# Patient Record
Sex: FEMALE | Race: BLACK OR AFRICAN AMERICAN | NOT HISPANIC OR LATINO | URBAN - METROPOLITAN AREA
[De-identification: names, ages, dates, MRNs, and addresses within clinical notes are randomized per-mention and may not be internally consistent; named-entity substitution may affect disease eponyms.]

---

## 2018-05-03 ENCOUNTER — INPATIENT (INPATIENT)
Facility: HOSPITAL | Age: 53
LOS: 16 days | Discharge: HOME HEALTH | End: 2018-05-20
Attending: INTERNAL MEDICINE | Admitting: INTERNAL MEDICINE
Payer: MEDICARE

## 2018-05-03 VITALS
SYSTOLIC BLOOD PRESSURE: 139 MMHG | HEART RATE: 116 BPM | TEMPERATURE: 98 F | DIASTOLIC BLOOD PRESSURE: 93 MMHG | OXYGEN SATURATION: 95 % | RESPIRATION RATE: 18 BRPM

## 2018-05-03 DIAGNOSIS — M21.959 UNSPECIFIED ACQUIRED DEFORMITY OF UNSPECIFIED THIGH: Chronic | ICD-10-CM

## 2018-05-03 DIAGNOSIS — I50.9 HEART FAILURE, UNSPECIFIED: ICD-10-CM

## 2018-05-03 DIAGNOSIS — E78.5 HYPERLIPIDEMIA, UNSPECIFIED: ICD-10-CM

## 2018-05-03 DIAGNOSIS — I10 ESSENTIAL (PRIMARY) HYPERTENSION: ICD-10-CM

## 2018-05-03 DIAGNOSIS — I82.90 ACUTE EMBOLISM AND THROMBOSIS OF UNSPECIFIED VEIN: ICD-10-CM

## 2018-05-03 DIAGNOSIS — S82.899A OTHER FRACTURE OF UNSPECIFIED LOWER LEG, INITIAL ENCOUNTER FOR CLOSED FRACTURE: Chronic | ICD-10-CM

## 2018-05-03 DIAGNOSIS — E11.9 TYPE 2 DIABETES MELLITUS WITHOUT COMPLICATIONS: ICD-10-CM

## 2018-05-03 LAB
ALBUMIN SERPL ELPH-MCNC: 2.6 G/DL — LOW (ref 3.3–5)
ALP SERPL-CCNC: 171 U/L — HIGH (ref 40–120)
ALT FLD-CCNC: 9 U/L — SIGNIFICANT CHANGE UP (ref 4–33)
ANISOCYTOSIS BLD QL: SLIGHT — SIGNIFICANT CHANGE UP
APTT BLD: 29.5 SEC — SIGNIFICANT CHANGE UP (ref 27.5–37.4)
AST SERPL-CCNC: 14 U/L — SIGNIFICANT CHANGE UP (ref 4–32)
BASE EXCESS BLDV CALC-SCNC: 2.8 MMOL/L — SIGNIFICANT CHANGE UP
BASOPHILS # BLD AUTO: 0.02 K/UL — SIGNIFICANT CHANGE UP (ref 0–0.2)
BASOPHILS NFR BLD AUTO: 0.4 % — SIGNIFICANT CHANGE UP (ref 0–2)
BASOPHILS NFR SPEC: 0 % — SIGNIFICANT CHANGE UP (ref 0–2)
BILIRUB SERPL-MCNC: 0.7 MG/DL — SIGNIFICANT CHANGE UP (ref 0.2–1.2)
BLOOD GAS VENOUS - CREATININE: 1.06 MG/DL — SIGNIFICANT CHANGE UP (ref 0.5–1.3)
BUN SERPL-MCNC: 24 MG/DL — HIGH (ref 7–23)
CALCIUM SERPL-MCNC: 8.8 MG/DL — SIGNIFICANT CHANGE UP (ref 8.4–10.5)
CHLORIDE BLDV-SCNC: 105 MMOL/L — SIGNIFICANT CHANGE UP (ref 96–108)
CHLORIDE SERPL-SCNC: 97 MMOL/L — LOW (ref 98–107)
CK MB BLD-MCNC: 2.05 NG/ML — SIGNIFICANT CHANGE UP (ref 1–4.7)
CK MB BLD-MCNC: SIGNIFICANT CHANGE UP (ref 0–2.5)
CK SERPL-CCNC: 85 U/L — SIGNIFICANT CHANGE UP (ref 25–170)
CO2 SERPL-SCNC: 28 MMOL/L — SIGNIFICANT CHANGE UP (ref 22–31)
CREAT SERPL-MCNC: 1.12 MG/DL — SIGNIFICANT CHANGE UP (ref 0.5–1.3)
EOSINOPHIL # BLD AUTO: 0.04 K/UL — SIGNIFICANT CHANGE UP (ref 0–0.5)
EOSINOPHIL NFR BLD AUTO: 0.8 % — SIGNIFICANT CHANGE UP (ref 0–6)
EOSINOPHIL NFR FLD: 0 % — SIGNIFICANT CHANGE UP (ref 0–6)
GAS PNL BLDV: 129 MMOL/L — LOW (ref 136–146)
GIANT PLATELETS BLD QL SMEAR: PRESENT — SIGNIFICANT CHANGE UP
GLUCOSE BLDC GLUCOMTR-MCNC: 160 MG/DL — HIGH (ref 70–99)
GLUCOSE BLDC GLUCOMTR-MCNC: 237 MG/DL — HIGH (ref 70–99)
GLUCOSE BLDV-MCNC: 307 — HIGH (ref 70–99)
GLUCOSE SERPL-MCNC: 331 MG/DL — HIGH (ref 70–99)
HBA1C BLD-MCNC: 14.3 % — HIGH (ref 4–5.6)
HCO3 BLDV-SCNC: 25 MMOL/L — SIGNIFICANT CHANGE UP (ref 20–27)
HCT VFR BLD CALC: 42.1 % — SIGNIFICANT CHANGE UP (ref 34.5–45)
HCT VFR BLDV CALC: 39.3 % — SIGNIFICANT CHANGE UP (ref 34.5–45)
HGB BLD-MCNC: 12.2 G/DL — SIGNIFICANT CHANGE UP (ref 11.5–15.5)
HGB BLDV-MCNC: 12.8 G/DL — SIGNIFICANT CHANGE UP (ref 11.5–15.5)
HYPOCHROMIA BLD QL: SLIGHT — SIGNIFICANT CHANGE UP
IMM GRANULOCYTES # BLD AUTO: 0.04 # — SIGNIFICANT CHANGE UP
IMM GRANULOCYTES NFR BLD AUTO: 0.8 % — SIGNIFICANT CHANGE UP (ref 0–1.5)
INR BLD: 1.12 — SIGNIFICANT CHANGE UP (ref 0.88–1.17)
LACTATE BLDV-MCNC: 1.2 MMOL/L — SIGNIFICANT CHANGE UP (ref 0.5–2)
LYMPHOCYTES # BLD AUTO: 0.74 K/UL — LOW (ref 1–3.3)
LYMPHOCYTES # BLD AUTO: 15.7 % — SIGNIFICANT CHANGE UP (ref 13–44)
LYMPHOCYTES NFR SPEC AUTO: 12.2 % — LOW (ref 13–44)
MAGNESIUM SERPL-MCNC: 1.6 MG/DL — SIGNIFICANT CHANGE UP (ref 1.6–2.6)
MCHC RBC-ENTMCNC: 20.8 PG — LOW (ref 27–34)
MCHC RBC-ENTMCNC: 29 % — LOW (ref 32–36)
MCV RBC AUTO: 71.8 FL — LOW (ref 80–100)
METAMYELOCYTES # FLD: 0.9 % — SIGNIFICANT CHANGE UP (ref 0–1)
MICROCYTES BLD QL: SLIGHT — SIGNIFICANT CHANGE UP
MONOCYTES # BLD AUTO: 0.49 K/UL — SIGNIFICANT CHANGE UP (ref 0–0.9)
MONOCYTES NFR BLD AUTO: 10.4 % — SIGNIFICANT CHANGE UP (ref 2–14)
MONOCYTES NFR BLD: 10.4 % — HIGH (ref 2–9)
NEUTROPHIL AB SER-ACNC: 67.8 % — SIGNIFICANT CHANGE UP (ref 43–77)
NEUTROPHILS # BLD AUTO: 3.39 K/UL — SIGNIFICANT CHANGE UP (ref 1.8–7.4)
NEUTROPHILS NFR BLD AUTO: 71.9 % — SIGNIFICANT CHANGE UP (ref 43–77)
NEUTS BAND # BLD: 2.6 % — SIGNIFICANT CHANGE UP (ref 0–6)
NRBC # FLD: 0 — SIGNIFICANT CHANGE UP
NT-PROBNP SERPL-SCNC: SIGNIFICANT CHANGE UP PG/ML
OVALOCYTES BLD QL SMEAR: SIGNIFICANT CHANGE UP
PCO2 BLDV: 50 MMHG — SIGNIFICANT CHANGE UP (ref 41–51)
PH BLDV: 7.36 PH — SIGNIFICANT CHANGE UP (ref 7.32–7.43)
PLATELET # BLD AUTO: 328 K/UL — SIGNIFICANT CHANGE UP (ref 150–400)
PLATELET COUNT - ESTIMATE: NORMAL — SIGNIFICANT CHANGE UP
PMV BLD: 9.4 FL — SIGNIFICANT CHANGE UP (ref 7–13)
PO2 BLDV: < 24 MMHG — LOW (ref 35–40)
POIKILOCYTOSIS BLD QL AUTO: SLIGHT — SIGNIFICANT CHANGE UP
POTASSIUM BLDV-SCNC: 4.6 MMOL/L — HIGH (ref 3.4–4.5)
POTASSIUM SERPL-MCNC: 4.8 MMOL/L — SIGNIFICANT CHANGE UP (ref 3.5–5.3)
POTASSIUM SERPL-SCNC: 4.8 MMOL/L — SIGNIFICANT CHANGE UP (ref 3.5–5.3)
PROT SERPL-MCNC: 6.6 G/DL — SIGNIFICANT CHANGE UP (ref 6–8.3)
PROTHROM AB SERPL-ACNC: 12.5 SEC — SIGNIFICANT CHANGE UP (ref 9.8–13.1)
RBC # BLD: 5.86 M/UL — HIGH (ref 3.8–5.2)
RBC # FLD: 15.9 % — HIGH (ref 10.3–14.5)
REVIEW TO FOLLOW: YES — SIGNIFICANT CHANGE UP
SAO2 % BLDV: 25.6 % — LOW (ref 60–85)
SODIUM SERPL-SCNC: 136 MMOL/L — SIGNIFICANT CHANGE UP (ref 135–145)
TROPONIN T SERPL-MCNC: < 0.06 NG/ML — SIGNIFICANT CHANGE UP (ref 0–0.06)
TROPONIN T SERPL-MCNC: < 0.06 NG/ML — SIGNIFICANT CHANGE UP (ref 0–0.06)
TSH SERPL-MCNC: 6.4 UIU/ML — HIGH (ref 0.27–4.2)
VARIANT LYMPHS # BLD: 6.1 % — SIGNIFICANT CHANGE UP
WBC # BLD: 4.72 K/UL — SIGNIFICANT CHANGE UP (ref 3.8–10.5)
WBC # FLD AUTO: 4.72 K/UL — SIGNIFICANT CHANGE UP (ref 3.8–10.5)

## 2018-05-03 PROCEDURE — 93970 EXTREMITY STUDY: CPT | Mod: 26

## 2018-05-03 PROCEDURE — 71045 X-RAY EXAM CHEST 1 VIEW: CPT | Mod: 26

## 2018-05-03 RX ORDER — FUROSEMIDE 40 MG
40 TABLET ORAL ONCE
Qty: 0 | Refills: 0 | Status: COMPLETED | OUTPATIENT
Start: 2018-05-03 | End: 2018-05-03

## 2018-05-03 RX ORDER — RIVAROXABAN 15 MG-20MG
20 KIT ORAL EVERY 24 HOURS
Qty: 0 | Refills: 0 | Status: DISCONTINUED | OUTPATIENT
Start: 2018-05-03 | End: 2018-05-05

## 2018-05-03 RX ORDER — FERROUS SULFATE 325(65) MG
325 TABLET ORAL
Qty: 0 | Refills: 0 | Status: DISCONTINUED | OUTPATIENT
Start: 2018-05-03 | End: 2018-05-20

## 2018-05-03 RX ORDER — ACETAMINOPHEN 500 MG
650 TABLET ORAL EVERY 6 HOURS
Qty: 0 | Refills: 0 | Status: DISCONTINUED | OUTPATIENT
Start: 2018-05-03 | End: 2018-05-20

## 2018-05-03 RX ORDER — INSULIN LISPRO 100/ML
VIAL (ML) SUBCUTANEOUS AT BEDTIME
Qty: 0 | Refills: 0 | Status: DISCONTINUED | OUTPATIENT
Start: 2018-05-03 | End: 2018-05-04

## 2018-05-03 RX ORDER — DEXTROSE 50 % IN WATER 50 %
25 SYRINGE (ML) INTRAVENOUS ONCE
Qty: 0 | Refills: 0 | Status: DISCONTINUED | OUTPATIENT
Start: 2018-05-03 | End: 2018-05-04

## 2018-05-03 RX ORDER — INSULIN LISPRO 100/ML
6 VIAL (ML) SUBCUTANEOUS ONCE
Qty: 0 | Refills: 0 | Status: COMPLETED | OUTPATIENT
Start: 2018-05-03 | End: 2018-05-03

## 2018-05-03 RX ORDER — METOPROLOL TARTRATE 50 MG
25 TABLET ORAL DAILY
Qty: 0 | Refills: 0 | Status: DISCONTINUED | OUTPATIENT
Start: 2018-05-03 | End: 2018-05-05

## 2018-05-03 RX ORDER — ATORVASTATIN CALCIUM 80 MG/1
40 TABLET, FILM COATED ORAL AT BEDTIME
Qty: 0 | Refills: 0 | Status: DISCONTINUED | OUTPATIENT
Start: 2018-05-03 | End: 2018-05-20

## 2018-05-03 RX ORDER — GLUCAGON INJECTION, SOLUTION 0.5 MG/.1ML
1 INJECTION, SOLUTION SUBCUTANEOUS ONCE
Qty: 0 | Refills: 0 | Status: DISCONTINUED | OUTPATIENT
Start: 2018-05-03 | End: 2018-05-04

## 2018-05-03 RX ORDER — SODIUM CHLORIDE 9 MG/ML
1000 INJECTION, SOLUTION INTRAVENOUS
Qty: 0 | Refills: 0 | Status: DISCONTINUED | OUTPATIENT
Start: 2018-05-03 | End: 2018-05-04

## 2018-05-03 RX ORDER — DEXTROSE 50 % IN WATER 50 %
1 SYRINGE (ML) INTRAVENOUS ONCE
Qty: 0 | Refills: 0 | Status: DISCONTINUED | OUTPATIENT
Start: 2018-05-03 | End: 2018-05-04

## 2018-05-03 RX ORDER — LOSARTAN POTASSIUM 100 MG/1
25 TABLET, FILM COATED ORAL DAILY
Qty: 0 | Refills: 0 | Status: DISCONTINUED | OUTPATIENT
Start: 2018-05-03 | End: 2018-05-05

## 2018-05-03 RX ORDER — FUROSEMIDE 40 MG
40 TABLET ORAL
Qty: 0 | Refills: 0 | Status: DISCONTINUED | OUTPATIENT
Start: 2018-05-03 | End: 2018-05-05

## 2018-05-03 RX ORDER — ASPIRIN/CALCIUM CARB/MAGNESIUM 324 MG
81 TABLET ORAL DAILY
Qty: 0 | Refills: 0 | Status: DISCONTINUED | OUTPATIENT
Start: 2018-05-03 | End: 2018-05-20

## 2018-05-03 RX ORDER — INSULIN LISPRO 100/ML
VIAL (ML) SUBCUTANEOUS
Qty: 0 | Refills: 0 | Status: DISCONTINUED | OUTPATIENT
Start: 2018-05-03 | End: 2018-05-04

## 2018-05-03 RX ORDER — FLUTICASONE PROPIONATE 220 MCG
1 AEROSOL WITH ADAPTER (GRAM) INHALATION
Qty: 0 | Refills: 0 | Status: DISCONTINUED | OUTPATIENT
Start: 2018-05-03 | End: 2018-05-20

## 2018-05-03 RX ORDER — DEXTROSE 50 % IN WATER 50 %
12.5 SYRINGE (ML) INTRAVENOUS ONCE
Qty: 0 | Refills: 0 | Status: DISCONTINUED | OUTPATIENT
Start: 2018-05-03 | End: 2018-05-04

## 2018-05-03 RX ADMIN — ATORVASTATIN CALCIUM 40 MILLIGRAM(S): 80 TABLET, FILM COATED ORAL at 22:56

## 2018-05-03 RX ADMIN — Medication 20 MILLIGRAM(S): at 18:18

## 2018-05-03 RX ADMIN — Medication 1: at 18:24

## 2018-05-03 RX ADMIN — Medication 40 MILLIGRAM(S): at 22:56

## 2018-05-03 RX ADMIN — Medication 6 UNIT(S): at 14:00

## 2018-05-03 RX ADMIN — Medication 1 PUFF(S): at 22:46

## 2018-05-03 RX ADMIN — Medication 40 MILLIGRAM(S): at 10:56

## 2018-05-03 RX ADMIN — RIVAROXABAN 20 MILLIGRAM(S): KIT at 18:18

## 2018-05-03 RX ADMIN — Medication 325 MILLIGRAM(S): at 18:18

## 2018-05-03 NOTE — H&P ADULT - HISTORY OF PRESENT ILLNESS
53 y/o female, with a PmHx of CHF stage III, on home O2, Sarcoidosis, ? blood clot in heart on Xarelto, DM, presented with sob and LE edema/pain. Pt states for the past 3 weeks she has been having LE edema/pain and sob but in the last 24 hours was started to get worse.  Pt states she was recently admitted to [facility] in February for a similar complaint and was eating much so she states her normal weight used to be 165lb but she has lost a lot of weight and now weights about 110. She states she has been having a  lot pain to both her legs so she decided to come to [facility] ED today for an evaluation. She denies any fever, chills, chest pain, HA, dizziness, blurred vision, n/v, abd pain, recent travel. The only complaints she has been having are nasal congestion, cold intolerance, diaphoresis, bilateral leg pain and sob (more than normal because she is on o2 via nasal cannula at home. She appears comfortable at this time. She is being admitted to telemetry for acute on chronic diastolic heart failure and chest pain r/o acs.

## 2018-05-03 NOTE — H&P ADULT - RS GEN PE MLT RESP DETAILS PC
respirations non-labored/no chest wall tenderness/airway patent/good air movement/clear to auscultation bilaterally/breath sounds equal

## 2018-05-03 NOTE — H&P ADULT - ATTENDING COMMENTS
Pt seen and examined at bedside Agree with assessment and plan as above   Admitted with acute on chronic systolic heart failure   IV lasix   TTE   For now continue with all home meds   Obtain outside cardiac records  Pulm consult

## 2018-05-03 NOTE — H&P ADULT - PROBLEM SELECTOR PLAN 1
ekg/telemetry, f/u ce x 2, mg, tsh, echo, lasix 40mg iv bid, strict I & O's, daily wts, fluid restriction, probnp, pt c/s ekg/telemetry, f/u ce x 2, mg, tsh, echo, lasix 40mg iv bid, strict I & O's, daily wts, fluid restriction, probnp,  Check TTE

## 2018-05-03 NOTE — H&P ADULT - FAMILY HISTORY
Mother  Still living? No  Family history of colon cancer in mother, Age at diagnosis: Age Unknown  Family history of breast cancer in mother, Age at diagnosis: Age Unknown     Father  Still living? Unknown  Family history of diabetes mellitus, Age at diagnosis: Age Unknown     Sibling  Still living? Yes, Estimated age: Age Unknown  Family history of diabetes mellitus, Age at diagnosis: Age Unknown  Family history of hypertension, Age at diagnosis: Age Unknown

## 2018-05-03 NOTE — H&P ADULT - NEGATIVE OPHTHALMOLOGIC SYMPTOMS
no diplopia/no blurred vision L/no loss of vision L/no loss of vision R/no photophobia/no blurred vision R

## 2018-05-03 NOTE — H&P ADULT - NSHPPHYSICALEXAM_GEN_ALL_CORE
Vital Signs Last 24 Hrs  T(C): 37 (03 May 2018 16:39), Max: 37.1 (03 May 2018 15:01)  T(F): 98.6 (03 May 2018 16:39), Max: 98.7 (03 May 2018 15:01)  HR: 98 (03 May 2018 16:39) (98 - 116)  BP: 112/81 (03 May 2018 16:39) (98/66 - 139/93)  BP(mean): --  RR: 16 (03 May 2018 16:39) (16 - 18)  SpO2: 99% (03 May 2018 16:39) (95% - 99%)    EKG: Sinus tach @ 115

## 2018-05-03 NOTE — H&P ADULT - ASSESSMENT
51 y/o female, with a PmHx of CHF stage III, on home O2, Sarcoidosis, ? blood clot in heart on Xarelto, DM, presented with sob and LE edema/pain. She is being admitted to telemetry for acute on chronic systolic heart failure.

## 2018-05-03 NOTE — H&P ADULT - PMH
Anemia    CHF (congestive heart failure)  Stage III, on home O2 @ 2lpm  Diabetes    Hyperlipidemia    Hypertension    Sarcoidosis    Thrombus  ? Thrombus in heart - on Xarelto

## 2018-05-03 NOTE — ED PROVIDER NOTE - ATTENDING CONTRIBUTION TO CARE
53 yo F with history of CHF likely stage III given history of possible defib placement on xarelto, sarcoidosis, dm, presenting with shortness of breath (on home O2 at baseline) and worsening lower extremity edema despite 80 lasix PO daily. Pos pack no chest pain  pt does not know reason for xarelto.  pe ncat  lung rales  le with pitting edema  imp decompensated chf failing outpt therapy.  Will admit, consult cards, optimization, and evaluation for ppm placement

## 2018-05-03 NOTE — ED PROVIDER NOTE - MEDICAL DECISION MAKING DETAILS
51 yo F with likely decompensated heart failure - labs, lasix, ekg, cxr and BLE doppler for swelling and likely admission

## 2018-05-03 NOTE — ED PROVIDER NOTE - OBJECTIVE STATEMENT
53 yo F with history of CHF likely stage III given history of possible defib placement on xarelto, sarcoidosis, dm, presenting with shortness of breath (on home O2 at baseline) and worsening lower extremity edema despite 80 lasix PO daily. Cardiologist is Dr. Sanford. Denies fevers, chills, cough, rhinorrhea, otorrhea, otalgia, nausea, vomiting, constipation, diarrhea, chest pain, or changes in urinary habits.

## 2018-05-03 NOTE — H&P ADULT - NSHPLABSRESULTS_GEN_ALL_CORE
LABS:    CARDIAC MARKERS:  CARDIAC MARKERS ( 03 May 2018 16:59 )  x     / < 0.06 ng/mL / 85 u/L / 2.05 ng/mL / x      CARDIAC MARKERS ( 03 May 2018 10:22 )  x     / < 0.06 ng/mL / x     / x     / x                                    11.8   6.14  )-----------( 298      ( 04 May 2018 06:45 )             40.7     05-04    130<L>  |  93<L>  |  34<H>  ----------------------------<  678<HH>  5.1   |  24  |  1.17    Ca    8.3<L>      04 May 2018 06:45  Mg     1.6     05-03    TPro  6.6  /  Alb  2.6<L>  /  TBili  0.7  /  DBili  x   /  AST  14  /  ALT  9   /  AlkPhos  171<H>  05-03    proBNP: Serum Pro-Brain Natriuretic Peptide: 7826 pg/mL (05-04 @ 06:45)    Lipid Profile:   HgA1c: Hemoglobin A1C, Whole Blood: 14.3 % (05-04 @ 06:45)  Hemoglobin A1C, Whole Blood: 14.3 % (05-03 @ 16:59)    TSH: Thyroid Stimulating Hormone, Serum: 6.40 uIU/mL (05-03 @ 16:59)      3-5/LPF

## 2018-05-03 NOTE — H&P ADULT - PSH
Ankle fracture  Left Ankle - with plate and screws placed  Hip deformity  left hip had a pin placed to hold bone in place after a fall

## 2018-05-03 NOTE — ED ADULT TRIAGE NOTE - CHIEF COMPLAINT QUOTE
Pt with bi-lateral leg edema and pain to knees having difficulty walking x several weeks worse now. Pt also reports edema to vaginal area.   Pt with increased sob with activity. Pt is on lasix.

## 2018-05-03 NOTE — H&P ADULT - NSHPREVIEWOFSYSTEMS_GEN_ALL_CORE
REVIEW OF SYSTEMS:    CONSTITUTIONAL: + weakness, fevers or chills  EYES/ENT: No visual changes;  No vertigo or throat pain   NECK: No pain or stiffness  RESPIRATORY: + cough, wheezing, hemoptysis; + shortness of breath  CARDIOVASCULAR: No chest pain or palpitations  GASTROINTESTINAL: No abdominal or epigastric pain. No nausea, vomiting, or hematemesis; No diarrhea or constipation. No melena or hematochezia.  GENITOURINARY: No dysuria, frequency or hematuria  NEUROLOGICAL: No numbness or weakness  SKIN: No itching, burning, rashes, or lesions   All other review of systems is negative unless indicated above.

## 2018-05-03 NOTE — ED ADULT NURSE NOTE - OBJECTIVE STATEMENT
pt received to room 13 via wheelchair. patient alert and oriented x4. afebrile. lungs has b.l lower lobe crackles. pt c.o of having sob with excretion and increase in swelling of b.l LE for the past 3 weeks. pt as b.l +3 pitting edema on the b.l LE, the right leg more swollen than left. pt c.o of having pain in the right leg. positive pedal pulses. limited LE ROM. Pt placed on continuous tele monitoring.pt is afib on cm,pt on Xarelto.   pt has no complaints of CP or SOB at the moment. md at bedside for eval. will continue to monitor

## 2018-05-03 NOTE — ED ADULT NURSE REASSESSMENT NOTE - NS ED NURSE REASSESS COMMENT FT1
Report received from LAUREN Paul. No acute distress at present. Respirations are even and unlabored. Pt. received with 20 gauge IV in right ac. Pt. is admitted to Tele, awaiting bed assignment. Report given to [facility] LAUREN Vazquez. Pt. will be transported to [facility].

## 2018-05-04 DIAGNOSIS — E78.5 HYPERLIPIDEMIA, UNSPECIFIED: ICD-10-CM

## 2018-05-04 DIAGNOSIS — R94.6 ABNORMAL RESULTS OF THYROID FUNCTION STUDIES: ICD-10-CM

## 2018-05-04 DIAGNOSIS — E11.65 TYPE 2 DIABETES MELLITUS WITH HYPERGLYCEMIA: ICD-10-CM

## 2018-05-04 DIAGNOSIS — R73.9 HYPERGLYCEMIA, UNSPECIFIED: ICD-10-CM

## 2018-05-04 LAB
APPEARANCE UR: CLEAR — SIGNIFICANT CHANGE UP
B-OH-BUTYR SERPL-SCNC: 0.7 MMOL/L — HIGH (ref 0–0.4)
BILIRUB UR-MCNC: NEGATIVE — SIGNIFICANT CHANGE UP
BLOOD UR QL VISUAL: HIGH
BUN SERPL-MCNC: 34 MG/DL — HIGH (ref 7–23)
CALCIUM SERPL-MCNC: 8.3 MG/DL — LOW (ref 8.4–10.5)
CHLORIDE SERPL-SCNC: 93 MMOL/L — LOW (ref 98–107)
CHOLEST SERPL-MCNC: 157 MG/DL — SIGNIFICANT CHANGE UP (ref 120–199)
CO2 SERPL-SCNC: 24 MMOL/L — SIGNIFICANT CHANGE UP (ref 22–31)
COLOR SPEC: YELLOW — SIGNIFICANT CHANGE UP
CREAT SERPL-MCNC: 1.17 MG/DL — SIGNIFICANT CHANGE UP (ref 0.5–1.3)
GLUCOSE BLDC GLUCOMTR-MCNC: 339 MG/DL — HIGH (ref 70–99)
GLUCOSE BLDC GLUCOMTR-MCNC: 360 MG/DL — HIGH (ref 70–99)
GLUCOSE BLDC GLUCOMTR-MCNC: 396 MG/DL — HIGH (ref 70–99)
GLUCOSE BLDC GLUCOMTR-MCNC: 504 MG/DL — CRITICAL HIGH (ref 70–99)
GLUCOSE BLDC GLUCOMTR-MCNC: >600 MG/DL — CRITICAL HIGH (ref 70–99)
GLUCOSE SERPL-MCNC: 678 MG/DL — CRITICAL HIGH (ref 70–99)
GLUCOSE UR-MCNC: 50 — SIGNIFICANT CHANGE UP
HBA1C BLD-MCNC: 14.3 % — HIGH (ref 4–5.6)
HCG UR-SCNC: NEGATIVE — SIGNIFICANT CHANGE UP
HCT VFR BLD CALC: 40.7 % — SIGNIFICANT CHANGE UP (ref 34.5–45)
HDLC SERPL-MCNC: 53 MG/DL — SIGNIFICANT CHANGE UP (ref 45–65)
HGB BLD-MCNC: 11.8 G/DL — SIGNIFICANT CHANGE UP (ref 11.5–15.5)
HYALINE CASTS # UR AUTO: HIGH (ref 0–?)
KETONES UR-MCNC: NEGATIVE — SIGNIFICANT CHANGE UP
LEUKOCYTE ESTERASE UR-ACNC: NEGATIVE — SIGNIFICANT CHANGE UP
LIPID PNL WITH DIRECT LDL SERPL: 95 MG/DL — SIGNIFICANT CHANGE UP
MCHC RBC-ENTMCNC: 20.8 PG — LOW (ref 27–34)
MCHC RBC-ENTMCNC: 29 % — LOW (ref 32–36)
MCV RBC AUTO: 71.7 FL — LOW (ref 80–100)
MUCOUS THREADS # UR AUTO: SIGNIFICANT CHANGE UP
NITRITE UR-MCNC: NEGATIVE — SIGNIFICANT CHANGE UP
NRBC # FLD: 0 — SIGNIFICANT CHANGE UP
NT-PROBNP SERPL-SCNC: 7826 PG/ML — SIGNIFICANT CHANGE UP
PH UR: 6.5 — SIGNIFICANT CHANGE UP (ref 4.6–8)
PLATELET # BLD AUTO: 298 K/UL — SIGNIFICANT CHANGE UP (ref 150–400)
PMV BLD: 10.3 FL — SIGNIFICANT CHANGE UP (ref 7–13)
POTASSIUM SERPL-MCNC: 5.1 MMOL/L — SIGNIFICANT CHANGE UP (ref 3.5–5.3)
POTASSIUM SERPL-SCNC: 5.1 MMOL/L — SIGNIFICANT CHANGE UP (ref 3.5–5.3)
PROT UR-MCNC: >600 MG/DL — SIGNIFICANT CHANGE UP
RBC # BLD: 5.68 M/UL — HIGH (ref 3.8–5.2)
RBC # FLD: 15.9 % — HIGH (ref 10.3–14.5)
RBC CASTS # UR COMP ASSIST: SIGNIFICANT CHANGE UP (ref 0–?)
SODIUM SERPL-SCNC: 130 MMOL/L — LOW (ref 135–145)
SP GR SPEC: 1.02 — SIGNIFICANT CHANGE UP (ref 1–1.04)
SP GR UR: 1.02 — SIGNIFICANT CHANGE UP (ref 1–1.03)
SPECIMEN SOURCE: SIGNIFICANT CHANGE UP
SQUAMOUS # UR AUTO: SIGNIFICANT CHANGE UP
TRIGL SERPL-MCNC: 111 MG/DL — SIGNIFICANT CHANGE UP (ref 10–149)
UROBILINOGEN FLD QL: NORMAL MG/DL — SIGNIFICANT CHANGE UP
WBC # BLD: 6.14 K/UL — SIGNIFICANT CHANGE UP (ref 3.8–10.5)
WBC # FLD AUTO: 6.14 K/UL — SIGNIFICANT CHANGE UP (ref 3.8–10.5)
WBC CLUMPS #/AREA URNS HPF: PRESENT — HIGH (ref 0–?)
WBC UR QL: SIGNIFICANT CHANGE UP (ref 0–?)

## 2018-05-04 PROCEDURE — 99223 1ST HOSP IP/OBS HIGH 75: CPT | Mod: GC

## 2018-05-04 PROCEDURE — 71045 X-RAY EXAM CHEST 1 VIEW: CPT | Mod: 26

## 2018-05-04 RX ORDER — SODIUM CHLORIDE 9 MG/ML
1000 INJECTION, SOLUTION INTRAVENOUS
Qty: 0 | Refills: 0 | Status: DISCONTINUED | OUTPATIENT
Start: 2018-05-04 | End: 2018-05-20

## 2018-05-04 RX ORDER — DEXTROSE 50 % IN WATER 50 %
25 SYRINGE (ML) INTRAVENOUS ONCE
Qty: 0 | Refills: 0 | Status: DISCONTINUED | OUTPATIENT
Start: 2018-05-04 | End: 2018-05-20

## 2018-05-04 RX ORDER — INSULIN LISPRO 100/ML
10 VIAL (ML) SUBCUTANEOUS
Qty: 0 | Refills: 0 | Status: DISCONTINUED | OUTPATIENT
Start: 2018-05-04 | End: 2018-05-05

## 2018-05-04 RX ORDER — INSULIN LISPRO 100/ML
VIAL (ML) SUBCUTANEOUS AT BEDTIME
Qty: 0 | Refills: 0 | Status: DISCONTINUED | OUTPATIENT
Start: 2018-05-04 | End: 2018-05-18

## 2018-05-04 RX ORDER — INSULIN GLARGINE 100 [IU]/ML
15 INJECTION, SOLUTION SUBCUTANEOUS AT BEDTIME
Qty: 0 | Refills: 0 | Status: DISCONTINUED | OUTPATIENT
Start: 2018-05-04 | End: 2018-05-05

## 2018-05-04 RX ORDER — DEXTROSE 50 % IN WATER 50 %
1 SYRINGE (ML) INTRAVENOUS ONCE
Qty: 0 | Refills: 0 | Status: DISCONTINUED | OUTPATIENT
Start: 2018-05-04 | End: 2018-05-20

## 2018-05-04 RX ORDER — INSULIN LISPRO 100/ML
10 VIAL (ML) SUBCUTANEOUS ONCE
Qty: 0 | Refills: 0 | Status: COMPLETED | OUTPATIENT
Start: 2018-05-04 | End: 2018-05-04

## 2018-05-04 RX ORDER — INSULIN LISPRO 100/ML
VIAL (ML) SUBCUTANEOUS
Qty: 0 | Refills: 0 | Status: DISCONTINUED | OUTPATIENT
Start: 2018-05-04 | End: 2018-05-18

## 2018-05-04 RX ORDER — DEXTROSE 50 % IN WATER 50 %
12.5 SYRINGE (ML) INTRAVENOUS ONCE
Qty: 0 | Refills: 0 | Status: DISCONTINUED | OUTPATIENT
Start: 2018-05-04 | End: 2018-05-20

## 2018-05-04 RX ORDER — ALBUTEROL 90 UG/1
2 AEROSOL, METERED ORAL EVERY 6 HOURS
Qty: 0 | Refills: 0 | Status: DISCONTINUED | OUTPATIENT
Start: 2018-05-04 | End: 2018-05-10

## 2018-05-04 RX ORDER — GLUCAGON INJECTION, SOLUTION 0.5 MG/.1ML
1 INJECTION, SOLUTION SUBCUTANEOUS ONCE
Qty: 0 | Refills: 0 | Status: DISCONTINUED | OUTPATIENT
Start: 2018-05-04 | End: 2018-05-20

## 2018-05-04 RX ADMIN — Medication 10 UNIT(S): at 08:24

## 2018-05-04 RX ADMIN — Medication 20 MILLIGRAM(S): at 05:37

## 2018-05-04 RX ADMIN — RIVAROXABAN 20 MILLIGRAM(S): KIT at 17:11

## 2018-05-04 RX ADMIN — Medication 40 MILLIGRAM(S): at 05:36

## 2018-05-04 RX ADMIN — Medication 40 MILLIGRAM(S): at 17:11

## 2018-05-04 RX ADMIN — Medication 10 UNIT(S): at 17:56

## 2018-05-04 RX ADMIN — ATORVASTATIN CALCIUM 40 MILLIGRAM(S): 80 TABLET, FILM COATED ORAL at 21:07

## 2018-05-04 RX ADMIN — Medication 10 UNIT(S): at 09:45

## 2018-05-04 RX ADMIN — Medication 25 MILLIGRAM(S): at 05:37

## 2018-05-04 RX ADMIN — Medication 325 MILLIGRAM(S): at 17:11

## 2018-05-04 RX ADMIN — Medication 4: at 22:37

## 2018-05-04 RX ADMIN — Medication 1 PUFF(S): at 09:46

## 2018-05-04 RX ADMIN — INSULIN GLARGINE 15 UNIT(S): 100 INJECTION, SOLUTION SUBCUTANEOUS at 22:37

## 2018-05-04 RX ADMIN — Medication 325 MILLIGRAM(S): at 05:36

## 2018-05-04 RX ADMIN — Medication 10: at 17:55

## 2018-05-04 RX ADMIN — LOSARTAN POTASSIUM 25 MILLIGRAM(S): 100 TABLET, FILM COATED ORAL at 05:37

## 2018-05-04 RX ADMIN — Medication 1 PUFF(S): at 21:07

## 2018-05-04 RX ADMIN — Medication 5: at 13:38

## 2018-05-04 RX ADMIN — Medication 20 MILLIGRAM(S): at 17:11

## 2018-05-04 RX ADMIN — Medication 81 MILLIGRAM(S): at 13:38

## 2018-05-04 NOTE — CONSULT NOTE ADULT - ASSESSMENT
53 y/o female, with a PMH uncontrolled DM2 c/b steroid induced hyperglycemia, HLD, CHF on home oxygen, hx of sarcoidosis on chronic prednisone, here with acute decompensated heart failure, also with severe steroid induced hyperglycemia in setting of uncontrolled DM2

## 2018-05-04 NOTE — CONSULT NOTE ADULT - ATTENDING COMMENTS
52F uncontrolled DM2. Will need basal bolus insulin inpatient and upon dc. Patient has beliefs about alternative medicine treatments and will need to reinforce importance of insulin at dc. Stop oral agents at dc. 52F uncontrolled DM2 exacerbated by chronic prednsione for sarcoid. Will need basal bolus insulin inpatient and upon dc. Patient has beliefs about alternative medicine treatments and will need to reinforce importance of insulin at dc. Stop oral agents at dc.

## 2018-05-04 NOTE — PHYSICAL THERAPY INITIAL EVALUATION ADULT - GENERAL OBSERVATIONS, REHAB EVAL
Consult received, chart reviewed. Patient received seated in chair, NAD, +cardiac monitor. Patient agreed to Evaluation from Physical Therapist.

## 2018-05-04 NOTE — CONSULT NOTE ADULT - PROBLEM SELECTOR RECOMMENDATION 9
- patient with severe steroid induced hyperglycemia in setting of uncontrolled DM2  - start basal bolus insulin at 0.6 units/kg as patient is on steroids - start Humalog 10 units qac and Lantus 15 units qhs, with moderate correction scale  - consistent carb diet  - order nutrition consult  - will follow  - plan to discharge on basal bolus insulin only, doses to be determined  - she can follow up in office as outpatient - [phone]

## 2018-05-04 NOTE — CONSULT NOTE ADULT - PROBLEM SELECTOR RECOMMENDATION 2
- recommend start basal bolus insulin at 0.6 units/kr - start Humalog 10 units qac and Lantus 15 units qhs, with moderate correction scale  - consistent carb diet  - will follow

## 2018-05-04 NOTE — PHYSICAL THERAPY INITIAL EVALUATION ADULT - PERTINENT HX OF CURRENT PROBLEM, REHAB EVAL
Pt. admitted for LE edema/pain and SOB. Acute on chronic CHF. Per radiology report, bilateral LE venous dopplers negative for DVT. PMH of CHF stage III, on home O2, Sarcoidosis, ? blood clot in heart on Xarelto, DM.

## 2018-05-04 NOTE — CONSULT NOTE ADULT - SUBJECTIVE AND OBJECTIVE BOX
HPI:  53 y/o female, with a PMH uncontrolled DM2 c/b steroid induced hyperglycemia, HLD, CHF on home oxygen, hx of sarcoidosis on chronic prednisone presents with sob and LE edema/pain, now admitted for acute decompensated heart failure.    Endocrine history:  Patient states that she was diagnosed with DM2 years ago. She is a somewhat poor historian. States that she takes Metformin 500 mg BID. Was previously on Glipizide, but was then switched to another sulfonylurea which she is not taking. Does not check FS because she has not had a glucometer for a year. Saw optho two years ago, no known retinopathy. She does not have symptoms of neuropathy. Has CKD stage 2. Due to lower extremity edema, she is increasingly bedbound. States that she cannot leave her home to buy her food and therefore only eats about one meal a day - she waits until her son comes home and then orders out (Chinese food, etc). Will sometimes have eggs and toast in the morning. States that she knows how to regulate her blood glucose levels by using turmeric (?). Does not have blurry vision, polyuria, polydipsia. Of note, she also has a history of sarcoidosis and has been taking prednisone daily for a year. Currently takes prednisone 20 mg daily. Does not have an endocrinologist, only a PMD.         PAST MEDICAL & SURGICAL HISTORY:  Thrombus: ? Thrombus in heart - on Xarelto  Hyperlipidemia  Anemia  Hypertension  Sarcoidosis  CHF (congestive heart failure): Stage III, on home O2 @ 2lpm  Diabetes  Ankle fracture: Left Ankle - with plate and screws placed  Hip deformity: left hip had a pin placed to hold bone in place after a fall      FAMILY HISTORY:  Family history of hypertension (Sibling): Siblings  Family history of diabetes mellitus (Father, Sibling): Father, Siblings  Family history of breast cancer in mother (Mother): Mother  Family history of colon cancer in mother (Mother): Mother      Social History:  No cigarette use  Social alcohol use  Lives with son  Single    Outpatient Medications:  · 	Metoprolol Succinate ER 25 mg oral tablet, extended release: 1 tab(s) orally once a day, Last Dose Taken:    · 	Xarelto 20 mg oral tablet: 1 tab(s) orally once a day (in the evening), Last Dose Taken:    · 	Flovent 220 mcg/inh inhalation aerosol with adapter: 1 puff(s) inhaled 2 times a day, Last Dose Taken:    · 	Aspirin Enteric Coated 81 mg oral delayed release tablet: 1 tab(s) orally once a day, Last Dose Taken:    · 	rosuvastatin 10 mg oral tablet: 1 tab(s) orally once a day (at bedtime), Last Dose Taken:    · 	predniSONE 20 mg oral tablet: 1 tab(s) orally 2 times a day, Last Dose Taken:    · 	azelastine 137 mcg/inh (0.1%) nasal spray: 2 spray(s) nasal 2 times a day, Last Dose Taken:    · 	furosemide 80 mg oral tablet: 1 tab(s) orally once a day, Last Dose Taken:    · 	sulfamethoxazole-trimethoprim DS: 1 tab(s) orally Monday, Wednesday, and Friday  · 	metFORMIN 500 mg oral tablet: 1 tab(s) orally 3 times a day, Last Dose Taken:    · 	losartan 25 mg oral tablet: 1 tab(s) orally once a day, Last Dose Taken:    · 	ferrous sulfate 325 mg (65 mg elemental iron) oral tablet: 1 tab(s) orally 2 times a day, Last Dose Taken:      MEDICATIONS  (STANDING):  aspirin enteric coated 81 milliGRAM(s) Oral daily  atorvastatin 40 milliGRAM(s) Oral at bedtime  dextrose 5%. 1000 milliLiter(s) (50 mL/Hr) IV Continuous <Continuous>  dextrose 50% Injectable 12.5 Gram(s) IV Push once  dextrose 50% Injectable 25 Gram(s) IV Push once  dextrose 50% Injectable 25 Gram(s) IV Push once  ferrous    sulfate 325 milliGRAM(s) Oral two times a day  fluticasone propionate   220 MICROgram(s) HFA Inhaler 1 Puff(s) Inhalation two times a day  furosemide   Injectable 40 milliGRAM(s) IV Push two times a day  insulin lispro (HumaLOG) corrective regimen sliding scale   SubCutaneous three times a day before meals  insulin lispro (HumaLOG) corrective regimen sliding scale   SubCutaneous at bedtime  losartan 25 milliGRAM(s) Oral daily  metoprolol succinate ER 25 milliGRAM(s) Oral daily  predniSONE   Tablet 20 milliGRAM(s) Oral two times a day  rivaroxaban 20 milliGRAM(s) Oral every 24 hours  trimethoprim  160 mG/sulfamethoxazole 800 mG 1 Tablet(s) Oral <User Schedule>    MEDICATIONS  (PRN):  acetaminophen   Tablet. 650 milliGRAM(s) Oral every 6 hours PRN mild, moderate pain  acetaminophen   Tablet. 650 milliGRAM(s) Oral every 6 hours PRN temp> 101  dextrose Gel 1 Dose(s) Oral once PRN Blood Glucose LESS THAN 70 milliGRAM(s)/deciliter  glucagon  Injectable 1 milliGRAM(s) IntraMuscular once PRN Glucose LESS THAN 70 milligrams/deciliter      Allergies  No Known Allergies    Review of Systems:  Constitutional: No fever  Eyes: No blurry vision  Neuro: No tremors  HEENT: No pain  Cardiovascular: No chest pain, palpitations; + lower extremity edema  Respiratory: + SOB, + cough  GI: No nausea, vomiting, abdominal pain  : No dysuria  Skin: no rash  Endocrine: no polyuria, polydipsia    ALL OTHER SYSTEMS REVIEWED AND NEGATIVE      PHYSICAL EXAM:  VITALS: T(C): 36.5 (05-04-18 @ 10:32)  T(F): 97.7 (05-04-18 @ 10:32), Max: 100.3 (05-03-18 @ 20:25)  HR: 83 (05-04-18 @ 10:32) (83 - 117)  BP: 106/77 (05-04-18 @ 10:32) (98/66 - 115/80)  RR:  (16 - 19)  SpO2:  (97% - 100%)  Wt(kg): --  GENERAL: NAD, well-developed  EYES: No proptosis, anicteric  HEENT:  Atraumatic, Normocephalic  THYROID: Normal size, no palpable nodules  RESPIRATORY: Clear to auscultation bilaterally; No rales, rhonchi, wheezing  CARDIOVASCULAR: Regular rate and rhythm; No murmurs; 3+ pitting edema bilaterally  GI: Soft, nontender, non distended, normal bowel sounds  SKIN: Dry, intact, No rashes   PSYCH: Alert and oriented x 3, reactive affect      POCT Blood Glucose.: 360 mg/dL (05-04-18 @ 12:43) H 5  POCT Blood Glucose.: 504 mg/dL (05-04-18 @ 11:01)  POCT Blood Glucose.: >600 mg/dL (05-04-18 @ 09:35) H 10  POCT Blood Glucose.: >600 mg/dL (05-04-18 @ 09:31)  POCT Blood Glucose.: >600 mg/dL (05-04-18 @ 08:53) H 10  POCT Blood Glucose.: >600 mg/dL (05-04-18 @ 07:57)  POCT Blood Glucose.: >600 mg/dL (05-04-18 @ 07:55)  POCT Blood Glucose.: 237 mg/dL (05-03-18 @ 21:45)  POCT Blood Glucose.: 160 mg/dL (05-03-18 @ 18:15)  POCT Blood Glucose.: 400 mg/dL (05-03-18 @ 13:44) H 6  POCT Blood Glucose.: 341 mg/dL (05-03-18 @ 09:54)                          11.8   6.14  )-----------( 298      ( 04 May 2018 06:45 )             40.7       05-04    130<L>  |  93<L>  |  34<H>  ----------------------------<  678<HH>  5.1   |  24  |  1.17    EGFR if : 62  EGFR if non : 54    Ca    8.3<L>      05-04  Mg     1.6     05-03    TPro  6.6  /  Alb  2.6<L>  /  TBili  0.7  /  DBili  x   /  AST  14  /  ALT  9   /  AlkPhos  171<H>  05-03      Thyroid Function Tests:  05-03 @ 16:59 TSH 6.40 FreeT4 -- T3 -- Anti TPO -- Anti Thyroglobulin Ab -- TSI --      Hemoglobin A1C, Whole Blood: 14.3 % <H> [4.0 - 5.6] (05-04-18 @ 06:45)  Hemoglobin A1C, Whole Blood: 14.3 % <H> [4.0 - 5.6] (05-03-18 @ 16:59)      05-04 Chol 157 LDL 95 HDL 53 Trig 111

## 2018-05-04 NOTE — PHYSICAL THERAPY INITIAL EVALUATION ADULT - PATIENT PROFILE REVIEW, REHAB EVAL
Noted elevated blood glucose. Spoke with RN Lennie Perales, reported pt. received coverage and ok to be seen for PT Evaluation. Pt. asymptomatic./yes

## 2018-05-04 NOTE — PROGRESS NOTE ADULT - ASSESSMENT
53 y/o female, with a PmHx of CHF stage III, on home O2, Sarcoidosis, ? blood clot in heart on Xarelto, DM, presented with sob and LE edema/pain. She is being admitted to telemetry for acute on chronic systolic heart failure.

## 2018-05-04 NOTE — PROGRESS NOTE ADULT - SUBJECTIVE AND OBJECTIVE BOX
Subjective: Patient seen and examined. No new events except as noted.   +swelling in legs   REVIEW OF SYSTEMS:    CONSTITUTIONAL: = weakness, fevers or chills  EYES/ENT: No visual changes;  No vertigo or throat pain   NECK: No pain or stiffness  RESPIRATORY: No cough, wheezing, hemoptysis; + shortness of breath  CARDIOVASCULAR: No chest pain or palpitations  GASTROINTESTINAL: No abdominal or epigastric pain. No nausea, vomiting, or hematemesis; No diarrhea or constipation. No melena or hematochezia.  GENITOURINARY: No dysuria, frequency or hematuria  NEUROLOGICAL: No numbness or weakness  SKIN: No itching, burning, rashes, or lesions   All other review of systems is negative unless indicated above.    MEDICATIONS:  MEDICATIONS  (STANDING):  aspirin enteric coated 81 milliGRAM(s) Oral daily  atorvastatin 40 milliGRAM(s) Oral at bedtime  dextrose 5%. 1000 milliLiter(s) (50 mL/Hr) IV Continuous <Continuous>  dextrose 50% Injectable 12.5 Gram(s) IV Push once  dextrose 50% Injectable 25 Gram(s) IV Push once  dextrose 50% Injectable 25 Gram(s) IV Push once  ferrous    sulfate 325 milliGRAM(s) Oral two times a day  fluticasone propionate   220 MICROgram(s) HFA Inhaler 1 Puff(s) Inhalation two times a day  furosemide   Injectable 40 milliGRAM(s) IV Push two times a day  insulin lispro (HumaLOG) corrective regimen sliding scale   SubCutaneous three times a day before meals  insulin lispro (HumaLOG) corrective regimen sliding scale   SubCutaneous at bedtime  losartan 25 milliGRAM(s) Oral daily  metoprolol succinate ER 25 milliGRAM(s) Oral daily  predniSONE   Tablet 20 milliGRAM(s) Oral two times a day  rivaroxaban 20 milliGRAM(s) Oral every 24 hours  trimethoprim  160 mG/sulfamethoxazole 800 mG 1 Tablet(s) Oral <User Schedule>      PHYSICAL EXAM:  T(C): 36.5 (05-04-18 @ 10:32), Max: 37.9 (05-03-18 @ 20:25)  HR: 83 (05-04-18 @ 10:32) (83 - 117)  BP: 106/77 (05-04-18 @ 10:32) (98/66 - 115/80)  RR: 19 (05-04-18 @ 10:32) (16 - 19)  SpO2: 97% (05-04-18 @ 10:32) (97% - 100%)  Wt(kg): --  I&O's Summary    Height (cm): 160.02 (05-03 @ 16:55)  Weight (kg): 74.8 (05-03 @ 16:55)  BMI (kg/m2): 29.2 (05-03 @ 16:55)  BSA (m2): 1.78 (05-03 @ 16:55)    Appearance: Normal	  HEENT:   Normal oral mucosa, PERRL, EOMI	  Lymphatic: No lymphadenopathy , no edema  Cardiovascular: Normal S1 S2, No JVD, No murmurs , Peripheral pulses palpable 2+ bilaterally  Respiratory: decreased bs  Gastrointestinal:  Soft, Non-tender, + BS	  Skin: No rashes, No ecchymoses, No cyanosis, warm to touch  Musculoskeletal: Normal range of motion, normal strength  Psychiatry:  Mood & affect appropriate  Ext: 3+ edema      LABS:    CARDIAC MARKERS:  CARDIAC MARKERS ( 03 May 2018 16:59 )  x     / < 0.06 ng/mL / 85 u/L / 2.05 ng/mL / x      CARDIAC MARKERS ( 03 May 2018 10:22 )  x     / < 0.06 ng/mL / x     / x     / x                                    11.8   6.14  )-----------( 298      ( 04 May 2018 06:45 )             40.7     05-04    130<L>  |  93<L>  |  34<H>  ----------------------------<  678<HH>  5.1   |  24  |  1.17    Ca    8.3<L>      04 May 2018 06:45  Mg     1.6     05-03    TPro  6.6  /  Alb  2.6<L>  /  TBili  0.7  /  DBili  x   /  AST  14  /  ALT  9   /  AlkPhos  171<H>  05-03    proBNP: Serum Pro-Brain Natriuretic Peptide: 7826 pg/mL (05-04 @ 06:45)    Lipid Profile:   HgA1c: Hemoglobin A1C, Whole Blood: 14.3 % (05-04 @ 06:45)  Hemoglobin A1C, Whole Blood: 14.3 % (05-03 @ 16:59)    TSH: Thyroid Stimulating Hormone, Serum: 6.40 uIU/mL (05-03 @ 16:59)      3-5/LPF          TELEMETRY: 	SR    ECG:  	  RADIOLOGY:   DIAGNOSTIC TESTING:  [ ] Echocardiogram:  [ ]  Catheterization:  [ ] Stress Test:    OTHER:

## 2018-05-05 DIAGNOSIS — I31.3 PERICARDIAL EFFUSION (NONINFLAMMATORY): ICD-10-CM

## 2018-05-05 LAB
BACTERIA UR CULT: SIGNIFICANT CHANGE UP
BASOPHILS # BLD AUTO: 0.01 K/UL — SIGNIFICANT CHANGE UP (ref 0–0.2)
BASOPHILS NFR BLD AUTO: 0.1 % — SIGNIFICANT CHANGE UP (ref 0–2)
EOSINOPHIL # BLD AUTO: 0 K/UL — SIGNIFICANT CHANGE UP (ref 0–0.5)
EOSINOPHIL NFR BLD AUTO: 0 % — SIGNIFICANT CHANGE UP (ref 0–6)
GLUCOSE BLDC GLUCOMTR-MCNC: 117 MG/DL — HIGH (ref 70–99)
GLUCOSE BLDC GLUCOMTR-MCNC: 138 MG/DL — HIGH (ref 70–99)
GLUCOSE BLDC GLUCOMTR-MCNC: 348 MG/DL — HIGH (ref 70–99)
GLUCOSE BLDC GLUCOMTR-MCNC: 404 MG/DL — HIGH (ref 70–99)
HCT VFR BLD CALC: 41 % — SIGNIFICANT CHANGE UP (ref 34.5–45)
HGB BLD-MCNC: 12.2 G/DL — SIGNIFICANT CHANGE UP (ref 11.5–15.5)
IMM GRANULOCYTES # BLD AUTO: 0.05 # — SIGNIFICANT CHANGE UP
IMM GRANULOCYTES NFR BLD AUTO: 0.6 % — SIGNIFICANT CHANGE UP (ref 0–1.5)
LYMPHOCYTES # BLD AUTO: 0.46 K/UL — LOW (ref 1–3.3)
LYMPHOCYTES # BLD AUTO: 5.7 % — LOW (ref 13–44)
MAGNESIUM SERPL-MCNC: 1.6 MG/DL — SIGNIFICANT CHANGE UP (ref 1.6–2.6)
MANUAL SMEAR VERIFICATION: SIGNIFICANT CHANGE UP
MCHC RBC-ENTMCNC: 21.1 PG — LOW (ref 27–34)
MCHC RBC-ENTMCNC: 29.8 % — LOW (ref 32–36)
MCV RBC AUTO: 70.8 FL — LOW (ref 80–100)
MONOCYTES # BLD AUTO: 0.28 K/UL — SIGNIFICANT CHANGE UP (ref 0–0.9)
MONOCYTES NFR BLD AUTO: 3.4 % — SIGNIFICANT CHANGE UP (ref 2–14)
NEUTROPHILS # BLD AUTO: 7.33 K/UL — SIGNIFICANT CHANGE UP (ref 1.8–7.4)
NEUTROPHILS NFR BLD AUTO: 90.2 % — HIGH (ref 43–77)
NRBC # FLD: 0 — SIGNIFICANT CHANGE UP
PLATELET # BLD AUTO: 345 K/UL — SIGNIFICANT CHANGE UP (ref 150–400)
PMV BLD: 9.5 FL — SIGNIFICANT CHANGE UP (ref 7–13)
RBC # BLD: 5.79 M/UL — HIGH (ref 3.8–5.2)
RBC # FLD: 16 % — HIGH (ref 10.3–14.5)
SPECIMEN SOURCE: SIGNIFICANT CHANGE UP
WBC # BLD: 8.13 K/UL — SIGNIFICANT CHANGE UP (ref 3.8–10.5)
WBC # FLD AUTO: 8.13 K/UL — SIGNIFICANT CHANGE UP (ref 3.8–10.5)

## 2018-05-05 PROCEDURE — 99223 1ST HOSP IP/OBS HIGH 75: CPT

## 2018-05-05 PROCEDURE — 99232 SBSQ HOSP IP/OBS MODERATE 35: CPT

## 2018-05-05 PROCEDURE — 93306 TTE W/DOPPLER COMPLETE: CPT | Mod: 26

## 2018-05-05 RX ORDER — INSULIN LISPRO 100/ML
15 VIAL (ML) SUBCUTANEOUS
Qty: 0 | Refills: 0 | Status: DISCONTINUED | OUTPATIENT
Start: 2018-05-05 | End: 2018-05-11

## 2018-05-05 RX ORDER — HUMAN INSULIN 100 [IU]/ML
5 INJECTION, SUSPENSION SUBCUTANEOUS ONCE
Qty: 0 | Refills: 0 | Status: COMPLETED | OUTPATIENT
Start: 2018-05-05 | End: 2018-05-05

## 2018-05-05 RX ORDER — INSULIN GLARGINE 100 [IU]/ML
20 INJECTION, SOLUTION SUBCUTANEOUS AT BEDTIME
Qty: 0 | Refills: 0 | Status: DISCONTINUED | OUTPATIENT
Start: 2018-05-05 | End: 2018-05-07

## 2018-05-05 RX ADMIN — HUMAN INSULIN 5 UNIT(S): 100 INJECTION, SUSPENSION SUBCUTANEOUS at 09:57

## 2018-05-05 RX ADMIN — Medication 1 PUFF(S): at 20:58

## 2018-05-05 RX ADMIN — ATORVASTATIN CALCIUM 40 MILLIGRAM(S): 80 TABLET, FILM COATED ORAL at 20:58

## 2018-05-05 RX ADMIN — Medication 15 UNIT(S): at 13:13

## 2018-05-05 RX ADMIN — LOSARTAN POTASSIUM 25 MILLIGRAM(S): 100 TABLET, FILM COATED ORAL at 05:42

## 2018-05-05 RX ADMIN — Medication 12: at 08:38

## 2018-05-05 RX ADMIN — Medication 8: at 13:13

## 2018-05-05 RX ADMIN — Medication 40 MILLIGRAM(S): at 05:42

## 2018-05-05 RX ADMIN — Medication 325 MILLIGRAM(S): at 05:42

## 2018-05-05 RX ADMIN — Medication 25 MILLIGRAM(S): at 05:42

## 2018-05-05 RX ADMIN — Medication 81 MILLIGRAM(S): at 08:38

## 2018-05-05 RX ADMIN — Medication 20 MILLIGRAM(S): at 17:07

## 2018-05-05 RX ADMIN — Medication 15 UNIT(S): at 18:12

## 2018-05-05 RX ADMIN — Medication 325 MILLIGRAM(S): at 17:07

## 2018-05-05 RX ADMIN — Medication 1 PUFF(S): at 08:37

## 2018-05-05 RX ADMIN — Medication 20 MILLIGRAM(S): at 05:42

## 2018-05-05 RX ADMIN — Medication 10 UNIT(S): at 08:38

## 2018-05-05 NOTE — CONSULT NOTE ADULT - PROBLEM SELECTOR RECOMMENDATION 9
Above d/w Dr. Franklin   Pt is very high risk for any operative procedure and general anesthesia given her co-morbidities. She also is refusing anesthesia at this time  No clinical signs of tamponade at all  Currently still on Xarelto.   Recommend IR consultation for Pericardial Drainage.   Will continue to follow along  Above d/w pt. and primary team.

## 2018-05-05 NOTE — CHART NOTE - NSCHARTNOTEFT_GEN_A_CORE
Called by nurse for hypotension    Pt is asymptomatic    Vital Signs Last 24 Hrs  T(C): 36.3 (05 May 2018 20:55), Max: 36.4 (05 May 2018 05:40)  T(F): 97.3 (05 May 2018 20:55), Max: 97.5 (05 May 2018 05:40)  HR: 97 (05 May 2018 20:55) (84 - 97)  BP: 92/61 (05 May 2018 20:55) (92/61 - 126/87)  RR: 16 (05 May 2018 20:55) (16 - 18)  SpO2: 99% (05 May 2018 20:55) (98% - 100%)    Case discussed with Dr. Sam, he recommends to discontinue Lasix.  Lasix Dce'd  Will continue to monitor.

## 2018-05-05 NOTE — DIETITIAN INITIAL EVALUATION ADULT. - PROBLEM SELECTOR PLAN 1
ekg/telemetry, f/u ce x 2, mg, tsh, echo, lasix 40mg iv bid, strict I & O's, daily wts, fluid restriction, probnp,  Check TTE

## 2018-05-05 NOTE — DIETITIAN INITIAL EVALUATION ADULT. - NS AS NUTRI DX KNOWLEDGE BELIEFS
Food - and nutrition - related knowledge deficit/Disordered eating pattern/Undesirable food choices/Self - monitoring deficit/Limited adherence to nutrition - related recommendations

## 2018-05-05 NOTE — CONSULT NOTE ADULT - ASSESSMENT
51 y/o female, with a PmHx of CHF stage III, on home O2, Sarcoidosis, ? blood clot in heart on Xarelto, Cardiac stents, DM, presented with sob and LE edema/pain. Pt states for the past 3 weeks she has been having LE edema/pain and sob but in the last 24 hours was started to get worse. Echo today showing large pericardial effusion.

## 2018-05-05 NOTE — CHART NOTE - NSCHARTNOTEFT_GEN_A_CORE
Called IR for pericardial drain for large pericardial effusion and tamponade physiology today, and said to call back on Monday since pt. is hemodynamically stable and the patient was on xarelto.  Xarelto was discontinued as there was no thrombus on todays Echo findings.  Will call IR to arrange on Monday.  D/W attending.

## 2018-05-05 NOTE — PROGRESS NOTE ADULT - PROBLEM SELECTOR PLAN 6
Thoracic and IR consulted. IR to perform pericardiocentesis on Monday   Xarelto on hold for now   Transfer to CCU for close monitoring

## 2018-05-05 NOTE — CHART NOTE - NSCHARTNOTEFT_GEN_A_CORE
Reason for CCU : pericardial effusion    HISTORY OF PRESENT ILLNESS:  Patient is a 52y old  Female  with a PmHx of CHF stage III, on home O2, Sarcoidosis, ? blood clot in heart on Xarelto, DM, HTN, HLD  presented with a chief complaint of  worsening of LE edema/pain x 3weeks and shortness of breath and recent weight gain who admitted to tele for acute on chronic HF found to have large pericardial effusion with no tamponade in echo. She denies any fever, CT surgery consulted for pericardial window  but  patient was  deemed not  a surgical candidate 2/2 high risk for any surgical procedure and general anesthesia given her co-morbidities.  Patient also  refusing anesthesia at this time. She  now transfer to CCU for borderline hypotension SBP 90 and  c/o dizziness.     Allergies: No Known Allergies    PAST MEDICAL & SURGICAL HISTORY:  Thrombus: ? Thrombus in heart - on Xarelto  Hyperlipidemia  Anemia  Hypertension  Sarcoidosis  CHF (congestive heart failure): Stage III, on home O2 @ 2lpm  Diabetes  Ankle fracture: Left Ankle - with plate and screws placed  Hip deformity: left hip had a pin placed to hold bone in place after a fall      MEDICATIONS  (STANDING):Home meds     Metoprolol Succinate ER 25 mg oral tablet, extended release: 1 tab(s) orally once a day, Last Dose Taken:    Xarelto 20 mg oral tablet: 1 tab(s) orally once a day (in the evening), Last Dose Taken:    Flovent 220 mcg/inh inhalation aerosol with adapter: 1 puff(s) inhaled 2 times a day, Last Dose Taken:    Aspirin Enteric Coated 81 mg oral delayed release tablet: 1 tab(s) orally once a day, Last Dose Taken:    rosuvastatin 10 mg oral tablet: 1 tab(s) orally once a day (at bedtime), Last Dose Taken:    predniSONE 20 mg oral tablet: 1 tab(s) orally 2 times a day, Last Dose Taken:    azelastine 137 mcg/inh (0.1%) nasal spray: 2 spray(s) nasal 2 times a day, Last Dose Taken:    furosemide 80 mg oral tablet: 1 tab(s) orally once a day, Last Dose Taken:    sulfamethoxazole-trimethoprim DS: 1 tab(s) orally Monday, Wednesday, and Friday  metFORMIN 500 mg oral tablet: 1 tab(s) orally 3 times a day, Last Dose Taken:    losartan 25 mg oral tablet: 1 tab(s) orally once a day, Last Dose Taken:    ferrous sulfate 325 mg (65 mg elemental iron) oral tablet: 1 tab(s) orally 2 times a day, Last Dose Taken:    current meds ;    aspirin enteric coated 81 milliGRAM(s) Oral daily  atorvastatin 40 milliGRAM(s) Oral at bedtime  ferrous    sulfate 325 milliGRAM(s) Oral two times a day  fluticasone propionate   220 MICROgram(s) HFA Inhaler 1 Puff(s) Inhalation two times a day  insulin glargine Injectable (LANTUS) 20 Unit(s) SubCutaneous at bedtime  insulin lispro (HumaLOG) corrective regimen sliding scale   SubCutaneous three times a day before meals  insulin lispro (HumaLOG) corrective regimen sliding scale   SubCutaneous at bedtime  insulin lispro Injectable (HumaLOG) 15 Unit(s) SubCutaneous three times a day before meals  losartan 25 milliGRAM(s) Oral daily  metoprolol succinate ER 25 milliGRAM(s) Oral daily  predniSONE   Tablet 20 milliGRAM(s) Oral two times a day  trimethoprim  160 mG/sulfamethoxazole 800 mG 1 Tablet(s) Oral <User Schedule>  acetaminophen   Tablet. 650 milliGRAM(s) Oral every 6 hours PRN temp> 101  acetaminophen   Tablet. 650 milliGRAM(s) Oral every 6 hours PRN mild, moderate pain  ALBUTerol    90 MICROgram(s) HFA Inhaler 2 Puff(s) Inhalation every 6 hours PRN Bronchospasm  dextrose Gel 1 Dose(s) Oral once PRN Blood Glucose LESS THAN 70 milliGRAM(s)/deciliter  glucagon  Injectable 1 milliGRAM(s) IntraMuscular once PRN Glucose LESS THAN 70 milligrams/deciliter      Drug Dosing Weight  Height (cm): 160.02 (04 May 2018 13:40)  Weight (kg): 74 (04 May 2018 13:40)  BMI (kg/m2): 28.9 (04 May 2018 13:40)  BSA (m2): 1.77 (04 May 2018 13:40)    FAMILY HISTORY:  Family history of hypertension (Sibling): Siblings  Family history of diabetes mellitus (Father, Sibling): Father, Siblings  Family history of breast cancer in mother (Mother): Mother  Family history of colon cancer in mother (Mother): Mother    SOCIAL HISTORY:  Single ,Assistant manger for a store   never smoked  social alcohol use  :    CONSTITUTIONAL: No fevers, No chills, No fatigue, No weight gain  EYES: No vision changes   ENT: No congestion, No ear pain, No sore throat.  NECK: No pain, No stiffness  RESPIRATORY: No shortness of breath, No cough, No wheezing, No hemoptysis  CARDIOVASCULAR: No chest pain. No palpitations, No SOTELO, No orthopnea, No paroxysmal nocturnal dyspnea, No pleuritic pain  GASTROINTESTINAL: No abdominal pain, No nausea, No vomiting, No hematemesis, No diarrhea No constipation. No melena  GENITOURINARY: No dysuria, No frequency, No incontinence, No hematuria  NEUROLOGICAL: No dizziness, No lightheadedness, No syncope, No LOC, No headache, No numbness or weakness  MUSCULOSKELETAL: No joint pain, No joint swelling.  PSYCHIATRIC: No anxiety, No depression  SKIN: No diaphoresis. No itching, No rashes, No pressure ulcers    All other review of systems is negative unless indicated above.    PHYSICAL EXAM:    Appearance: NAD, no distress  HEENT:   Normal oral mucosa, PERRL, EOMI  Cardiovascular: Regular rate and rhythm, Normal S1 S2, No JVD, No murmurs, No edema  Respiratory: Lungs clear to auscultation. No rales, No rhonchi, No wheezing. No tenderness to palpation  Gastrointestinal:  Soft, Non-tender, + BS  Neurologic: Non-focal, A&Ox3  Skin: Warm and dry, No rashes, No ecchymoses, No cyanosis  Extremities: No clubbing, cyanosis or edema  Vascular: Peripheral pulses palpable 2+ bilaterally  Psychiatry: Mood & affect appropriate      ICU Vital Signs Last 24 Hrs  T(C): 36.3 (05 May 2018 20:55), Max: 36.4 (05 May 2018 05:40)  T(F): 97.3 (05 May 2018 20:55), Max: 97.5 (05 May 2018 05:40)  HR: 97 (05 May 2018 20:55) (84 - 97)  BP: 92/61 (05 May 2018 20:55) (92/61 - 126/87)  RR: 16 (05 May 2018 20:55) (16 - 18)  SpO2: 99% (05 May 2018 20:55) (98% - 100%)          LABS:  CBC Full  -  ( 05 May 2018 07:05 )  WBC Count : 8.13 K/uL  Hemoglobin : 12.2 g/dL  Hematocrit : 41.0 %  Platelet Count - Automated : 345 K/uL  Mean Cell Volume : 70.8 fL  Mean Cell Hemoglobin : 21.1 pg  Mean Cell Hemoglobin Concentration : 29.8 %  Auto Neutrophil # : 7.33 K/uL  Auto Lymphocyte # : 0.46 K/uL  Auto Monocyte # : 0.28 K/uL  Auto Eosinophil # : 0.00 K/uL  Auto Basophil # : 0.01 K/uL  Auto Neutrophil % : 90.2 %  Auto Lymphocyte % : 5.7 %  Auto Monocyte % : 3.4 %  Auto Eosinophil % : 0.0 %  Auto Basophil % : 0.1 %        130<L>  |  93<L>  |  34<H>  ----------------------------<  678<HH>  5.1   |  24  |  1.17    Ca    8.3<L>      04 May 2018 06:45  Mg     1.6     05-          CAPILLARY BLOOD GLUCOSE      POCT Blood Glucose.: 117 mg/dL (05 May 2018 22:13)      Urinalysis Basic - ( 03 May 2018 23:20 )    Color: YELLOW / Appearance: CLEAR / S.018 / pH: 6.5  Gluc: 50 / Ketone: NEGATIVE  / Bili: NEGATIVE / Urobili: NORMAL mg/dL   Blood: SMALL / Protein: >600 mg/dL / Nitrite: NEGATIVE   Leuk Esterase: NEGATIVE / RBC: 2-5 / WBC 2-5   Sq Epi: OCC / Non Sq Epi: x / Bacteria: x  --  IN:    Oral Fluid: 560 mL  Total IN: 560 mL    OUT:    Voided: 600 mL  Total OUT: 600 mL    Total NET: -40 mL      05 May 2018 07:01  -  05 May 2018 22:38  --------------------------------------------------------  IN:    Oral Fluid: 480 mL  Total IN: 480 mL    OUT:  Total OUT: 0 mL    Total NET: 480 mL          EKG:    ECHO      RADIOLOGY STUDIES    CXR:     CT SCAN:     ULTRASOUND:     53 y/o female, with a PmHx of CHF stage III, on home O2, Sarcoidosis, ? blood clot in heart on Xarelto, DM, presented with sob and LE edema/pain. She is being admitted to telemetry for acute on chronic systolic heart failure.     Problem/Plan - 1:  ·  Problem: Acute on chronic heart failure, unspecified heart failure type.  Plan: ekg/telemetry, f/u ce x 2, mg, tsh, echo, lasix 40mg iv bid, strict I & O's, daily wts, fluid restriction, probnp,  Check TTE.    Problem/Plan - 2:  ·  Problem: Diabetes.  Plan: monitor fs, insulin ssc, hgba1-c, hold po meds.     Problem/Plan - 3:  ·  Problem: Thrombus.  Plan: ? thrombus in heart, echo, con't Xarelto.     Problem/Plan - 4:  ·  Problem: Hyperlipidemia.  Plan: fasting lipid profile, con't statin.     Problem/Plan - 5:  ·  Problem: Hypertension.  Plan: monitor bp, con't meds, adjust as needed.                   Case discussed with Cardiology fellow Reason for CCU : pericardial effusion    HISTORY OF PRESENT ILLNESS:  Patient is a 59 years old female with HTN,. DM, HLD, sarcoidosis on steroid, CHFrEF stage III, ? cardiac thrombus on xer alto ,on home O2  presented with a chief complaint of  worsening of LE edema/pain x 3weeks and shortness of breath and recent weight gain who admitted to tele for acute on chronic HF found to have large pericardial effusion with no tamponade in echo. She denies any fever, CT surgery consulted for pericardial window  but  patient was  deemed not  a surgical candidate 2/2 high risk for any surgical procedure and general anesthesia given her co-morbidities.  . Patient also  refusing anesthesia at this time. IR to perform pericardiocentesis  on Monday. She  now transfer to CCU for borderline hypotension SBP 90 and  c/o dizziness.     Allergies: No Known Allergies    PAST MEDICAL & SURGICAL HISTORY:  Thrombus: ? Thrombus in heart - on Xarelto  Hyperlipidemia  Anemia  Hypertension  Sarcoidosis  CHF (congestive heart failure): Stage III, on home O2 @ 2lpm  Diabetes  Ankle fracture: Left Ankle - with plate and screws placed  Hip deformity: left hip had a pin placed to hold bone in place after a fall      MEDICATIONS  (STANDING):Home meds     Metoprolol Succinate ER 25 mg oral tablet, extended release: 1 tab(s) orally once a day, Last Dose Taken:    Xarelto 20 mg oral tablet: 1 tab(s) orally once a day (in the evening), Last Dose Taken:    Flovent 220 mcg/inh inhalation aerosol with adapter: 1 puff(s) inhaled 2 times a day, Last Dose Taken:    Aspirin Enteric Coated 81 mg oral delayed release tablet: 1 tab(s) orally once a day, Last Dose Taken:    rosuvastatin 10 mg oral tablet: 1 tab(s) orally once a day (at bedtime), Last Dose Taken:    prednisone 20 mg oral tablet: 1 tab(s) orally 2 times a day, Last Dose Taken:    azelastine 137 mcg/inh (0.1%) nasal spray: 2 spray(s) nasal 2 times a day, Last Dose Taken:    furosemide 80 mg oral tablet: 1 tab(s) orally once a day, Last Dose Taken:    sulfamethoxazole-trimethoprim DS: 1 tab(s) orally Monday, Wednesday, and Friday  met FORMIN 500 mg oral tablet: 1 tab(s) orally 3 times a day, Last Dose Taken:    losartan 25 mg oral tablet: 1 tab(s) orally once a day, Last Dose Taken:    ferrous sulfate 325 mg (65 mg elemental iron) oral tablet: 1 tab(s) orally 2 times a day, Last Dose Taken:    current meds ;    aspirin enteric coated 81 milliGRAM(s) Oral daily  atorvastatin 40 milliGRAM(s) Oral at bedtime  ferrous    sulfate 325 milliGRAM(s) Oral two times a day  fluticasone propionate   220 MICROgram(s) HFA Inhaler 1 Puff(s) Inhalation two times a day  insulin glargine Injectable (LANTUS) 20 Unit(s) SubCutaneous at bedtime  insulin lispro (HumaLOG) corrective regimen sliding scale   SubCutaneous three times a day before meals  insulin lispro (HumaLOG) corrective regimen sliding scale   SubCutaneous at bedtime  insulin lispro Injectable (HumaLOG) 15 Unit(s) SubCutaneous three times a day before meals  losartan 25 milliGRAM(s) Oral daily  metoprolol succinate ER 25 milliGRAM(s) Oral daily  predniSONE   Tablet 20 milliGRAM(s) Oral two times a day  trimethoprim  160 mG/sulfamethoxazole 800 mG 1 Tablet(s) Oral <User Schedule>  acetaminophen   Tablet. 650 milliGRAM(s) Oral every 6 hours PRN temp> 101  acetaminophen   Tablet. 650 milliGRAM(s) Oral every 6 hours PRN mild, moderate pain  ALBUTerol    90 MICROgram(s) HFA Inhaler 2 Puff(s) Inhalation every 6 hours PRN Bronchospasm  dextrose Gel 1 Dose(s) Oral once PRN Blood Glucose LESS THAN 70 milliGRAM(s)/deciliter  glucagon  Injectable 1 milliGRAM(s) IntraMuscular once PRN Glucose LESS THAN 70 milligrams/deciliter      Drug Dosing Weight  Height (cm): 160.02 (04 May 2018 13:40)  Weight (kg): 74 (04 May 2018 13:40)  BMI (kg/m2): 28.9 (04 May 2018 13:40)  BSA (m2): 1.77 (04 May 2018 13:40)    FAMILY HISTORY:  Family history of hypertension (Sibling): Siblings  Family history of diabetes mellitus (Father, Sibling): Father, Siblings  Family history of breast cancer in mother (Mother): Mother  Family history of colon cancer in mother (Mother): Mother    SOCIAL HISTORY:  Single ,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  NECK: No pain, No stiffness  RESPIRATORY: No shortness of breath, No cough, No wheezing, No hemoptysis  CARDIOVASCULAR: No chest pain. No palpitations, No SOTELO, No orthopnea, No paroxysmal nocturnal dyspnea, No pleuritic pain  GASTROINTESTINAL: No abdominal pain, No nausea, No vomiting, No hematemesis, No diarrhea No constipation. No melena  GENITOURINARY: No dysuria, No frequency, No incontinence, No hematuria  NEUROLOGICAL: No dizziness, No lightheadedness, No syncope, No LOC, No headache, No numbness or weakness  MUSCULOSKELETAL: No joint pain, No joint swelling.  PSYCHIATRIC: No anxiety, No depression  SKIN: No diaphoresis. No itching, No rashes, No pressure ulcers    All other review of systems is negative unless indicated above.    PHYSICAL EXAM:    Appearance: NAD, no distress  HEENT:   Normal oral mucosa, PERRL, EOMI  Cardiovascular: Regular rate and rhythm, Normal S1 S2, No JVD, No murmurs, No edema  Respiratory: Lungs clear to auscultation. No rales, No rhonchi, No wheezing. No tenderness to palpation  Gastrointestinal:  Soft, Non-tender, + BS  Neurologic: Non-focal, A&Ox3  Skin: Warm and dry, No rashes, No ecchymoses, No cyanosis  Extremities: No clubbing, cyanosis or edema  Vascular: Peripheral pulses palpable 2+ bilaterally  Psychiatry: Mood & affect appropriate      ICU Vital Signs Last 24 Hrs  T(C): 36.3 (05 May 2018 20:55), Max: 36.4 (05 May 2018 05:40)  T(F): 97.3 (05 May 2018 20:55), Max: 97.5 (05 May 2018 05:40)  HR: 97 (05 May 2018 20:55) (84 - 97)  BP: 92/61 (05 May 2018 20:55) (92/61 - 126/87)  RR: 16 (05 May 2018 20:55) (16 - 18)  SpO2: 99% (05 May 2018 20:55) (98% - 100%)          LABS:  CBC Full  -  ( 05 May 2018 07:05 )  WBC Count : 8.13 K/uL  Hemoglobin : 12.2 g/dL  Hematocrit : 41.0 %  Platelet Count - Automated : 345 K/uL  Mean Cell Volume : 70.8 fL  Mean Cell Hemoglobin : 21.1 pg  Mean Cell Hemoglobin Concentration : 29.8 %  Auto Neutrophil # : 7.33 K/uL  Auto Lymphocyte # : 0.46 K/uL  Auto Monocyte # : 0.28 K/uL  Auto Eosinophil # : 0.00 K/uL  Auto Basophil # : 0.01 K/uL  Auto Neutrophil % : 90.2 %  Auto Lymphocyte % : 5.7 %  Auto Monocyte % : 3.4 %  Auto Eosinophil % : 0.0 %  Auto Basophil % : 0.1 %        130<L>  |  93<L>  |  34<H>  ----------------------------<  678<HH>  5.1   |  24  |  1.17    Ca    8.3<L>      04 May 2018 06:45g     1.6               CAPILLARY BLOOD GLUCOSE    POCT Blood Glucose.: 117 mg/dL (05 May 2018 22:13)      Urinalysis Basic - ( 03 May 2018 23:20 )    Color: YELLOW / Appearance: CLEAR / S.018 / pH: 6.5  Gluc: 50 / Ketone: NEGATIVE  / Bili: NEGATIVE / Urobili: NORMAL mg/dL   Blood: SMALL / Protein: >600 mg/dL / Nitrite: NEGATIVE   Leuk Esterase: NEGATIVE / RBC: 2-5 / WBC 2-5   Sq Epi: OCC / Non Sq Epi: x / Bacteria: x  --  IN:    Oral Fluid: 560 mL  Total IN: 560 mL    OUT:    Voided: 600 mL  Total OUT: 600 mL    Total NET: -40 mL      05 May 2018 07:01  -  05 May 2018 22:38  --------------------------------------------------------  IN:    Oral Fluid: 480 mL  Total IN: 480 mL    OUT:  Total OUT: 0 mL    Total NET: 480 mL          EKG:< from: 12 Lead ECG (18 @ 09:59) > sinus tachycardia left axis deviation Ventricular Rate 115 BPM  Atrial Rate 115 BPM,P-R Interval 186 ms,QRS Duration 78 ms,Q-T Interval 330 ms,QTC Calculation(Bezet) 456 ms    ECHO:< from: Transthoracic Echocardiogram (18 @ 11:47) >Ef 19%, Severe global left ventricular systolic dysfunction.  2. Large circumferential pericardial effusion. The effusion measures 3.2 cm lateral to the right atrium and 1.6 cm anterior to the right ventricle. The IVC measures about 2.0cm and does not appear to collapse, although there is minimal respiratory variability. Transmitral gradients were not performed. The RV and RA are not collapsed however they do not appear to fully expand during diastole.*** No previous Echo exam.    RADIOLOGY STUDIES    CXR: < from: Xray Chest 1 View- PORTABLE-Routine (18 @ 07:51) >Small bilateral pleural effusions with adjacent atelectasis with or   without pneumonia.    ULTRASOUND: < from: US Duplex Venous Lower Ext Complete, Bilateral (18 @ 13:08) >No evidence of bilateral lower extremity deep venous thrombosis.      Assessment :59 years old female with HTN, DM ,HLD, sarcoidosis on steroid, CHFrEF stage III, on home O2  presented acute on chronic HF found to have large  pericardial effusion     Problem/Plan-1:  Problem: large pericardial effusion  monitor in ccu -currently stable   holding xeralto for pericardiocentesis by IR on monday      Problem/Plan - 2:  ·  Problem: Acute on chronic heart failure, unspecified heart failure type.    Plan: ekg /telemetry, f/u ce x 2,    lasix on hold due to pericardial effusion and borderline hypotension     strict I & O's, daily wts, fluid restriction,      Problem/Plan - 3:  ·  Problem: Diabetes.    Plan:  hyperglycemia 2/2 long term steroid use  endocrinology consulted -started on Lantus 20units daily  Humalog 15units pre meal   monitor finger stick , insulin ssc,  hold po meds.     Problem/Plan - 4:  ·  Problem: Thrombus.    Plan: ? thrombus in heart,   recent echo showed no thrombus  holding  Xarelto for pericardiocentesis      Problem/Plan - 5:  ·  Problem: Hyperlipidemia.  Plan:    con't  Lipitor 40mg daily    Problem/Plan - 6:  ·  Problem: Hypertension.    Plan: monitor Blood Pressure  hold anti HTN meds for now in the setting of borderline low Blood Pressure and large pericardial effusion Reason for CCU : pericardial effusion    HISTORY OF PRESENT ILLNESS:  Patient is a 59 years old female with HTN,. DM, HLD, sarcoidosis on steroid, CHFrEF stage III, CAD s/p  mLAD  xience stent on 2017 ? cardiac thrombus on xer alto ,? asthma /COPD on home O2  presented with a chief complaint of  worsening of LE edema/pain x 3weeks and shortness of breath and recent weight gain who admitted to tele for acute on chronic HF found to have large pericardial effusion with no tamponade in echo. She denies any fever, CT surgery consulted for pericardial window  but  patient was  deemed not  a surgical candidate 2/2 high risk for any surgical procedure and general anesthesia given her co-morbidities.  . Patient also  refusing anesthesia at this time. IR to perform pericardiocentesis  on Monday. She  now transfer to CCU for borderline hypotension SBP 90 and  c/o dizziness.   . She reported that she was send by her PMD for cardiac workup and had one cardiac  stent in [facility] in 2017 and was told that other area of heart muscle is dead. she was recently diagnosed with sarcoidosis ( 1 month ago).she reports not taking her medication regularly because its not helping her anymore.    Allergies: No Known Allergies    PAST MEDICAL & SURGICAL HISTORY:  Thrombus: ? Thrombus in heart - on Xarelto  Hyperlipidemia  Anemia  Hypertension  Sarcoidosis  CHF (congestive heart failure): Stage III, on home O2 @ 2lpm  Diabetes  astma/COPD  CAd s/p stent  Ankle fracture: Left Ankle - with plate and screws placed  Hip deformity: left hip had a pin placed to hold bone in place after a fall      MEDICATIONS  (STANDING):Home meds     Metoprolol Succinate ER 25 mg oral tablet, extended release: 1 tab(s) orally once a day, Last Dose Taken:    Xarelto 20 mg oral tablet: 1 tab(s) orally once a day (in the evening), Last Dose Taken:    Flovent 220 mcg/inh inhalation aerosol with adapter: 1 puff(s) inhaled 2 times a day, Last Dose Taken:    Aspirin Enteric Coated 81 mg oral delayed release tablet: 1 tab(s) orally once a day, Last Dose Taken:    rosuvastatin 10 mg oral tablet: 1 tab(s) orally once a day (at bedtime), Last Dose Taken:    prednisone 20 mg oral tablet: 1 tab(s) orally 2 times a day, Last Dose Taken:    azelastine 137 mcg/inh (0.1%) nasal spray: 2 spray(s) nasal 2 times a day, Last Dose Taken:    furosemide 80 mg oral tablet: 1 tab(s) orally once a day, Last Dose Taken:    sulfamethoxazole-trimethoprim DS: 1 tab(s) orally Monday, Wednesday, and Friday  met FORMIN 500 mg oral tablet: 1 tab(s) orally 3 times a day, Last Dose Taken:    losartan 25 mg oral tablet: 1 tab(s) orally once a day, Last Dose Taken:    ferrous sulfate 325 mg (65 mg elemental iron) oral tablet: 1 tab(s) orally 2 times a day, Last Dose Taken:  flovent HFA 220mcg inhaler 1 puff bid  ventolin HFA 2puffs 4x day prn    current meds ;    aspirin enteric coated 81 milliGRAM(s) Oral daily  atorvastatin 40 milliGRAM(s) Oral at bedtime  ferrous    sulfate 325 milliGRAM(s) Oral two times a day  fluticasone propionate   220 MICROgram(s) HFA Inhaler 1 Puff(s) Inhalation two times a day  insulin glargine Injectable (LANTUS) 20 Unit(s) SubCutaneous at bedtime  insulin lispro (HumaLOG) corrective regimen sliding scale   SubCutaneous three times a day before meals  insulin lispro (HumaLOG) corrective regimen sliding scale   SubCutaneous at bedtime  insulin lispro Injectable (HumaLOG) 15 Unit(s) SubCutaneous three times a day before meals  losartan 25 milliGRAM(s) Oral daily  metoprolol succinate ER 25 milliGRAM(s) Oral daily  predniSONE   Tablet 20 milliGRAM(s) Oral two times a day  trimethoprim  160 mG/sulfamethoxazole 800 mG 1 Tablet(s) Oral <User Schedule>  acetaminophen   Tablet. 650 milliGRAM(s) Oral every 6 hours PRN temp> 101  acetaminophen   Tablet. 650 milliGRAM(s) Oral every 6 hours PRN mild, moderate pain  ALBUTerol    90 MICROgram(s) HFA Inhaler 2 Puff(s) Inhalation every 6 hours PRN Bronchospasm  dextrose Gel 1 Dose(s) Oral once PRN Blood Glucose LESS THAN 70 milliGRAM(s)/deciliter  glucagon  Injectable 1 milliGRAM(s) IntraMuscular once PRN Glucose LESS THAN 70 milligrams/deciliter      Drug Dosing Weight  Height (cm): 160.02 (04 May 2018 13:40)  Weight (kg): 74 (04 May 2018 13:40)  BMI (kg/m2): 28.9 (04 May 2018 13:40)  BSA (m2): 1.77 (04 May 2018 13:40)    FAMILY HISTORY:  Family history of hypertension (Sibling): Siblings  Family history of diabetes mellitus (Father, Sibling): Father, Siblings  Family history of breast cancer in mother (Mother): Mother  Family history of colon cancer in mother (Mother): Mother    SOCIAL HISTORY:  Single ,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 No palpitations, + SOTELO, + orthopnea, No paroxysmal nocturnal dyspnea, No pleuritic pain  GASTROINTESTINAL: No abdominal pain, No nausea, No vomiting, No hematemesis, No diarrhea No constipation. No melena,+ abdomen bloating  GENITOURINARY: No dysuria, No frequency, No incontinence, No hematuria  NEUROLOGICAL: No dizziness, No lightheadedness, No syncope, No LOC, No headache, + weakness  MUSCULOSKELETAL: + leg ankle pain, No joint swelling,+ LE edema   PSYCHIATRIC: No anxiety, No depression  SKIN: No diaphoresis. No itching, No rashes, No pressure ulcers      PHYSICAL EXAM:    Appearance: NAD, no distress  HEENT:   Normal oral mucosa, PERRL, EOMI  Cardiovascular: Regular rate and rhythm, Normal S1 S2, + mild  JVD, No murmurs,   Respiratory: b/l Lungs crackles  to auscultation. , No rhonchi, No wheezing. No tenderness to palpation  Gastrointestinal:  Soft, Non-tender, + BS,distended  Neurologic: Non-focal, A&Ox3  Skin: Warm and dry, No rashes, No ecchymoses, No cyanosis  Extremities: No clubbing, cyanosis , b/l + LE edema up to the lower abdomen  Vascular: Peripheral pulses palpable 2+ bilaterally  Psychiatry: Mood & affect appropriate      ICU Vital Signs Last 24 Hrs  T(C): 36.3 (05 May 2018 20:55), Max: 36.4 (05 May 2018 05:40)  T(F): 97.3 (05 May 2018 20:55), Max: 97.5 (05 May 2018 05:40)  HR: 97 (05 May 2018 20:55) (84 - 97)  BP: 92/61 (05 May 2018 20:55) (92/61 - 126/87)  RR: 16 (05 May 2018 20:55) (16 - 18)  SpO2: 99% (05 May 2018 20:55) (98% - 100%)          LABS:  CBC Full  -  ( 05 May 2018 07:05 )  WBC Count : 8.13 K/uL  Hemoglobin : 12.2 g/dL  Hematocrit : 41.0 %  Platelet Count - Automated : 345 K/uL  Mean Cell Volume : 70.8 fL  Mean Cell Hemoglobin : 21.1 pg  Mean Cell Hemoglobin Concentration : 29.8 %  Auto Neutrophil # : 7.33 K/uL  Auto Lymphocyte # : 0.46 K/uL  Auto Monocyte # : 0.28 K/uL  Auto Eosinophil # : 0.00 K/uL  Auto Basophil # : 0.01 K/uL  Auto Neutrophil % : 90.2 %  Auto Lymphocyte % : 5.7 %  Auto Monocyte % : 3.4 %  Auto Eosinophil % : 0.0 %  Auto Basophil % : 0.1 %    04    130<L>  |  93<L>  |  34<H>  ----------------------------<  678<HH>  5.1   |  24  |  1.17    Ca    8.3<L>      04 May 2018 06:45g     1.6     05-05          CAPILLARY BLOOD GLUCOSE    POCT Blood Glucose.: 117 mg/dL (05 May 2018 22:13)      Urinalysis Basic - ( 03 May 2018 23:20 )    Color: YELLOW / Appearance: CLEAR / S.018 / pH: 6.5  Gluc: 50 / Ketone: NEGATIVE  / Bili: NEGATIVE / Urobili: NORMAL mg/dL   Blood: SMALL / Protein: >600 mg/dL / Nitrite: NEGATIVE   Leuk Esterase: NEGATIVE / RBC: 2-5 / WBC 2-5   Sq Epi: OCC / Non Sq Epi: x / Bacteria: x  --  IN:    Oral Fluid: 560 mL  Total IN: 560 mL    OUT:    Voided: 600 mL  Total OUT: 600 mL    Total NET: -40 mL      05 May 2018 07:01  -  05 May 2018 22:38  --------------------------------------------------------  IN:    Oral Fluid: 480 mL  Total IN: 480 mL    OUT:  Total OUT: 0 mL    Total NET: 480 mL          EKG:< from: 12 Lead ECG (18 @ 09:59) > sinus tachycardia left axis deviation Ventricular Rate 115 BPM  Atrial Rate 115 BPM,P-R Interval 186 ms,QRS Duration 78 ms,Q-T Interval 330 ms,QTC Calculation(Bezet) 456 ms    ECHO:< from: Transthoracic Echocardiogram (18 @ 11:47) >Ef 19%, Severe global left ventricular systolic dysfunction.  2. Large circumferential pericardial effusion. The effusion measures 3.2 cm lateral to the right atrium and 1.6 cm anterior to the right ventricle. The IVC measures about 2.0cm and does not appear to collapse, although there is minimal respiratory variability. Transmitral gradients were not performed. The RV and RA are not collapsed however they do not appear to fully expand during diastole.*** No previous Echo exam.    RADIOLOGY STUDIES    CXR: < from: Xray Chest 1 View- PORTABLE-Routine (18 @ 07:51) >Small bilateral pleural effusions with adjacent atelectasis with or   without pneumonia.    ULTRASOUND: < from: US Duplex Venous Lower Ext Complete, Bilateral (18 @ 13:08) >No evidence of bilateral lower extremity deep venous thrombosis.      Assessment :59 years old female with HTN, DM ,HLD, sarcoidosis on steroid, CHFrEF stage III, on home O2  presented acute on chronic HF found to have large  pericardial effusion     Problem/Plan-1:  Problem: large pericardial effusion  monitor in ccu -currently stable   holding xeralto for pericardiocentesis by IR on monday      Problem/Plan - 2:  ·  Problem: Acute on chronic heart failure, unspecified heart failure type.    Plan: ekg /telemetry, f/u ce x 2,    lasix on hold due to pericardial effusion and borderline hypotension     strict I & O's, daily wts, fluid restriction,      Problem/Plan - 3:  ·  Problem: Diabetes.    Plan:  hyperglycemia 2/2 long term steroid use  endocrinology consulted -started on Lantus 20units daily  Humalog 15units pre meal   monitor finger stick , insulin ssc,  hold po meds.     Problem/Plan - 4:  ·  Problem: Thrombus.    Plan: ? thrombus in heart,   recent echo showed no thrombus  holding  Xarelto for pericardiocentesis      Problem/Plan - 5:  ·  Problem: Hyperlipidemia.  Plan:    con't  Lipitor 40mg daily    Problem/Plan - 6:  ·  Problem: Hypertension.    Plan: monitor Blood Pressure  hold anti HTN meds for now in the setting of borderline low Blood Pressure and large pericardial effusion Reason for CCU : pericardial effusion    HISTORY OF PRESENT ILLNESS:  Patient is a 52 years old female with HTN,. DM, HLD, sarcoidosis on steroid, CHFrEF stage III, CAD s/p  mLAD  xience stent on 2017 ? cardiac thrombus on xer alto ,? asthma /COPD on home O2  presented with a chief complaint of  worsening of LE edema/pain x 3weeks and shortness of breath and recent weight gain who admitted to tele for acute on chronic HF found to have large pericardial effusion with no tamponade in echo. She denies any fever, CT surgery consulted for pericardial window  but  patient was  deemed not  a surgical candidate 2/2 high risk for any surgical procedure and general anesthesia given her co-morbidities.  . Patient also  refusing anesthesia at this time. IR to perform pericardiocentesis  on Monday. She  now transfer to CCU for borderline hypotension SBP 90 and  c/o dizziness.   . She reported that she was send by her PMD for cardiac workup and had one cardiac  stent in [facility] in 2017 and was told that other area of heart muscle is dead. she was recently diagnosed with sarcoidosis ( 1 month ago).she reports not taking her medication regularly because its not helping her anymore.    Allergies: No Known Allergies    PAST MEDICAL & SURGICAL HISTORY:  Thrombus: ? Thrombus in heart - on Xarelto  Hyperlipidemia  Anemia  Hypertension  Sarcoidosis  CHF (congestive heart failure): Stage III, on home O2 @ 2lpm  Diabetes  astma/COPD  CAd s/p stent  Ankle fracture: Left Ankle - with plate and screws placed  Hip deformity: left hip had a pin placed to hold bone in place after a fall      MEDICATIONS  (STANDING):Home meds     Metoprolol Succinate ER 25 mg oral tablet, extended release: 1 tab(s) orally once a day, Last Dose Taken:    Xarelto 20 mg oral tablet: 1 tab(s) orally once a day (in the evening), Last Dose Taken:    Flovent 220 mcg/inh inhalation aerosol with adapter: 1 puff(s) inhaled 2 times a day, Last Dose Taken:    Aspirin Enteric Coated 81 mg oral delayed release tablet: 1 tab(s) orally once a day, Last Dose Taken:    rosuvastatin 10 mg oral tablet: 1 tab(s) orally once a day (at bedtime), Last Dose Taken:    prednisone 20 mg oral tablet: 1 tab(s) orally 2 times a day, Last Dose Taken:    azelastine 137 mcg/inh (0.1%) nasal spray: 2 spray(s) nasal 2 times a day, Last Dose Taken:    furosemide 80 mg oral tablet: 1 tab(s) orally once a day, Last Dose Taken:    sulfamethoxazole-trimethoprim DS: 1 tab(s) orally Monday, Wednesday, and Friday  met FORMIN 500 mg oral tablet: 1 tab(s) orally 3 times a day, Last Dose Taken:    losartan 25 mg oral tablet: 1 tab(s) orally once a day, Last Dose Taken:    ferrous sulfate 325 mg (65 mg elemental iron) oral tablet: 1 tab(s) orally 2 times a day, Last Dose Taken:  flovent HFA 220mcg inhaler 1 puff bid  ventolin HFA 2puffs 4x day prn    current meds ;    aspirin enteric coated 81 milliGRAM(s) Oral daily  atorvastatin 40 milliGRAM(s) Oral at bedtime  ferrous    sulfate 325 milliGRAM(s) Oral two times a day  fluticasone propionate   220 MICROgram(s) HFA Inhaler 1 Puff(s) Inhalation two times a day  insulin glargine Injectable (LANTUS) 20 Unit(s) SubCutaneous at bedtime  insulin lispro (HumaLOG) corrective regimen sliding scale   SubCutaneous three times a day before meals  insulin lispro (HumaLOG) corrective regimen sliding scale   SubCutaneous at bedtime  insulin lispro Injectable (HumaLOG) 15 Unit(s) SubCutaneous three times a day before meals  losartan 25 milliGRAM(s) Oral daily  metoprolol succinate ER 25 milliGRAM(s) Oral daily  predniSONE   Tablet 20 milliGRAM(s) Oral two times a day  trimethoprim  160 mG/sulfamethoxazole 800 mG 1 Tablet(s) Oral <User Schedule>  acetaminophen   Tablet. 650 milliGRAM(s) Oral every 6 hours PRN temp> 101  acetaminophen   Tablet. 650 milliGRAM(s) Oral every 6 hours PRN mild, moderate pain  ALBUTerol    90 MICROgram(s) HFA Inhaler 2 Puff(s) Inhalation every 6 hours PRN Bronchospasm  dextrose Gel 1 Dose(s) Oral once PRN Blood Glucose LESS THAN 70 milliGRAM(s)/deciliter  glucagon  Injectable 1 milliGRAM(s) IntraMuscular once PRN Glucose LESS THAN 70 milligrams/deciliter      Drug Dosing Weight  Height (cm): 160.02 (04 May 2018 13:40)  Weight (kg): 74 (04 May 2018 13:40)  BMI (kg/m2): 28.9 (04 May 2018 13:40)  BSA (m2): 1.77 (04 May 2018 13:40)    FAMILY HISTORY:  Family history of hypertension (Sibling): Siblings  Family history of diabetes mellitus (Father, Sibling): Father, Siblings  Family history of breast cancer in mother (Mother): Mother  Family history of colon cancer in mother (Mother): Mother    SOCIAL HISTORY:  Single ,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 No palpitations, + SOTELO, + orthopnea, No paroxysmal nocturnal dyspnea, No pleuritic pain  GASTROINTESTINAL: No abdominal pain, No nausea, No vomiting, No hematemesis, No diarrhea No constipation. No melena,+ abdomen bloating  GENITOURINARY: No dysuria, No frequency, No incontinence, No hematuria  NEUROLOGICAL: No dizziness, No lightheadedness, No syncope, No LOC, No headache, + weakness  MUSCULOSKELETAL: + leg ankle pain, No joint swelling,+ LE edema   PSYCHIATRIC: No anxiety, No depression  SKIN: No diaphoresis. No itching, No rashes, No pressure ulcers      PHYSICAL EXAM:    Appearance: NAD, no distress  HEENT:   Normal oral mucosa, PERRL, EOMI  Cardiovascular: Regular rate and rhythm, Normal S1 S2, + mild  JVD, No murmurs,   Respiratory: b/l Lungs crackles  to auscultation. , No rhonchi, No wheezing. No tenderness to palpation  Gastrointestinal:  Soft, Non-tender, + BS,distended  Neurologic: Non-focal, A&Ox3  Skin: Warm and dry, No rashes, No ecchymoses, No cyanosis  Extremities: No clubbing, cyanosis , b/l + LE edema up to the lower abdomen  Vascular: Peripheral pulses palpable 2+ bilaterally  Psychiatry: Mood & affect appropriate      ICU Vital Signs Last 24 Hrs  T(C): 36.3 (05 May 2018 20:55), Max: 36.4 (05 May 2018 05:40)  T(F): 97.3 (05 May 2018 20:55), Max: 97.5 (05 May 2018 05:40)  HR: 97 (05 May 2018 20:55) (84 - 97)  BP: 92/61 (05 May 2018 20:55) (92/61 - 126/87)  RR: 16 (05 May 2018 20:55) (16 - 18)  SpO2: 99% (05 May 2018 20:55) (98% - 100%)          LABS:  CBC Full  -  ( 05 May 2018 07:05 )  WBC Count : 8.13 K/uL  Hemoglobin : 12.2 g/dL  Hematocrit : 41.0 %  Platelet Count - Automated : 345 K/uL  Mean Cell Volume : 70.8 fL  Mean Cell Hemoglobin : 21.1 pg  Mean Cell Hemoglobin Concentration : 29.8 %  Auto Neutrophil # : 7.33 K/uL  Auto Lymphocyte # : 0.46 K/uL  Auto Monocyte # : 0.28 K/uL  Auto Eosinophil # : 0.00 K/uL  Auto Basophil # : 0.01 K/uL  Auto Neutrophil % : 90.2 %  Auto Lymphocyte % : 5.7 %  Auto Monocyte % : 3.4 %  Auto Eosinophil % : 0.0 %  Auto Basophil % : 0.1 %    04    130<L>  |  93<L>  |  34<H>  ----------------------------<  678<HH>  5.1   |  24  |  1.17    Ca    8.3<L>      04 May 2018 06:45g     1.6     05-05          CAPILLARY BLOOD GLUCOSE    POCT Blood Glucose.: 117 mg/dL (05 May 2018 22:13)      Urinalysis Basic - ( 03 May 2018 23:20 )    Color: YELLOW / Appearance: CLEAR / S.018 / pH: 6.5  Gluc: 50 / Ketone: NEGATIVE  / Bili: NEGATIVE / Urobili: NORMAL mg/dL   Blood: SMALL / Protein: >600 mg/dL / Nitrite: NEGATIVE   Leuk Esterase: NEGATIVE / RBC: 2-5 / WBC 2-5   Sq Epi: OCC / Non Sq Epi: x / Bacteria: x  --  IN:    Oral Fluid: 560 mL  Total IN: 560 mL    OUT:    Voided: 600 mL  Total OUT: 600 mL    Total NET: -40 mL      05 May 2018 07:01  -  05 May 2018 22:38  --------------------------------------------------------  IN:    Oral Fluid: 480 mL  Total IN: 480 mL    OUT:  Total OUT: 0 mL    Total NET: 480 mL          EKG:< from: 12 Lead ECG (18 @ 09:59) > sinus tachycardia left axis deviation Ventricular Rate 115 BPM  Atrial Rate 115 BPM,P-R Interval 186 ms,QRS Duration 78 ms,Q-T Interval 330 ms,QTC Calculation(Bezet) 456 ms    ECHO:< from: Transthoracic Echocardiogram (18 @ 11:47) >Ef 19%, Severe global left ventricular systolic dysfunction.  2. Large circumferential pericardial effusion. The effusion measures 3.2 cm lateral to the right atrium and 1.6 cm anterior to the right ventricle. The IVC measures about 2.0cm and does not appear to collapse, although there is minimal respiratory variability. Transmitral gradients were not performed. The RV and RA are not collapsed however they do not appear to fully expand during diastole.*** No previous Echo exam.    RADIOLOGY STUDIES    CXR: < from: Xray Chest 1 View- PORTABLE-Routine (18 @ 07:51) >Small bilateral pleural effusions with adjacent atelectasis with or   without pneumonia.    ULTRASOUND: < from: US Duplex Venous Lower Ext Complete, Bilateral (18 @ 13:08) >No evidence of bilateral lower extremity deep venous thrombosis.      Assessment :52 years old female with HTN, DM ,HLD, sarcoidosis on steroid, CHFrEF stage III, on home O2  presented acute on chronic HF found to have large  pericardial effusion     Problem/Plan-1:  Problem: large pericardial effusion  monitor in ccu -currently stable   holding xeralto for pericardiocentesis by IR on monday      Problem/Plan - 2:  ·  Problem: Acute on chronic heart failure, unspecified heart failure type.    Plan: ekg /telemetry, f/u ce x 2,    lasix on hold due to pericardial effusion and borderline hypotension     strict I & O's, daily wts, fluid restriction,      Problem/Plan - 3:  ·  Problem: Diabetes.    Plan:  hyperglycemia 2/2 long term steroid use  endocrinology consulted -started on Lantus 20units daily  Humalog 15units pre meal   monitor finger stick , insulin ssc,  hold po meds.     Problem/Plan - 4:  ·  Problem: Thrombus.    Plan: ? thrombus in heart,   recent echo showed no thrombus  holding  Xarelto for pericardiocentesis      Problem/Plan - 5:  ·  Problem: Hyperlipidemia.  Plan:    con't  Lipitor 40mg daily    Problem/Plan - 6:  ·  Problem: Hypertension.    Plan: monitor Blood Pressure  hold anti HTN meds for now in the setting of borderline low Blood Pressure and large pericardial effusion Reason for CCU : pericardial effusion    HISTORY OF PRESENT ILLNESS:  Patient is a 52 years old female with HTN,. DM, HLD, sarcoidosis on steroid, CHFrEF stage III, CAD s/p  mLAD  xience stent on 2017 ? cardiac thrombus on xer alto ,? asthma /COPD on home O2  presented with a chief complaint of  worsening of LE edema/pain x 3weeks and shortness of breath and recent weight gain who admitted to tele for acute on chronic HF found to have large pericardial effusion with early  tamponade in echo. She denies any fever, CT surgery consulted for pericardial window  but  patient was  deemed not  a surgical candidate 2/2 high risk for any surgical procedure and general anesthesia given her co-morbidities.  . Patient also  refusing anesthesia at this time. IR to perform pericardiocentesis  on Monday. She  now transfer to CCU for borderline hypotension SBP 90 and  c/o dizziness.   . She reported that she was send by her PMD for cardiac workup and had one cardiac  stent in [facility] in 2017 and was told that other area of heart muscle is dead. she was recently diagnosed with sarcoidosis ( 1 month ago).she reports not taking her medication regularly because its not helping her anymore.    Allergies: No Known Allergies    PAST MEDICAL & SURGICAL HISTORY:  Thrombus: ? Thrombus in heart - on Xarelto  Hyperlipidemia  Anemia  Hypertension  Sarcoidosis  CHF (congestive heart failure): Stage III, on home O2 @ 2lpm  Diabetes  astma/COPD  CAd s/p stent  Ankle fracture: Left Ankle - with plate and screws placed  Hip deformity: left hip had a pin placed to hold bone in place after a fall      MEDICATIONS  (STANDING):Home meds     Metoprolol Succinate ER 25 mg oral tablet, extended release: 1 tab(s) orally once a day, Last Dose Taken:    Xarelto 20 mg oral tablet: 1 tab(s) orally once a day (in the evening), Last Dose Taken:    Flovent 220 mcg/inh inhalation aerosol with adapter: 1 puff(s) inhaled 2 times a day, Last Dose Taken:    Aspirin Enteric Coated 81 mg oral delayed release tablet: 1 tab(s) orally once a day, Last Dose Taken:    rosuvastatin 10 mg oral tablet: 1 tab(s) orally once a day (at bedtime), Last Dose Taken:    prednisone 20 mg oral tablet: 1 tab(s) orally 2 times a day, Last Dose Taken:    azelastine 137 mcg/inh (0.1%) nasal spray: 2 spray(s) nasal 2 times a day, Last Dose Taken:    furosemide 80 mg oral tablet: 1 tab(s) orally once a day, Last Dose Taken:    sulfamethoxazole-trimethoprim DS: 1 tab(s) orally Monday, Wednesday, and Friday  met FORMIN 500 mg oral tablet: 1 tab(s) orally 3 times a day, Last Dose Taken:    losartan 25 mg oral tablet: 1 tab(s) orally once a day, Last Dose Taken:    ferrous sulfate 325 mg (65 mg elemental iron) oral tablet: 1 tab(s) orally 2 times a day, Last Dose Taken:  flovent HFA 220mcg inhaler 1 puff bid  ventolin HFA 2puffs 4x day prn    current meds ;    aspirin enteric coated 81 milliGRAM(s) Oral daily  atorvastatin 40 milliGRAM(s) Oral at bedtime  ferrous    sulfate 325 milliGRAM(s) Oral two times a day  fluticasone propionate   220 MICROgram(s) HFA Inhaler 1 Puff(s) Inhalation two times a day  insulin glargine Injectable (LANTUS) 20 Unit(s) SubCutaneous at bedtime  insulin lispro (HumaLOG) corrective regimen sliding scale   SubCutaneous three times a day before meals  insulin lispro (HumaLOG) corrective regimen sliding scale   SubCutaneous at bedtime  insulin lispro Injectable (HumaLOG) 15 Unit(s) SubCutaneous three times a day before meals  losartan 25 milliGRAM(s) Oral daily  metoprolol succinate ER 25 milliGRAM(s) Oral daily  predniSONE   Tablet 20 milliGRAM(s) Oral two times a day  trimethoprim  160 mG/sulfamethoxazole 800 mG 1 Tablet(s) Oral <User Schedule>  acetaminophen   Tablet. 650 milliGRAM(s) Oral every 6 hours PRN temp> 101  acetaminophen   Tablet. 650 milliGRAM(s) Oral every 6 hours PRN mild, moderate pain  ALBUTerol    90 MICROgram(s) HFA Inhaler 2 Puff(s) Inhalation every 6 hours PRN Bronchospasm  dextrose Gel 1 Dose(s) Oral once PRN Blood Glucose LESS THAN 70 milliGRAM(s)/deciliter  glucagon  Injectable 1 milliGRAM(s) IntraMuscular once PRN Glucose LESS THAN 70 milligrams/deciliter      Drug Dosing Weight  Height (cm): 160.02 (04 May 2018 13:40)  Weight (kg): 74 (04 May 2018 13:40)  BMI (kg/m2): 28.9 (04 May 2018 13:40)  BSA (m2): 1.77 (04 May 2018 13:40)    FAMILY HISTORY:  Family history of hypertension (Sibling): Siblings  Family history of diabetes mellitus (Father, Sibling): Father, Siblings  Family history of breast cancer in mother (Mother): Mother  Family history of colon cancer in mother (Mother): Mother    SOCIAL HISTORY:  Single ,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 No palpitations, + SOTELO, + orthopnea, No paroxysmal nocturnal dyspnea, No pleuritic pain  GASTROINTESTINAL: No abdominal pain, No nausea, No vomiting, No hematemesis, No diarrhea No constipation. No melena,+ abdomen bloating  GENITOURINARY: No dysuria, No frequency, No incontinence, No hematuria  NEUROLOGICAL: No dizziness, No lightheadedness, No syncope, No LOC, No headache, + weakness  MUSCULOSKELETAL: + leg ankle pain, No joint swelling,+ LE edema   PSYCHIATRIC: No anxiety, No depression  SKIN: No diaphoresis. No itching, No rashes, No pressure ulcers      PHYSICAL EXAM:    Appearance: NAD, no distress  HEENT:   Normal oral mucosa, PERRL, EOMI  Cardiovascular: Regular rate and rhythm, Normal S1 S2, + mild  JVD, No murmurs,   Respiratory: b/l Lungs crackles  to auscultation. , No rhonchi, No wheezing. No tenderness to palpation  Gastrointestinal:  Soft, Non-tender, + BS,distended  Neurologic: Non-focal, A&Ox3  Skin: Warm and dry, No rashes, No ecchymoses, No cyanosis  Extremities: No clubbing, cyanosis , b/l + LE edema up to the lower abdomen  Vascular: Peripheral pulses palpable 2+ bilaterally  Psychiatry: Mood & affect appropriate      ICU Vital Signs Last 24 Hrs  T(C): 36.3 (05 May 2018 20:55), Max: 36.4 (05 May 2018 05:40)  T(F): 97.3 (05 May 2018 20:55), Max: 97.5 (05 May 2018 05:40)  HR: 97 (05 May 2018 20:55) (84 - 97)  BP: 92/61 (05 May 2018 20:55) (92/61 - 126/87)  RR: 16 (05 May 2018 20:55) (16 - 18)  SpO2: 99% (05 May 2018 20:55) (98% - 100%)          LABS:  CBC Full  -  ( 05 May 2018 07:05 )  WBC Count : 8.13 K/uL  Hemoglobin : 12.2 g/dL  Hematocrit : 41.0 %  Platelet Count - Automated : 345 K/uL  Mean Cell Volume : 70.8 fL  Mean Cell Hemoglobin : 21.1 pg  Mean Cell Hemoglobin Concentration : 29.8 %  Auto Neutrophil # : 7.33 K/uL  Auto Lymphocyte # : 0.46 K/uL  Auto Monocyte # : 0.28 K/uL  Auto Eosinophil # : 0.00 K/uL  Auto Basophil # : 0.01 K/uL  Auto Neutrophil % : 90.2 %  Auto Lymphocyte % : 5.7 %  Auto Monocyte % : 3.4 %  Auto Eosinophil % : 0.0 %  Auto Basophil % : 0.1 %    04    130<L>  |  93<L>  |  34<H>  ----------------------------<  678<HH>  5.1   |  24  |  1.17    Ca    8.3<L>      04 May 2018 06:45g     1.6     05-05          CAPILLARY BLOOD GLUCOSE    POCT Blood Glucose.: 117 mg/dL (05 May 2018 22:13)      Urinalysis Basic - ( 03 May 2018 23:20 )    Color: YELLOW / Appearance: CLEAR / S.018 / pH: 6.5  Gluc: 50 / Ketone: NEGATIVE  / Bili: NEGATIVE / Urobili: NORMAL mg/dL   Blood: SMALL / Protein: >600 mg/dL / Nitrite: NEGATIVE   Leuk Esterase: NEGATIVE / RBC: 2-5 / WBC 2-5   Sq Epi: OCC / Non Sq Epi: x / Bacteria: x  --  IN:    Oral Fluid: 560 mL  Total IN: 560 mL    OUT:    Voided: 600 mL  Total OUT: 600 mL    Total NET: -40 mL      05 May 2018 07:01  -  05 May 2018 22:38  --------------------------------------------------------  IN:    Oral Fluid: 480 mL  Total IN: 480 mL    OUT:  Total OUT: 0 mL    Total NET: 480 mL          EKG:< from: 12 Lead ECG (18 @ 09:59) > sinus tachycardia left axis deviation Ventricular Rate 115 BPM  Atrial Rate 115 BPM,P-R Interval 186 ms,QRS Duration 78 ms,Q-T Interval 330 ms,QTC Calculation(Bezet) 456 ms    ECHO:< from: Transthoracic Echocardiogram (18 @ 11:47) >Ef 19%, Severe global left ventricular systolic dysfunction.  2. Large circumferential pericardial effusion. The effusion measures 3.2 cm lateral to the right atrium and 1.6 cm anterior to the right ventricle. The IVC measures about 2.0cm and does not appear to collapse, although there is minimal respiratory variability. Transmitral gradients were not performed. The RV and RA are not collapsed however they do not appear to fully expand during diastole.*** No previous Echo exam.    RADIOLOGY STUDIES    CXR: < from: Xray Chest 1 View- PORTABLE-Routine (18 @ 07:51) >Small bilateral pleural effusions with adjacent atelectasis with or   without pneumonia.    ULTRASOUND: < from: US Duplex Venous Lower Ext Complete, Bilateral (18 @ 13:08) >No evidence of bilateral lower extremity deep venous thrombosis.      Assessment :52 years old female with HTN, DM ,HLD, sarcoidosis on steroid, CHFrEF stage III, on home O2  presented acute on chronic HF found to have large  pericardial effusion w/ early tamponade physiology     Problem/Plan-1:  Problem: large pericardial effusion  monitor in ccu -currently stable   holding xer alto for pericardiocentesis by IR on Monday      Problem/Plan - 2:  ·  Problem: Acute on chronic heart failure, unspecified heart failure type.    Plan: ekg /telemetry, f/u ce x 2,    lasix on hold due to pericardial effusion and borderline hypotension     strict I & O's, daily wts, fluid restriction,      Problem/Plan - 3:  ·  Problem: Diabetes.    Plan:  hyperglycemia 2/2 long term steroid use  endocrinology consulted -started on Lantus 20units daily  Humalog 15units pre meal   monitor finger stick , insulin ssc,  hold po meds.     Problem/Plan - 4:  ·  Problem: Thrombus.    Plan: ? thrombus in heart,   recent echo showed no thrombus  holding  Xarelto for pericardiocentesis      Problem/Plan - 5:  ·  Problem: Hyperlipidemia.  Plan:    con't  Lipitor 40mg daily    Problem/Plan - 6:  ·  Problem: Hypertension.    Plan: monitor Blood Pressure  hold anti HTN meds for now in the setting of borderline low Blood Pressure and large pericardial effusion

## 2018-05-05 NOTE — PROGRESS NOTE ADULT - SUBJECTIVE AND OBJECTIVE BOX
Subjective: Patient seen and examined. No new events except as noted.   No cp or sob   Dizzy at times     REVIEW OF SYSTEMS:    CONSTITUTIONAL: + weakness, fevers or chills  EYES/ENT: No visual changes;  No vertigo or throat pain   NECK: No pain or stiffness  RESPIRATORY: No cough, wheezing, hemoptysis; No shortness of breath  CARDIOVASCULAR: No chest pain or palpitations  GASTROINTESTINAL: No abdominal or epigastric pain. No nausea, vomiting, or hematemesis; No diarrhea or constipation. No melena or hematochezia.  GENITOURINARY: No dysuria, frequency or hematuria  NEUROLOGICAL: No numbness or weakness  SKIN: No itching, burning, rashes, or lesions   All other review of systems is negative unless indicated above.    MEDICATIONS:  MEDICATIONS  (STANDING):  aspirin enteric coated 81 milliGRAM(s) Oral daily  atorvastatin 40 milliGRAM(s) Oral at bedtime  dextrose 5%. 1000 milliLiter(s) (50 mL/Hr) IV Continuous <Continuous>  dextrose 50% Injectable 12.5 Gram(s) IV Push once  dextrose 50% Injectable 25 Gram(s) IV Push once  dextrose 50% Injectable 25 Gram(s) IV Push once  ferrous    sulfate 325 milliGRAM(s) Oral two times a day  fluticasone propionate   220 MICROgram(s) HFA Inhaler 1 Puff(s) Inhalation two times a day  insulin glargine Injectable (LANTUS) 20 Unit(s) SubCutaneous at bedtime  insulin lispro (HumaLOG) corrective regimen sliding scale   SubCutaneous three times a day before meals  insulin lispro (HumaLOG) corrective regimen sliding scale   SubCutaneous at bedtime  insulin lispro Injectable (HumaLOG) 15 Unit(s) SubCutaneous three times a day before meals  losartan 25 milliGRAM(s) Oral daily  metoprolol succinate ER 25 milliGRAM(s) Oral daily  predniSONE   Tablet 20 milliGRAM(s) Oral two times a day  trimethoprim  160 mG/sulfamethoxazole 800 mG 1 Tablet(s) Oral <User Schedule>      PHYSICAL EXAM:  T(C): 36.3 (05-05-18 @ 20:55), Max: 36.4 (05-05-18 @ 05:40)  HR: 97 (05-05-18 @ 20:55) (84 - 97)  BP: 92/61 (05-05-18 @ 20:55) (92/61 - 126/87)  RR: 16 (05-05-18 @ 20:55) (16 - 18)  SpO2: 99% (05-05-18 @ 20:55) (98% - 100%)  Wt(kg): --  I&O's Summary    04 May 2018 07:01  -  05 May 2018 07:00  --------------------------------------------------------  IN: 560 mL / OUT: 600 mL / NET: -40 mL    05 May 2018 07:01  -  05 May 2018 21:42  --------------------------------------------------------  IN: 480 mL / OUT: 0 mL / NET: 480 mL          Appearance: Normal	  HEENT:   Normal oral mucosa, PERRL, EOMI	  Lymphatic: No lymphadenopathy , no edema  Cardiovascular: Normal S1 S2, No JVD, No murmurs , Peripheral pulses palpable 2+ bilaterally  Respiratory: +crackles   Gastrointestinal:  Soft, Non-tender, + BS	  Skin: No rashes, No ecchymoses, No cyanosis, warm to touch  Musculoskeletal: Normal range of motion, normal strength  Psychiatry:  Mood & affect appropriate  Ext: + edema      LABS:    CARDIAC MARKERS:  CARDIAC MARKERS ( 03 May 2018 16:59 )  x     / < 0.06 ng/mL / 85 u/L / 2.05 ng/mL / x      CARDIAC MARKERS ( 03 May 2018 10:22 )  x     / < 0.06 ng/mL / x     / x     / x                                    12.2   8.13  )-----------( 345      ( 05 May 2018 07:05 )             41.0     05-04    130<L>  |  93<L>  |  34<H>  ----------------------------<  678<HH>  5.1   |  24  |  1.17    Ca    8.3<L>      04 May 2018 06:45  Mg     1.6     05-05      proBNP:   Lipid Profile:   HgA1c:   TSH:     3-5/LPF          TELEMETRY: 	    ECG:  	  RADIOLOGY:   DIAGNOSTIC TESTING:  [ ] Echocardiogram:  < from: Transthoracic Echocardiogram (05.05.18 @ 11:47) >    Patient name: PEYMAN WATTS  YOB: 1965   Age: 52 (F)   MR#: [id number]  Study Date: 5/5/2018  Location: [id number]: [facility]par[facility]ja  Study quality: Technically good  Referring Physician: David Sam MD  Blood Pressure: 122/67 mmHg  Height: 160 cm  Weight: 74 kg  BSA: 1.8 m2  ------------------------------------------------------------------------  PROCEDURE: Transthoracic echocardiogram with 2-D, M-Mode  and complete spectral and color flow Doppler.  INDICATION: Heart failure, unspecified (I50.9)  ------------------------------------------------------------------------  DIMENSIONS:  Dimensions:     Normal Values:  LA:     2.1 cm    2.0 - 4.0 cm  Ao:     3.0 cm    2.0 - 3.8 cm  SEPTUM: 0.6 cm    0.6 - 1.2 cm  PWT:    1.1cm    0.6 - 1.1 cm  LVIDd:  4.6 cm    3.0 - 5.6 cm  LVIDs:  4.2 cm    1.8 - 4.0 cm  Derived Variables:  LVMI: 72 g/m2  RWT: 0.47  Fractional short: 9 %  Ejection Fraction (Facundoicholtz): 19 %  ------------------------------------------------------------------------  OBSERVATIONS:  Mitral Valve: Normal mitral valve.  Aortic Root: Normal aortic root.  Aortic Valve: Normal trileaflet aortic valve.  Left Atrium: Normal left atrium.  Left Ventricle: Severe global left ventricular systolic  dysfunction. Normal left ventricular internal dimensions  and wall thicknesses. (DT:211 ms).  Right Heart: Normal right atrium. Normal right ventricular  size and function. Normal tricuspid valve. Normal pulmonic  valve.  Pericardium/PleuraLarge circumferential pericardial  effusion. The effusion measures 3.2 cm lateral to the right  atrium and 1.6 cm anterior to the right ventricle. The IVC  measures about 2.0 cm and does not appear to collapse,  although there is minimal respiratory variability.  Transmitral gradients were not performed. The RV and RA are  not collapsed however they do not appear to fully expand  during diastole.  ------------------------------------------------------------------------  CONCLUSIONS:  1. Severe global left ventricular systolic dysfunction.  2. Large circumferential pericardial effusion. The effusion  measures 3.2 cm lateral to the right atrium and 1.6 cm  anterior to the right ventricle. The IVC measures about 2.0  cm and does not appear to collapse, although there is  minimal respiratory variability. Transmitral gradients were  not performed. The RV and RA are not collapsed however they  do not appear to fully expand during diastole.  *** No previous Echo exam.  ------------------------------------------------------------------------  Confirmed on  5/5/2018 - 12:42:37 by Triny Mascorro MD  ------------------------------------------------------------------------    < end of copied text >    [ ]  Catheterization:  [ ] Stress Test:    OTHER:

## 2018-05-05 NOTE — PROGRESS NOTE ADULT - PROBLEM SELECTOR PLAN 1
See full consult for complete plan of care    DM 2 exacerbated by steroids:  Hyperglycemia persists  -Increased Humalog to 15 units qac and increased Lantus to 20 units qhs, with moderate correction scale  -Ordered one time STAT dose of NPH 5 units to bridge to next Lantus dose tonight  -C/W consistent carb diet  -F/U nutrition consult  -will follow  -plan to discharge on basal bolus insulin only, doses to be determined  - she can follow up in office as outpatient - [phone] but she states she would rather find a provider closer to her home in [place]. Encouraged her to call her insurance company to locate a provider close to her and she should also see her PCP (who she also reports is in [place]) immediately upon DC. PCP can possible also refer to an endocrine provider in closer proximity to her home if that is more convenient for her.     Please note: nursing staff report they attempt to teach her pens today but she declined. Basal/bolus insulin is indicated for DC given hyperglycemia and hbA1c. Please continue to have staff try to teach her both insulin administration and glucometer. Staff should document all education provided on DM CPG. See full consult for complete plan of care    DM 2 exacerbated by steroids:  Hyperglycemia persists  -Increased Humalog to 15 units qac and increased Lantus to 20 units qhs, with moderate correction scale  -Ordered one time STAT dose of NPH 5 units to bridge to next Lantus dose tonight  -Important to note that endocrine service should be altered to steroid changes by calling - once steroids are decreased, insulin doses will likely need to be changed to avoid hypoglycemia.   -C/W consistent carb diet  -F/U nutrition consult  -will follow  -plan to discharge on basal bolus insulin only, doses to be determined  - she can follow up in office as outpatient - [phone] but she states she would rather find a provider closer to her home in [place]. Encouraged her to call her insurance company to locate a provider close to her and she should also see her PCP (who she also reports is in [place]) immediately upon DC. PCP can possible also refer to an endocrine provider in closer proximity to her home if that is more convenient for her.     Please note: nursing staff report they attempt to teach her pens today but she declined. Basal/bolus insulin is indicated for DC given hyperglycemia and hbA1c. Please continue to have staff try to teach her both insulin administration and glucometer. Staff should document all education provided on DM CPG.

## 2018-05-05 NOTE — PROGRESS NOTE ADULT - SUBJECTIVE AND OBJECTIVE BOX
Chief Complaint: DM 2 exacerbated by steroids with severe hyperglycemia     History: Hyperglycemia noted. Patient continues on prednisone 20 mg BID, tolerating PO intake with good reported appetite. Says she is willing to learn insulin but does not think she really needs it - nursing staff reports attempting to review pens with her today but she declined.     MEDICATIONS  (STANDING):  aspirin enteric coated 81 milliGRAM(s) Oral daily  atorvastatin 40 milliGRAM(s) Oral at bedtime  dextrose 5%. 1000 milliLiter(s) (50 mL/Hr) IV Continuous <Continuous>  dextrose 50% Injectable 12.5 Gram(s) IV Push once  dextrose 50% Injectable 25 Gram(s) IV Push once  dextrose 50% Injectable 25 Gram(s) IV Push once  ferrous    sulfate 325 milliGRAM(s) Oral two times a day  fluticasone propionate   220 MICROgram(s) HFA Inhaler 1 Puff(s) Inhalation two times a day  furosemide   Injectable 40 milliGRAM(s) IV Push two times a day  insulin glargine Injectable (LANTUS) 20 Unit(s) SubCutaneous at bedtime  insulin lispro (HumaLOG) corrective regimen sliding scale   SubCutaneous three times a day before meals  insulin lispro (HumaLOG) corrective regimen sliding scale   SubCutaneous at bedtime  insulin lispro Injectable (HumaLOG) 15 Unit(s) SubCutaneous three times a day before meals  losartan 25 milliGRAM(s) Oral daily  metoprolol succinate ER 25 milliGRAM(s) Oral daily  predniSONE   Tablet 20 milliGRAM(s) Oral two times a day  rivaroxaban 20 milliGRAM(s) Oral every 24 hours  trimethoprim  160 mG/sulfamethoxazole 800 mG 1 Tablet(s) Oral <User Schedule>    MEDICATIONS  (PRN):  acetaminophen   Tablet. 650 milliGRAM(s) Oral every 6 hours PRN temp> 101  acetaminophen   Tablet. 650 milliGRAM(s) Oral every 6 hours PRN mild, moderate pain  ALBUTerol    90 MICROgram(s) HFA Inhaler 2 Puff(s) Inhalation every 6 hours PRN Bronchospasm  dextrose Gel 1 Dose(s) Oral once PRN Blood Glucose LESS THAN 70 milliGRAM(s)/deciliter  glucagon  Injectable 1 milliGRAM(s) IntraMuscular once PRN Glucose LESS THAN 70 milligrams/deciliter          No Known Allergies          Review of Systems:  Constitutional: No fever  Eyes: No blurry vision  Cardiovascular: No chest pain, palpitations  Respiratory: No SOB, no cough  GI: No nausea, vomiting, abdominal pain  Endocrine: no polyuria, polydipsia      PHYSICAL EXAM:  VITALS: T(C): 36.4 (05-05-18 @ 05:40)  T(F): 97.5 (05-05-18 @ 05:40), Max: 98 (05-04-18 @ 17:07)  HR: 84 (05-05-18 @ 05:40) (78 - 89)  BP: 126/87 (05-05-18 @ 05:40) (106/77 - 126/87)  RR:  (16 - 19)  SpO2:  (97% - 100%)  Wt(kg): --  GENERAL: NAD  RESPIRATORY: Non labored  CARDIOVASCULAR: B/L LE edema noted  GI: Soft, nontender, non distended  SKIN: Dry, warm  PSYCH: Alert and oriented x 3      POCT Blood Glucose.: 404 mg/dL (05-05-18 @ 08:29)  POCT Blood Glucose.: 339 mg/dL (05-04-18 @ 22:26)  POCT Blood Glucose.: 396 mg/dL (05-04-18 @ 17:20)  POCT Blood Glucose.: 360 mg/dL (05-04-18 @ 12:43)  POCT Blood Glucose.: 504 mg/dL (05-04-18 @ 11:01)  POCT Blood Glucose.: >600 mg/dL (05-04-18 @ 09:35)  POCT Blood Glucose.: >600 mg/dL (05-04-18 @ 09:31)  POCT Blood Glucose.: >600 mg/dL (05-04-18 @ 08:53)  POCT Blood Glucose.: >600 mg/dL (05-04-18 @ 07:57)  POCT Blood Glucose.: >600 mg/dL (05-04-18 @ 07:55)  POCT Blood Glucose.: 237 mg/dL (05-03-18 @ 21:45)  POCT Blood Glucose.: 160 mg/dL (05-03-18 @ 18:15)  POCT Blood Glucose.: 400 mg/dL (05-03-18 @ 13:44)  POCT Blood Glucose.: 341 mg/dL (05-03-18 @ 09:54)      05-04    130<L>  |  93<L>  |  34<H>  ----------------------------<  678<HH>  5.1   |  24  |  1.17    EGFR if : 62  EGFR if non : 54    Ca    8.3<L>      05-04  Mg     1.6     05-05    TPro  6.6  /  Alb  2.6<L>  /  TBili  0.7  /  DBili  x   /  AST  14  /  ALT  9   /  AlkPhos  171<H>  05-03          Thyroid Function Tests:  05-03 @ 16:59 TSH 6.40 FreeT4 -- T3 -- Anti TPO -- Anti Thyroglobulin Ab -- TSI --      Hemoglobin A1C, Whole Blood: 14.3 % <H> [4.0 - 5.6] (05-04-18 @ 06:45)  Hemoglobin A1C, Whole Blood: 14.3 % <H> [4.0 - 5.6] (05-03-18 @ 16:59)

## 2018-05-05 NOTE — CONSULT NOTE ADULT - SUBJECTIVE AND OBJECTIVE BOX
HPI:  53 y/o female, with a PmHx of CHF stage III, on home O2, Sarcoidosis, ? blood clot in heart on Xarelto, DM, presented with sob and LE edema/pain. Pt states for the past 3 weeks she has been having LE edema/pain and sob but in the last 24 hours was started to get worse.  Pt states she was recently admitted to [facility] back in February for a similar complaint and was eating much so she states her normal weight used to be 165lb but she has lost a lot of weight and now weights about 110. She states she has been having a  lot pain to both her legs so she decided to come to [facility] ED today for an evaluation. She denies any fever, chills, chest pain, HA, dizziness, blurred vision, n/v, abd pain, recent travel. The only complaints she has been having are nasal congestion, cold intolerance, diaphoresis, bilateral leg pain and sob (more than normal because she is on o2 via nasal cannula at home. She appears comfortable at this time. She is being admitted to telemetry for acute on chronic diastolic heart failure and chest pain r/o acs. (03 May 2018 16:55)  Upon further work up pt. had TTE today which showed large circumferential pericardial effusion, no RV or RA collapse. Called by primary team to evaluate for surgical intervention. Upon exam pt. in no distress, alert, awake. On Ra w O2 sats 100% B/p and HR stable. Pt. denies any dizziness, chest pain, sob, abd pain, n/v/d. Pt only complaint is worsening LE edema with pain in her legs and hips x mult weeks. She states that last yr in May at [facility] she received multiple cardiac stents and at that time her cardiologist told her to never go under general anesthesia because "her body and heart can't handle it" Pt. states because of that there's no way she will agree to go under general anesthesia. Pt. has not followed up w her Cards for over 6 months. Denies any knowledge of previous pericardial effusion. She has baseline mild SOB, wears O2 PRN at home. Has been on steriods at home, upon admission glucose > 600.       PAST MEDICAL & SURGICAL HISTORY:  Thrombus: ? Thrombus in heart - on Xarelto  Hyperlipidemia  Anemia  Hypertension  Sarcoidosis  CHF (congestive heart failure): Stage III, on home O2 @ 2lpm  Diabetes  Ankle fracture: Left Ankle - with plate and screws placed  Hip deformity: left hip had a pin placed to hold bone in place after a fall    REVIEW OF SYSTEMS      General: + fatigue    Skin/Breast: No Rashes/ Lesions/ Masses  	  Ophthalmologic: No Blurry vision/ Glaucoma/ Blindness  	  ENMT: No Hearing loss/ Drainage/ Lesions	    Respiratory and Thorax: No Cough/ Wheezing/ SOB/ Hemoptysis/ Sputum production  	  Cardiovascular: No Chest pain/ Palpitations/ Diaphoresis	    Gastrointestinal: No Nausea/ Vomiting/ Constipation/ Appetite Change	    Genitourinary: No Heamturia/ Dysuria/ Frequency change/ Impotence	    Musculoskeletal: + pain/weakness to bilat LE	    Neurological: No Seizures/ TIA/CVA/ Parastesias	    Psychiatric: No Dementia/ Depression/ SI/HI	    Hematology/Lymphatics: + worsening LE edema/anasarca    Endocrine:	+ Hyperglycemia/ Hypoglycemia    Allergic/Immunologic:	 No Anaphylaxis/ Intolerance/ Recent illnesses  	    MEDICATIONS  (STANDING):  aspirin enteric coated 81 milliGRAM(s) Oral daily  atorvastatin 40 milliGRAM(s) Oral at bedtime  dextrose 5%. 1000 milliLiter(s) (50 mL/Hr) IV Continuous <Continuous>  dextrose 50% Injectable 12.5 Gram(s) IV Push once  dextrose 50% Injectable 25 Gram(s) IV Push once  dextrose 50% Injectable 25 Gram(s) IV Push once  ferrous    sulfate 325 milliGRAM(s) Oral two times a day  fluticasone propionate   220 MICROgram(s) HFA Inhaler 1 Puff(s) Inhalation two times a day  furosemide   Injectable 40 milliGRAM(s) IV Push two times a day  insulin glargine Injectable (LANTUS) 20 Unit(s) SubCutaneous at bedtime  insulin lispro (HumaLOG) corrective regimen sliding scale   SubCutaneous three times a day before meals  insulin lispro (HumaLOG) corrective regimen sliding scale   SubCutaneous at bedtime  insulin lispro Injectable (HumaLOG) 15 Unit(s) SubCutaneous three times a day before meals  losartan 25 milliGRAM(s) Oral daily  metoprolol succinate ER 25 milliGRAM(s) Oral daily  predniSONE   Tablet 20 milliGRAM(s) Oral two times a day  trimethoprim  160 mG/sulfamethoxazole 800 mG 1 Tablet(s) Oral <User Schedule>    MEDICATIONS  (PRN):  acetaminophen   Tablet. 650 milliGRAM(s) Oral every 6 hours PRN temp> 101  acetaminophen   Tablet. 650 milliGRAM(s) Oral every 6 hours PRN mild, moderate pain  ALBUTerol    90 MICROgram(s) HFA Inhaler 2 Puff(s) Inhalation every 6 hours PRN Bronchospasm  dextrose Gel 1 Dose(s) Oral once PRN Blood Glucose LESS THAN 70 milliGRAM(s)/deciliter  glucagon  Injectable 1 milliGRAM(s) IntraMuscular once PRN Glucose LESS THAN 70 milligrams/deciliter      Allergies    No Known Allergies    Intolerances        SOCIAL HISTORY:non smoker, occasional ETOH.   On disability from her job    FAMILY HISTORY:  Family history of hypertension (Sibling): Siblings  Family history of diabetes mellitus (Father, Sibling): Father, Siblings  Family history of breast cancer in mother (Mother): Mother  Family history of colon cancer in mother (Mother): Mother      Vital Signs Last 24 Hrs  T(C): 36.3 (05 May 2018 12:42), Max: 36.7 (04 May 2018 17:07)  T(F): 97.3 (05 May 2018 12:42), Max: 98 (04 May 2018 17:07)  HR: 92 (05 May 2018 12:42) (78 - 92)  BP: 120/84 (05 May 2018 12:42) (120/84 - 126/87)  BP(mean): --  RR: 16 (05 May 2018 12:42) (16 - 19)  SpO2: 100% (05 May 2018 12:42) (98% - 100%)    General: WN/WD NAD  Neurology: A&Ox3, nonfocal, AJ x 4  Eyes: PERRLA/ EOMI, Gross vision intact  ENT/Neck: Neck supple, trachea midline, No JVD, Gross hearing intact  Respiratory: CTA B/L, decreased at bases No wheezing, rales, rhonchi  CV: RRR, S1S2, no murmurs, rubs or gallops  Abdominal: Soft, NT, ND +BS, + anasarca, swelling to genital area  Extremities: +3 bilat pitting edema, + peripheral pulses  Skin: No Rashes, Hematoma, Ecchymosis        LABS:                        12.2   8.13  )-----------( 345      ( 05 May 2018 07:05 )             41.0     05-04    130<L>  |  93<L>  |  34<H>  ----------------------------<  678<HH>  5.1   |  24  |  1.17    Ca    8.3<L>      04 May 2018 06:45  Mg     1.6     05-05        Urinalysis Basic - ( 03 May 2018 23:20 )    Color: YELLOW / Appearance: CLEAR / S.018 / pH: 6.5  Gluc: 50 / Ketone: NEGATIVE  / Bili: NEGATIVE / Urobili: NORMAL mg/dL   Blood: SMALL / Protein: >600 mg/dL / Nitrite: NEGATIVE   Leuk Esterase: NEGATIVE / RBC: 2-5 / WBC 2-5   Sq Epi: OCC / Non Sq Epi: x / Bacteria: x        RADIOLOGY & ADDITIONAL STUDIES:    Patient name: PEYMAN WATTS  YOB: 1965   Age: 52 (F)   MR#: [id number]  Study Date: 2018  Location: [id number]: Aparna Borja  Study quality: Technically good  Referring Physician: David Sam MD  Blood Pressure: 122/67 mmHg  Height: 160 cm  Weight: 74 kg  BSA: 1.8 m2  ------------------------------------------------------------------------  PROCEDURE: Transthoracic echocardiogram with 2-D, M-Mode  and complete spectral and color flow Doppler.  INDICATION: Heart failure, unspecified (I50.9)  ------------------------------------------------------------------------  DIMENSIONS:  Dimensions:     Normal Values:  LA:     2.1 cm    2.0 - 4.0 cm  Ao:     3.0 cm    2.0 - 3.8 cm  SEPTUM: 0.6 cm    0.6 - 1.2 cm  PWT:    1.1 cm    0.6 - 1.1 cm  LVIDd:  4.6 cm    3.0 - 5.6 cm  LVIDs:  4.2 cm    1.8 - 4.0 cm  Derived Variables:  LVMI: 72 g/m2  RWT: 0.47  Fractional short: 9 %  Ejection Fraction (Teicholtz): 19 %  ------------------------------------------------------------------------  OBSERVATIONS:  Mitral Valve: Normal mitral valve.  Aortic Root: Normal aortic root.  Aortic Valve: Normal trileaflet aortic valve.  Left Atrium: Normal left atrium.  Left Ventricle: Severe global left ventricular systolic  dysfunction. Normal left ventricular internal dimensions  and wall thicknesses. (DT:211 ms).  Right Heart: Normal right atrium. Normal right ventricular  size and function. Normal tricuspid valve. Normal pulmonic  valve.  Pericardium/PleuraLarge circumferential pericardial  effusion. The effusion measures 3.2 cm lateral to the right  atrium and 1.6 cm anterior to the right ventricle. The IVC  measures about 2.0 cm and does not appear to collapse,  although there is minimal respiratory variability.  Transmitral gradients were not performed. The RV and RA are  not collapsed however they do not appear to fully expand  during diastole.  ------------------------------------------------------------------------  CONCLUSIONS:  1. Severe global left ventricular systolic dysfunction.  2. Large circumferential pericardial effusion. The effusion  measures 3.2 cm lateral to the right atrium and 1.6 cm  anterior to the right ventricle. The IVC measures about 2.0  cm and does not appear to collapse, although there is  minimal respiratory variability. Transmitral gradients were  not performed. The RV and RA are not collapsed however they  do not appear to fully expand during diastole.  *** No previous Echo exam.  ------------------------------------------------------------------------  Confirmed on  2018 - 12:42:37 by Triny Mascorro MD

## 2018-05-05 NOTE — DIETITIAN INITIAL EVALUATION ADULT. - OTHER INFO
Nutrition consult ordered for RD assessment and AIC >7 %. 51 Y/O female admitted with the DX of heart failure, uncontrolled DM, reports good po and appetite with    no N/V/D  reported at this  time, Pt reports  her usual    weight was 165 # lost weight related with diabetes, current weight 163 #, however  pt with 4 + edema in bilateral feet and ankle. Labs reviewed, elevated A1C noted, Pt appears to be non complaint , snacking on candies and food from home as she gets very hungry at night. Nutrition education provide with  written material and food  options discussed, Recommend to add Glucerna 1 can po daily at night  and add CSCHO evening snacks to current diet.

## 2018-05-06 DIAGNOSIS — I51.3 INTRACARDIAC THROMBOSIS, NOT ELSEWHERE CLASSIFIED: ICD-10-CM

## 2018-05-06 DIAGNOSIS — I25.10 ATHEROSCLEROTIC HEART DISEASE OF NATIVE CORONARY ARTERY WITHOUT ANGINA PECTORIS: ICD-10-CM

## 2018-05-06 DIAGNOSIS — Z29.9 ENCOUNTER FOR PROPHYLACTIC MEASURES, UNSPECIFIED: ICD-10-CM

## 2018-05-06 DIAGNOSIS — D86.9 SARCOIDOSIS, UNSPECIFIED: ICD-10-CM

## 2018-05-06 LAB
BASOPHILS # BLD AUTO: 0.01 K/UL — SIGNIFICANT CHANGE UP (ref 0–0.2)
BASOPHILS NFR BLD AUTO: 0.1 % — SIGNIFICANT CHANGE UP (ref 0–2)
BUN SERPL-MCNC: 61 MG/DL — HIGH (ref 7–23)
CALCIUM SERPL-MCNC: 8.5 MG/DL — SIGNIFICANT CHANGE UP (ref 8.4–10.5)
CHLORIDE SERPL-SCNC: 99 MMOL/L — SIGNIFICANT CHANGE UP (ref 98–107)
CO2 SERPL-SCNC: 25 MMOL/L — SIGNIFICANT CHANGE UP (ref 22–31)
CREAT SERPL-MCNC: 1.48 MG/DL — HIGH (ref 0.5–1.3)
EOSINOPHIL # BLD AUTO: 0.03 K/UL — SIGNIFICANT CHANGE UP (ref 0–0.5)
EOSINOPHIL NFR BLD AUTO: 0.4 % — SIGNIFICANT CHANGE UP (ref 0–6)
GLUCOSE BLDC GLUCOMTR-MCNC: 111 MG/DL — HIGH (ref 70–99)
GLUCOSE BLDC GLUCOMTR-MCNC: 127 MG/DL — HIGH (ref 70–99)
GLUCOSE BLDC GLUCOMTR-MCNC: 168 MG/DL — HIGH (ref 70–99)
GLUCOSE BLDC GLUCOMTR-MCNC: 175 MG/DL — HIGH (ref 70–99)
GLUCOSE SERPL-MCNC: 210 MG/DL — HIGH (ref 70–99)
HCT VFR BLD CALC: 40.4 % — SIGNIFICANT CHANGE UP (ref 34.5–45)
HGB BLD-MCNC: 11.7 G/DL — SIGNIFICANT CHANGE UP (ref 11.5–15.5)
IMM GRANULOCYTES # BLD AUTO: 0.07 # — SIGNIFICANT CHANGE UP
IMM GRANULOCYTES NFR BLD AUTO: 0.8 % — SIGNIFICANT CHANGE UP (ref 0–1.5)
LYMPHOCYTES # BLD AUTO: 0.98 K/UL — LOW (ref 1–3.3)
LYMPHOCYTES # BLD AUTO: 11.9 % — LOW (ref 13–44)
MAGNESIUM SERPL-MCNC: 1.6 MG/DL — SIGNIFICANT CHANGE UP (ref 1.6–2.6)
MCHC RBC-ENTMCNC: 20.7 PG — LOW (ref 27–34)
MCHC RBC-ENTMCNC: 29 % — LOW (ref 32–36)
MCV RBC AUTO: 71.6 FL — LOW (ref 80–100)
MONOCYTES # BLD AUTO: 0.48 K/UL — SIGNIFICANT CHANGE UP (ref 0–0.9)
MONOCYTES NFR BLD AUTO: 5.8 % — SIGNIFICANT CHANGE UP (ref 2–14)
NEUTROPHILS # BLD AUTO: 6.69 K/UL — SIGNIFICANT CHANGE UP (ref 1.8–7.4)
NEUTROPHILS NFR BLD AUTO: 81 % — HIGH (ref 43–77)
NRBC # FLD: 0 — SIGNIFICANT CHANGE UP
PLATELET # BLD AUTO: 375 K/UL — SIGNIFICANT CHANGE UP (ref 150–400)
PMV BLD: 10 FL — SIGNIFICANT CHANGE UP (ref 7–13)
POTASSIUM SERPL-MCNC: 3.8 MMOL/L — SIGNIFICANT CHANGE UP (ref 3.5–5.3)
POTASSIUM SERPL-SCNC: 3.8 MMOL/L — SIGNIFICANT CHANGE UP (ref 3.5–5.3)
RBC # BLD: 5.64 M/UL — HIGH (ref 3.8–5.2)
RBC # FLD: 16 % — HIGH (ref 10.3–14.5)
SODIUM SERPL-SCNC: 136 MMOL/L — SIGNIFICANT CHANGE UP (ref 135–145)
WBC # BLD: 8.26 K/UL — SIGNIFICANT CHANGE UP (ref 3.8–10.5)
WBC # FLD AUTO: 8.26 K/UL — SIGNIFICANT CHANGE UP (ref 3.8–10.5)

## 2018-05-06 RX ORDER — HEPARIN SODIUM 5000 [USP'U]/ML
5000 INJECTION INTRAVENOUS; SUBCUTANEOUS EVERY 8 HOURS
Qty: 0 | Refills: 0 | Status: DISCONTINUED | OUTPATIENT
Start: 2018-05-06 | End: 2018-05-06

## 2018-05-06 RX ORDER — POTASSIUM CHLORIDE 20 MEQ
20 PACKET (EA) ORAL ONCE
Qty: 0 | Refills: 0 | Status: COMPLETED | OUTPATIENT
Start: 2018-05-06 | End: 2018-05-06

## 2018-05-06 RX ORDER — HEPARIN SODIUM 5000 [USP'U]/ML
5000 INJECTION INTRAVENOUS; SUBCUTANEOUS EVERY 8 HOURS
Qty: 0 | Refills: 0 | Status: COMPLETED | OUTPATIENT
Start: 2018-05-06 | End: 2018-05-07

## 2018-05-06 RX ORDER — MAGNESIUM SULFATE 500 MG/ML
1 VIAL (ML) INJECTION ONCE
Qty: 0 | Refills: 0 | Status: COMPLETED | OUTPATIENT
Start: 2018-05-06 | End: 2018-05-06

## 2018-05-06 RX ORDER — CHLORHEXIDINE GLUCONATE 213 G/1000ML
1 SOLUTION TOPICAL
Qty: 0 | Refills: 0 | Status: DISCONTINUED | OUTPATIENT
Start: 2018-05-06 | End: 2018-05-20

## 2018-05-06 RX ADMIN — Medication 81 MILLIGRAM(S): at 12:29

## 2018-05-06 RX ADMIN — Medication 20 MILLIGRAM(S): at 06:17

## 2018-05-06 RX ADMIN — Medication 20 MILLIGRAM(S): at 17:39

## 2018-05-06 RX ADMIN — Medication 15 UNIT(S): at 08:58

## 2018-05-06 RX ADMIN — Medication 100 GRAM(S): at 10:03

## 2018-05-06 RX ADMIN — Medication 1 PUFF(S): at 21:37

## 2018-05-06 RX ADMIN — INSULIN GLARGINE 20 UNIT(S): 100 INJECTION, SOLUTION SUBCUTANEOUS at 00:54

## 2018-05-06 RX ADMIN — Medication 20 MILLIEQUIVALENT(S): at 10:03

## 2018-05-06 RX ADMIN — Medication 2: at 08:57

## 2018-05-06 RX ADMIN — Medication 325 MILLIGRAM(S): at 06:17

## 2018-05-06 RX ADMIN — INSULIN GLARGINE 20 UNIT(S): 100 INJECTION, SOLUTION SUBCUTANEOUS at 22:49

## 2018-05-06 RX ADMIN — ATORVASTATIN CALCIUM 40 MILLIGRAM(S): 80 TABLET, FILM COATED ORAL at 22:49

## 2018-05-06 RX ADMIN — Medication 15 UNIT(S): at 17:39

## 2018-05-06 RX ADMIN — Medication 15 UNIT(S): at 12:53

## 2018-05-06 RX ADMIN — Medication 2: at 12:52

## 2018-05-06 RX ADMIN — Medication 325 MILLIGRAM(S): at 17:39

## 2018-05-06 RX ADMIN — Medication 1 PUFF(S): at 10:19

## 2018-05-06 RX ADMIN — HEPARIN SODIUM 5000 UNIT(S): 5000 INJECTION INTRAVENOUS; SUBCUTANEOUS at 12:54

## 2018-05-06 NOTE — PROGRESS NOTE ADULT - ASSESSMENT
pt w. sarcoidosis and cardiac stents now off xarelto for IR drainage of pericardial effusion tomorrow

## 2018-05-06 NOTE — PROGRESS NOTE ADULT - PROBLEM SELECTOR PLAN 6
Thoracic and IR consulted. IR to perform pericardiocentesis on Monday   Xarelto on hold for now   CCU for close monitoring

## 2018-05-06 NOTE — PROGRESS NOTE ADULT - SUBJECTIVE AND OBJECTIVE BOX
Subjective: Patient seen and examined. No new events except as noted.   transferred to CCU last night   BP improved   No cp or sob   Feels hungry     REVIEW OF SYSTEMS:    CONSTITUTIONAL: + weakness, fevers or chills  EYES/ENT: No visual changes;  No vertigo or throat pain   NECK: No pain or stiffness  RESPIRATORY: No cough, wheezing, hemoptysis; No shortness of breath  CARDIOVASCULAR: No chest pain or palpitations  GASTROINTESTINAL: No abdominal or epigastric pain. No nausea, vomiting, or hematemesis; No diarrhea or constipation. No melena or hematochezia.  GENITOURINARY: No dysuria, frequency or hematuria  NEUROLOGICAL: No numbness or weakness  SKIN: No itching, burning, rashes, or lesions   All other review of systems is negative unless indicated above.    MEDICATIONS:  MEDICATIONS  (STANDING):  aspirin enteric coated 81 milliGRAM(s) Oral daily  atorvastatin 40 milliGRAM(s) Oral at bedtime  chlorhexidine 4% Liquid 1 Application(s) Topical <User Schedule>  dextrose 5%. 1000 milliLiter(s) (50 mL/Hr) IV Continuous <Continuous>  dextrose 50% Injectable 12.5 Gram(s) IV Push once  dextrose 50% Injectable 25 Gram(s) IV Push once  dextrose 50% Injectable 25 Gram(s) IV Push once  ferrous    sulfate 325 milliGRAM(s) Oral two times a day  fluticasone propionate   220 MICROgram(s) HFA Inhaler 1 Puff(s) Inhalation two times a day  heparin  Injectable 5000 Unit(s) SubCutaneous every 8 hours  insulin glargine Injectable (LANTUS) 20 Unit(s) SubCutaneous at bedtime  insulin lispro (HumaLOG) corrective regimen sliding scale   SubCutaneous three times a day before meals  insulin lispro (HumaLOG) corrective regimen sliding scale   SubCutaneous at bedtime  insulin lispro Injectable (HumaLOG) 15 Unit(s) SubCutaneous three times a day before meals  predniSONE   Tablet 20 milliGRAM(s) Oral two times a day  trimethoprim  160 mG/sulfamethoxazole 800 mG 1 Tablet(s) Oral <User Schedule>      PHYSICAL EXAM:  T(C): 37 (05-06-18 @ 04:00), Max: 37 (05-06-18 @ 04:00)  HR: 90 (05-06-18 @ 10:20) (79 - 97)  BP: 126/111 (05-06-18 @ 10:00) (92/61 - 132/84)  RR: 25 (05-06-18 @ 10:00) (14 - 25)  SpO2: 99% (05-06-18 @ 10:20) (94% - 100%)  Wt(kg): --  I&O's Summary    05 May 2018 07:01  -  06 May 2018 07:00  --------------------------------------------------------  IN: 600 mL / OUT: 400 mL / NET: 200 mL    06 May 2018 07:01  -  06 May 2018 11:31  --------------------------------------------------------  IN: 0 mL / OUT: 250 mL / NET: -250 mL          Appearance: Normal	  HEENT:   Normal oral mucosa, PERRL, EOMI	  Lymphatic: No lymphadenopathy , no edema  Cardiovascular: Normal S1 S2, +JVD, No murmurs , Peripheral pulses palpable 2+ bilaterally  Respiratory: Lungs clear to auscultation, normal effort 	  Gastrointestinal:  Soft, Non-tender, + BS	  Skin: No rashes, No ecchymoses, No cyanosis, warm to touch  Musculoskeletal: Normal range of motion, normal strength  Psychiatry:  Mood & affect appropriate  Ext: No edema      LABS:    CARDIAC MARKERS:  CARDIAC MARKERS ( 03 May 2018 16:59 )  x     / < 0.06 ng/mL / 85 u/L / 2.05 ng/mL / x                                    11.7   8.26  )-----------( 375      ( 06 May 2018 05:00 )             40.4     05-06    136  |  99  |  61<H>  ----------------------------<  210<H>  3.8   |  25  |  1.48<H>    Ca    8.5      06 May 2018 05:00  Mg     1.6     05-06      proBNP:   Lipid Profile:   HgA1c:   TSH:     3-5/LPF          TELEMETRY: 	SR    ECG:  	  RADIOLOGY:   DIAGNOSTIC TESTING:  [ ] Echocardiogram:  [ ]  Catheterization:  [ ] Stress Test:    OTHER:

## 2018-05-06 NOTE — PROGRESS NOTE ADULT - PROBLEM SELECTOR PLAN 3
- History of sarcoidosis on Prednisone  - May be possible etiology of effusion  - C/w Prednisone 20 mg QD

## 2018-05-06 NOTE — PROGRESS NOTE ADULT - PROBLEM SELECTOR PLAN 1
- Large circumferential pericardial effusion, RV and RA not collapsed however they do not appear to fully expand during diastole, no overt tamponade physiology  - Unclear etiology of effusion, may be related to fluid overload in setting of HFrEF and medication non-compliance vs sarcoidosis related vs occult malignancy  - HD stable, will c/w close monitoring of BP/ hemodynamics in CCU  - Holding diuretics and antihypertensives in setting of pre-load dependence   - CT surgery consulted for pericardial window, however patient high risk for any operative procedure and general anesthesia given her co-morbidities and refusing anesthesia  - Plan to call IR tomorrow 5/7 for pericardial drain placement

## 2018-05-06 NOTE — PROGRESS NOTE ADULT - ASSESSMENT
52 year old F, w/ HFrEF 20%, COPD on home O2, Sarcoidosis on Prednisone, history of cardiac thrombus on Xarelto, DM2 with large circumferential pericardial effusion of unclear etiology admitted to CCU for close monitoring of hemodynamics, awaiting drain of effusion by IR.

## 2018-05-06 NOTE — PROGRESS NOTE ADULT - PROBLEM SELECTOR PLAN 5
- Apparent history of intracardiac thrombus, patient was on Xarelto at home  - No thrombus visualized on TTE this admission  - Holding Xarelto given planned pericardial drain

## 2018-05-06 NOTE — PROGRESS NOTE ADULT - SUBJECTIVE AND OBJECTIVE BOX
pt moved to CCU last nite 2/2 low BP and c/o dizzyness. presently pt moved to CCU last nite 2/2 low BP and c/o dizzyness. presently hemodynamically stable . pt refusing anesthesia. Prefers to have IR drainage tomorrow.  ICU Vital Signs Last 24 Hrs  T(C): 36.3 (06 May 2018 12:00), Max: 37 (06 May 2018 04:00)  T(F): 97.4 (06 May 2018 12:00), Max: 98.6 (06 May 2018 04:00)  HR: 103 (06 May 2018 14:00) (79 - 105)  BP: 107/75 (06 May 2018 14:00) (92/61 - 132/84)  BP(mean): 82 (06 May 2018 14:00) (74 - 114)  ABP: --  ABP(mean): --  RR: 25 (06 May 2018 14:00) (14 - 28)  SpO2: 100% (06 May 2018 13:00) (94% - 100%)

## 2018-05-06 NOTE — PROGRESS NOTE ADULT - PROBLEM SELECTOR PLAN 9
- Heparin SC Q8H, will hold tonight given planned IR pericardial drain tomorrow    Valentino Moseley MD PGY-1  Pager: [phone]

## 2018-05-06 NOTE — PROGRESS NOTE ADULT - PROBLEM SELECTOR PLAN 2
- Patient with severely reduced EF 19%, appears hypervolemic as crackles, peripheral edema, and JVD on exam  - However holding diuretics due to pre-load dependence in setting of large circumferential pericardial effusion  - Will closely monitor respiratory status, currently stable  - Holding HF meds as above

## 2018-05-06 NOTE — PROGRESS NOTE ADULT - SUBJECTIVE AND OBJECTIVE BOX
INTERVAL HPI /OVERNIGHT EVENTS: No acute events overnight. BP stable /67-88 last 12 hours. No chest pain, no worsening shortness of breath, no acute events on telemetry.     SUBJECTIVE: Patient seen and examined at bedside this AM. Patient complaining of sacral discomfort and worsening LE swelling. Denies chest pain, shortness of breath, palpitations. Patient states she was non-compliant with her Lasix at home, states she was taking Prednisone. Never had a colonoscopy, last mammogram 3 years ago was reportedly negative.      OBJECTIVE:    VITAL SIGNS:  ICU Vital Signs Last 24 Hrs  T(C): 37 (06 May 2018 04:00), Max: 37 (06 May 2018 04:00)  T(F): 98.6 (06 May 2018 04:00), Max: 98.6 (06 May 2018 04:00)  HR: 90 (06 May 2018 10:20) (79 - 97)  BP: 126/111 (06 May 2018 10:00) (92/61 - 132/84)  BP(mean): 114 (06 May 2018 10:00) (74 - 114)  ABP: --  ABP(mean): --  RR: 25 (06 May 2018 10:00) (14 - 25)  SpO2: 99% (06 May 2018 10:20) (94% - 100%)        05-05 @ 07:01  -  05-06 @ 07:00  --------------------------------------------------------  IN: 600 mL / OUT: 400 mL / NET: 200 mL    05-06 @ 07:01  -  05-06 @ 10:23  --------------------------------------------------------  IN: 0 mL / OUT: 250 mL / NET: -250 mL      CAPILLARY BLOOD GLUCOSE      POCT Blood Glucose.: 175 mg/dL (06 May 2018 08:28)      PHYSICAL EXAM:  General: Patient in NAD, resting comfortably, non-toxic  HEENT: NC/AT; PERRL, anicteric sclera  Neck: +JVD and hepatojugular reflux. Neck supple.  Respiratory: Crackles B/L lung fields up to mid lung. No wheezing or rhonchi.   Cardiovascular: +S1/S2, RRR; no M/R/G  Gastrointestinal: Abdomen soft, NT/ND; +BS x4  Extremities: +2 pitting edema of B/L LEs up to thighs, 2+ peripheral pulses B/L  Skin: Normal color and turgor, no rash  Neurological: Grossly intact, A & O x 4.     MEDICATIONS:  MEDICATIONS  (STANDING):  aspirin enteric coated 81 milliGRAM(s) Oral daily  atorvastatin 40 milliGRAM(s) Oral at bedtime  chlorhexidine 4% Liquid 1 Application(s) Topical <User Schedule>  dextrose 5%. 1000 milliLiter(s) (50 mL/Hr) IV Continuous <Continuous>  dextrose 50% Injectable 12.5 Gram(s) IV Push once  dextrose 50% Injectable 25 Gram(s) IV Push once  dextrose 50% Injectable 25 Gram(s) IV Push once  ferrous    sulfate 325 milliGRAM(s) Oral two times a day  fluticasone propionate   220 MICROgram(s) HFA Inhaler 1 Puff(s) Inhalation two times a day  heparin  Injectable 5000 Unit(s) SubCutaneous every 8 hours  insulin glargine Injectable (LANTUS) 20 Unit(s) SubCutaneous at bedtime  insulin lispro (HumaLOG) corrective regimen sliding scale   SubCutaneous three times a day before meals  insulin lispro (HumaLOG) corrective regimen sliding scale   SubCutaneous at bedtime  insulin lispro Injectable (HumaLOG) 15 Unit(s) SubCutaneous three times a day before meals  predniSONE   Tablet 20 milliGRAM(s) Oral two times a day  trimethoprim  160 mG/sulfamethoxazole 800 mG 1 Tablet(s) Oral <User Schedule>    MEDICATIONS  (PRN):  acetaminophen   Tablet. 650 milliGRAM(s) Oral every 6 hours PRN temp> 101  acetaminophen   Tablet. 650 milliGRAM(s) Oral every 6 hours PRN mild, moderate pain  ALBUTerol    90 MICROgram(s) HFA Inhaler 2 Puff(s) Inhalation every 6 hours PRN Bronchospasm  dextrose Gel 1 Dose(s) Oral once PRN Blood Glucose LESS THAN 70 milliGRAM(s)/deciliter  glucagon  Injectable 1 milliGRAM(s) IntraMuscular once PRN Glucose LESS THAN 70 milligrams/deciliter      ALLERGIES: Allergies  No Known Allergies    Intolerances        LABS:                        11.7   8.26  )-----------( 375      ( 06 May 2018 05:00 )             40.4     05-06    136  |  99  |  61<H>  ----------------------------<  210<H>  3.8   |  25  |  1.48<H>    Ca    8.5      06 May 2018 05:00  Mg     1.6     05-06        RADIOLOGY & ADDITIONAL TESTS: Reviewed.    TTE: < from: Transthoracic Echocardiogram (05.05.18 @ 11:47) >  CONCLUSIONS:  1. Severe global left ventricular systolic dysfunction.  2. Large circumferential pericardial effusion. The effusion  measures 3.2 cm lateral to the right atrium and 1.6 cm  anterior to the right ventricle. The IVC measures about 2.0  cm and does not appear to collapse, although there is  minimal respiratory variability. Transmitral gradients were  not performed. The RV and RA are not collapsed however they  do not appear to fully expand during diastole.  *** No previous Echo exam.    < end of copied text >

## 2018-05-07 LAB
APTT BLD: 26.7 SEC — LOW (ref 27.5–37.4)
BASOPHILS # BLD AUTO: 0 K/UL — SIGNIFICANT CHANGE UP (ref 0–0.2)
BASOPHILS NFR BLD AUTO: 0 % — SIGNIFICANT CHANGE UP (ref 0–2)
BLD GP AB SCN SERPL QL: NEGATIVE — SIGNIFICANT CHANGE UP
BUN SERPL-MCNC: 64 MG/DL — HIGH (ref 7–23)
CALCIUM SERPL-MCNC: 8.3 MG/DL — LOW (ref 8.4–10.5)
CHLORIDE SERPL-SCNC: 101 MMOL/L — SIGNIFICANT CHANGE UP (ref 98–107)
CO2 SERPL-SCNC: 23 MMOL/L — SIGNIFICANT CHANGE UP (ref 22–31)
CREAT SERPL-MCNC: 1.34 MG/DL — HIGH (ref 0.5–1.3)
EOSINOPHIL # BLD AUTO: 0 K/UL — SIGNIFICANT CHANGE UP (ref 0–0.5)
EOSINOPHIL NFR BLD AUTO: 0 % — SIGNIFICANT CHANGE UP (ref 0–6)
GLUCOSE BLDC GLUCOMTR-MCNC: 174 MG/DL — HIGH (ref 70–99)
GLUCOSE BLDC GLUCOMTR-MCNC: 208 MG/DL — HIGH (ref 70–99)
GLUCOSE BLDC GLUCOMTR-MCNC: 370 MG/DL — HIGH (ref 70–99)
GLUCOSE SERPL-MCNC: 369 MG/DL — HIGH (ref 70–99)
HCT VFR BLD CALC: 40.9 % — SIGNIFICANT CHANGE UP (ref 34.5–45)
HGB BLD-MCNC: 12 G/DL — SIGNIFICANT CHANGE UP (ref 11.5–15.5)
IMM GRANULOCYTES # BLD AUTO: 0.04 # — SIGNIFICANT CHANGE UP
IMM GRANULOCYTES NFR BLD AUTO: 0.7 % — SIGNIFICANT CHANGE UP (ref 0–1.5)
INR BLD: 0.97 — SIGNIFICANT CHANGE UP (ref 0.88–1.17)
LYMPHOCYTES # BLD AUTO: 0.48 K/UL — LOW (ref 1–3.3)
LYMPHOCYTES # BLD AUTO: 8.2 % — LOW (ref 13–44)
MAGNESIUM SERPL-MCNC: 1.9 MG/DL — SIGNIFICANT CHANGE UP (ref 1.6–2.6)
MCHC RBC-ENTMCNC: 21 PG — LOW (ref 27–34)
MCHC RBC-ENTMCNC: 29.3 % — LOW (ref 32–36)
MCV RBC AUTO: 71.6 FL — LOW (ref 80–100)
MONOCYTES # BLD AUTO: 0.13 K/UL — SIGNIFICANT CHANGE UP (ref 0–0.9)
MONOCYTES NFR BLD AUTO: 2.2 % — SIGNIFICANT CHANGE UP (ref 2–14)
NEUTROPHILS # BLD AUTO: 5.17 K/UL — SIGNIFICANT CHANGE UP (ref 1.8–7.4)
NEUTROPHILS NFR BLD AUTO: 88.9 % — HIGH (ref 43–77)
NRBC # FLD: 0 — SIGNIFICANT CHANGE UP
PLATELET # BLD AUTO: 387 K/UL — SIGNIFICANT CHANGE UP (ref 150–400)
PMV BLD: 10.3 FL — SIGNIFICANT CHANGE UP (ref 7–13)
POTASSIUM SERPL-MCNC: 4.5 MMOL/L — SIGNIFICANT CHANGE UP (ref 3.5–5.3)
POTASSIUM SERPL-SCNC: 4.5 MMOL/L — SIGNIFICANT CHANGE UP (ref 3.5–5.3)
PROTHROM AB SERPL-ACNC: 11.2 SEC — SIGNIFICANT CHANGE UP (ref 9.8–13.1)
RBC # BLD: 5.71 M/UL — HIGH (ref 3.8–5.2)
RBC # FLD: 16.2 % — HIGH (ref 10.3–14.5)
RH IG SCN BLD-IMP: POSITIVE — SIGNIFICANT CHANGE UP
SODIUM SERPL-SCNC: 134 MMOL/L — LOW (ref 135–145)
WBC # BLD: 5.82 K/UL — SIGNIFICANT CHANGE UP (ref 3.8–10.5)
WBC # FLD AUTO: 5.82 K/UL — SIGNIFICANT CHANGE UP (ref 3.8–10.5)

## 2018-05-07 PROCEDURE — 33015: CPT

## 2018-05-07 PROCEDURE — 77012 CT SCAN FOR NEEDLE BIOPSY: CPT | Mod: 26

## 2018-05-07 PROCEDURE — 99232 SBSQ HOSP IP/OBS MODERATE 35: CPT

## 2018-05-07 RX ORDER — INSULIN GLARGINE 100 [IU]/ML
22 INJECTION, SOLUTION SUBCUTANEOUS AT BEDTIME
Qty: 0 | Refills: 0 | Status: DISCONTINUED | OUTPATIENT
Start: 2018-05-07 | End: 2018-05-08

## 2018-05-07 RX ADMIN — Medication 10: at 08:00

## 2018-05-07 RX ADMIN — Medication 325 MILLIGRAM(S): at 20:39

## 2018-05-07 RX ADMIN — CHLORHEXIDINE GLUCONATE 1 APPLICATION(S): 213 SOLUTION TOPICAL at 10:35

## 2018-05-07 RX ADMIN — Medication 650 MILLIGRAM(S): at 22:45

## 2018-05-07 RX ADMIN — Medication 325 MILLIGRAM(S): at 05:40

## 2018-05-07 RX ADMIN — ATORVASTATIN CALCIUM 40 MILLIGRAM(S): 80 TABLET, FILM COATED ORAL at 22:17

## 2018-05-07 RX ADMIN — Medication 4: at 12:13

## 2018-05-07 RX ADMIN — Medication 20 MILLIGRAM(S): at 05:40

## 2018-05-07 RX ADMIN — INSULIN GLARGINE 22 UNIT(S): 100 INJECTION, SOLUTION SUBCUTANEOUS at 22:17

## 2018-05-07 RX ADMIN — Medication 1 PUFF(S): at 22:12

## 2018-05-07 RX ADMIN — Medication 650 MILLIGRAM(S): at 22:16

## 2018-05-07 RX ADMIN — Medication 81 MILLIGRAM(S): at 12:13

## 2018-05-07 RX ADMIN — Medication 1 PUFF(S): at 10:52

## 2018-05-07 RX ADMIN — Medication 20 MILLIGRAM(S): at 20:40

## 2018-05-07 NOTE — PROGRESS NOTE ADULT - ASSESSMENT
52 year old woman with T2DM, uncontrolled, with steroid induced hyperglycemia, admitted with acute decompensated heart failure, now NPO for IR drainage of pericardial effusion.

## 2018-05-07 NOTE — PROGRESS NOTE ADULT - PROBLEM SELECTOR PLAN 6
Thoracic and IR consulted. IR to perform pericardiocentesis today    Xarelto on hold for now   CCU for close monitoring

## 2018-05-07 NOTE — CHART NOTE - NSCHARTNOTEFT_GEN_A_CORE
Interventional Radiology Pre-Procedure Note    52 year old F, w/ HFrEF 20%, COPD on home O2, Sarcoidosis on Prednisone, history of cardiac thrombus on Xarelto, DM2 with large circumferential pericardial effusion of unclear etiology admitted to CCU for close monitoring of hemodynamics, awaiting drain of effusion by IR.     Procedure: Pericardiocentesis and placement of pericardial drain    Diagnosis/Indication: Large circumferential pericardial effusion with impending pericardial tamponade       PAST MEDICAL & SURGICAL HISTORY:  Thrombus: Thrombus in heart - on Xarelto  Hyperlipidemia  Anemia  Hypertension  Sarcoidosis  CHF (congestive heart failure): Stage III, on home O2 @ 2lpm  Diabetes  Ankle fracture: Left Ankle - with plate and screws placed  Hip deformity: left hip had a pin placed to hold bone in place after a fall       Female    Allergies: No Known Allergies      LABS:  CBC Full  -  ( 07 May 2018 05:45 )  WBC Count : 5.82 K/uL  Hemoglobin : 12.0 g/dL  Hematocrit : 40.9 %  Platelet Count - Automated : 387 K/uL  Mean Cell Volume : 71.6 fL  Mean Cell Hemoglobin : 21.0 pg  Mean Cell Hemoglobin Concentration : 29.3 %  Auto Neutrophil # : 5.17 K/uL  Auto Lymphocyte # : 0.48 K/uL  Auto Monocyte # : 0.13 K/uL  Auto Eosinophil # : 0.00 K/uL  Auto Basophil # : 0.00 K/uL  Auto Neutrophil % : 88.9 %  Auto Lymphocyte % : 8.2 %  Auto Monocyte % : 2.2 %  Auto Eosinophil % : 0.0 %  Auto Basophil % : 0.0 %    05-07    134<L>  |  101  |  64<H>  ----------------------------<  369<H>  4.5   |  23  |  1.34<H>    Ca    8.3<L>      07 May 2018 06:38  Mg     1.9     05-07      PT/INR - ( 07 May 2018 05:45 )   PT: 11.2 SEC;   INR: 0.97     PTT - ( 07 May 2018 05:45 )  PTT:26.7 SEC      Given size of effusion and risk of tamponade physiology, pericardiocentesis and drain is a medical necessity according to attending Dr. Sam, spoke to IR attending Dr. Claudio Lagos. Patient is stable for procedure. Can give consent.     Valentino Moseley MD PGY-1  Pager: [phone]

## 2018-05-07 NOTE — PROGRESS NOTE ADULT - PROBLEM SELECTOR PLAN 1
AM hyperglycemia today likely driven partly by bedtime high-carbohydrate snack.   She had milder morning hyperglycemia yesterday.  Counseled pt not to "correct" for hypoglycemia if her glucose is not actually low.  Recommend increasing Lantus to 22 units, continue to monitor on current dose of humalog  Insulin doses will most likely need to be decreased when steroids are tapered    -plan to discharge on basal bolus insulin only, doses to be determined  - she can follow up in office as outpatient - [phone] but she states she would rather find a provider closer to her home in [place].

## 2018-05-07 NOTE — PROGRESS NOTE ADULT - SUBJECTIVE AND OBJECTIVE BOX
Diabetes  Follow up note    Interval History: Pt feeling better.  Has decreased swelling in legs.  Feeling hungry, has been NPO for procedure.  She at cookies and a sandwich at bedtime yesterday because glucose felt low.    MEDICATIONS  (STANDING):  aspirin enteric coated 81 milliGRAM(s) Oral daily  atorvastatin 40 milliGRAM(s) Oral at bedtime  chlorhexidine 4% Liquid 1 Application(s) Topical <User Schedule>  dextrose 5%. 1000 milliLiter(s) (50 mL/Hr) IV Continuous <Continuous>  dextrose 50% Injectable 12.5 Gram(s) IV Push once  dextrose 50% Injectable 25 Gram(s) IV Push once  dextrose 50% Injectable 25 Gram(s) IV Push once  ferrous    sulfate 325 milliGRAM(s) Oral two times a day  fluticasone propionate   220 MICROgram(s) HFA Inhaler 1 Puff(s) Inhalation two times a day  insulin glargine Injectable (LANTUS) 20 Unit(s) SubCutaneous at bedtime  insulin lispro (HumaLOG) corrective regimen sliding scale   SubCutaneous three times a day before meals  insulin lispro (HumaLOG) corrective regimen sliding scale   SubCutaneous at bedtime  insulin lispro Injectable (HumaLOG) 15 Unit(s) SubCutaneous three times a day before meals  predniSONE   Tablet 20 milliGRAM(s) Oral two times a day    MEDICATIONS  (PRN):  acetaminophen   Tablet. 650 milliGRAM(s) Oral every 6 hours PRN temp> 101  acetaminophen   Tablet. 650 milliGRAM(s) Oral every 6 hours PRN mild, moderate pain  ALBUTerol    90 MICROgram(s) HFA Inhaler 2 Puff(s) Inhalation every 6 hours PRN Bronchospasm  dextrose Gel 1 Dose(s) Oral once PRN Blood Glucose LESS THAN 70 milliGRAM(s)/deciliter  glucagon  Injectable 1 milliGRAM(s) IntraMuscular once PRN Glucose LESS THAN 70 milligrams/deciliter      Allergies    No Known Allergies    PHYSICAL EXAM:  VITALS: T(C): 36.4 (05-07-18 @ 12:00)  T(F): 97.5 (05-07-18 @ 12:00), Max: 98.2 (05-06-18 @ 20:00)  HR: 99 (05-07-18 @ 15:00) (84 - 123)  BP: 137/99 (05-07-18 @ 15:00) (87/62 - 141/91)  RR:  (10 - 26)  SpO2:  (93% - 99%)  GENERAL: NAD,   EYES: No proptosis,  HEENT:  Atraumatic, Normocephalic,   RESPIRATORY: Clear to auscultation bilaterally  CARDIOVASCULAR: Regular rhythm; No murmurs; b/l LEperipheral edema  GI: Soft, nontender, non distended, normal bowel sounds    POCT Blood Glucose.: 208 mg/dL (05-07-18 @ 12:08)  POCT Blood Glucose.: 370 mg/dL (05-07-18 @ 07:54)  POCT Blood Glucose.: 127 mg/dL (05-06-18 @ 21:58)  POCT Blood Glucose.: 111 mg/dL (05-06-18 @ 17:32)  POCT Blood Glucose.: 168 mg/dL (05-06-18 @ 12:38)  POCT Blood Glucose.: 175 mg/dL (05-06-18 @ 08:28)  POCT Blood Glucose.: 117 mg/dL (05-05-18 @ 22:13)  POCT Blood Glucose.: 138 mg/dL (05-05-18 @ 17:14)  POCT Blood Glucose.: 348 mg/dL (05-05-18 @ 12:42)  POCT Blood Glucose.: 404 mg/dL (05-05-18 @ 08:29)  POCT Blood Glucose.: 339 mg/dL (05-04-18 @ 22:26)  POCT Blood Glucose.: 396 mg/dL (05-04-18 @ 17:20)        05-07    134<L>  |  101  |  64<H>  ----------------------------<  369<H>  4.5   |  23  |  1.34<H>    EGFR if : 53  EGFR if non : 45    Ca    8.3<L>      05-07  Mg     1.9     05-07      Hemoglobin A1C, Whole Blood: 14.3 % <H> [4.0 - 5.6] (05-04-18 @ 06:45)  Hemoglobin A1C, Whole Blood: 14.3 % <H> [4.0 - 5.6] (05-03-18 @ 16:59)

## 2018-05-07 NOTE — PROGRESS NOTE ADULT - SUBJECTIVE AND OBJECTIVE BOX
Subjective: Patient seen and examined. No new events except as noted.   Feels weak   wants to eat   REVIEW OF SYSTEMS:    CONSTITUTIONAL: + weakness, fevers or chills  EYES/ENT: No visual changes;  No vertigo or throat pain   NECK: No pain or stiffness  RESPIRATORY: No cough, wheezing, hemoptysis; No shortness of breath  CARDIOVASCULAR: No chest pain or palpitations  GASTROINTESTINAL: No abdominal or epigastric pain. No nausea, vomiting, or hematemesis; No diarrhea or constipation. No melena or hematochezia.  GENITOURINARY: No dysuria, frequency or hematuria  NEUROLOGICAL: No numbness or weakness  SKIN: No itching, burning, rashes, or lesions   All other review of systems is negative unless indicated above.    MEDICATIONS:  MEDICATIONS  (STANDING):  aspirin enteric coated 81 milliGRAM(s) Oral daily  atorvastatin 40 milliGRAM(s) Oral at bedtime  chlorhexidine 4% Liquid 1 Application(s) Topical <User Schedule>  dextrose 5%. 1000 milliLiter(s) (50 mL/Hr) IV Continuous <Continuous>  dextrose 50% Injectable 12.5 Gram(s) IV Push once  dextrose 50% Injectable 25 Gram(s) IV Push once  dextrose 50% Injectable 25 Gram(s) IV Push once  ferrous    sulfate 325 milliGRAM(s) Oral two times a day  fluticasone propionate   220 MICROgram(s) HFA Inhaler 1 Puff(s) Inhalation two times a day  insulin glargine Injectable (LANTUS) 20 Unit(s) SubCutaneous at bedtime  insulin lispro (HumaLOG) corrective regimen sliding scale   SubCutaneous three times a day before meals  insulin lispro (HumaLOG) corrective regimen sliding scale   SubCutaneous at bedtime  insulin lispro Injectable (HumaLOG) 15 Unit(s) SubCutaneous three times a day before meals  predniSONE   Tablet 20 milliGRAM(s) Oral two times a day      PHYSICAL EXAM:  T(C): 35.6 (05-07-18 @ 08:00), Max: 36.8 (05-06-18 @ 20:00)  HR: 98 (05-07-18 @ 10:00) (84 - 123)  BP: 122/90 (05-07-18 @ 10:00) (87/62 - 140/96)  RR: 18 (05-07-18 @ 10:00) (10 - 28)  SpO2: 95% (05-07-18 @ 10:00) (93% - 100%)  Wt(kg): --  I&O's Summary    06 May 2018 07:01  -  07 May 2018 07:00  --------------------------------------------------------  IN: 340 mL / OUT: 750 mL / NET: -410 mL    07 May 2018 07:01  -  07 May 2018 11:06  --------------------------------------------------------  IN: 0 mL / OUT: 200 mL / NET: -200 mL          Appearance: Normal	  HEENT:   Normal oral mucosa, PERRL, EOMI	  Lymphatic: No lymphadenopathy , no edema  Cardiovascular: Normal S1 S2, No JVD, No murmurs , Peripheral pulses palpable 2+ bilaterally  Respiratory: Lungs clear to auscultation, normal effort 	  Gastrointestinal:  Soft, Non-tender, + BS	  Skin: No rashes, No ecchymoses, No cyanosis, warm to touch  Musculoskeletal: Normal range of motion, normal strength  Psychiatry:  Mood & affect appropriate  Ext: No edema      LABS:    CARDIAC MARKERS:                                12.0   5.82  )-----------( 387      ( 07 May 2018 05:45 )             40.9     05-07    134<L>  |  101  |  64<H>  ----------------------------<  369<H>  4.5   |  23  |  1.34<H>    Ca    8.3<L>      07 May 2018 06:38  Mg     1.9     05-07      proBNP:   Lipid Profile:   HgA1c:   TSH:             TELEMETRY: 	SR    ECG:  	  RADIOLOGY:   DIAGNOSTIC TESTING:  [ ] Echocardiogram:  [ ]  Catheterization:  [ ] Stress Test:    OTHER:

## 2018-05-07 NOTE — PROGRESS NOTE ADULT - PROBLEM SELECTOR PLAN 1
- Large circumferential pericardial effusion, RV and RA not collapsed however they do not appear to fully expand during diastole, no overt tamponade physiology  - Unclear etiology of effusion, may be related to fluid overload in setting of HFrEF and medication non-compliance vs sarcoidosis related vs occult malignancy  - HD stable, will c/w close monitoring of BP/ hemodynamics in CCU  - Holding diuretics and antihypertensives in setting of pre-load dependence   - Plan to call IR today 5/7 for pericardial drain placement

## 2018-05-07 NOTE — PROGRESS NOTE ADULT - SUBJECTIVE AND OBJECTIVE BOX
INTERVAL HPI/ OVERNIGHT EVENTS: No acute events overnight. BP stable, 130s systolic throughout the night. Intermittent PVCs on telemetry but rare, otherwise no events     SUBJECTIVE: Patient seen and examined at bedside this AM. Patient feels well, has no complaints, No shortness of breath, chest pain, palpitations.     OBJECTIVE:    VITAL SIGNS:  ICU Vital Signs Last 24 Hrs  T(C): 36.6 (07 May 2018 04:00), Max: 36.8 (06 May 2018 20:00)  T(F): 97.8 (07 May 2018 04:00), Max: 98.2 (06 May 2018 20:00)  HR: 99 (07 May 2018 07:00) (84 - 123)  BP: 136/93 (07 May 2018 07:00) (87/62 - 140/96)  BP(mean): 103 (07 May 2018 07:00) (68 - 114)  ABP: --  ABP(mean): --  RR: 21 (07 May 2018 07:00) (10 - 28)  SpO2: 97% (07 May 2018 07:00) (97% - 100%)        05-06 @ 07:01  -  05-07 @ 07:00  --------------------------------------------------------  IN: 340 mL / OUT: 750 mL / NET: -410 mL      CAPILLARY BLOOD GLUCOSE  POCT Blood Glucose.: 370 mg/dL (07 May 2018 07:54)      PHYSICAL EXAM:  General: Patient in NAD, resting comfortably, non-toxic  HEENT: NC/AT; PERRL, anicteric sclera  Neck: +JVD and hepatojugular reflux. Neck supple.  Respiratory: Crackles B/L lung fields up to mid lung. No wheezing or rhonchi.   Cardiovascular: +S1/S2, RRR; no M/R/G  Gastrointestinal: Abdomen soft, NT/ND; +BS x4  Extremities: +2 pitting edema of B/L LEs up to thighs, 2+ peripheral pulses B/L  Skin: Normal color and turgor, no rash  Neurological: Grossly intact, A & O x 4.     MEDICATIONS:  MEDICATIONS  (STANDING):  aspirin enteric coated 81 milliGRAM(s) Oral daily  atorvastatin 40 milliGRAM(s) Oral at bedtime  chlorhexidine 4% Liquid 1 Application(s) Topical <User Schedule>  dextrose 5%. 1000 milliLiter(s) (50 mL/Hr) IV Continuous <Continuous>  dextrose 50% Injectable 12.5 Gram(s) IV Push once  dextrose 50% Injectable 25 Gram(s) IV Push once  dextrose 50% Injectable 25 Gram(s) IV Push once  ferrous    sulfate 325 milliGRAM(s) Oral two times a day  fluticasone propionate   220 MICROgram(s) HFA Inhaler 1 Puff(s) Inhalation two times a day  insulin glargine Injectable (LANTUS) 20 Unit(s) SubCutaneous at bedtime  insulin lispro (HumaLOG) corrective regimen sliding scale   SubCutaneous three times a day before meals  insulin lispro (HumaLOG) corrective regimen sliding scale   SubCutaneous at bedtime  insulin lispro Injectable (HumaLOG) 15 Unit(s) SubCutaneous three times a day before meals  predniSONE   Tablet 20 milliGRAM(s) Oral two times a day  trimethoprim  160 mG/sulfamethoxazole 800 mG 1 Tablet(s) Oral <User Schedule>    MEDICATIONS  (PRN):  acetaminophen   Tablet. 650 milliGRAM(s) Oral every 6 hours PRN temp> 101  acetaminophen   Tablet. 650 milliGRAM(s) Oral every 6 hours PRN mild, moderate pain  ALBUTerol    90 MICROgram(s) HFA Inhaler 2 Puff(s) Inhalation every 6 hours PRN Bronchospasm  dextrose Gel 1 Dose(s) Oral once PRN Blood Glucose LESS THAN 70 milliGRAM(s)/deciliter  glucagon  Injectable 1 milliGRAM(s) IntraMuscular once PRN Glucose LESS THAN 70 milligrams/deciliter      ALLERGIES: Allergies  No Known Allergies        LABS:                        12.0   5.82  )-----------( 387      ( 07 May 2018 05:45 )             40.9     05-07    134<L>  |  101  |  64<H>  ----------------------------<  369<H>  4.5   |  23  |  1.34<H>    Ca    8.3<L>      07 May 2018 06:38  Mg     1.9     05-07        PT/INR - ( 07 May 2018 05:45 )   PT: 11.2 SEC;   INR: 0.97     PTT - ( 07 May 2018 05:45 )  PTT:26.7 SEC      RADIOLOGY & ADDITIONAL TESTS: Reviewed.    TTE: < from: Transthoracic Echocardiogram (05.05.18 @ 11:47) >  CONCLUSIONS:  1. Severe global left ventricular systolic dysfunction.  2. Large circumferential pericardial effusion. The effusion  measures 3.2 cm lateral to the right atrium and 1.6 cm  anterior to the right ventricle. The IVC measures about 2.0  cm and does not appear to collapse, although there is  minimal respiratory variability. Transmitral gradients were  not performed. The RV and RA are not collapsed however they  do not appear to fully expand during diastole.    < end of copied text >

## 2018-05-08 DIAGNOSIS — J93.9 PNEUMOTHORAX, UNSPECIFIED: ICD-10-CM

## 2018-05-08 LAB
BACTERIA BLD CULT: SIGNIFICANT CHANGE UP
BUN SERPL-MCNC: 53 MG/DL — HIGH (ref 7–23)
CALCIUM SERPL-MCNC: 8.5 MG/DL — SIGNIFICANT CHANGE UP (ref 8.4–10.5)
CHLORIDE SERPL-SCNC: 102 MMOL/L — SIGNIFICANT CHANGE UP (ref 98–107)
CO2 SERPL-SCNC: 29 MMOL/L — SIGNIFICANT CHANGE UP (ref 22–31)
CREAT SERPL-MCNC: 1.46 MG/DL — HIGH (ref 0.5–1.3)
GLUCOSE BLDC GLUCOMTR-MCNC: 116 MG/DL — HIGH (ref 70–99)
GLUCOSE BLDC GLUCOMTR-MCNC: 148 MG/DL — HIGH (ref 70–99)
GLUCOSE BLDC GLUCOMTR-MCNC: 165 MG/DL — HIGH (ref 70–99)
GLUCOSE BLDC GLUCOMTR-MCNC: 197 MG/DL — HIGH (ref 70–99)
GLUCOSE SERPL-MCNC: 197 MG/DL — HIGH (ref 70–99)
GRAM STN WND: SIGNIFICANT CHANGE UP
HCT VFR BLD CALC: 44.3 % — SIGNIFICANT CHANGE UP (ref 34.5–45)
HGB BLD-MCNC: 12.8 G/DL — SIGNIFICANT CHANGE UP (ref 11.5–15.5)
MAGNESIUM SERPL-MCNC: 1.8 MG/DL — SIGNIFICANT CHANGE UP (ref 1.6–2.6)
MCHC RBC-ENTMCNC: 20.5 PG — LOW (ref 27–34)
MCHC RBC-ENTMCNC: 28.9 % — LOW (ref 32–36)
MCV RBC AUTO: 71.1 FL — LOW (ref 80–100)
NRBC # FLD: 0 — SIGNIFICANT CHANGE UP
PHOSPHATE SERPL-MCNC: 3.2 MG/DL — SIGNIFICANT CHANGE UP (ref 2.5–4.5)
PLATELET # BLD AUTO: 365 K/UL — SIGNIFICANT CHANGE UP (ref 150–400)
PMV BLD: 9.9 FL — SIGNIFICANT CHANGE UP (ref 7–13)
POTASSIUM SERPL-MCNC: 4.6 MMOL/L — SIGNIFICANT CHANGE UP (ref 3.5–5.3)
POTASSIUM SERPL-SCNC: 4.6 MMOL/L — SIGNIFICANT CHANGE UP (ref 3.5–5.3)
RBC # BLD: 6.23 M/UL — HIGH (ref 3.8–5.2)
RBC # FLD: 17.5 % — HIGH (ref 10.3–14.5)
SODIUM SERPL-SCNC: 139 MMOL/L — SIGNIFICANT CHANGE UP (ref 135–145)
SPECIMEN SOURCE: SIGNIFICANT CHANGE UP
WBC # BLD: 10.56 K/UL — HIGH (ref 3.8–10.5)
WBC # FLD AUTO: 10.56 K/UL — HIGH (ref 3.8–10.5)

## 2018-05-08 PROCEDURE — 99222 1ST HOSP IP/OBS MODERATE 55: CPT

## 2018-05-08 PROCEDURE — 99232 SBSQ HOSP IP/OBS MODERATE 35: CPT

## 2018-05-08 PROCEDURE — 71045 X-RAY EXAM CHEST 1 VIEW: CPT | Mod: 26

## 2018-05-08 PROCEDURE — 93306 TTE W/DOPPLER COMPLETE: CPT | Mod: 26

## 2018-05-08 RX ORDER — MAGNESIUM SULFATE 500 MG/ML
1 VIAL (ML) INJECTION ONCE
Qty: 0 | Refills: 0 | Status: COMPLETED | OUTPATIENT
Start: 2018-05-08 | End: 2018-05-08

## 2018-05-08 RX ORDER — CARVEDILOL PHOSPHATE 80 MG/1
3.12 CAPSULE, EXTENDED RELEASE ORAL EVERY 12 HOURS
Qty: 0 | Refills: 0 | Status: DISCONTINUED | OUTPATIENT
Start: 2018-05-08 | End: 2018-05-08

## 2018-05-08 RX ORDER — CEFTRIAXONE 500 MG/1
2 INJECTION, POWDER, FOR SOLUTION INTRAMUSCULAR; INTRAVENOUS ONCE
Qty: 0 | Refills: 0 | Status: COMPLETED | OUTPATIENT
Start: 2018-05-08 | End: 2018-05-08

## 2018-05-08 RX ORDER — CEFTRIAXONE 500 MG/1
INJECTION, POWDER, FOR SOLUTION INTRAMUSCULAR; INTRAVENOUS
Qty: 0 | Refills: 0 | Status: DISCONTINUED | OUTPATIENT
Start: 2018-05-08 | End: 2018-05-10

## 2018-05-08 RX ORDER — INSULIN GLARGINE 100 [IU]/ML
24 INJECTION, SOLUTION SUBCUTANEOUS AT BEDTIME
Qty: 0 | Refills: 0 | Status: DISCONTINUED | OUTPATIENT
Start: 2018-05-08 | End: 2018-05-11

## 2018-05-08 RX ORDER — HEPARIN SODIUM 5000 [USP'U]/ML
5000 INJECTION INTRAVENOUS; SUBCUTANEOUS EVERY 8 HOURS
Qty: 0 | Refills: 0 | Status: DISCONTINUED | OUTPATIENT
Start: 2018-05-08 | End: 2018-05-18

## 2018-05-08 RX ORDER — VANCOMYCIN HCL 1 G
750 VIAL (EA) INTRAVENOUS EVERY 12 HOURS
Qty: 0 | Refills: 0 | Status: DISCONTINUED | OUTPATIENT
Start: 2018-05-08 | End: 2018-05-10

## 2018-05-08 RX ORDER — CEFTRIAXONE 500 MG/1
2 INJECTION, POWDER, FOR SOLUTION INTRAMUSCULAR; INTRAVENOUS EVERY 24 HOURS
Qty: 0 | Refills: 0 | Status: DISCONTINUED | OUTPATIENT
Start: 2018-05-09 | End: 2018-05-10

## 2018-05-08 RX ADMIN — Medication 15 UNIT(S): at 12:37

## 2018-05-08 RX ADMIN — Medication 15 UNIT(S): at 09:19

## 2018-05-08 RX ADMIN — Medication 150 MILLIGRAM(S): at 20:11

## 2018-05-08 RX ADMIN — Medication 1 PUFF(S): at 07:57

## 2018-05-08 RX ADMIN — Medication 325 MILLIGRAM(S): at 18:13

## 2018-05-08 RX ADMIN — Medication 325 MILLIGRAM(S): at 05:42

## 2018-05-08 RX ADMIN — Medication 20 MILLIGRAM(S): at 18:13

## 2018-05-08 RX ADMIN — Medication 1 PUFF(S): at 23:24

## 2018-05-08 RX ADMIN — ALBUTEROL 2 PUFF(S): 90 AEROSOL, METERED ORAL at 07:57

## 2018-05-08 RX ADMIN — Medication 100 GRAM(S): at 09:21

## 2018-05-08 RX ADMIN — Medication 2: at 12:37

## 2018-05-08 RX ADMIN — HEPARIN SODIUM 5000 UNIT(S): 5000 INJECTION INTRAVENOUS; SUBCUTANEOUS at 21:52

## 2018-05-08 RX ADMIN — Medication 2: at 09:18

## 2018-05-08 RX ADMIN — CHLORHEXIDINE GLUCONATE 1 APPLICATION(S): 213 SOLUTION TOPICAL at 09:19

## 2018-05-08 RX ADMIN — INSULIN GLARGINE 24 UNIT(S): 100 INJECTION, SOLUTION SUBCUTANEOUS at 21:52

## 2018-05-08 RX ADMIN — Medication 15 UNIT(S): at 18:13

## 2018-05-08 RX ADMIN — CEFTRIAXONE 100 GRAM(S): 500 INJECTION, POWDER, FOR SOLUTION INTRAMUSCULAR; INTRAVENOUS at 16:43

## 2018-05-08 RX ADMIN — ATORVASTATIN CALCIUM 40 MILLIGRAM(S): 80 TABLET, FILM COATED ORAL at 21:52

## 2018-05-08 RX ADMIN — HEPARIN SODIUM 5000 UNIT(S): 5000 INJECTION INTRAVENOUS; SUBCUTANEOUS at 14:28

## 2018-05-08 RX ADMIN — Medication 81 MILLIGRAM(S): at 12:34

## 2018-05-08 RX ADMIN — Medication 20 MILLIGRAM(S): at 05:42

## 2018-05-08 NOTE — PROGRESS NOTE ADULT - PROBLEM SELECTOR PLAN 4
- C/w Lantus 20 U HS  - C/w Humalog 15 U TID w/ meals  - FSG TID and w/ meals, ISS - History of sarcoidosis on Prednisone  - May be possible etiology of effusion  - C/w Prednisone 20 mg QD

## 2018-05-08 NOTE — PROGRESS NOTE ADULT - PROBLEM SELECTOR PLAN 5
- Apparent history of intracardiac thrombus, patient was on Xarelto at home  - No thrombus visualized on TTE this admission  - Holding Xarelto given planned pericardial drain - C/w Lantus 24 U HS, endocrine following, appreciate recomendations  - C/w Humalog 15 U TID w/ meals  - FSG TID and w/ meals, ISS

## 2018-05-08 NOTE — PROGRESS NOTE ADULT - PROBLEM SELECTOR PLAN 2
- Patient with severely reduced EF 19%, appears hypervolemic as crackles, peripheral edema, and JVD on exam  - However holding diuretics due to pre-load dependence in setting of large circumferential pericardial effusion  - Will closely monitor respiratory status, currently stable  - Holding HF meds as above - Patient with severely reduced EF 19%  - Holding diuretics at present  - Will closely monitor respiratory status, currently stable  - Holding HF meds as above

## 2018-05-08 NOTE — PROGRESS NOTE ADULT - PROBLEM SELECTOR PLAN 9
- Heparin SC Q8H, holding for planned procedure    Valentino Moseley MD PGY-1  Pager: [phone] - C/w Atorvastatin

## 2018-05-08 NOTE — PROGRESS NOTE ADULT - ASSESSMENT
52 year old F, w/ HFrEF 20%, COPD on home O2, Sarcoidosis on Prednisone, history of cardiac thrombus on Xarelto, DM2 with large circumferential pericardial effusion of unclear etiology admitted to CCU for close monitoring of hemodynamics, s/p pericardial drain placement complicated by small L PTX and R hydropneumothorax, clinically stable.

## 2018-05-08 NOTE — PROGRESS NOTE ADULT - PROBLEM SELECTOR PLAN 1
AM Glucose improved but still elevated.    Recommend increasing Lantus to 24 units, continue to monitor on current dose of humalog  Insulin doses will most likely need to be decreased when steroids are tapered    -plan to discharge on basal bolus insulin only, doses to be determined  - she can follow up in endocrine office as outpatient - [phone] but she states she would rather find a provider closer to her home in [place].

## 2018-05-08 NOTE — PROGRESS NOTE ADULT - ASSESSMENT
52 year old woman with T2DM, uncontrolled, with steroid induced hyperglycemia, admitted with acute decompensated heart failure, now post IR drainage of pericardial effusion.

## 2018-05-08 NOTE — PROGRESS NOTE ADULT - SUBJECTIVE AND OBJECTIVE BOX
Subjective: Patient seen and examined. No new events except as noted.   s/p CT guided IR drain   developed Right PTX and hydrothorax   denies chest pain or sob     REVIEW OF SYSTEMS:    CONSTITUTIONAL: + weakness, fevers or chills  EYES/ENT: No visual changes;  No vertigo or throat pain   NECK: No pain or stiffness  RESPIRATORY: No cough, wheezing, hemoptysis; No shortness of breath  CARDIOVASCULAR: No chest pain or palpitations  GASTROINTESTINAL: No abdominal or epigastric pain. No nausea, vomiting, or hematemesis; No diarrhea or constipation. No melena or hematochezia.  GENITOURINARY: No dysuria, frequency or hematuria  NEUROLOGICAL: No numbness or weakness  SKIN: No itching, burning, rashes, or lesions   All other review of systems is negative unless indicated above.    MEDICATIONS:  MEDICATIONS  (STANDING):  aspirin enteric coated 81 milliGRAM(s) Oral daily  atorvastatin 40 milliGRAM(s) Oral at bedtime  chlorhexidine 4% Liquid 1 Application(s) Topical <User Schedule>  dextrose 5%. 1000 milliLiter(s) (50 mL/Hr) IV Continuous <Continuous>  dextrose 50% Injectable 12.5 Gram(s) IV Push once  dextrose 50% Injectable 25 Gram(s) IV Push once  dextrose 50% Injectable 25 Gram(s) IV Push once  ferrous    sulfate 325 milliGRAM(s) Oral two times a day  fluticasone propionate   220 MICROgram(s) HFA Inhaler 1 Puff(s) Inhalation two times a day  heparin  Injectable 5000 Unit(s) SubCutaneous every 8 hours  insulin glargine Injectable (LANTUS) 24 Unit(s) SubCutaneous at bedtime  insulin lispro (HumaLOG) corrective regimen sliding scale   SubCutaneous three times a day before meals  insulin lispro (HumaLOG) corrective regimen sliding scale   SubCutaneous at bedtime  insulin lispro Injectable (HumaLOG) 15 Unit(s) SubCutaneous three times a day before meals  predniSONE   Tablet 20 milliGRAM(s) Oral two times a day      PHYSICAL EXAM:  T(C): 36.7 (05-08-18 @ 08:00), Max: 36.7 (05-08-18 @ 08:00)  HR: 122 (05-08-18 @ 11:00) (95 - 127)  BP: 101/69 (05-08-18 @ 11:00) (93/63 - 152/96)  RR: 29 (05-08-18 @ 11:00) (13 - 37)  SpO2: 98% (05-08-18 @ 11:00) (91% - 100%)  Wt(kg): --  I&O's Summary    07 May 2018 07:01  -  08 May 2018 07:00  --------------------------------------------------------  IN: 240 mL / OUT: 990 mL / NET: -750 mL    08 May 2018 07:01  -  08 May 2018 11:55  --------------------------------------------------------  IN: 220 mL / OUT: 280 mL / NET: -60 mL          Appearance: Normal	  HEENT:   Normal oral mucosa, PERRL, EOMI	  Lymphatic: No lymphadenopathy , no edema  Cardiovascular: tachy S1 S2, No JVD, No murmurs ,+ pericardial drain   Respiratory: Lungs clear to auscultation, normal effort 	  Gastrointestinal:  Soft, Non-tender, + BS	  Skin: No rashes, No ecchymoses, No cyanosis, warm to touch  Musculoskeletal: Normal range of motion, normal strength  Psychiatry:  Mood & affect appropriate  Ext: No edema      LABS:    CARDIAC MARKERS:                                12.8   10.56 )-----------( 365      ( 08 May 2018 04:40 )             44.3     05-08    139  |  102  |  53<H>  ----------------------------<  197<H>  4.6   |  29  |  1.46<H>    Ca    8.5      08 May 2018 04:40  Phos  3.2     05-08  Mg     1.8     05-08      proBNP:   Lipid Profile:   HgA1c:   TSH:             TELEMETRY: 	    ECG:  	  RADIOLOGY:   < from: IR Procedure (05.07.18 @ 19:55) >    EXAM:  IR PROCEDURE        PROCEDURE DATE:  May  7 2018         INTERPRETATION:    Procedure: COMPUTED TOMOGRAPHY GUIDED PERICARDIOCENTESIS AND PERICARDIAL   DRAIN PLACEMENT    : SAIRA Manzo MD    Clinical indication: 52-year-old female with large circumferential   pericardial effusion with right atrial diastolic collapse. The patient is   referred for pericardiocentesis and pericardial drain placement.    Technique: Following discussions of the risks, benefits, and alternatives   to the procedure informed consent was obtained. 1% lidocaine was utilized   for local anesthesia.    The patient was placed supine on the CT gantry and connected to   physiologic monitoring. Transit axial images of the chest without and   with intravenous contrast were obtained. The pericardial effusion was   identified. A suitable window was identified and the left anterolateral   chest wall was prepped draped in the usual sterile fashion. Using CT   guidance a 21-gauge needle was advanced into the collection using a left   anterolateral approach. Access into the effusion was confirmed with   imaging. A 0.018 mandril wire was advanced through the needle into the   pericardial space, which was confirmed with CT imaging. The needle was   exchanged over the mandril wire for an AccuStick introducer catheter. The   stiffener and inner dilator were removed, and approximately 20 mL of   serosanguineous fluid was aspirated and was sent for analysis as   requested. A 0.035 wire was advanced through theintroducer catheter. CT   imaging at this point revealed the wire and the catheter to be outside of   the pericardial space. The catheter was removed. A small nonexpanding   left pneumothorax was noted. The patient remained asymptomatic, therefore   a decision was made to proceed with placing the pericardial drainage   catheter.     A new suitable window was identified and the anterior upper chest was   prepped and draped in the usual sterile fashion. Using CT guidance, a 4   [place] Yueh needle/catheter was advanced into the pericardial fluid using   the anterior mediastinal approach. Access into the effusion was confirmed   with imaging. The inner needle was removed and a 0.035 wire was advanced   through the Yueh catheter into the pericardial fluid, which was confirmed   with CT imaging. The catheter was exchanged over the wire for a 7 [place]   Stevens Seth pigtail drainage catheter, which was then advanced into   the collection and formed. Approximately 230 mL of serosanguineous fluid   was manually aspirated. Completion CT images demonstrated near complete   collapse of the effusion and good positioning of the catheter tip within   the posterior pericardial space. In addition, a small right pneumothorax   as well as a persistentsmall left hydropneumothorax were noted. Interval   CT imaging at 10 and 20 minutes post procedure demonstrated nonexpanding   stable small right pneumothorax and a stable small left   hydropneumothorax. The catheter was secured to the patient's skinwith a   silk suture and connected to gravity drainage. A dry sterile dressing was   applied.    The patient tolerated the procedure well and was transferred from the   department in stable condition. The patient remained asymptomatic   throughout the entire procedure.    IMPRESSION: SUCCESSFUL CT-GUIDED PERCUTANEOUS DRAINAGE OF PERICARDIAL   FLUID WITH 7 Portuguese PIGTAIL CATHETER YIELDING APPROXIMATELY 250 ML OF   SEROSANGUINEOUS FLUID AS ABOVE.     Small right pneumothorax and small left hydropneumothorax were noted on   postprocedural images. Patient remained asymptomatic throughout the   procedure. These findings were communicated with CCU NP Kirt   immediately after the procedure.                  RADHA MANZO M.D., ATTENDING RADIOLOGIST  This document has been electronically signed. May  7 2018  8:41PM                  < end of copied text >    DIAGNOSTIC TESTING:  [ ] Echocardiogram:  [ ]  Catheterization:  [ ] Stress Test:    OTHER:

## 2018-05-08 NOTE — PROGRESS NOTE ADULT - PROBLEM SELECTOR PLAN 3
- History of sarcoidosis on Prednisone  - May be possible etiology of effusion  - C/w Prednisone 20 mg QD - L PTX and R hydropneumothorax as complications of pericardial drain placement\  - Tachycardic to 110s-130s, likely from PTX, respiratory status stable, SpO2 >95%RA  - Daily CXR  - Closely monitor respiratory status

## 2018-05-08 NOTE — PROGRESS NOTE ADULT - SUBJECTIVE AND OBJECTIVE BOX
INTERVAL HPI/ OVERNIGHT EVENTS: Patient went to IR for placement of pericardial drain, initially attempted on L, drain became misplaced, attempted on R with successful placement of pericardial drain, 250 mL of serosanguinous fluid drained. Procedure complicated by small L sided PTX and R sided hydropneumothorax, stable on CXR and CT chest overnight. Tachycardic overnight to 100s, SpO2 stable 95-96% FiO2 on RA. BP stable. Pericardial drain draining 120 mL overnight.     SUBJECTIVE: Patient seen and examined at bedside this AM. She denies shortness of breath, chest pain, pleurisy, palpitations, lightheadedness dizziness, worsening LE swelling.     OBJECTIVE:    VITAL SIGNS:  ICU Vital Signs Last 24 Hrs  T(C): 36.7 (08 May 2018 08:00), Max: 36.7 (08 May 2018 08:00)  T(F): 98.1 (08 May 2018 08:00), Max: 98.1 (08 May 2018 08:00)  HR: 112 (08 May 2018 08:00) (86 - 125)  BP: 109/77 (08 May 2018 08:00) (93/63 - 152/96)  BP(mean): 84 (08 May 2018 08:00) (63 - 112)  ABP: --  ABP(mean): --  RR: 24 (08 May 2018 08:00) (11 - 30)  SpO2: 96% (08 May 2018 08:00) (91% - 100%)        05-07 @ 07:01  -  05-08 @ 07:00  --------------------------------------------------------  IN: 240 mL / OUT: 990 mL / NET: -750 mL      CAPILLARY BLOOD GLUCOSE      POCT Blood Glucose.: 174 mg/dL (07 May 2018 21:56)      PHYSICAL EXAM:  General: Patient in NAD, resting comfortably, non-toxic  HEENT: NC/AT; PERRL, anicteric sclera  Neck: +JVD and hepatojugular reflux. Neck supple  Respiratory: Crackles B/L lung fields up to mid lung. decreased breath sounds L lung base. No wheezing or rhonchi  Cardiovascular: +S1/S2, RRR; no M/R/G. Drain in place R anterior chest wall  Gastrointestinal: Abdomen soft, NT/ND; +BS x4  Extremities: +2 pitting edema of B/L LEs up to thighs, 2+ peripheral pulses B/L  Skin: Normal color and turgor, no rash  Neurological: Grossly intact, A & O x 4    MEDICATIONS:  MEDICATIONS  (STANDING):  aspirin enteric coated 81 milliGRAM(s) Oral daily  atorvastatin 40 milliGRAM(s) Oral at bedtime  carvedilol 3.125 milliGRAM(s) Oral every 12 hours  chlorhexidine 4% Liquid 1 Application(s) Topical <User Schedule>  dextrose 5%. 1000 milliLiter(s) (50 mL/Hr) IV Continuous <Continuous>  dextrose 50% Injectable 12.5 Gram(s) IV Push once  dextrose 50% Injectable 25 Gram(s) IV Push once  dextrose 50% Injectable 25 Gram(s) IV Push once  ferrous    sulfate 325 milliGRAM(s) Oral two times a day  fluticasone propionate   220 MICROgram(s) HFA Inhaler 1 Puff(s) Inhalation two times a day  insulin glargine Injectable (LANTUS) 22 Unit(s) SubCutaneous at bedtime  insulin lispro (HumaLOG) corrective regimen sliding scale   SubCutaneous three times a day before meals  insulin lispro (HumaLOG) corrective regimen sliding scale   SubCutaneous at bedtime  insulin lispro Injectable (HumaLOG) 15 Unit(s) SubCutaneous three times a day before meals  magnesium sulfate  IVPB 1 Gram(s) IV Intermittent once  predniSONE   Tablet 20 milliGRAM(s) Oral two times a day    MEDICATIONS  (PRN):  acetaminophen   Tablet. 650 milliGRAM(s) Oral every 6 hours PRN temp> 101  acetaminophen   Tablet. 650 milliGRAM(s) Oral every 6 hours PRN mild, moderate pain  ALBUTerol    90 MICROgram(s) HFA Inhaler 2 Puff(s) Inhalation every 6 hours PRN Bronchospasm  dextrose Gel 1 Dose(s) Oral once PRN Blood Glucose LESS THAN 70 milliGRAM(s)/deciliter  glucagon  Injectable 1 milliGRAM(s) IntraMuscular once PRN Glucose LESS THAN 70 milligrams/deciliter      ALLERGIES: Allergies    No Known Allergies    Intolerances        LABS:                        12.8   10.56 )-----------( 365      ( 08 May 2018 04:40 )             44.3     05-08    139  |  102  |  53<H>  ----------------------------<  197<H>  4.6   |  29  |  1.46<H>    Ca    8.5      08 May 2018 04:40  Phos  3.2     05-08  Mg     1.8     05-08        PT/INR - ( 07 May 2018 05:45 )   PT: 11.2 SEC;   INR: 0.97     PTT - ( 07 May 2018 05:45 )  PTT:26.7 SEC

## 2018-05-08 NOTE — PROGRESS NOTE ADULT - PROBLEM SELECTOR PLAN 1
- Large circumferential pericardial effusion, RV and RA not collapsed however they do not appear to fully expand during diastole, no overt tamponade physiology  - Unclear etiology of effusion, may be related to fluid overload in setting of HFrEF and medication non-compliance vs sarcoidosis related vs occult malignancy  - HD stable, will c/w close monitoring of BP/ hemodynamics in CCU  - Holding diuretics and antihypertensives in setting of pre-load dependence   - Plan to call IR today 5/7 for pericardial drain placement - Large circumferential pericardial effusion, RV and RA not collapsed however they do not appear to fully expand during diastole s/p placement of pericardial drain, actively draining overnight  - Unclear etiology of effusion, may be related to fluid overload in setting of HFrEF and medication non-compliance vs sarcoidosis related vs occult malignancy vs infectious as gram stain from initial fluid growing GPCs in pairs, will f/u culture  - ID consult in meantime for gram stain results  - HD stable, will c/w close monitoring of BP/ hemodynamics in CCU

## 2018-05-08 NOTE — PROGRESS NOTE ADULT - SUBJECTIVE AND OBJECTIVE BOX
Diabetes  Follow up note    Interval History: Pt reports good appetite, but does not like the food, so not always eating meals.      MEDICATIONS  (STANDING):  aspirin enteric coated 81 milliGRAM(s) Oral daily  atorvastatin 40 milliGRAM(s) Oral at bedtime  carvedilol 3.125 milliGRAM(s) Oral every 12 hours  chlorhexidine 4% Liquid 1 Application(s) Topical <User Schedule>  dextrose 5%. 1000 milliLiter(s) (50 mL/Hr) IV Continuous <Continuous>  dextrose 50% Injectable 12.5 Gram(s) IV Push once  dextrose 50% Injectable 25 Gram(s) IV Push once  dextrose 50% Injectable 25 Gram(s) IV Push once  ferrous    sulfate 325 milliGRAM(s) Oral two times a day  fluticasone propionate   220 MICROgram(s) HFA Inhaler 1 Puff(s) Inhalation two times a day  heparin  Injectable 5000 Unit(s) SubCutaneous every 8 hours  insulin glargine Injectable (LANTUS) 22 Unit(s) SubCutaneous at bedtime  insulin lispro (HumaLOG) corrective regimen sliding scale   SubCutaneous three times a day before meals  insulin lispro (HumaLOG) corrective regimen sliding scale   SubCutaneous at bedtime  insulin lispro Injectable (HumaLOG) 15 Unit(s) SubCutaneous three times a day before meals  predniSONE   Tablet 20 milliGRAM(s) Oral two times a day    MEDICATIONS  (PRN):  acetaminophen   Tablet. 650 milliGRAM(s) Oral every 6 hours PRN temp> 101  acetaminophen   Tablet. 650 milliGRAM(s) Oral every 6 hours PRN mild, moderate pain  ALBUTerol    90 MICROgram(s) HFA Inhaler 2 Puff(s) Inhalation every 6 hours PRN Bronchospasm  dextrose Gel 1 Dose(s) Oral once PRN Blood Glucose LESS THAN 70 milliGRAM(s)/deciliter  glucagon  Injectable 1 milliGRAM(s) IntraMuscular once PRN Glucose LESS THAN 70 milligrams/deciliter      Allergies    No Known Allergies    PHYSICAL EXAM:  VITALS: T(C): 36.7 (05-08-18 @ 08:00)  T(F): 98.1 (05-08-18 @ 08:00), Max: 98.1 (05-08-18 @ 08:00)  HR: 110 (05-08-18 @ 09:00) (86 - 125)  BP: 111/75 (05-08-18 @ 09:00) (93/63 - 152/96)  RR:  (11 - 30)  SpO2:  (91% - 100%)  GENERAL: Sitting up in bed in NAD,   EYES: No proptosis,  HEENT:  Atraumatic, Normocephalic,   RESPIRATORY: anterior lung fields clear  CARDIOVASCULAR: Regular rhythm;  right LE edema, Drain with serosanguinous fluid  GI: Soft, nontender, non distended, normal bowel sounds  SKIN: no rash        POCT Blood Glucose.: 197 mg/dL (05-08-18 @ 08:59)  POCT Blood Glucose.: 174 mg/dL (05-07-18 @ 21:56)  POCT Blood Glucose.: 208 mg/dL (05-07-18 @ 12:08)  POCT Blood Glucose.: 370 mg/dL (05-07-18 @ 07:54)  POCT Blood Glucose.: 127 mg/dL (05-06-18 @ 21:58)  POCT Blood Glucose.: 111 mg/dL (05-06-18 @ 17:32)  POCT Blood Glucose.: 168 mg/dL (05-06-18 @ 12:38)  POCT Blood Glucose.: 175 mg/dL (05-06-18 @ 08:28)  POCT Blood Glucose.: 117 mg/dL (05-05-18 @ 22:13)  POCT Blood Glucose.: 138 mg/dL (05-05-18 @ 17:14)  POCT Blood Glucose.: 348 mg/dL (05-05-18 @ 12:42)        05-08    139  |  102  |  53<H>  ----------------------------<  197<H>  4.6   |  29  |  1.46<H>    EGFR if : 47  EGFR if non : 41    Ca    8.5      05-08  Mg     1.8     05-08  Phos  3.2     05-08      Hemoglobin A1C, Whole Blood: 14.3 % <H> [4.0 - 5.6] (05-04-18 @ 06:45)  Hemoglobin A1C, Whole Blood: 14.3 % <H> [4.0 - 5.6] (05-03-18 @ 16:59)

## 2018-05-08 NOTE — PROGRESS NOTE ADULT - PROBLEM SELECTOR PLAN 7
- Holding home Losartan, Metoprolol  - Close monitoring of BP - History of CAD with stent to LAD  - C/w ASA

## 2018-05-08 NOTE — CONSULT NOTE ADULT - ATTENDING COMMENTS
51 yo woman with CHF ?sarcoid with pericardial effusion s/p pericardial drain 5/7 ; gram stain shows gpc in pairs.   Concern for bacterial pericarditis with organisms such as strep pneumo or other strep pathogen.  Suggest: blood culture               start vanco 750 mg iv q 12 hours               start ceftriaxone 2 gm iv q 24 hours

## 2018-05-08 NOTE — CONSULT NOTE ADULT - SUBJECTIVE AND OBJECTIVE BOX
HPI:  53 y/o female, with a PmHx of CHF stage III, on home O2, Sarcoidosis, ? blood clot in heart on Xarelto, DM, presented with sob and LE edema/pain. Pt states for the past 3 weeks she has been having LE edema/pain and sob but in the last 24 hours was started to get worse.  Pt states she was recently admitted to [facility] in February for a similar complaint and was eating much so she states her normal weight used to be 165lb but she has lost a lot of weight and now weights about 110. She states she has been having a  lot pain to both her legs so she decided to come to [facility] ED today for an evaluation. She denies any fever, chills, chest pain, HA, dizziness, blurred vision, n/v, abd pain, recent travel. The only complaints she has been having are nasal congestion, cold intolerance, diaphoresis, bilateral leg pain and sob (more than normal because she is on o2 via nasal cannula at home.   ID- admitted to CCU; found to have pericardial effusion. s/p pericardial drain placement 5/7.  gram stain gpc pairs.         PAST MEDICAL & SURGICAL HISTORY:  Thrombus: ? Thrombus in heart - on Xarelto  Hyperlipidemia  Anemia  Hypertension  Sarcoidosis  CHF (congestive heart failure): Stage III, on home O2 @ 2lpm  Diabetes  Ankle fracture: Left Ankle - with plate and screws placed  Hip deformity: left hip had a pin placed to hold bone in place after a fall      Allergies    No Known Allergies    Intolerances        ANTIMICROBIALS:  cefTRIAXone   IVPB    cefTRIAXone   IVPB 2 once  vancomycin  IVPB 750 every 12 hours      OTHER MEDS:  acetaminophen   Tablet. 650 milliGRAM(s) Oral every 6 hours PRN  acetaminophen   Tablet. 650 milliGRAM(s) Oral every 6 hours PRN  ALBUTerol    90 MICROgram(s) HFA Inhaler 2 Puff(s) Inhalation every 6 hours PRN  aspirin enteric coated 81 milliGRAM(s) Oral daily  atorvastatin 40 milliGRAM(s) Oral at bedtime  chlorhexidine 4% Liquid 1 Application(s) Topical <User Schedule>  dextrose 5%. 1000 milliLiter(s) IV Continuous <Continuous>  dextrose 50% Injectable 12.5 Gram(s) IV Push once  dextrose 50% Injectable 25 Gram(s) IV Push once  dextrose 50% Injectable 25 Gram(s) IV Push once  dextrose Gel 1 Dose(s) Oral once PRN  ferrous    sulfate 325 milliGRAM(s) Oral two times a day  fluticasone propionate   220 MICROgram(s) HFA Inhaler 1 Puff(s) Inhalation two times a day  glucagon  Injectable 1 milliGRAM(s) IntraMuscular once PRN  heparin  Injectable 5000 Unit(s) SubCutaneous every 8 hours  insulin glargine Injectable (LANTUS) 24 Unit(s) SubCutaneous at bedtime  insulin lispro (HumaLOG) corrective regimen sliding scale   SubCutaneous three times a day before meals  insulin lispro (HumaLOG) corrective regimen sliding scale   SubCutaneous at bedtime  insulin lispro Injectable (HumaLOG) 15 Unit(s) SubCutaneous three times a day before meals  predniSONE   Tablet 20 milliGRAM(s) Oral two times a day      SOCIAL HISTORY: lives home  born [place]  no travel    FAMILY HISTORY:  Family history of hypertension (Sibling): Siblings  Family history of diabetes mellitus (Father, Sibling): Father, Siblings  Family history of breast cancer in mother (Mother): Mother  Family history of colon cancer in mother (Mother): Mother      REVIEW OF SYSTEMS  [  ] ROS unobtainable because:    [x  ] All other systems negative except as noted below:	    Constitutional:  [ ] fever [ ] chills  [x ] weight loss  [x ] weakness  Skin:  [ ] rash [ ] phlebitis	  Eyes: [ ] icterus [ ] pain  [ ] discharge	  ENMT: [ ] sore throat  [ ] thrush [ ] ulcers [ ] exudates  Respiratory: [ ] dyspnea [ ] hemoptysis [ ] cough [ ] sputum	  Cardiovascular:  [ ] chest pain [ ] palpitations [ ] edema	  Gastrointestinal:  [ ] nausea [ ] vomiting [ ] diarrhea [ ] constipation [ ] pain	  Genitourinary:  [ ] dysuria [ ] frequency [ ] hematuria [ ] discharge [ ] flank pain  [ ] incontinence  Musculoskeletal:  right leg swelling.  left leg with hardware ankle post fx.  left hip hardware.  Neurological:  [ ] headache [ ] seizures  [ ] confusion/altered mental status  Psychiatric:  [ ] anxiety [ ] depression	  Hematology/Lymphatics:  [ ] lymphadenopathy  Endocrine:  [ ] adrenal [ ] thyroid  Allergic/Immunologic:	 [ ] transplant [ ] seasonal    PHYSICAL EXAM:  General: [x ] non-toxic  HEAD/EYES:  [x ] white sclera , right eye deviated (chronic per patient)  ENT:  [ ] normal [x ] supple [ ] thrush [ ] pharyngeal exudate  Cardiovascular:   [x ] murmur [ ] normal [ ] PPM/AICD  Respiratory:  [x ] clear to ausculation bilaterally drain right  GI:  [x ] soft, non-tender, normal bowel sounds  :  [ ] barfield [ ] no CVA tenderness   Musculoskeletal:  [ ] no synovitis  Neurologic:  [ ] non-focal exam   Skin:  [x ] no rash  abrasion knee    right leg . left  Lymph: [ ] no lymphadenopathy  Psychiatric:  [x ] appropriate affect [x ] alert & oriented  Lines:  [x ] no phlebitis [ ] central line          Drug Dosing Weight  Height (cm): 160.02 (04 May 2018 13:40)  Weight (kg): 74 (04 May 2018 13:40)  BMI (kg/m2): 28.9 (04 May 2018 13:40)  BSA (m2): 1.77 (04 May 2018 13:40)    Vital Signs Last 24 Hrs  T(F): 97.9 (05-08-18 @ 12:00), Max: 100.3 (05-03-18 @ 20:25)    Vital Signs Last 24 Hrs  HR: 114 (05-08-18 @ 15:00) (96 - 127)  BP: 116/70 (05-08-18 @ 15:00) (93/63 - 152/96)  RR: 23 (05-08-18 @ 15:00)  SpO2: 96% (05-08-18 @ 15:00) (91% - 100%)  Wt(kg): --                          12.8   10.56 )-----------( 365      ( 08 May 2018 04:40 )             44.3       05-08    139  |  102  |  53<H>  ----------------------------<  197<H>  4.6   |  29  |  1.46<H>    Ca    8.5      08 May 2018 04:40  Phos  3.2     05-08  Mg     1.8     05-08            MICROBIOLOGY:  DRAINAGE  05-07-18 --  --  --  gram stain  gpc pairs    URINE MIDSTREAM  05-04-18 --  --  --      BLOOD PERIPHERAL  05-03-18 --  --  --  no growth      RADIOLOGY:    < from: Xray Chest 1 View- PORTABLE-Routine (05.08.18 @ 07:24) >            IMPRESSION:  Right basilar pleural pigtail catheter.    No right pneumothorax seen.    New minimal left apical pneumothorax.    Small right pleural effusion, unchanged.    Increased left basilar and retrocardiac opacity may be due to an   increasing small left pleural effusion with passive atelectasis,   atelectasis of other cause, and/or pneumonia.    Bilateral reticular opacities, more pronounced on the right, are again   seen. While possibly due to pulmonary vascular congestion the possibility   of bronchiectasis on the right is raised as discussed above. Correlation   with CT scan of the chest can be performed for further evaluation if felt   to be clinically indicated.      < end of copied text >

## 2018-05-09 ENCOUNTER — TRANSCRIPTION ENCOUNTER (OUTPATIENT)
Age: 53
End: 2018-05-09

## 2018-05-09 LAB
BUN SERPL-MCNC: 65 MG/DL — HIGH (ref 7–23)
BUN SERPL-MCNC: 65 MG/DL — HIGH (ref 7–23)
BUN SERPL-MCNC: 68 MG/DL — HIGH (ref 7–23)
BUN SERPL-MCNC: 73 MG/DL — HIGH (ref 7–23)
CALCIUM SERPL-MCNC: 8.5 MG/DL — SIGNIFICANT CHANGE UP (ref 8.4–10.5)
CALCIUM SERPL-MCNC: 8.5 MG/DL — SIGNIFICANT CHANGE UP (ref 8.4–10.5)
CALCIUM SERPL-MCNC: 8.7 MG/DL — SIGNIFICANT CHANGE UP (ref 8.4–10.5)
CALCIUM SERPL-MCNC: 8.9 MG/DL — SIGNIFICANT CHANGE UP (ref 8.4–10.5)
CHLORIDE SERPL-SCNC: 95 MMOL/L — LOW (ref 98–107)
CHLORIDE SERPL-SCNC: 98 MMOL/L — SIGNIFICANT CHANGE UP (ref 98–107)
CHLORIDE SERPL-SCNC: 98 MMOL/L — SIGNIFICANT CHANGE UP (ref 98–107)
CHLORIDE SERPL-SCNC: 99 MMOL/L — SIGNIFICANT CHANGE UP (ref 98–107)
CO2 SERPL-SCNC: 17 MMOL/L — LOW (ref 22–31)
CO2 SERPL-SCNC: 24 MMOL/L — SIGNIFICANT CHANGE UP (ref 22–31)
CO2 SERPL-SCNC: 26 MMOL/L — SIGNIFICANT CHANGE UP (ref 22–31)
CO2 SERPL-SCNC: 29 MMOL/L — SIGNIFICANT CHANGE UP (ref 22–31)
CREAT SERPL-MCNC: 1.35 MG/DL — HIGH (ref 0.5–1.3)
CREAT SERPL-MCNC: 1.39 MG/DL — HIGH (ref 0.5–1.3)
CREAT SERPL-MCNC: 1.46 MG/DL — HIGH (ref 0.5–1.3)
CREAT SERPL-MCNC: 1.66 MG/DL — HIGH (ref 0.5–1.3)
GLUCOSE BLDC GLUCOMTR-MCNC: 160 MG/DL — HIGH (ref 70–99)
GLUCOSE BLDC GLUCOMTR-MCNC: 238 MG/DL — HIGH (ref 70–99)
GLUCOSE BLDC GLUCOMTR-MCNC: 303 MG/DL — HIGH (ref 70–99)
GLUCOSE BLDC GLUCOMTR-MCNC: 428 MG/DL — HIGH (ref 70–99)
GLUCOSE SERPL-MCNC: 134 MG/DL — HIGH (ref 70–99)
GLUCOSE SERPL-MCNC: 169 MG/DL — HIGH (ref 70–99)
GLUCOSE SERPL-MCNC: 435 MG/DL — HIGH (ref 70–99)
GLUCOSE SERPL-MCNC: 444 MG/DL — HIGH (ref 70–99)
HCT VFR BLD CALC: 45.3 % — HIGH (ref 34.5–45)
HGB BLD-MCNC: 13.2 G/DL — SIGNIFICANT CHANGE UP (ref 11.5–15.5)
MAGNESIUM SERPL-MCNC: 2.1 MG/DL — SIGNIFICANT CHANGE UP (ref 1.6–2.6)
MAGNESIUM SERPL-MCNC: 2.2 MG/DL — SIGNIFICANT CHANGE UP (ref 1.6–2.6)
MCHC RBC-ENTMCNC: 21 PG — LOW (ref 27–34)
MCHC RBC-ENTMCNC: 29.1 % — LOW (ref 32–36)
MCV RBC AUTO: 71.9 FL — LOW (ref 80–100)
NRBC # FLD: 0 — SIGNIFICANT CHANGE UP
PHOSPHATE SERPL-MCNC: 3.4 MG/DL — SIGNIFICANT CHANGE UP (ref 2.5–4.5)
PHOSPHATE SERPL-MCNC: 3.5 MG/DL — SIGNIFICANT CHANGE UP (ref 2.5–4.5)
PHOSPHATE SERPL-MCNC: 3.6 MG/DL — SIGNIFICANT CHANGE UP (ref 2.5–4.5)
PHOSPHATE SERPL-MCNC: 4.7 MG/DL — HIGH (ref 2.5–4.5)
PLATELET # BLD AUTO: 348 K/UL — SIGNIFICANT CHANGE UP (ref 150–400)
PMV BLD: 10.6 FL — SIGNIFICANT CHANGE UP (ref 7–13)
POTASSIUM SERPL-MCNC: 5.8 MMOL/L — HIGH (ref 3.5–5.3)
POTASSIUM SERPL-MCNC: 5.9 MMOL/L — HIGH (ref 3.5–5.3)
POTASSIUM SERPL-MCNC: 6 MMOL/L — HIGH (ref 3.5–5.3)
POTASSIUM SERPL-MCNC: 6.8 MMOL/L — CRITICAL HIGH (ref 3.5–5.3)
POTASSIUM SERPL-SCNC: 5.8 MMOL/L — HIGH (ref 3.5–5.3)
POTASSIUM SERPL-SCNC: 5.9 MMOL/L — HIGH (ref 3.5–5.3)
POTASSIUM SERPL-SCNC: 6 MMOL/L — HIGH (ref 3.5–5.3)
POTASSIUM SERPL-SCNC: 6.8 MMOL/L — CRITICAL HIGH (ref 3.5–5.3)
RBC # BLD: 6.3 M/UL — HIGH (ref 3.8–5.2)
RBC # FLD: 18.2 % — HIGH (ref 10.3–14.5)
SODIUM SERPL-SCNC: 127 MMOL/L — LOW (ref 135–145)
SODIUM SERPL-SCNC: 132 MMOL/L — LOW (ref 135–145)
SODIUM SERPL-SCNC: 133 MMOL/L — LOW (ref 135–145)
SODIUM SERPL-SCNC: 137 MMOL/L — SIGNIFICANT CHANGE UP (ref 135–145)
WBC # BLD: 10.51 K/UL — HIGH (ref 3.8–10.5)
WBC # FLD AUTO: 10.51 K/UL — HIGH (ref 3.8–10.5)

## 2018-05-09 PROCEDURE — 99232 SBSQ HOSP IP/OBS MODERATE 35: CPT

## 2018-05-09 PROCEDURE — 99231 SBSQ HOSP IP/OBS SF/LOW 25: CPT

## 2018-05-09 PROCEDURE — 71045 X-RAY EXAM CHEST 1 VIEW: CPT | Mod: 26

## 2018-05-09 RX ORDER — DEXTROSE 50 % IN WATER 50 %
50 SYRINGE (ML) INTRAVENOUS ONCE
Qty: 0 | Refills: 0 | Status: COMPLETED | OUTPATIENT
Start: 2018-05-09 | End: 2018-05-09

## 2018-05-09 RX ORDER — FUROSEMIDE 40 MG
40 TABLET ORAL EVERY 12 HOURS
Qty: 0 | Refills: 0 | Status: DISCONTINUED | OUTPATIENT
Start: 2018-05-09 | End: 2018-05-09

## 2018-05-09 RX ORDER — INSULIN HUMAN 100 [IU]/ML
10 INJECTION, SOLUTION SUBCUTANEOUS ONCE
Qty: 0 | Refills: 0 | Status: COMPLETED | OUTPATIENT
Start: 2018-05-09 | End: 2018-05-09

## 2018-05-09 RX ORDER — DEXTROSE 50 % IN WATER 50 %
25 SYRINGE (ML) INTRAVENOUS ONCE
Qty: 0 | Refills: 0 | Status: COMPLETED | OUTPATIENT
Start: 2018-05-09 | End: 2018-05-09

## 2018-05-09 RX ORDER — FUROSEMIDE 40 MG
40 TABLET ORAL EVERY 12 HOURS
Qty: 0 | Refills: 0 | Status: DISCONTINUED | OUTPATIENT
Start: 2018-05-09 | End: 2018-05-10

## 2018-05-09 RX ORDER — SODIUM POLYSTYRENE SULFONATE 4.1 MEQ/G
30 POWDER, FOR SUSPENSION ORAL ONCE
Qty: 0 | Refills: 0 | Status: COMPLETED | OUTPATIENT
Start: 2018-05-09 | End: 2018-05-09

## 2018-05-09 RX ORDER — ALBUTEROL 90 UG/1
2.5 AEROSOL, METERED ORAL ONCE
Qty: 0 | Refills: 0 | Status: COMPLETED | OUTPATIENT
Start: 2018-05-09 | End: 2018-05-09

## 2018-05-09 RX ORDER — CALCIUM GLUCONATE 100 MG/ML
1 VIAL (ML) INTRAVENOUS ONCE
Qty: 0 | Refills: 0 | Status: COMPLETED | OUTPATIENT
Start: 2018-05-09 | End: 2018-05-09

## 2018-05-09 RX ADMIN — Medication 20 MILLIGRAM(S): at 05:30

## 2018-05-09 RX ADMIN — Medication 150 MILLIGRAM(S): at 22:13

## 2018-05-09 RX ADMIN — Medication 40 MILLIGRAM(S): at 11:30

## 2018-05-09 RX ADMIN — INSULIN HUMAN 10 UNIT(S): 100 INJECTION, SOLUTION SUBCUTANEOUS at 23:54

## 2018-05-09 RX ADMIN — HEPARIN SODIUM 5000 UNIT(S): 5000 INJECTION INTRAVENOUS; SUBCUTANEOUS at 13:20

## 2018-05-09 RX ADMIN — Medication 325 MILLIGRAM(S): at 05:30

## 2018-05-09 RX ADMIN — Medication 15 UNIT(S): at 18:15

## 2018-05-09 RX ADMIN — Medication 15 UNIT(S): at 13:15

## 2018-05-09 RX ADMIN — Medication 40 MILLIGRAM(S): at 18:28

## 2018-05-09 RX ADMIN — ATORVASTATIN CALCIUM 40 MILLIGRAM(S): 80 TABLET, FILM COATED ORAL at 23:15

## 2018-05-09 RX ADMIN — Medication 81 MILLIGRAM(S): at 13:19

## 2018-05-09 RX ADMIN — Medication 50 MILLILITER(S): at 23:14

## 2018-05-09 RX ADMIN — Medication 15 UNIT(S): at 09:10

## 2018-05-09 RX ADMIN — SODIUM POLYSTYRENE SULFONATE 30 GRAM(S): 4.1 POWDER, FOR SUSPENSION ORAL at 23:36

## 2018-05-09 RX ADMIN — CHLORHEXIDINE GLUCONATE 1 APPLICATION(S): 213 SOLUTION TOPICAL at 12:00

## 2018-05-09 RX ADMIN — Medication 150 MILLIGRAM(S): at 05:30

## 2018-05-09 RX ADMIN — Medication 200 GRAM(S): at 23:37

## 2018-05-09 RX ADMIN — Medication 8: at 13:15

## 2018-05-09 RX ADMIN — Medication 325 MILLIGRAM(S): at 18:27

## 2018-05-09 RX ADMIN — Medication 1 PUFF(S): at 22:34

## 2018-05-09 RX ADMIN — ALBUTEROL 2.5 MILLIGRAM(S): 90 AEROSOL, METERED ORAL at 22:33

## 2018-05-09 RX ADMIN — Medication 1 PUFF(S): at 08:02

## 2018-05-09 RX ADMIN — CEFTRIAXONE 100 GRAM(S): 500 INJECTION, POWDER, FOR SOLUTION INTRAMUSCULAR; INTRAVENOUS at 18:00

## 2018-05-09 RX ADMIN — INSULIN GLARGINE 24 UNIT(S): 100 INJECTION, SOLUTION SUBCUTANEOUS at 23:15

## 2018-05-09 RX ADMIN — Medication 2: at 18:15

## 2018-05-09 RX ADMIN — Medication 25 GRAM(S): at 23:53

## 2018-05-09 RX ADMIN — Medication 20 MILLIGRAM(S): at 18:28

## 2018-05-09 RX ADMIN — Medication 12: at 09:10

## 2018-05-09 RX ADMIN — HEPARIN SODIUM 5000 UNIT(S): 5000 INJECTION INTRAVENOUS; SUBCUTANEOUS at 05:30

## 2018-05-09 RX ADMIN — HEPARIN SODIUM 5000 UNIT(S): 5000 INJECTION INTRAVENOUS; SUBCUTANEOUS at 22:15

## 2018-05-09 NOTE — DISCHARGE NOTE ADULT - MEDICATION SUMMARY - MEDICATIONS TO TAKE
I will START or STAY ON the medications listed below when I get home from the hospital:    walker  -- Indication: For walker    test strips as per glucometer  -- 30 day supply QID testing  -- Indication: For DM    lancets as per glucometer  -- 30 day supply  -- Indication: For DM    BD sol pen needles as per glucometer  -- 30 day supply QID insulin  -- Indication: For DM    alcohol prep pads  -- 1 box  -- Indication: For DM    Glucometer as per insurance  -- x1  -- Indication: For DM    predniSONE 20 mg oral tablet  -- 1 tab(s) by mouth 2 times a day  -- Indication: For Sarcoid    Aspirin Enteric Coated 81 mg oral delayed release tablet  -- 1 tab(s) by mouth once a day  -- Indication: For CAD (coronary artery disease)    acetaminophen 325 mg oral tablet  -- 2 tab(s) by mouth every 6 hours, As needed, mild, moderate pain  -- Indication: For Pain    Xarelto 20 mg oral tablet  -- 1 tab(s) by mouth once a day (in the evening) - restart on Monday 5/21  -- Indication: For Afib    Lantus Solostar Pen 100 units/mL subcutaneous solution  -- 22 unit(s) subcutaneous once a day (at bedtime)  x 30 day supply  -- Do not drink alcoholic beverages when taking this medication.  It is very important that you take or use this exactly as directed.  Do not skip doses or discontinue unless directed by your doctor.  Keep in refrigerator.  Do not freeze.    -- Indication: For DM    HumaLOG KwikPen 100 units/mL injectable solution  -- 6 unit(s) injectable 3 times a day (before meals)  x 30 day supply  -- Indication: For DM    rosuvastatin 10 mg oral tablet  -- 1 tab(s) by mouth once a day (at bedtime)  -- Indication: For Hyperlipidemia    carvedilol 6.25 mg oral tablet  -- 1 tab(s) by mouth every 12 hours  -- Indication: For CHF (congestive heart failure)    furosemide 40 mg oral tablet  -- 1 tab(s) by mouth once a day  -- Indication: For CHF (congestive heart failure)    ferrous sulfate 325 mg (65 mg elemental iron) oral tablet  -- 1 tab(s) by mouth 2 times a day  -- Indication: For Anemia    azelastine 137 mcg/inh (0.1%) nasal spray  -- 2 spray(s) into nose 2 times a day  -- Indication: For Nasal spray    Flovent 220 mcg/inh inhalation aerosol with adapter  -- 1 puff(s) inhaled 2 times a day  -- Indication: For COPD    sulfamethoxazole-trimethoprim DS  -- 1 tab(s) by mouth Monday, Wednesday, and Friday  -- Indication: For Anti infective    levothyroxine 25 mcg (0.025 mg) oral tablet  -- 1 tab(s) by mouth once a day  -- Indication: For Hypothyroid

## 2018-05-09 NOTE — PROGRESS NOTE ADULT - PROBLEM SELECTOR PLAN 3
- L PTX and R hydropneumothorax as complications of pericardial drain placement  - CXR this AM, PTX appears to be resolved, tachycardia stable and respiratory status stable  - Daily CXR  - Close monitor respiratory status

## 2018-05-09 NOTE — PROGRESS NOTE ADULT - SUBJECTIVE AND OBJECTIVE BOX
INTERVAL HPI/ OVERNIGHT EVENTS: No acute events overnight. Patient afebrile, BP stable. Pericardial drain with 60 mL output overnight, 140 mL last 24 hours, started on Vancomycin and Ceftriaxone for GPC growing from pericardial drain placement w/ IR. Repeat TTE showed no pericardial effusion, still severe global LV dysfunction. CXR this AM with no significant PTX, respiratory status stable, SpO2 > 94% on RA.     SUBJECTIVE: Patient seen and examined at bedside this AM. She denies fever, chills, shortness of breath, chest pain, palpitations. States LE swelling is stable but she feels fluid around her chest/ breast area which is new.     OBJECTIVE:    VITAL SIGNS:  ICU Vital Signs Last 24 Hrs  T(C): 36.7 (09 May 2018 04:00), Max: 36.8 (08 May 2018 16:00)  T(F): 98 (09 May 2018 04:00), Max: 98.3 (08 May 2018 16:00)  HR: 101 (09 May 2018 08:02) (95 - 127)  BP: 120/90 (09 May 2018 06:00) (101/69 - 147/96)  BP(mean): 95 (09 May 2018 06:00) (75 - 108)  ABP: --  ABP(mean): --  RR: 19 (09 May 2018 06:00) (9 - 37)  SpO2: 95% (09 May 2018 08:02) (94% - 98%)        05-08 @ 07:01  -  05-09 @ 07:00  --------------------------------------------------------  IN: 760 mL / OUT: 890 mL / NET: -130 mL      CAPILLARY BLOOD GLUCOSE      POCT Blood Glucose.: 148 mg/dL (08 May 2018 21:47)      PHYSICAL EXAM:  General:Patient in NAD, non-toxic  HEENT: NC/AT; PERRL, anicteric sclera  Neck: supple, + JVD  Respiratory: No respiratory distress. Lungs with bibasilar rales, unchanged from prior. No decreased breath sounds  Cardiovascular: R sided chest wall pericardial drain in place. +S1/S2; RRR; no M/R/G  Gastrointestinal: Soft, NT/ND; +BS x4  Extremities: + 2 pitting edema up to thighs B/L. WWP; 2+ peripheral pulses B/L  Skin: Normal color and turgor; no rash  Neurological: Grossly intact    MEDICATIONS:  MEDICATIONS  (STANDING):  aspirin enteric coated 81 milliGRAM(s) Oral daily  atorvastatin 40 milliGRAM(s) Oral at bedtime  cefTRIAXone   IVPB      cefTRIAXone   IVPB 2 Gram(s) IV Intermittent every 24 hours  chlorhexidine 4% Liquid 1 Application(s) Topical <User Schedule>  dextrose 5%. 1000 milliLiter(s) (50 mL/Hr) IV Continuous <Continuous>  dextrose 50% Injectable 12.5 Gram(s) IV Push once  dextrose 50% Injectable 25 Gram(s) IV Push once  dextrose 50% Injectable 25 Gram(s) IV Push once  ferrous    sulfate 325 milliGRAM(s) Oral two times a day  fluticasone propionate   220 MICROgram(s) HFA Inhaler 1 Puff(s) Inhalation two times a day  heparin  Injectable 5000 Unit(s) SubCutaneous every 8 hours  insulin glargine Injectable (LANTUS) 24 Unit(s) SubCutaneous at bedtime  insulin lispro (HumaLOG) corrective regimen sliding scale   SubCutaneous three times a day before meals  insulin lispro (HumaLOG) corrective regimen sliding scale   SubCutaneous at bedtime  insulin lispro Injectable (HumaLOG) 15 Unit(s) SubCutaneous three times a day before meals  predniSONE   Tablet 20 milliGRAM(s) Oral two times a day  trimethoprim  160 mG/sulfamethoxazole 800 mG 1 Tablet(s) Oral <User Schedule>  vancomycin  IVPB 750 milliGRAM(s) IV Intermittent every 12 hours    MEDICATIONS  (PRN):  acetaminophen   Tablet. 650 milliGRAM(s) Oral every 6 hours PRN temp> 101  acetaminophen   Tablet. 650 milliGRAM(s) Oral every 6 hours PRN mild, moderate pain  ALBUTerol    90 MICROgram(s) HFA Inhaler 2 Puff(s) Inhalation every 6 hours PRN Bronchospasm  dextrose Gel 1 Dose(s) Oral once PRN Blood Glucose LESS THAN 70 milliGRAM(s)/deciliter  glucagon  Injectable 1 milliGRAM(s) IntraMuscular once PRN Glucose LESS THAN 70 milligrams/deciliter      ALLERGIES: Allergies    No Known Allergies    Intolerances        LABS:                        13.2   10.51 )-----------( 348      ( 09 May 2018 06:40 )             45.3     05-09    132<L>  |  95<L>  |  65<H>  ----------------------------<  444<H>  5.9<H>   |  24  |  1.46<H>    Ca    8.5      09 May 2018 06:40  Phos  3.4     05-09  Mg     2.1     05-09    Culture - Surg Site Aerob/Anaer w/Gm St (05.07.18 @ 18:56)    Gram Stain Wound:   WBC^White Blood Cells  QNTY CELLS IN GRAM STAIN: MODERATE (3+)  GPCPR^Gram Pos Cocci in Pairs  QUANTITY OF BACTERIA SEEN: FEW (2+)    Specimen Source: DRAINAGE        RADIOLOGY & ADDITIONAL TESTS: Reviewed.    TTE: < from: Transthoracic Echocardiogram (05.08.18 @ 14:59) >  CONCLUSIONS:  1. Normal mitral valve. Minimal mitral regurgitation.  2. Severe global left ventricular systolic dysfunction.  3. The right ventricle is not well visualized; grossly  normal right ventricular systolic function.  4. Normal pericardium with no pericardial effusion.  5. Left pleural effusion.  *** Compared with echocardiogram of 5/5/2018, no  pericardial effusion is seen on the current study.    < end of copied text >

## 2018-05-09 NOTE — DISCHARGE NOTE ADULT - MEDICATION SUMMARY - MEDICATIONS TO STOP TAKING
I will STOP taking the medications listed below when I get home from the hospital:    Metoprolol Succinate ER 25 mg oral tablet, extended release  -- 1 tab(s) by mouth once a day    metFORMIN 500 mg oral tablet  -- 1 tab(s) by mouth 3 times a day    losartan 25 mg oral tablet  -- 1 tab(s) by mouth once a day

## 2018-05-09 NOTE — PROGRESS NOTE ADULT - SUBJECTIVE AND OBJECTIVE BOX
Subjective: Patient seen and examined. No new events except as noted.   No cp or sob     REVIEW OF SYSTEMS:    CONSTITUTIONAL: + weakness, fevers or chills  EYES/ENT: No visual changes;  No vertigo or throat pain   NECK: No pain or stiffness  RESPIRATORY: No cough, wheezing, hemoptysis; No shortness of breath  CARDIOVASCULAR: No chest pain or palpitations  GASTROINTESTINAL: No abdominal or epigastric pain. No nausea, vomiting, or hematemesis; No diarrhea or constipation. No melena or hematochezia.  GENITOURINARY: No dysuria, frequency or hematuria  NEUROLOGICAL: No numbness or weakness  SKIN: No itching, burning, rashes, or lesions   All other review of systems is negative unless indicated above.    MEDICATIONS:  MEDICATIONS  (STANDING):  aspirin enteric coated 81 milliGRAM(s) Oral daily  atorvastatin 40 milliGRAM(s) Oral at bedtime  cefTRIAXone   IVPB      cefTRIAXone   IVPB 2 Gram(s) IV Intermittent every 24 hours  chlorhexidine 4% Liquid 1 Application(s) Topical <User Schedule>  dextrose 5%. 1000 milliLiter(s) (50 mL/Hr) IV Continuous <Continuous>  dextrose 50% Injectable 12.5 Gram(s) IV Push once  dextrose 50% Injectable 25 Gram(s) IV Push once  dextrose 50% Injectable 25 Gram(s) IV Push once  ferrous    sulfate 325 milliGRAM(s) Oral two times a day  fluticasone propionate   220 MICROgram(s) HFA Inhaler 1 Puff(s) Inhalation two times a day  furosemide   Injectable 40 milliGRAM(s) IV Push every 12 hours  heparin  Injectable 5000 Unit(s) SubCutaneous every 8 hours  insulin glargine Injectable (LANTUS) 24 Unit(s) SubCutaneous at bedtime  insulin lispro (HumaLOG) corrective regimen sliding scale   SubCutaneous three times a day before meals  insulin lispro (HumaLOG) corrective regimen sliding scale   SubCutaneous at bedtime  insulin lispro Injectable (HumaLOG) 15 Unit(s) SubCutaneous three times a day before meals  predniSONE   Tablet 20 milliGRAM(s) Oral two times a day  vancomycin  IVPB 750 milliGRAM(s) IV Intermittent every 12 hours      PHYSICAL EXAM:  T(C): 36.7 (05-09-18 @ 04:00), Max: 36.8 (05-08-18 @ 16:00)  HR: 101 (05-09-18 @ 08:02) (95 - 116)  BP: 120/90 (05-09-18 @ 06:00) (109/65 - 147/96)  RR: 19 (05-09-18 @ 06:00) (9 - 25)  SpO2: 95% (05-09-18 @ 08:02) (94% - 97%)  Wt(kg): --  I&O's Summary    08 May 2018 07:01  -  09 May 2018 07:00  --------------------------------------------------------  IN: 760 mL / OUT: 890 mL / NET: -130 mL          Appearance: Normal	  HEENT:   Normal oral mucosa, PERRL, EOMI	  Lymphatic: No lymphadenopathy , no edema  Cardiovascular: Normal S1 S2, No JVD, No murmurs , +pericardial drain   Respiratory: Lungs clear to auscultation, normal effort 	  Gastrointestinal:  Soft, Non-tender, + BS	  Skin: No rashes, No ecchymoses, No cyanosis, warm to touch  Musculoskeletal: Normal range of motion, normal strength  Psychiatry:  Mood & affect appropriate  Ext: + edema      LABS:    CARDIAC MARKERS:                                13.2   10.51 )-----------( 348      ( 09 May 2018 06:40 )             45.3     05-09    133<L>  |  98  |  65<H>  ----------------------------<  435<H>  5.8<H>   |  26  |  1.35<H>    Ca    8.5      09 May 2018 08:45  Phos  3.5     05-09  Mg     2.2     05-09      proBNP:   Lipid Profile:   HgA1c:   TSH:             TELEMETRY: ST 	    ECG:  	  RADIOLOGY:   DIAGNOSTIC TESTING:  [ ] Echocardiogram:  [ ]  Catheterization:  [ ] Stress Test:    OTHER:

## 2018-05-09 NOTE — DISCHARGE NOTE ADULT - CARE PROVIDER_API CALL
David Sam (), Cardiology; Internal Medicine  [address] [facility]  [address]  [place] [address]  Phone: [phone]  Fax: [phone]    Titi Alcantara), Cardiac Electrophysiology; Cardiovascular Disease  [address], [place] [id number]  Phone: [phone]  Fax: [phone]

## 2018-05-09 NOTE — DISCHARGE NOTE ADULT - ADDITIONAL INSTRUCTIONS
Follow up with Endocrinology at [address] [place] [address] [phone].    follow up with your PMD in one week - call for appointment

## 2018-05-09 NOTE — PROGRESS NOTE ADULT - ASSESSMENT
53 yo woman with CHF, sarcoid on prednisone with pericardial effusion s/p pericardial drain 5/7 ; gram stain shows gpc in pairs. Culture no growth to date.    Concern for bacterial pericarditis with organisms such as strep pneumo or other strep pathogen.  Suggest: follow pericardial fluid culture and blood culture               continue vanco 750 mg iv q 12 hours               continue ceftriaxone 2 gm iv q 24 hours               bactrim ppx 3x/ week

## 2018-05-09 NOTE — PROGRESS NOTE ADULT - PROBLEM SELECTOR PLAN 2
- Patient with severely reduced EF 19%, repeat TTE showing severe global LV dysfunction  - Holding diuretics at present  - Will closely monitor respiratory status, currently stable  - Holding HF meds as above

## 2018-05-09 NOTE — DISCHARGE NOTE ADULT - CARE PLAN
Principal Discharge DX:	Acute on chronic heart failure, unspecified heart failure type  Goal:	To relieve and prevent worsening symptoms associated with congestive heart failure, to improve quality of life, and to treat underlying conditions such as coronary heart disease, high blood pressure, or diabetes, and to maintain euvolemia.  Assessment and plan of treatment:	Low salt diet, fluid restriction to 1500 ml daily, monitor your fluid intake and weight daily, exercise as tolerated 30 minutes daily, and follow up with your physician within 1 to 2 weeks.  Secondary Diagnosis:	Afib  Secondary Diagnosis:	YANA (acute kidney injury)  Secondary Diagnosis:	Diabetes  Secondary Diagnosis:	Sarcoidosis

## 2018-05-09 NOTE — PROGRESS NOTE ADULT - SUBJECTIVE AND OBJECTIVE BOX
Diabetes  Follow up note    Interval History: Pt was very hungry last night.  Did not eat her meal at dinner time, saved it and ate at ~11 pm, also guanaco crackers, cookies and Glucerna at that time    MEDICATIONS  (STANDING):  aspirin enteric coated 81 milliGRAM(s) Oral daily  atorvastatin 40 milliGRAM(s) Oral at bedtime  cefTRIAXone   IVPB      cefTRIAXone   IVPB 2 Gram(s) IV Intermittent every 24 hours  chlorhexidine 4% Liquid 1 Application(s) Topical <User Schedule>  dextrose 5%. 1000 milliLiter(s) (50 mL/Hr) IV Continuous <Continuous>  dextrose 50% Injectable 12.5 Gram(s) IV Push once  dextrose 50% Injectable 25 Gram(s) IV Push once  dextrose 50% Injectable 25 Gram(s) IV Push once  ferrous    sulfate 325 milliGRAM(s) Oral two times a day  fluticasone propionate   220 MICROgram(s) HFA Inhaler 1 Puff(s) Inhalation two times a day  furosemide   Injectable 40 milliGRAM(s) IV Push every 12 hours  heparin  Injectable 5000 Unit(s) SubCutaneous every 8 hours  insulin glargine Injectable (LANTUS) 24 Unit(s) SubCutaneous at bedtime  insulin lispro (HumaLOG) corrective regimen sliding scale   SubCutaneous three times a day before meals  insulin lispro (HumaLOG) corrective regimen sliding scale   SubCutaneous at bedtime  insulin lispro Injectable (HumaLOG) 15 Unit(s) SubCutaneous three times a day before meals  predniSONE   Tablet 20 milliGRAM(s) Oral two times a day  vancomycin  IVPB 750 milliGRAM(s) IV Intermittent every 12 hours    MEDICATIONS  (PRN):  acetaminophen   Tablet. 650 milliGRAM(s) Oral every 6 hours PRN temp> 101  acetaminophen   Tablet. 650 milliGRAM(s) Oral every 6 hours PRN mild, moderate pain  ALBUTerol    90 MICROgram(s) HFA Inhaler 2 Puff(s) Inhalation every 6 hours PRN Bronchospasm  dextrose Gel 1 Dose(s) Oral once PRN Blood Glucose LESS THAN 70 milliGRAM(s)/deciliter  glucagon  Injectable 1 milliGRAM(s) IntraMuscular once PRN Glucose LESS THAN 70 milligrams/deciliter      Allergies    No Known Allergies    PHYSICAL EXAM:  VITALS: T(C): 36.3 (05-09-18 @ 16:00)  T(F): 97.4 (05-09-18 @ 16:00), Max: 98.1 (05-09-18 @ 08:00)  HR: 110 (05-09-18 @ 15:00) (95 - 133)  BP: 132/85 (05-09-18 @ 15:00) (112/66 - 147/96)  RR:  (9 - 29)  SpO2:  (94% - 97%)  GENERAL: Sitting up in chair in NAD,   EYES: No proptosis,  HEENT:  Atraumatic, Normocephalic,   RESPIRATORY: Clear to auscultation bilaterally  CARDIOVASCULAR: Regular rhythm; No murmurs; left LE edema  GI: Soft, nontender, non distended, normal bowel sounds      POCT Blood Glucose.: 303 mg/dL (05-09-18 @ 13:08)  POCT Blood Glucose.: 428 mg/dL (05-09-18 @ 08:55)  POCT Blood Glucose.: 148 mg/dL (05-08-18 @ 21:47)  POCT Blood Glucose.: 116 mg/dL (05-08-18 @ 18:11)  POCT Blood Glucose.: 165 mg/dL (05-08-18 @ 12:32)  POCT Blood Glucose.: 197 mg/dL (05-08-18 @ 08:59)  POCT Blood Glucose.: 174 mg/dL (05-07-18 @ 21:56)  POCT Blood Glucose.: 208 mg/dL (05-07-18 @ 12:08)  POCT Blood Glucose.: 370 mg/dL (05-07-18 @ 07:54)  POCT Blood Glucose.: 127 mg/dL (05-06-18 @ 21:58)  POCT Blood Glucose.: 111 mg/dL (05-06-18 @ 17:32)        05-09    133<L>  |  98  |  65<H>  ----------------------------<  435<H>  5.8<H>   |  26  |  1.35<H>    EGFR if : 52  EGFR if non : 45    Ca    8.5      05-09  Mg     2.2     05-09  Phos  3.5     05-09          Hemoglobin A1C, Whole Blood: 14.3 % <H> [4.0 - 5.6] (05-04-18 @ 06:45)  Hemoglobin A1C, Whole Blood: 14.3 % <H> [4.0 - 5.6] (05-03-18 @ 16:59)

## 2018-05-09 NOTE — PROGRESS NOTE ADULT - PROBLEM SELECTOR PLAN 4
- History of sarcoidosis on Prednisone  - C/w Prednisone 20 mg QD  - On Bactrim for PCP prophylaxis as she was on it at home

## 2018-05-09 NOTE — PROGRESS NOTE ADULT - PROBLEM SELECTOR PLAN 1
Hyperglycemia this morning likely related to eating full meal + snacks at bedtime without prandial dose of insulin.  Counseled pt that if she is eating a significant amount of carbohydrate at night she should notify her nurse so that she can receive prandial insulin  Recommend continued monitoring on current insulin regimen  Insulin doses will most likely need to be decreased when steroids are tapered    -plan to discharge on basal bolus insulin only, doses to be determined  - she can follow up in endocrine office as outpatient - [phone] but she states she would rather find a provider closer to her home in [place].

## 2018-05-09 NOTE — PROGRESS NOTE ADULT - PROBLEM SELECTOR PLAN 1
- Large circumferential pericardial effusion s/p placement of pericardial drain, still with output, repeat TTE with resolution of effusion  - Unclear etiology of effusion, may be related to fluid overload in setting of HFrEF and medication non-compliance vs sarcoidosis related vs occult malignancy vs infectious as gram stain from initial fluid growing GPCs in pairs, Staph vs strep pneumo  - ID consulted, will treat with Vancomycin 750 mg Q12H, Ceftriaxone 2 g Q24H   - F/u culture and sensitivities  - HD stable, will c/w close monitoring of BP/ hemodynamics in CCU

## 2018-05-09 NOTE — DISCHARGE NOTE ADULT - HOSPITAL COURSE
52 year old F w/ HTN, DM, HLD, sarcoidosis on steroids, HFrEF , CAD s/p  mLAD s/p stent placement 4/2017, cardiac thrombus on Xarelto asthma /COPD on home O2 presented w/ worsening of LE edema/pain x 3 weeks and shortness of breath and recent weight gain, admitted to acute decompensated systolic heart failure, found to have large circumferential pericardial effusion with early tamponade physiology on TTE. CT surgery consulted for pericardial window but  patient was deemed not a surgical candidate due to high risk for any surgical procedure and general anesthesia given her co-morbidities and patient refused anesthesia. Patient was transferred to CCU for close monitoring of hemodynamics given TTE findings. Diuretics held due to pre-load dependence in setting of large pericardial effusion. In CCU remained HD stable, underwent placement of pericardial drain with IR on 5/7. Procedure was complicated by hydropneumothorax on R due to initial dislodgement of R sided drain, and L sided PTX after pericardial drain placement on L. Post- procedure CCU course unremarkable except for tachycardia likely related to PTX, respiratory status remained stable, pneumothoraces resolved. Gram stain from procedure growing gram positive cocci in pairs, ID consulted, patient started on Vancomycin and Ceftriaxone. Patient started on Lasix 40 mg IV BID for ADHF. 52 year old F w/ HTN, DM, HLD, sarcoidosis on steroids, HFrEF , CAD s/p  mLAD s/p stent placement 4/2017, cardiac thrombus on Xarelto asthma /COPD on home O2 presented w/ worsening of LE edema/pain x 3 weeks and shortness of breath and recent weight gain, admitted to acute decompensated systolic heart failure, found to have large circumferential pericardial effusion with early tamponade physiology on TTE. CT surgery consulted for pericardial window but  patient was deemed not a surgical candidate due to high risk for any surgical procedure and general anesthesia given her co-morbidities and patient refused anesthesia. Patient was transferred to CCU for close monitoring of hemodynamics given TTE findings. Diuretics held due to pre-load dependence in setting of large pericardial effusion. In CCU remained HD stable, underwent placement of pericardial drain with IR on 5/7. Procedure was complicated by hydropneumothorax on R due to initial dislodgement of R sided drain, and L sided PTX after pericardial drain placement on L. Post- procedure CCU course unremarkable except for tachycardia likely related to PTX, respiratory status remained stable, pneumothoraces resolved. Gram stain from procedure growing gram positive cocci in pairs, ID consulted, patient started on Vancomycin and Ceftriaxone. Patient started on Lasix for ADHF. 52 year old F w/ HTN, DM, HLD, sarcoidosis on steroids, HFrEF , CAD s/p  mLAD s/p stent placement 4/2017, cardiac thrombus on Xarelto asthma /COPD on home O2 presented w/ worsening of LE edema/pain x 3 weeks and shortness of breath and recent weight gain, admitted to acute decompensated systolic heart failure, found to have large circumferential pericardial effusion with early tamponade physiology on TTE. CT surgery consulted for pericardial window but  patient was deemed not a surgical candidate due to high risk for any surgical procedure and general anesthesia given her co-morbidities and patient refused anesthesia. Patient was transferred to CCU for close monitoring of hemodynamics given TTE findings. Diuretics held due to pre-load dependence in setting of large pericardial effusion. In CCU remained HD stable, underwent placement of pericardial drain with IR on 5/7. Procedure was complicated by hydropneumothorax on R due to initial dislodgement of R sided drain, and L sided PTX after pericardial drain placement on L. Post- procedure CCU course unremarkable except for tachycardia likely related to PTX, respiratory status remained stable, pneumothoraces resolved. Gram stain from procedure growing gram positive cocci in pairs, ID consulted, patient started on Vancomycin and Ceftriaxone. Patient started on Lasix for ADHF.     5/5/: Severe global left ventricular systolic dysfunction. Large circumferential pericardial effusion. The effusion measures 3.2 cm lateral to the right atrium and 1.6 cm anterior to the right ventricle. The IVC measures about 2.0 cm and does not appear to collapse, although there is minimal respiratory variability. Transmitral gradients were not performed. The RV and RA are not collapsed however they do not appear to fully expand during diastole.    5/3 fever 101 in ER, pancx, Tylenol PRN, repeat CXR  5/5 Echo: Large pericardial effusion with tamponade physiology-As per CTS can have IR drain.  As per IR-can have Monday-hemodynamically stable trnasfer to CCU   5/6-57: BP stable over weekend. Pericardial drain placed by IR today. initial left anterolateral approached  but catheter  slipped out of pericardium . l 2 nd attempt via anterior chest wall approach done and drained 250cc serosangious fluid.Small right pneumothorax and small left hydropneumothorax were noted on postprocedural images. Patient remained asymptomatic  5/8: Tachy to 120s, 130s, sinus tachy, likely due to PTX. Respiratory status and satuations stable on RA. Drain w/ 80 mL of output as of 3 pm. BP stable. Gram stain pericardial fluid growing GPCs, ID consulted  5/9: Gram stain not resulted yet, Vanc/ Ceftriaxone continued. BP stable, RR and SpO2 stabke. CXR without PTX. Drain w. 50 mL of output  OVN: K+6.0 treated   5/10: K 5.0. Vitals stable, Cx pericardial fluid neg. ID - DC'd Abx given cultures actually negative (report addended by Micro). Transferred to 8T (signed out to Leeanna CLIFTON at 1600)    5/16 CT CHEST- Pulmonary sarcoidosis with evidence of extensive fibrosis in the   bilateral lower lobes and right middle lobe, as well as nonfibrotic   changes in the bilateral upper lobes. Calcified mediastinal and bilateral hilar adenopathy.  Asymmetrical skin thickening and subcutaneous edema of the right breast.   Correlate with dedicated mammographic imaging or any prior mammograms if   available.  5/18-- ECHO -- Severe global left ventricular systolic dysfunction.  2. Right ventricular enlargement with decreased right ventricular systolic function. 3. Normal pericardium with no pericardial effusion.  5/18 EP: s/p dual chamber ICD    Discussed with MD - pt stable for discharge home, pt with no complaints, discharge medications reviewed with MD.

## 2018-05-09 NOTE — DISCHARGE NOTE ADULT - MEDICATION SUMMARY - MEDICATIONS TO CHANGE
I will SWITCH the dose or number of times a day I take the medications listed below when I get home from the hospital:    Xarelto 20 mg oral tablet  -- 1 tab(s) by mouth once a day (in the evening)    furosemide 80 mg oral tablet  -- 1 tab(s) by mouth once a day

## 2018-05-09 NOTE — DISCHARGE NOTE ADULT - PATIENT PORTAL LINK FT
You can access the [facility] Patient Portal, offered by [facility], by registering with the following website: [email or web address]

## 2018-05-09 NOTE — PROGRESS NOTE ADULT - SUBJECTIVE AND OBJECTIVE BOX
Pt. seen and examined sitting up in her chair. Patient is without complaints, NAD. Denies SOB, CP, palpitations.    Vital Signs Last 24 Hrs  T(C): 36.7 (09 May 2018 08:00), Max: 36.8 (08 May 2018 16:00)  T(F): 98.1 (09 May 2018 08:00), Max: 98.3 (08 May 2018 16:00)  HR: 110 (09 May 2018 15:00) (95 - 133)  BP: 132/85 (09 May 2018 15:00) (112/66 - 147/96)  BP(mean): 98 (09 May 2018 15:00) (76 - 119)  RR: 18 (09 May 2018 15:00) (9 - 29)  SpO2: 97% (09 May 2018 09:00) (94% - 97%)    Focused Exam findings:    Pericardial drain in place, dressing c/di/i, drainage bag was in patient's gown pocket caused kinking of the catheter.    LABS:                        13.2   10.51 )-----------( 348      ( 09 May 2018 06:40 )             45.3     05-09    133<L>  |  98  |  65<H>  ----------------------------<  435<H>  5.8<H>   |  26  |  1.35<H>    Ca    8.5      09 May 2018 08:45  Phos  3.5     05-09  Mg     2.2     05-09      I&O's Detail    08 May 2018 07:01  -  09 May 2018 07:00  --------------------------------------------------------  IN:    IV PiggyBack: 400 mL    Oral Fluid: 360 mL  Total IN: 760 mL    OUT:    Drain: 140 mL    Voided: 750 mL  Total OUT: 890 mL    Total NET: -130 mL

## 2018-05-09 NOTE — DISCHARGE NOTE ADULT - CONDITIONS AT DISCHARGE
Patient is AXOX4. Denied chest pain and shortness of breath. Vital signs remained stable. Pain was assessed and remained at an acceptable level with interventions. Incentive spirometer encouraged. Patient ambulated in hallway. Patient safety maintained through out shift. Surgical incision is d/c/i. IVs have been removed and patient is ready for discharge.

## 2018-05-09 NOTE — PROGRESS NOTE ADULT - PROBLEM SELECTOR PLAN 5
- C/w Lantus 24 U HS, endocrine following, appreciate recommendations  - C/w Humalog 15 U TID w/ meals  - FSG TID and w/ meals, ISS

## 2018-05-09 NOTE — PROGRESS NOTE ADULT - ASSESSMENT
53 y/o female, with a PmHx of CHF stage III, on home O2, Sarcoidosis, DM admitted with worsening LE edema/pain and SOB x 3 weeks. Found to have a pericardial effusion s/p pericardial drain placement on 5/7. Small asymptomatic post-procedure  pneumothorax still asymptomatic. Catheter was found to be kinked secondary to patient placing drainage bag in gown pocket.      PLAN:  Patient doing well.  Continue bedside drainage.  Monitor output.  Pt. educated on the drainage catheter and how to prevent kinking of the catheter. 51 y/o female, with a PmHx of CHF stage III, on home O2, Sarcoidosis, DM admitted with worsening LE edema/pain and SOB x 3 weeks. Found to have a pericardial effusion s/p pericardial drain placement on 5/7. Small asymptomatic post-procedure pneumothorax still asymptomatic. Catheter was found to be kinked secondary to patient placing drainage bag in gown pocket.      PLAN:  Patient doing well.  Continue bedside drainage.  Monitor output.  Pt. educated on the drainage catheter and how to prevent kinking of the catheter.

## 2018-05-09 NOTE — DISCHARGE NOTE ADULT - PLAN OF CARE
To relieve and prevent worsening symptoms associated with congestive heart failure, to improve quality of life, and to treat underlying conditions such as coronary heart disease, high blood pressure, or diabetes, and to maintain euvolemia. Low salt diet, fluid restriction to 1500 ml daily, monitor your fluid intake and weight daily, exercise as tolerated 30 minutes daily, and follow up with your physician within 1 to 2 weeks.

## 2018-05-09 NOTE — PROGRESS NOTE ADULT - PROBLEM SELECTOR PLAN 6
- Apparent history of intracardiac thrombus, patient was on Xarelto at home  - No thrombus visualized on TTE this admission  - Holding Xarelto

## 2018-05-09 NOTE — DISCHARGE NOTE ADULT - CARE PROVIDERS DIRECT ADDRESSES
,[email or web address],[email or web address]

## 2018-05-10 ENCOUNTER — RESULT REVIEW (OUTPATIENT)
Age: 53
End: 2018-05-10

## 2018-05-10 DIAGNOSIS — N17.9 ACUTE KIDNEY FAILURE, UNSPECIFIED: ICD-10-CM

## 2018-05-10 LAB
APTT BLD: 26.9 SEC — LOW (ref 27.5–37.4)
BUN SERPL-MCNC: 73 MG/DL — HIGH (ref 7–23)
CALCIUM SERPL-MCNC: 8.6 MG/DL — SIGNIFICANT CHANGE UP (ref 8.4–10.5)
CHLORIDE SERPL-SCNC: 96 MMOL/L — LOW (ref 98–107)
CO2 SERPL-SCNC: 24 MMOL/L — SIGNIFICANT CHANGE UP (ref 22–31)
CREAT SERPL-MCNC: 1.52 MG/DL — HIGH (ref 0.5–1.3)
GLUCOSE BLDC GLUCOMTR-MCNC: 177 MG/DL — HIGH (ref 70–99)
GLUCOSE BLDC GLUCOMTR-MCNC: 272 MG/DL — HIGH (ref 70–99)
GLUCOSE BLDC GLUCOMTR-MCNC: 340 MG/DL — HIGH (ref 70–99)
GLUCOSE BLDC GLUCOMTR-MCNC: 91 MG/DL — SIGNIFICANT CHANGE UP (ref 70–99)
GLUCOSE SERPL-MCNC: 376 MG/DL — HIGH (ref 70–99)
HCT VFR BLD CALC: 40.4 % — SIGNIFICANT CHANGE UP (ref 34.5–45)
HGB BLD-MCNC: 12 G/DL — SIGNIFICANT CHANGE UP (ref 11.5–15.5)
INR BLD: 0.96 — SIGNIFICANT CHANGE UP (ref 0.88–1.17)
MAGNESIUM SERPL-MCNC: 2 MG/DL — SIGNIFICANT CHANGE UP (ref 1.6–2.6)
MCHC RBC-ENTMCNC: 21 PG — LOW (ref 27–34)
MCHC RBC-ENTMCNC: 29.7 % — LOW (ref 32–36)
MCV RBC AUTO: 70.8 FL — LOW (ref 80–100)
NRBC # FLD: 0 — SIGNIFICANT CHANGE UP
PHOSPHATE SERPL-MCNC: 3.9 MG/DL — SIGNIFICANT CHANGE UP (ref 2.5–4.5)
PLATELET # BLD AUTO: 325 K/UL — SIGNIFICANT CHANGE UP (ref 150–400)
PMV BLD: 10 FL — SIGNIFICANT CHANGE UP (ref 7–13)
POTASSIUM SERPL-MCNC: 5 MMOL/L — SIGNIFICANT CHANGE UP (ref 3.5–5.3)
POTASSIUM SERPL-SCNC: 5 MMOL/L — SIGNIFICANT CHANGE UP (ref 3.5–5.3)
PROTHROM AB SERPL-ACNC: 10.6 SEC — SIGNIFICANT CHANGE UP (ref 9.8–13.1)
RBC # BLD: 5.71 M/UL — HIGH (ref 3.8–5.2)
RBC # FLD: 17 % — HIGH (ref 10.3–14.5)
SODIUM SERPL-SCNC: 135 MMOL/L — SIGNIFICANT CHANGE UP (ref 135–145)
SPECIMEN SOURCE: SIGNIFICANT CHANGE UP
SPECIMEN SOURCE: SIGNIFICANT CHANGE UP
VANCOMYCIN TROUGH SERPL-MCNC: 18.4 UG/ML — SIGNIFICANT CHANGE UP (ref 10–20)
WBC # BLD: 10.03 K/UL — SIGNIFICANT CHANGE UP (ref 3.8–10.5)
WBC # FLD AUTO: 10.03 K/UL — SIGNIFICANT CHANGE UP (ref 3.8–10.5)

## 2018-05-10 PROCEDURE — 99232 SBSQ HOSP IP/OBS MODERATE 35: CPT

## 2018-05-10 PROCEDURE — 88112 CYTOPATH CELL ENHANCE TECH: CPT | Mod: 26

## 2018-05-10 PROCEDURE — 88305 TISSUE EXAM BY PATHOLOGIST: CPT | Mod: 26

## 2018-05-10 RX ORDER — CARVEDILOL PHOSPHATE 80 MG/1
3.12 CAPSULE, EXTENDED RELEASE ORAL EVERY 12 HOURS
Qty: 0 | Refills: 0 | Status: DISCONTINUED | OUTPATIENT
Start: 2018-05-10 | End: 2018-05-11

## 2018-05-10 RX ORDER — INSULIN LISPRO 100/ML
5 VIAL (ML) SUBCUTANEOUS
Qty: 0 | Refills: 0 | Status: DISCONTINUED | OUTPATIENT
Start: 2018-05-10 | End: 2018-05-13

## 2018-05-10 RX ORDER — FUROSEMIDE 40 MG
60 TABLET ORAL EVERY 12 HOURS
Qty: 0 | Refills: 0 | Status: DISCONTINUED | OUTPATIENT
Start: 2018-05-10 | End: 2018-05-11

## 2018-05-10 RX ORDER — LEVALBUTEROL 1.25 MG/.5ML
0.63 SOLUTION, CONCENTRATE RESPIRATORY (INHALATION) EVERY 6 HOURS
Qty: 0 | Refills: 0 | Status: DISCONTINUED | OUTPATIENT
Start: 2018-05-10 | End: 2018-05-20

## 2018-05-10 RX ADMIN — Medication 15 UNIT(S): at 18:32

## 2018-05-10 RX ADMIN — Medication 8: at 09:31

## 2018-05-10 RX ADMIN — Medication 6: at 13:13

## 2018-05-10 RX ADMIN — CARVEDILOL PHOSPHATE 3.12 MILLIGRAM(S): 80 CAPSULE, EXTENDED RELEASE ORAL at 18:32

## 2018-05-10 RX ADMIN — HEPARIN SODIUM 5000 UNIT(S): 5000 INJECTION INTRAVENOUS; SUBCUTANEOUS at 14:12

## 2018-05-10 RX ADMIN — Medication 15 UNIT(S): at 13:14

## 2018-05-10 RX ADMIN — Medication 250 MILLIGRAM(S): at 10:07

## 2018-05-10 RX ADMIN — HEPARIN SODIUM 5000 UNIT(S): 5000 INJECTION INTRAVENOUS; SUBCUTANEOUS at 06:07

## 2018-05-10 RX ADMIN — Medication 1 PUFF(S): at 11:11

## 2018-05-10 RX ADMIN — Medication 2: at 18:32

## 2018-05-10 RX ADMIN — Medication 40 MILLIGRAM(S): at 06:07

## 2018-05-10 RX ADMIN — ATORVASTATIN CALCIUM 40 MILLIGRAM(S): 80 TABLET, FILM COATED ORAL at 22:40

## 2018-05-10 RX ADMIN — CEFTRIAXONE 100 GRAM(S): 500 INJECTION, POWDER, FOR SOLUTION INTRAMUSCULAR; INTRAVENOUS at 15:56

## 2018-05-10 RX ADMIN — CHLORHEXIDINE GLUCONATE 1 APPLICATION(S): 213 SOLUTION TOPICAL at 10:07

## 2018-05-10 RX ADMIN — Medication 20 MILLIGRAM(S): at 06:07

## 2018-05-10 RX ADMIN — HEPARIN SODIUM 5000 UNIT(S): 5000 INJECTION INTRAVENOUS; SUBCUTANEOUS at 22:40

## 2018-05-10 RX ADMIN — Medication 60 MILLIGRAM(S): at 18:46

## 2018-05-10 RX ADMIN — Medication 1 PUFF(S): at 22:40

## 2018-05-10 RX ADMIN — INSULIN GLARGINE 24 UNIT(S): 100 INJECTION, SOLUTION SUBCUTANEOUS at 22:42

## 2018-05-10 RX ADMIN — Medication 20 MILLIGRAM(S): at 18:32

## 2018-05-10 RX ADMIN — ALBUTEROL 2 PUFF(S): 90 AEROSOL, METERED ORAL at 17:20

## 2018-05-10 RX ADMIN — Medication 325 MILLIGRAM(S): at 06:07

## 2018-05-10 RX ADMIN — Medication 15 UNIT(S): at 09:30

## 2018-05-10 RX ADMIN — Medication 325 MILLIGRAM(S): at 18:32

## 2018-05-10 RX ADMIN — Medication 81 MILLIGRAM(S): at 12:12

## 2018-05-10 NOTE — CHART NOTE - NSCHARTNOTEFT_GEN_A_CORE
Source: Patient   Diet : Consistent carbohydrate w/ evening snack ; DASH / TLC; 1500 ml Fluid Restriction , No Concentrated Potassium + Glucerna Shake  3/D      Patient reports fair to good appetite, PO intake , alert and oriented , with 3+ R leg and R breast edema , noted wt. decrease from 74.8 kg to 60.8 kg .    Current Weight: Weight (kg): 60.8 kg on 5/10/18; 74.8 kg on 5/3/18.     Pertinent Medications: MEDICATIONS  (STANDING):    aspirin enteric coated 81 milliGRAM(s) Oral daily  atorvastatin 40 milliGRAM(s) Oral at bedtime  carvedilol 3.125 milliGRAM(s) Oral every 12 hours  cefTRIAXone   IVPB      cefTRIAXone   IVPB 2 Gram(s) IV Intermittent every 24 hours  ferrous    sulfate 325 milliGRAM(s) Oral two times a day  fluticasone propionate   220 MICROgram(s) HFA Inhaler 1 Puff(s) Inhalation two times a day  furosemide   Injectable 60 milliGRAM(s) IV Push every 12 hours  heparin  Injectable 5000 Unit(s) SubCutaneous every 8 hours  insulin glargine Injectable (LANTUS) 24 Unit(s) SubCutaneous at bedtime  insulin lispro (HumaLOG) corrective regimen sliding scale   SubCutaneous three times a day before meals  insulin lispro (HumaLOG) corrective regimen sliding scale   SubCutaneous at bedtime  insulin lispro Injectable (HumaLOG) 15 Unit(s) SubCutaneous three times a day before meals  insulin lispro Injectable (HumaLOG) 5 Unit(s) SubCutaneous <User Schedule>  predniSONE   Tablet 20 milliGRAM(s) Oral two times a day  vancomycin  IVPB 750 milliGRAM(s) IV Intermittent every 12 hours    MEDICATIONS  (PRN):  acetaminophen   Tablet. 650 milliGRAM(s) Oral every 6 hours PRN temp> 101  acetaminophen   Tablet. 650 milliGRAM(s) Oral every 6 hours PRN mild, moderate pain  ALBUTerol    90 MICROgram(s) HFA Inhaler 2 Puff(s) Inhalation every 6 hours PRN Bronchospasm  dextrose Gel 1 Dose(s) Oral once PRN Blood Glucose LESS THAN 70 milliGRAM(s)/deciliter  glucagon  Injectable 1 milliGRAM(s) IntraMuscular once PRN Glucose LESS THAN 70 milligrams/deciliter    Pertinent Labs:  05-10 Na135 mmol/L Glu 376 mg/dL<H> K+ 5.0 mmol/L Cr  1.52 mg/dL<H> BUN 73 mg/dL<H> 05-10 Phos 3.9 mg/dL 05-04 [id number] 14.3 %<H> 05-04 Chol 157 mg/dL LDL 95 mg/dL HDL 53 mg/dL Trig 111 mg/dL      Skin: intact    Recommend Consistent carbohydrate renal diet with 1500 ml Fluid Restriction  and Nepro carb steady 8 oz 2/d     Monitoring and Evaluation:  PO intake , Tolerance to diet prescription , weights , follow up per protocol

## 2018-05-10 NOTE — PROGRESS NOTE ADULT - SUBJECTIVE AND OBJECTIVE BOX
Follow Up:  pericarditis    Inverval History/ROS: feels better.  pericardial drain remains in place.  micro contacted- corrected report: gram stain without organisms..    Allergies  No Known Allergies        ANTIMICROBIALS:  cefTRIAXone   IVPB    cefTRIAXone   IVPB 2 every 24 hours  vancomycin  IVPB 750 every 12 hours      OTHER MEDS:  acetaminophen   Tablet. 650 milliGRAM(s) Oral every 6 hours PRN  acetaminophen   Tablet. 650 milliGRAM(s) Oral every 6 hours PRN  ALBUTerol    90 MICROgram(s) HFA Inhaler 2 Puff(s) Inhalation every 6 hours PRN  aspirin enteric coated 81 milliGRAM(s) Oral daily  atorvastatin 40 milliGRAM(s) Oral at bedtime  chlorhexidine 4% Liquid 1 Application(s) Topical <User Schedule>  dextrose 5%. 1000 milliLiter(s) IV Continuous <Continuous>  dextrose 50% Injectable 12.5 Gram(s) IV Push once  dextrose 50% Injectable 25 Gram(s) IV Push once  dextrose 50% Injectable 25 Gram(s) IV Push once  dextrose Gel 1 Dose(s) Oral once PRN  ferrous    sulfate 325 milliGRAM(s) Oral two times a day  fluticasone propionate   220 MICROgram(s) HFA Inhaler 1 Puff(s) Inhalation two times a day  furosemide   Injectable 40 milliGRAM(s) IV Push every 12 hours  glucagon  Injectable 1 milliGRAM(s) IntraMuscular once PRN  heparin  Injectable 5000 Unit(s) SubCutaneous every 8 hours  insulin glargine Injectable (LANTUS) 24 Unit(s) SubCutaneous at bedtime  insulin lispro (HumaLOG) corrective regimen sliding scale   SubCutaneous three times a day before meals  insulin lispro (HumaLOG) corrective regimen sliding scale   SubCutaneous at bedtime  insulin lispro Injectable (HumaLOG) 15 Unit(s) SubCutaneous three times a day before meals  predniSONE   Tablet 20 milliGRAM(s) Oral two times a day      Vital Signs Last 24 Hrs  T(F): 97.5 (05-10-18 @ 16:00), Max: 98 (05-10-18 @ 11:54)  HR: 110 (05-10-18 @ 16:00)  BP: 126/86 (05-10-18 @ 16:00)  RR: 18 (05-10-18 @ 16:00)  SpO2: 100% (05-10-18 @ 16:00) (97% - 100%)    PHYSICAL EXAM:  General: [x ] non-toxic  HEAD/EYES: [ ] PERRL [x ] white sclera [ ] icterus  ENT:  [ ] normal [x ] supple [ ] thrush [ ] pharyngeal exudate  Cardiovascular:   [ ] murmur [x ] normal  drain+  Respiratory:  [x ] clear to ausculation bilaterally  GI:  [x ] soft, non-tender, normal bowel sounds  :  [ ] barfield [ ] no CVA tenderness   Musculoskeletal:  right leg swelling> left  Neurologic:  [ ] non-focal exam   Skin:  [x ] no rash  Lymph: [ ] no lymphadenopathy  Psychiatric:  [x ] appropriate affect [x ] alert & oriented  Lines:  [x ] no phlebitis [ ] central line                                12.0   10.03 )-----------( 325      ( 10 May 2018 02:40 )             40.4 05-10    135  |  96  |  73  ----------------------------<  376  5.0   |  24  |  1.52  Ca    8.6      10 May 2018 02:40Phos  3.9     05-10Mg     2.0     05-10              MICROBIOLOGY:    DRAINAGE  05-07-18 --  --  --    Culture - Surg Site Aerob/Anaer w/Gm St (05.07.18 @ 18:56)    Gram Stain Wound:   WBC^White Blood Cells  QNTY CELLS IN GRAM STAIN: MODERATE (3+)  GPCPR^Gram Pos Cocci in Pairs  QUANTITY OF BACTERIA SEEN: FEW (2+)    Gram Stain Wound:   ************************************************  PLEASE TAKE NOTE:  CORRECTED REPORT -  AN ORGANISM IN THIS GRAM STAIN WAS ORIGINALLY REPORTED AS:  GRAM POSITIVE COCCI IN PAIRS  THE GRAM STAIN REPORT  HAS BEEN CHANGED TO:  NO ORGANISM SEEN ON GRAMSTAIN  CONTACT MICRO LAB IF FURTHER INFORMATION IS DESIRED  RESULT CALLED TO: DR. CEDRICK CARLSON  DATE / TIME CALLED: 05/10/18 1656  CALLED BY: KADIE GORMAN  ************************************************                *******************************                * This is an appended result. *                *******************************  A prior result that was reported as final has been changed.  WBC^White Blood Cells  QNTY CELLS IN GRAM STAIN: MODERATE (3+)  GPCPR^Gram Pos Cocci in Pairs  QUANTITY OF BACTERIA SEEN: FEW (2+)    Culture - Surgical Site:   NO ORGANISMS ISOLATED AT 24 HOURS  NO ORGANISMS ISOLATED AT 48 HRS.    Specimen Source: DRAINAGE        URINE MIDSTREAM  05-04-18 --  --  --      BLOOD PERIPHERAL  05-03-18 --  --  --        RADIOLOGY:    < from: Xray Chest 1 View- PORTABLE-Routine (05.09.18 @ 07:50) >    Lines/Tubes: None    Heart and mediastinum:  Unchanged.    Lungs, pleura, and airways: Patchy opacities bilaterally suggestive of   pulmonary edema. Small left effusion, stable    Bones and soft tissues: The bones and soft tissues are unchanged.    Impression:    Interstitial edema and left effusion, stable.    < end of copied text >

## 2018-05-10 NOTE — PROGRESS NOTE ADULT - SUBJECTIVE AND OBJECTIVE BOX
INTERVAL HPI/ OVERNIGHT EVENTS: CXR yesterday with resolution of PTX.  Drain with 50 mL of output overnight. Potassium 6.0 overnight, no EKG changes, given calcium gluconate, albuterol, insulin, K decreased to 5.0. Remains tachycardic low 100s. Lasix started,     SUBJECTIVE: Patient seen and examined at bedside this AM. She     OBJECTIVE:    VITAL SIGNS:  ICU Vital Signs Last 24 Hrs  T(C): 36.4 (10 May 2018 06:00), Max: 36.6 (10 May 2018 00:00)  T(F): 97.5 (10 May 2018 06:00), Max: 97.8 (10 May 2018 00:00)  HR: 99 (10 May 2018 06:00) (99 - 133)  BP: 137/90 (10 May 2018 06:00) (114/72 - 138/114)  BP(mean): 102 (10 May 2018 06:00) (81 - 119)  ABP: --  ABP(mean): --  RR: 0 (10 May 2018 06:00) (0 - 29)  SpO2: 99% (10 May 2018 06:00) (97% - 100%)        05-09 @ 07:01  -  05-10 @ 07:00  --------------------------------------------------------  IN: 1420 mL / OUT: 1400 mL / NET: 20 mL      CAPILLARY BLOOD GLUCOSE      POCT Blood Glucose.: 238 mg/dL (09 May 2018 23:11)      PHYSICAL EXAM:  General: WDWN ; NAD  HEENT: NC/AT; PERRL, anicteric sclera  Neck: supple, no JVD  Respiratory: CTA B/L; no W/R/R  Cardiovascular: +S1/S2; RRR; no M/R/G  Gastrointestinal: soft, NT/ND; +BS x4  Extremities: WWP; 2+ peripheral pulses B/L; no LE edema  Skin: normal color and turgor; no rash  Neurological:     MEDICATIONS:  MEDICATIONS  (STANDING):  aspirin enteric coated 81 milliGRAM(s) Oral daily  atorvastatin 40 milliGRAM(s) Oral at bedtime  cefTRIAXone   IVPB      cefTRIAXone   IVPB 2 Gram(s) IV Intermittent every 24 hours  chlorhexidine 4% Liquid 1 Application(s) Topical <User Schedule>  dextrose 5%. 1000 milliLiter(s) (50 mL/Hr) IV Continuous <Continuous>  dextrose 50% Injectable 12.5 Gram(s) IV Push once  dextrose 50% Injectable 25 Gram(s) IV Push once  dextrose 50% Injectable 25 Gram(s) IV Push once  ferrous    sulfate 325 milliGRAM(s) Oral two times a day  fluticasone propionate   220 MICROgram(s) HFA Inhaler 1 Puff(s) Inhalation two times a day  furosemide   Injectable 40 milliGRAM(s) IV Push every 12 hours  heparin  Injectable 5000 Unit(s) SubCutaneous every 8 hours  insulin glargine Injectable (LANTUS) 24 Unit(s) SubCutaneous at bedtime  insulin lispro (HumaLOG) corrective regimen sliding scale   SubCutaneous three times a day before meals  insulin lispro (HumaLOG) corrective regimen sliding scale   SubCutaneous at bedtime  insulin lispro Injectable (HumaLOG) 15 Unit(s) SubCutaneous three times a day before meals  predniSONE   Tablet 20 milliGRAM(s) Oral two times a day  vancomycin  IVPB 750 milliGRAM(s) IV Intermittent every 12 hours    MEDICATIONS  (PRN):  acetaminophen   Tablet. 650 milliGRAM(s) Oral every 6 hours PRN temp> 101  acetaminophen   Tablet. 650 milliGRAM(s) Oral every 6 hours PRN mild, moderate pain  ALBUTerol    90 MICROgram(s) HFA Inhaler 2 Puff(s) Inhalation every 6 hours PRN Bronchospasm  dextrose Gel 1 Dose(s) Oral once PRN Blood Glucose LESS THAN 70 milliGRAM(s)/deciliter  glucagon  Injectable 1 milliGRAM(s) IntraMuscular once PRN Glucose LESS THAN 70 milligrams/deciliter      ALLERGIES: Allergies    No Known Allergies    Intolerances        LABS:                        12.0   10.03 )-----------( 325      ( 10 May 2018 02:40 )             40.4     05-10    135  |  96<L>  |  73<H>  ----------------------------<  376<H>  5.0   |  24  |  1.52<H>    Ca    8.6      10 May 2018 02:40  Phos  3.9     05-10  Mg     2.0     05-10        PT/INR - ( 10 May 2018 02:46 )   PT: 10.6 SEC;   INR: 0.96          PTT - ( 10 May 2018 02:46 )  PTT:26.9 SEC          TSH:   HgbA1C:   Pro-BNP:  Lipid profile:    RADIOLOGY & ADDITIONAL TESTS: Reviewed.    TTE: INTERVAL HPI/ OVERNIGHT EVENTS: CXR yesterday with resolution of PTX.  Drain with 50 mL of output overnight. Potassium 6.0 overnight, no EKG changes, given calcium gluconate, albuterol, insulin, K decreased to 5.0. Remains tachycardic low 100s. Lasix started, 40 mg IV BID, net positive 200 mL last 24 hours.     SUBJECTIVE: Patient seen and examined at bedside this AM. C/o same fullness in L breast. Denies shortness of breath, chest pain, palpitations, worsening LE swelling.      OBJECTIVE:    VITAL SIGNS:  ICU Vital Signs Last 24 Hrs  T(C): 36.4 (10 May 2018 06:00), Max: 36.6 (10 May 2018 00:00)  T(F): 97.5 (10 May 2018 06:00), Max: 97.8 (10 May 2018 00:00)  HR: 99 (10 May 2018 06:00) (99 - 133)  BP: 137/90 (10 May 2018 06:00) (114/72 - 138/114)  BP(mean): 102 (10 May 2018 06:00) (81 - 119)  ABP: --  ABP(mean): --  RR: 0 (10 May 2018 06:00) (0 - 29)  SpO2: 99% (10 May 2018 06:00) (97% - 100%)        05-09 @ 07:01  -  05-10 @ 07:00  --------------------------------------------------------  IN: 1420 mL / OUT: 1400 mL / NET: 20 mL      CAPILLARY BLOOD GLUCOSE  POCT Blood Glucose.: 238 mg/dL (09 May 2018 23:11)      PHYSICAL EXAM:  General:Patient in NAD, non-toxic  HEENT: NC/AT; PERRL, anicteric sclera  Neck: supple, + JVD  Respiratory: No respiratory distress. Lungs with bibasilar rales, improved from prior. No decreased breath sounds  Cardiovascular: R sided chest wall pericardial drain in place. +S1/S2; RRR; no M/R/G  Gastrointestinal: Soft, NT/ND; +BS x4  Extremities: +2 pitting edema up to legs B/L. WWP; 2+ peripheral pulses B/L  Skin: Normal color and turgor; no rash  Neurological: Grossly intact      MEDICATIONS:  MEDICATIONS  (STANDING):  aspirin enteric coated 81 milliGRAM(s) Oral daily  atorvastatin 40 milliGRAM(s) Oral at bedtime  cefTRIAXone   IVPB      cefTRIAXone   IVPB 2 Gram(s) IV Intermittent every 24 hours  chlorhexidine 4% Liquid 1 Application(s) Topical <User Schedule>  dextrose 5%. 1000 milliLiter(s) (50 mL/Hr) IV Continuous <Continuous>  dextrose 50% Injectable 12.5 Gram(s) IV Push once  dextrose 50% Injectable 25 Gram(s) IV Push once  dextrose 50% Injectable 25 Gram(s) IV Push once  ferrous    sulfate 325 milliGRAM(s) Oral two times a day  fluticasone propionate   220 MICROgram(s) HFA Inhaler 1 Puff(s) Inhalation two times a day  furosemide   Injectable 40 milliGRAM(s) IV Push every 12 hours  heparin  Injectable 5000 Unit(s) SubCutaneous every 8 hours  insulin glargine Injectable (LANTUS) 24 Unit(s) SubCutaneous at bedtime  insulin lispro (HumaLOG) corrective regimen sliding scale   SubCutaneous three times a day before meals  insulin lispro (HumaLOG) corrective regimen sliding scale   SubCutaneous at bedtime  insulin lispro Injectable (HumaLOG) 15 Unit(s) SubCutaneous three times a day before meals  predniSONE   Tablet 20 milliGRAM(s) Oral two times a day  vancomycin  IVPB 750 milliGRAM(s) IV Intermittent every 12 hours    MEDICATIONS  (PRN):  acetaminophen   Tablet. 650 milliGRAM(s) Oral every 6 hours PRN temp> 101  acetaminophen   Tablet. 650 milliGRAM(s) Oral every 6 hours PRN mild, moderate pain  ALBUTerol    90 MICROgram(s) HFA Inhaler 2 Puff(s) Inhalation every 6 hours PRN Bronchospasm  dextrose Gel 1 Dose(s) Oral once PRN Blood Glucose LESS THAN 70 milliGRAM(s)/deciliter  glucagon  Injectable 1 milliGRAM(s) IntraMuscular once PRN Glucose LESS THAN 70 milligrams/deciliter      ALLERGIES: Allergies  No Known Allergies    Intolerances        LABS:                        12.0   10.03 )-----------( 325      ( 10 May 2018 02:40 )             40.4     05-10    135  |  96<L>  |  73<H>  ----------------------------<  376<H>  5.0   |  24  |  1.52<H>    Ca    8.6      10 May 2018 02:40  Phos  3.9     05-10  Mg     2.0     05-10        PT/INR - ( 10 May 2018 02:46 )   PT: 10.6 SEC;   INR: 0.96     PTT - ( 10 May 2018 02:46 )  PTT:26.9 SEC

## 2018-05-10 NOTE — PROGRESS NOTE ADULT - SUBJECTIVE AND OBJECTIVE BOX
Subjective: Patient seen and examined. No new events except as noted.   Remains in CCU   c/o right breast fullness   No cp or sob   Stuffy nose     REVIEW OF SYSTEMS:    CONSTITUTIONAL: + weakness, fevers or chills  EYES/ENT: No visual changes;  No vertigo or throat pain   NECK: No pain or stiffness  RESPIRATORY: No cough, wheezing, hemoptysis; No shortness of breath  CARDIOVASCULAR: No chest pain or palpitations  GASTROINTESTINAL: No abdominal or epigastric pain. No nausea, vomiting, or hematemesis; No diarrhea or constipation. No melena or hematochezia.  GENITOURINARY: No dysuria, frequency or hematuria  NEUROLOGICAL: No numbness or weakness  SKIN: No itching, burning, rashes, or lesions   All other review of systems is negative unless indicated above.    MEDICATIONS:  MEDICATIONS  (STANDING):  aspirin enteric coated 81 milliGRAM(s) Oral daily  atorvastatin 40 milliGRAM(s) Oral at bedtime  carvedilol 3.125 milliGRAM(s) Oral every 12 hours  cefTRIAXone   IVPB      cefTRIAXone   IVPB 2 Gram(s) IV Intermittent every 24 hours  chlorhexidine 4% Liquid 1 Application(s) Topical <User Schedule>  dextrose 5%. 1000 milliLiter(s) (50 mL/Hr) IV Continuous <Continuous>  dextrose 50% Injectable 12.5 Gram(s) IV Push once  dextrose 50% Injectable 25 Gram(s) IV Push once  dextrose 50% Injectable 25 Gram(s) IV Push once  ferrous    sulfate 325 milliGRAM(s) Oral two times a day  fluticasone propionate   220 MICROgram(s) HFA Inhaler 1 Puff(s) Inhalation two times a day  furosemide   Injectable 60 milliGRAM(s) IV Push every 12 hours  heparin  Injectable 5000 Unit(s) SubCutaneous every 8 hours  insulin glargine Injectable (LANTUS) 24 Unit(s) SubCutaneous at bedtime  insulin lispro (HumaLOG) corrective regimen sliding scale   SubCutaneous three times a day before meals  insulin lispro (HumaLOG) corrective regimen sliding scale   SubCutaneous at bedtime  insulin lispro Injectable (HumaLOG) 15 Unit(s) SubCutaneous three times a day before meals  insulin lispro Injectable (HumaLOG) 5 Unit(s) SubCutaneous <User Schedule>  predniSONE   Tablet 20 milliGRAM(s) Oral two times a day  vancomycin  IVPB 750 milliGRAM(s) IV Intermittent every 12 hours      PHYSICAL EXAM:  T(C): 36.7 (05-10-18 @ 11:54), Max: 36.7 (05-10-18 @ 11:54)  HR: 112 (05-10-18 @ 11:00) (99 - 118)  BP: 130/98 (05-10-18 @ 11:00) (114/72 - 138/114)  RR: 21 (05-10-18 @ 11:00) (0 - 29)  SpO2: 100% (05-10-18 @ 11:00) (98% - 100%)  Wt(kg): --  I&O's Summary    09 May 2018 07:01  -  10 May 2018 07:00  --------------------------------------------------------  IN: 1420 mL / OUT: 1400 mL / NET: 20 mL    10 May 2018 07:01  -  10 May 2018 11:59  --------------------------------------------------------  IN: 270 mL / OUT: 500 mL / NET: -230 mL          Appearance: Normal	  HEENT:   Normal oral mucosa, PERRL, EOMI	  Lymphatic: No lymphadenopathy , no edema  Cardiovascular: Normal S1 S2, No JVD, No murmurs , Peripheral pulses palpable 2+ bilaterally  +Pericardial drain   Respiratory: Lungs clear to auscultation, normal effort 	  Gastrointestinal:  Soft, Non-tender, + BS	  Skin: No rashes, No ecchymoses, No cyanosis, warm to touch  Musculoskeletal: Normal range of motion, normal strength  Psychiatry:  Mood & affect appropriate  Ext: 2+edema      LABS:    CARDIAC MARKERS:                                12.0   10.03 )-----------( 325      ( 10 May 2018 02:40 )             40.4     05-10    135  |  96<L>  |  73<H>  ----------------------------<  376<H>  5.0   |  24  |  1.52<H>    Ca    8.6      10 May 2018 02:40  Phos  3.9     05-10  Mg     2.0     05-10      proBNP:   Lipid Profile:   HgA1c:   TSH:             TELEMETRY: 	  SR/ST   ECG:  	  RADIOLOGY: < from: Xray Chest 1 View- PORTABLE-Routine (05.09.18 @ 07:50) >    EXAM:  XR CHEST PORTABLE ROUTINE 1V        PROCEDURE DATE:  May  9 2018         INTERPRETATION:    Portable chest xray: Shortness of Breath    Comparison: Most recent prior     FINDINGS:    Lines/Tubes: None    Heart and mediastinum:  Unchanged.    Lungs, pleura, and airways: Patchy opacities bilaterally suggestive of   pulmonary edema. Small left effusion, stable    Bones and soft tissues: The bones and soft tissues are unchanged.    Impression:    Interstitial edema and left effusion, stable.                  AUNG HITCHCOCK M.D., ATTENDING RADIOLOGIST  This document has been electronically signed. May  9 2018 11:52AM              Culture - Surg Site Aerob/Anaer w/Gm St (05.07.18 @ 18:56)    Gram Stain Wound:   WBC^White Blood Cells  QNTY CELLS IN GRAM STAIN: MODERATE (3+)  GPCPR^Gram Pos Cocci in Pairs  QUANTITY OF BACTERIA SEEN: FEW (2+)    Culture - Surgical Site:   NO ORGANISMS ISOLATED AT 24 HOURS  NO ORGANISMS ISOLATED AT 48 HRS.    Specimen Source: DRAINAGE        < end of copied text >    DIAGNOSTIC TESTING:  [ ] Echocardiogram:  [ ]  Catheterization:  [ ] Stress Test:    OTHER:

## 2018-05-10 NOTE — PROGRESS NOTE ADULT - PROBLEM SELECTOR PLAN 1
- Large circumferential pericardial effusion s/p placement of pericardial drain, still with output, repeat TTE with resolution of effusion  - Unclear etiology of effusion, possible infectious vs gram positive cocci in pairs in gram stain vs sarcoidosis vs occult malignancy vs fluid overload  - f/u culture, ID following, c/w Vancomycin 750 mg Q12H, Ceftriaxone 2 g Q24H   - F/u cytology of fluid  - HD stable, stable for telemetry floor

## 2018-05-10 NOTE — PROGRESS NOTE ADULT - PROBLEM SELECTOR PLAN 1
Pt again reports eating a lot at bedtime.  Recommend adding humalog 5 units prior to bedtime snack/meal, if she eats one.  Continue remaining insulin regimen      Insulin doses will most likely need to be decreased when steroids are tapered    -plan to discharge on basal bolus insulin only, doses to be determined  - she can follow up in endocrine office as outpatient - [phone] but she states she would rather find a provider closer to her home in [place].

## 2018-05-10 NOTE — PROGRESS NOTE ADULT - SUBJECTIVE AND OBJECTIVE BOX
Diabetes  Follow up note    Interval History: Pt feeling very hungry at bedtime, snacking "alot" at that time.    No abdominal pain, nausea    MEDICATIONS  (STANDING):  aspirin enteric coated 81 milliGRAM(s) Oral daily  atorvastatin 40 milliGRAM(s) Oral at bedtime  carvedilol 3.125 milliGRAM(s) Oral every 12 hours  cefTRIAXone   IVPB      cefTRIAXone   IVPB 2 Gram(s) IV Intermittent every 24 hours  chlorhexidine 4% Liquid 1 Application(s) Topical <User Schedule>  dextrose 5%. 1000 milliLiter(s) (50 mL/Hr) IV Continuous <Continuous>  dextrose 50% Injectable 12.5 Gram(s) IV Push once  dextrose 50% Injectable 25 Gram(s) IV Push once  dextrose 50% Injectable 25 Gram(s) IV Push once  ferrous    sulfate 325 milliGRAM(s) Oral two times a day  fluticasone propionate   220 MICROgram(s) HFA Inhaler 1 Puff(s) Inhalation two times a day  furosemide   Injectable 40 milliGRAM(s) IV Push every 12 hours  heparin  Injectable 5000 Unit(s) SubCutaneous every 8 hours  insulin glargine Injectable (LANTUS) 24 Unit(s) SubCutaneous at bedtime  insulin lispro (HumaLOG) corrective regimen sliding scale   SubCutaneous three times a day before meals  insulin lispro (HumaLOG) corrective regimen sliding scale   SubCutaneous at bedtime  insulin lispro Injectable (HumaLOG) 15 Unit(s) SubCutaneous three times a day before meals  predniSONE   Tablet 20 milliGRAM(s) Oral two times a day  vancomycin  IVPB 750 milliGRAM(s) IV Intermittent every 12 hours    MEDICATIONS  (PRN):  acetaminophen   Tablet. 650 milliGRAM(s) Oral every 6 hours PRN temp> 101  acetaminophen   Tablet. 650 milliGRAM(s) Oral every 6 hours PRN mild, moderate pain  ALBUTerol    90 MICROgram(s) HFA Inhaler 2 Puff(s) Inhalation every 6 hours PRN Bronchospasm  dextrose Gel 1 Dose(s) Oral once PRN Blood Glucose LESS THAN 70 milliGRAM(s)/deciliter  glucagon  Injectable 1 milliGRAM(s) IntraMuscular once PRN Glucose LESS THAN 70 milligrams/deciliter      Allergies    No Known Allergies          PHYSICAL EXAM:  VITALS: T(C): 36.6 (05-10-18 @ 08:00)  T(F): 97.8 (05-10-18 @ 08:00), Max: 97.8 (05-10-18 @ 00:00)  HR: 117 (05-10-18 @ 10:00) (99 - 118)  BP: 125/88 (05-10-18 @ 10:00) (114/72 - 138/114)  RR:  (0 - 29)  SpO2:  (98% - 100%)  GENERAL: Sitting up in bed in NAD,   EYES: No proptosis,  HEENT:  Atraumatic, Normocephalic,   RESPIRATORY: Clear to auscultation bilaterally  CARDIOVASCULAR: Regular rhythm; No murmurs; right LE edema, drain with serosanguinous fluid  GI: Soft, nontender, non distended, normal bowel sounds      POCT Blood Glucose.: 340 mg/dL (05-10-18 @ 09:01)  POCT Blood Glucose.: 238 mg/dL (05-09-18 @ 23:11)  POCT Blood Glucose.: 160 mg/dL (05-09-18 @ 17:32)  POCT Blood Glucose.: 303 mg/dL (05-09-18 @ 13:08)  POCT Blood Glucose.: 428 mg/dL (05-09-18 @ 08:55)  POCT Blood Glucose.: 148 mg/dL (05-08-18 @ 21:47)  POCT Blood Glucose.: 116 mg/dL (05-08-18 @ 18:11)  POCT Blood Glucose.: 165 mg/dL (05-08-18 @ 12:32)  POCT Blood Glucose.: 197 mg/dL (05-08-18 @ 08:59)  POCT Blood Glucose.: 174 mg/dL (05-07-18 @ 21:56)  POCT Blood Glucose.: 208 mg/dL (05-07-18 @ 12:08)        05-10    135  |  96<L>  |  73<H>  ----------------------------<  376<H>  5.0   |  24  |  1.52<H>    EGFR if : 45  EGFR if non : 39    Ca    8.6      05-10  Mg     2.0     05-10  Phos  3.9     05-10          Hemoglobin A1C, Whole Blood: 14.3 % <H> [4.0 - 5.6] (05-04-18 @ 06:45)  Hemoglobin A1C, Whole Blood: 14.3 % <H> [4.0 - 5.6] (05-03-18 @ 16:59)

## 2018-05-10 NOTE — PROGRESS NOTE ADULT - PROBLEM SELECTOR PLAN 2
- Patient with severely reduced EF 19%, repeat TTE showing severe global LV dysfunction  - c/w Lasix 40 mg IV BID, goal net negative 1 L/ 24 hours  - Will closely monitor respiratory status, currently stable  - Holding HF meds as above - Patient with severely reduced EF 19%, repeat TTE showing severe global LV dysfunction  - c/w Lasix, increase to 60 mg IV BID, goal net negative 1 L/ 24 hours  - Start Carvedilol 3.125 mg QD w/ hold parameters  - Will closely monitor respiratory status, currently stable  - Holding HF meds as above

## 2018-05-10 NOTE — PROGRESS NOTE ADULT - ASSESSMENT
53 yo woman with CHF, sarcoid on prednisone with pericardial effusion s/p pericardial drain 5/7 ; gram stain report corrected by micro; no organisms seen. Culture no growth.      Suggest:                d/c  vanco 750 mg iv q 12 hours               d/c  ceftriaxone 2 gm iv q 24 hours               bactrim ppx 3x/ week while receiving prednisone    Will d/c f/u   Please call with questions

## 2018-05-10 NOTE — PROGRESS NOTE ADULT - PROBLEM SELECTOR PLAN 10
- Heparin SC Q8H for DVT prophylaxis    Valentino Moseley MD PGY-1  Pager: [phone]
- Heparin SC Q8H for DVT prophylaxis    Valentino Moseley MD PGY-1  Pager: [phone]
- Heparin SC Q8H for DVT prophylaxis    Valentino Moseley MD PGY-1  Pager: [phone]

## 2018-05-10 NOTE — PROGRESS NOTE ADULT - ASSESSMENT
52 year old F, w/ HFrEF 20%, COPD on home O2, Sarcoidosis on Prednisone, history of cardiac thrombus on Xarelto, DM2 with large circumferential pericardial effusion of unclear etiology admitted to CCU for close monitoring of hemodynamics, s/p pericardial drain placement complicated by small L PTX and R hydropneumothorax now resolved, clinically stable.

## 2018-05-11 LAB
APTT BLD: 25.7 SEC — LOW (ref 27.5–37.4)
BUN SERPL-MCNC: 87 MG/DL — HIGH (ref 7–23)
CALCIUM SERPL-MCNC: 8.3 MG/DL — LOW (ref 8.4–10.5)
CHLORIDE SERPL-SCNC: 96 MMOL/L — LOW (ref 98–107)
CO2 SERPL-SCNC: 27 MMOL/L — SIGNIFICANT CHANGE UP (ref 22–31)
CREAT SERPL-MCNC: 1.72 MG/DL — HIGH (ref 0.5–1.3)
GLUCOSE BLDC GLUCOMTR-MCNC: 224 MG/DL — HIGH (ref 70–99)
GLUCOSE BLDC GLUCOMTR-MCNC: 439 MG/DL — HIGH (ref 70–99)
GLUCOSE BLDC GLUCOMTR-MCNC: 451 MG/DL — CRITICAL HIGH (ref 70–99)
GLUCOSE BLDC GLUCOMTR-MCNC: 78 MG/DL — SIGNIFICANT CHANGE UP (ref 70–99)
GLUCOSE BLDC GLUCOMTR-MCNC: 94 MG/DL — SIGNIFICANT CHANGE UP (ref 70–99)
GLUCOSE SERPL-MCNC: 430 MG/DL — HIGH (ref 70–99)
HCT VFR BLD CALC: 38.4 % — SIGNIFICANT CHANGE UP (ref 34.5–45)
HGB BLD-MCNC: 11.2 G/DL — LOW (ref 11.5–15.5)
INR BLD: 0.91 — SIGNIFICANT CHANGE UP (ref 0.88–1.17)
MAGNESIUM SERPL-MCNC: 2 MG/DL — SIGNIFICANT CHANGE UP (ref 1.6–2.6)
MCHC RBC-ENTMCNC: 20.7 PG — LOW (ref 27–34)
MCHC RBC-ENTMCNC: 29.2 % — LOW (ref 32–36)
MCV RBC AUTO: 70.8 FL — LOW (ref 80–100)
NRBC # FLD: 0 — SIGNIFICANT CHANGE UP
PHOSPHATE SERPL-MCNC: 4.8 MG/DL — HIGH (ref 2.5–4.5)
PLATELET # BLD AUTO: 317 K/UL — SIGNIFICANT CHANGE UP (ref 150–400)
PMV BLD: 10.6 FL — SIGNIFICANT CHANGE UP (ref 7–13)
POTASSIUM SERPL-MCNC: 5.2 MMOL/L — SIGNIFICANT CHANGE UP (ref 3.5–5.3)
POTASSIUM SERPL-SCNC: 5.2 MMOL/L — SIGNIFICANT CHANGE UP (ref 3.5–5.3)
PROT UR-MCNC: 71.3 MG/DL — SIGNIFICANT CHANGE UP
PROTHROM AB SERPL-ACNC: 10.5 SEC — SIGNIFICANT CHANGE UP (ref 9.8–13.1)
RBC # BLD: 5.42 M/UL — HIGH (ref 3.8–5.2)
RBC # FLD: 16.9 % — HIGH (ref 10.3–14.5)
SODIUM SERPL-SCNC: 134 MMOL/L — LOW (ref 135–145)
WBC # BLD: 10.19 K/UL — SIGNIFICANT CHANGE UP (ref 3.8–10.5)
WBC # FLD AUTO: 10.19 K/UL — SIGNIFICANT CHANGE UP (ref 3.8–10.5)

## 2018-05-11 PROCEDURE — 99233 SBSQ HOSP IP/OBS HIGH 50: CPT

## 2018-05-11 PROCEDURE — 99232 SBSQ HOSP IP/OBS MODERATE 35: CPT | Mod: GC

## 2018-05-11 RX ORDER — INSULIN GLARGINE 100 [IU]/ML
28 INJECTION, SOLUTION SUBCUTANEOUS AT BEDTIME
Qty: 0 | Refills: 0 | Status: DISCONTINUED | OUTPATIENT
Start: 2018-05-11 | End: 2018-05-13

## 2018-05-11 RX ORDER — BUMETANIDE 0.25 MG/ML
1 INJECTION INTRAMUSCULAR; INTRAVENOUS
Qty: 10 | Refills: 0 | Status: DISCONTINUED | OUTPATIENT
Start: 2018-05-11 | End: 2018-05-16

## 2018-05-11 RX ORDER — INSULIN LISPRO 100/ML
17 VIAL (ML) SUBCUTANEOUS
Qty: 0 | Refills: 0 | Status: DISCONTINUED | OUTPATIENT
Start: 2018-05-11 | End: 2018-05-13

## 2018-05-11 RX ADMIN — Medication 325 MILLIGRAM(S): at 17:04

## 2018-05-11 RX ADMIN — Medication 81 MILLIGRAM(S): at 11:32

## 2018-05-11 RX ADMIN — Medication 1 PUFF(S): at 22:58

## 2018-05-11 RX ADMIN — Medication 20 MILLIGRAM(S): at 17:04

## 2018-05-11 RX ADMIN — Medication 1 PUFF(S): at 11:29

## 2018-05-11 RX ADMIN — CARVEDILOL PHOSPHATE 3.12 MILLIGRAM(S): 80 CAPSULE, EXTENDED RELEASE ORAL at 06:24

## 2018-05-11 RX ADMIN — HEPARIN SODIUM 5000 UNIT(S): 5000 INJECTION INTRAVENOUS; SUBCUTANEOUS at 16:19

## 2018-05-11 RX ADMIN — Medication 17 UNIT(S): at 16:59

## 2018-05-11 RX ADMIN — BUMETANIDE 10 MG/HR: 0.25 INJECTION INTRAMUSCULAR; INTRAVENOUS at 22:54

## 2018-05-11 RX ADMIN — Medication 60 MILLIGRAM(S): at 06:25

## 2018-05-11 RX ADMIN — Medication 15 UNIT(S): at 12:22

## 2018-05-11 RX ADMIN — HEPARIN SODIUM 5000 UNIT(S): 5000 INJECTION INTRAVENOUS; SUBCUTANEOUS at 21:47

## 2018-05-11 RX ADMIN — Medication 15 UNIT(S): at 08:52

## 2018-05-11 RX ADMIN — Medication 60 MILLIGRAM(S): at 17:04

## 2018-05-11 RX ADMIN — ATORVASTATIN CALCIUM 40 MILLIGRAM(S): 80 TABLET, FILM COATED ORAL at 21:47

## 2018-05-11 RX ADMIN — Medication 20 MILLIGRAM(S): at 06:24

## 2018-05-11 RX ADMIN — CHLORHEXIDINE GLUCONATE 1 APPLICATION(S): 213 SOLUTION TOPICAL at 11:27

## 2018-05-11 RX ADMIN — Medication 4: at 12:22

## 2018-05-11 RX ADMIN — Medication 325 MILLIGRAM(S): at 06:24

## 2018-05-11 RX ADMIN — Medication 12: at 08:51

## 2018-05-11 RX ADMIN — HEPARIN SODIUM 5000 UNIT(S): 5000 INJECTION INTRAVENOUS; SUBCUTANEOUS at 06:24

## 2018-05-11 RX ADMIN — CARVEDILOL PHOSPHATE 3.12 MILLIGRAM(S): 80 CAPSULE, EXTENDED RELEASE ORAL at 17:04

## 2018-05-11 NOTE — PROGRESS NOTE ADULT - SUBJECTIVE AND OBJECTIVE BOX
Subjective: Patient seen and examined. No new events except as noted.   Transferred out of ICU   feels ok   stuffy nose   no cp or sob     REVIEW OF SYSTEMS:    CONSTITUTIONAL: + weakness, fevers or chills  EYES/ENT: No visual changes;  No vertigo or throat pain   NECK: No pain or stiffness  RESPIRATORY: No cough, wheezing, hemoptysis; No shortness of breath  CARDIOVASCULAR: No chest pain or palpitations  GASTROINTESTINAL: No abdominal or epigastric pain. No nausea, vomiting, or hematemesis; No diarrhea or constipation. No melena or hematochezia.  GENITOURINARY: No dysuria, frequency or hematuria  NEUROLOGICAL: No numbness or weakness  SKIN: No itching, burning, rashes, or lesions   All other review of systems is negative unless indicated above.    MEDICATIONS:  MEDICATIONS  (STANDING):  aspirin enteric coated 81 milliGRAM(s) Oral daily  atorvastatin 40 milliGRAM(s) Oral at bedtime  carvedilol 3.125 milliGRAM(s) Oral every 12 hours  chlorhexidine 4% Liquid 1 Application(s) Topical <User Schedule>  dextrose 5%. 1000 milliLiter(s) (50 mL/Hr) IV Continuous <Continuous>  dextrose 50% Injectable 12.5 Gram(s) IV Push once  dextrose 50% Injectable 25 Gram(s) IV Push once  dextrose 50% Injectable 25 Gram(s) IV Push once  ferrous    sulfate 325 milliGRAM(s) Oral two times a day  fluticasone propionate   220 MICROgram(s) HFA Inhaler 1 Puff(s) Inhalation two times a day  furosemide   Injectable 60 milliGRAM(s) IV Push every 12 hours  heparin  Injectable 5000 Unit(s) SubCutaneous every 8 hours  insulin glargine Injectable (LANTUS) 24 Unit(s) SubCutaneous at bedtime  insulin lispro (HumaLOG) corrective regimen sliding scale   SubCutaneous three times a day before meals  insulin lispro (HumaLOG) corrective regimen sliding scale   SubCutaneous at bedtime  insulin lispro Injectable (HumaLOG) 15 Unit(s) SubCutaneous three times a day before meals  insulin lispro Injectable (HumaLOG) 5 Unit(s) SubCutaneous <User Schedule>  predniSONE   Tablet 20 milliGRAM(s) Oral two times a day      PHYSICAL EXAM:  T(C): 36.4 (05-11-18 @ 12:05), Max: 36.6 (05-11-18 @ 06:20)  HR: 109 (05-11-18 @ 12:05) (103 - 125)  BP: 120/73 (05-11-18 @ 12:05) (110/72 - 138/93)  RR: 18 (05-11-18 @ 12:05) (13 - 24)  SpO2: 100% (05-11-18 @ 12:05) (98% - 100%)  Wt(kg): --  I&O's Summary    10 May 2018 07:01  -  11 May 2018 07:00  --------------------------------------------------------  IN: 320 mL / OUT: 1560 mL / NET: -1240 mL    11 May 2018 07:01  -  11 May 2018 14:14  --------------------------------------------------------  IN: 237 mL / OUT: 915 mL / NET: -678 mL          Appearance: Normal	  HEENT:   Normal oral mucosa, PERRL, EOMI	  Lymphatic: No lymphadenopathy , no edema  Cardiovascular: Normal S1 S2, No JVD, No murmurs , +pericardial drain   Respiratory: Lungs clear to auscultation, normal effort 	  Gastrointestinal:  Soft, Non-tender, + BS	  Skin: No rashes, No ecchymoses, No cyanosis, warm to touch  Musculoskeletal: Normal range of motion, normal strength  Psychiatry:  Mood & affect appropriate  Ext: + edema      LABS:    CARDIAC MARKERS:                                11.2   10.19 )-----------( 317      ( 11 May 2018 05:50 )             38.4     05-11    134<L>  |  96<L>  |  87<H>  ----------------------------<  430<H>  5.2   |  27  |  1.72<H>    Ca    8.3<L>      11 May 2018 05:50  Phos  4.8     05-11  Mg     2.0     05-11      proBNP:   Lipid Profile:   HgA1c:   TSH:             TELEMETRY: SR	    ECG:  	  RADIOLOGY:   DIAGNOSTIC TESTING:  [ ] Echocardiogram:  [ ]  Catheterization:  [ ] Stress Test:    OTHER:

## 2018-05-11 NOTE — PROGRESS NOTE ADULT - PROBLEM SELECTOR PLAN 1
appears stable at this time   Cont Lasix 40 mg IV bid  Coreg 3.125 mg bid  EPS consult for ICD   May need inotrope therapy with Lasix if Cr continues to rise and no improvement in edema

## 2018-05-11 NOTE — PROGRESS NOTE ADULT - PROBLEM SELECTOR PLAN 6
s/p IR drain   Still draining albeit much less than before. IR follow up for DC  monitor closely  On IV abx per ID for culture findings

## 2018-05-11 NOTE — PROGRESS NOTE ADULT - SUBJECTIVE AND OBJECTIVE BOX
History:  No acute overnight events. She reported that last night after noting a BG of 91 which was "low" for her and she "pigged out". Her BG level this morning was in the 400s.   Otherwise reported that she continues to have lower extremity swelling. She reported good appetite and denied any nausea or changes in bowel movements.         MEDICATIONS  (STANDING):  aspirin enteric coated 81 milliGRAM(s) Oral daily  atorvastatin 40 milliGRAM(s) Oral at bedtime  carvedilol 3.125 milliGRAM(s) Oral every 12 hours  chlorhexidine 4% Liquid 1 Application(s) Topical <User Schedule>  dextrose 5%. 1000 milliLiter(s) (50 mL/Hr) IV Continuous <Continuous>  dextrose 50% Injectable 12.5 Gram(s) IV Push once  dextrose 50% Injectable 25 Gram(s) IV Push once  dextrose 50% Injectable 25 Gram(s) IV Push once  ferrous    sulfate 325 milliGRAM(s) Oral two times a day  fluticasone propionate   220 MICROgram(s) HFA Inhaler 1 Puff(s) Inhalation two times a day  furosemide   Injectable 60 milliGRAM(s) IV Push every 12 hours  heparin  Injectable 5000 Unit(s) SubCutaneous every 8 hours  insulin glargine Injectable (LANTUS) 24 Unit(s) SubCutaneous at bedtime  insulin lispro (HumaLOG) corrective regimen sliding scale   SubCutaneous three times a day before meals  insulin lispro (HumaLOG) corrective regimen sliding scale   SubCutaneous at bedtime  insulin lispro Injectable (HumaLOG) 15 Unit(s) SubCutaneous three times a day before meals  insulin lispro Injectable (HumaLOG) 5 Unit(s) SubCutaneous <User Schedule>  predniSONE   Tablet 20 milliGRAM(s) Oral two times a day    MEDICATIONS  (PRN):  acetaminophen   Tablet. 650 milliGRAM(s) Oral every 6 hours PRN temp> 101  acetaminophen   Tablet. 650 milliGRAM(s) Oral every 6 hours PRN mild, moderate pain  dextrose Gel 1 Dose(s) Oral once PRN Blood Glucose LESS THAN 70 milliGRAM(s)/deciliter  glucagon  Injectable 1 milliGRAM(s) IntraMuscular once PRN Glucose LESS THAN 70 milligrams/deciliter  levalbuterol Inhalation 0.63 milliGRAM(s) Inhalation every 6 hours PRN bronchospasm      Allergies  No Known Allergies      Review of Systems:  Constitutional: No fever  Eyes: No blurry vision  Neuro: No tremors  HEENT: No pain  Cardiovascular: No chest pain, palpitations  Respiratory: No SOB, no cough  GI: No nausea, vomiting, abdominal pain  Skin: no rash  Psych: no depression  Endocrine: no polyuria, polydipsia        PHYSICAL EXAM:  VITALS: T(C): 36.4 (05-11-18 @ 12:05)  T(F): 97.5 (05-11-18 @ 12:05), Max: 97.8 (05-11-18 @ 06:20)  HR: 109 (05-11-18 @ 12:05) (103 - 125)  BP: 120/73 (05-11-18 @ 12:05) (110/72 - 138/93)  RR:  (13 - 24)  SpO2:  (98% - 100%)  Wt(kg): --  GENERAL: NAD   EYES: No proptosis, no lid lag, anicteric  HEENT:  Atraumatic, Normocephalic, moist mucous membranes  RESPIRATORY: Clear to auscultation bilaterally; No rales, rhonchi, wheezing, or rubs  CARDIOVASCULAR: Regular rate and rhythm; No murmurs; no peripheral edema  GI: Soft, nontender, non distended, normal bowel sounds +drain with minimal output   SKIN: Dry, intact, No rashes or lesions  MUSCULOSKELETAL: 2+ pitting edema in the lower extremity   NEURO: sensation intact, extraocular movements intact, no tremor, normal reflexes  PSYCH: Alert and oriented x 3, circumferential speech       POCT Blood Glucose.: 224 mg/dL (05-11-18 @ 12:00)  POCT Blood Glucose.: 451 mg/dL (05-11-18 @ 08:48)  POCT Blood Glucose.: 439 mg/dL (05-11-18 @ 08:35)  POCT Blood Glucose.: 91 mg/dL (05-10-18 @ 21:39)  POCT Blood Glucose.: 177 mg/dL (05-10-18 @ 18:24)  POCT Blood Glucose.: 272 mg/dL (05-10-18 @ 13:10)  POCT Blood Glucose.: 340 mg/dL (05-10-18 @ 09:01)  POCT Blood Glucose.: 238 mg/dL (05-09-18 @ 23:11)  POCT Blood Glucose.: 160 mg/dL (05-09-18 @ 17:32)  POCT Blood Glucose.: 303 mg/dL (05-09-18 @ 13:08)  POCT Blood Glucose.: 428 mg/dL (05-09-18 @ 08:55)  POCT Blood Glucose.: 148 mg/dL (05-08-18 @ 21:47)  POCT Blood Glucose.: 116 mg/dL (05-08-18 @ 18:11)      05-11    134<L>  |  96<L>  |  87<H>  ----------------------------<  430<H>  5.2   |  27  |  1.72<H>    EGFR if : 39  EGFR if non : 34    Ca    8.3<L>      05-11  Mg     2.0     05-11  Phos  4.8     05-11            Thyroid Function Tests:  05-03 @ 16:59 TSH 6.40 FreeT4 -- T3 -- Anti TPO -- Anti Thyroglobulin Ab -- TSI --      Hemoglobin A1C, Whole Blood: 14.3 % <H> [4.0 - 5.6] (05-04-18 @ 06:45)  Hemoglobin A1C, Whole Blood: 14.3 % <H> [4.0 - 5.6] (05-03-18 @ 16:59)

## 2018-05-11 NOTE — CONSULT NOTE ADULT - SUBJECTIVE AND OBJECTIVE BOX
Patient seen and evaluated at bedside on [address] [place] [address]    Outpatient EP: Dr. Eric Berger ([facility]) - I spoke w/ Dr. Berger to obtain collateral information    Chief Complaint: "I had pains in my legs"    HPI: 52F w/ PMHx of CAD (s/p PCI to mLAD 4/2017), chronic HFrEF (2/2 NICM, NYHA III, w/ LV thrombus - on Xarelto), COPD (on 2L NC @ home), Sarcoidosis, DM2 who presented on 5/3 w/ LE edema and pain x weeks. The patient reports compliance with her home meds and states that he legs were becoming progressively more swollen. She was having pain in her legs to the point where she was not able to get out of bed due to the pain. She had fallen multiple times due to the fact that her legs were weak and heavy. She also was getting progressively more SOB - she gets SOTELO when walking and cleaning the house. She denies CP or palpitations, is able to sleep laying flat although reports PND. Was having weight loss at home but was not able to fit in her jeans or shoes due to the swelling. She reports that the topic of an AICD was addressed by Dr. Berger - after speaking with Dr. Berger it appears she has not followed up with him since she had her PCI in 4/2017.    The patient was found to have acute on chronic HFrEF and was admitted to Tele. On 5/5 the patient's TTE showed a large pericardial effusion w/ tamponade physiology. She was transferred to the CCU. On 5/7 the patient had a pericardial drain placed by IR (250cc removed). The Cx initially was reported by micro as positive - not updated to NO GROWTH. The patient is off ABxs (no positive BCx, the patient is afebrile, Tm:100.3 on 5/3). ID following. EP called given LVEF:19%    PMHx:   LV thrombus (I confirmed this diagnosis with Dr. Berger)  HLD  HTN  COPD/Asthma (reports multiple episodes of bronchitis as a child, on 2L NC @ home)  Sarcoidosis (pt denies extrapulmonary involvement)  chronic HFrEF  DM2  CAD (s/p PCI 4/2017 - to mLAD Xience 3.0 x 33)    PSHx:   Ankle fracture - s/p Sx  Hip deformity - s/p Sx - age 12    Allergies: No Known Allergies    Home Meds: Toprol XL 25, Xarelto 20, ASA 81, Crestor 10, Losartan 25, Lasix p.o. 80 daily, Prednisone 20, Bactrim, Metformin 500 tid, Fe    Current Medications:   acetaminophen   Tablet. 650 milliGRAM(s) Oral every 6 hours PRN  acetaminophen   Tablet. 650 milliGRAM(s) Oral every 6 hours PRN  aspirin enteric coated 81 milliGRAM(s) Oral daily  atorvastatin 40 milliGRAM(s) Oral at bedtime  carvedilol 3.125 milliGRAM(s) Oral every 12 hours  ferrous    sulfate 325 milliGRAM(s) Oral two times a day  fluticasone propionate   220 MICROgram(s) HFA Inhaler 1 Puff(s) Inhalation two times a day  furosemide   Injectable 60 milliGRAM(s) IV Push every 12 hours  heparin  Injectable 5000 Unit(s) SubCutaneous every 8 hours  insulin glargine Injectable (LANTUS) 24 Unit(s) SubCutaneous at bedtime  insulin lispro (HumaLOG) corrective regimen sliding scale   SubCutaneous three times a day before meals  insulin lispro (HumaLOG) corrective regimen sliding scale   SubCutaneous at bedtime  insulin lispro Injectable (HumaLOG) 15 Unit(s) SubCutaneous three times a day before meals  insulin lispro Injectable (HumaLOG) 5 Unit(s) SubCutaneous <User Schedule>  levalbuterol Inhalation 0.63 milliGRAM(s) Inhalation every 6 hours PRN  predniSONE   Tablet 20 milliGRAM(s) Oral two times a day    FAMILY HISTORY: The patient reports the only heart disease in her family is her nephew - born with a congenital heart defect    Social History: Lives with her two children  Smoking History: Tried cigarettes as a teenager  Alcohol Use: Denied  Drug Use: Used to smoke marijuana recreationally    REVIEW OF SYSTEMS:  CONSTITUTIONAL: + weakness, no fevers or chills  EYES/ENT: No visual changes;  No dysphagia  NECK: No pain or stiffness  RESPIRATORY: No cough, wheezing, hemoptysis; + shortness of breath  CARDIOVASCULAR: No chest pain or palpitations; + lower extremity edema  GASTROINTESTINAL: No abdominal or epigastric pain. No nausea, vomiting, or hematemesis; No diarrhea or constipation. No melena or hematochezia.  BACK: No back pain  GENITOURINARY: No dysuria, frequency or hematuria  NEUROLOGICAL: No numbness or weakness  SKIN: No itching, burning, rashes, or lesions   All other review of systems is negative unless indicated above.    Physical Exam:  T(F): 97.5 (05-11), Max: 97.8 (05-11)  HR: 109 (05-11) (103 - 125)  BP: 120/73 (05-11) (110/72 - 138/93)  RR: 18 (05-11)  SpO2: 100% on RA (the patient states she uses 2L of NC @ home)    GENERAL: No acute distress, well-developed, laying at 30 degrees, speaking in full sentences, no accessory muscle use  HEAD:  Atraumatic, Normocephalic  ENT: EOMI, PERRLA, conjunctiva and sclera clear, Neck supple, JVP elevated 4cm above the sternal notch with the patient sitting at 45 degrees, moist mucosa  CHEST/LUNG: bibasilar rales   BACK: No spinal tenderness  HEART: Regular rhythm, low grade tachy; No murmurs, rubs, or gallops  ABDOMEN: Obese, Soft, Nontender, Nondistended; Bowel sounds present  EXTREMITIES:  No clubbing, cyanosis, 2+ b/l LE edema, LE are warm to touch  PSYCH: Nl behavior, nl affect  NEUROLOGY: AAOx3, non-focal, cranial nerves intact  SKIN: Normal color, No rashes or lesions  LINES: Pericardial drain    Cardiovascular Diagnostic Testing:    Tele: Sinus 100s - 110s    ECG: Personally reviewed: NSR @ 99 w/ CT:185, QRSd:94, QTc:432, LAD (-66), TWI in avl, no q waves    Echo: Personally reviewed: < from: Transthoracic Echocardiogram (05.08.18 @ 14:59) > OBSERVATIONS: Mitral Valve: Normal mitral valve. Minimal mitral regurgitation. Aortic Root: Normal aortic root. Aortic Valve: Aortic valve leaflet morphology not well visualized. Left Atrium: Normal left atrium. Left Ventricle: Severe global left ventricular systolic dysfunction. Right Heart: Normal right atrium. The right ventricle is not well visualized; grossly normal right ventricular systolic function. Normal tricuspid valve. Minimal tricuspid regurgitation. Normal pulmonic valve. Pericardium / Pleura Normal pericardium with no pericardial effusion. Left pleural effusion.  < end of copied text >    < from: Transthoracic Echocardiogram (05.05.18 @ 11:47) >  DIMENSIONS:  Dimensions:     Normal Values:  LA:     2.1 cm    2.0 - 4.0 cm  Ao:     3.0 cm    2.0 - 3.8 cm  SEPTUM: 0.6 cm    0.6 - 1.2 cm  PWT:    1.1cm    0.6 - 1.1 cm  LVIDd:  4.6 cm    3.0 - 5.6 cm  LVIDs:  4.2 cm    1.8 - 4.0 cm  Derived Variables:  LVMI: 72 g/m2  RWT: 0.47  Fractional short: 9 %  Ejection Fraction (Teicholtz): 19 %  ------------------------------------------------------------------------  OBSERVATIONS: Mitral Valve: Normal mitral valve. Aortic Root: Normal aortic root. Aortic Valve: Normal trileaflet aortic valve. Left Atrium: Normal left atrium. Left Ventricle: Severe global left ventricular systolic dysfunction. Normal left ventricular internal dimensions and wall thicknesses. (DT:211 ms). Right Heart: Normal right atrium. Normal right ventricular size and function. Normal tricuspid valve. Normal pulmonic valve. Pericardium/PleuraLarge circumferential pericardial effusion. The effusion measures 3.2 cm lateral to the right atrium and 1.6 cm anterior to the right ventricle. The IVC measures about 2.0 cm and does not appear to collapse, although there is minimal respiratory variability. Transmitral gradients were not performed. The RV and RA are not collapsed however they do not appear to fully expand during diastole.  < end of copied text >    Stress Testing: No prior in [place]    Cath: No prior in [place]    Imaging:    CXR: Personally reviewed: edema and L PLEF    < from: US Duplex Venous Lower Ext Complete, Bilateral (05.03.18 @ 13:08) > IMPRESSION:  No evidence of bilateral lower extremity deep venous thrombosis.  < end of copied text >     Labs: Personally reviewed                        11.2   10.19 )-----------( 317      ( 11 May 2018 05:50 )             38.4     05-11    134<L>  |  96<L>  |  87<H>  ----------------------------<  430<H>  5.2   |  27  |  1.72 - Cr is uptrending    Ca    8.3<L>      11 May 2018 05:50  Phos  4.8     05-11  Mg     2.0     05-11    PT/INR - ( 11 May 2018 05:50 )   PT: 10.5 SEC;   INR: 0.91     PTT - ( 11 May 2018 05:50 )  PTT:25.7 SEC    Thyroid Stimulating Hormone, Serum: 6.40 uIU/mL  Hemoglobin A1C, Whole Blood: 14.3:

## 2018-05-11 NOTE — PROGRESS NOTE ADULT - ASSESSMENT
52 year old woman with T2DM, uncontrolled, with steroid induced hyperglycemia, admitted with acute decompensated heart failure, now post IR drainage of pericardial effusion.  Continues to be uncontrolled inpatient.

## 2018-05-11 NOTE — PROVIDER CONTACT NOTE (OTHER) - SITUATION
Pt's blood glucose level was 451. Pt stated that she was hungry and drank a bottle of Glucerna (237mls) at around 7:45

## 2018-05-11 NOTE — CONSULT NOTE ADULT - ATTENDING COMMENTS
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 Suggest a cardiac MR to evaluate for cardiac sarcoid followed by possible ICD placement. Will follow.

## 2018-05-11 NOTE — PROGRESS NOTE ADULT - PROBLEM SELECTOR PLAN 1
-Currently on Prednisone 20 mg BID, causing significant hyperglycemia. Contributed also by bedtime snacking.   -Will increase the Lantus dose to 28 units at bedtime   -Increase Lispro to 18 units with meals  -SS #2   -5 units of lispro should be given with a bedtime snack.   -Insulin doses will need to be tapered when steroids are decreased.   -Will be discharged home on basal bolus, will determine doses when closer to discharge.   -Can follow if preferred by the patient at our office- [phone]

## 2018-05-11 NOTE — CONSULT NOTE ADULT - ASSESSMENT
52F w/ PMHx of CAD (s/p PCI to mLAD 4/2017), chronic HFrEF (2/2 NICM, NYHA III, w/ LV thrombus - on Xarelto as outpt), COPD (on 2L NC @ home), Sarcoidosis, DM2 (HbA1c:14.3) who presented on 5/3 w/ LE edema and pain x weeks - admitted on 5/3 w/ acute on chronic HFrEF exacerbation and found to have a large pericardial effusion w/ tamponade physiology (s/p pericardial drain). EP called to evaluate patient given LVEF:19%. The patient is still clinically hypervolemic w/ elevated JVP, bibasilar rales, LE edema. She has poor glycemic control, a pericardial drain and uptrending Cr. She was on ARB and B-blocker as an outpatient (was not on Aldactone - is NYHA III @ baseline).     Plan:    1. acute on chronic HFrEF (LVEF:19%)  -given that the patient was on ARB + B-blocker as an outpatient and LVEF 19%, pt last revascularized 4/2017 (that PCI was driven by a viability study per Dr. Berger) will consider AICD implantation this admission when pt more optimized  -Would prefer pericardial drain remove and pt more optimized from a volume standpoint with improved glycemic control (improved would healing)  -c/w Medical optimization of HF per general Cardiology  -Monitor on Tele, K>4, Mg>2    EP will follow    SONDRA Sanford  Cardiology Fellow  (p): [phone] 52F w/ PMHx of CAD (s/p PCI to mLAD 4/2017), chronic HFrEF (2/2 NICM, NYHA III, w/ LV thrombus - on Xarelto as outpt), COPD (on 2L NC @ home), Sarcoidosis, DM2 (HbA1c:14.3) who presented on 5/3 w/ LE edema and pain x weeks - admitted on 5/3 w/ acute on chronic HFrEF exacerbation and found to have a large pericardial effusion w/ tamponade physiology (s/p pericardial drain). EP called to evaluate patient given LVEF:19%. The patient is still clinically hypervolemic w/ elevated JVP, bibasilar rales, LE edema. She has poor glycemic control, a pericardial drain and uptrending Cr. She was on ARB and B-blocker as an outpatient (was not on Aldactone - is NYHA III @ baseline).     Plan:    1. acute on chronic HFrEF (LVEF:19%)  -given that the patient was on ARB + B-blocker as an outpatient and LVEF 19%, pt last revascularized 4/2017 (that PCI was driven by a viability study per Dr. Berger) will consider AICD implantation this admission when pt more optimized  -Would prefer pericardial drain remove and pt more optimized from a volume standpoint with improved glycemic control (improved would healing)  -c/w Medical optimization of HF per general Cardiology  -Monitor on Tele, K>4, Mg>2  -check cardiac MRI - is there myocardial involvement of the sarcoid    EP will follow    SONDRA Sanford  Cardiology Fellow  (p): [phone]

## 2018-05-11 NOTE — CONSULT NOTE ADULT - SUBJECTIVE AND OBJECTIVE BOX
HPI:  Ms. Nava is a 52 year-old woman with history of multiple medical issues including type 2 diabetes mellitus, sarcoidosis, systolic congestive heart failure on home O2, LV thrombus on Xarelto, and stage 3 chronic kidney disease. She presented to the [facility] ER back on 5/3/18 with progressively worsening shortness of breath and swelling over a 3 week span, with acute worsening of her symptoms during the 24 hours prior to admission. She was noted on echocardiogram 5/5/18 to have a large pericardial effusion with associated diastolic collapse; in light of this, she was transferred to the CCU. She underwent pericardiocentesis + chest drain placement on 5/7/18.     Over the past few days, her serum potassium has been quite high at times; on 5/9/18 it was 6.8meq/L, although this appears to have been a hemolyzed specimen. It was repeated, and the nonhemolyzed specimen demonstrated a potassium of 6.0meq/L. She received IV calcium, D50, and insulin, as well as oral Kayexalate, on 5/9/18. Her K+ is now down to 5.2meq/L. In light of the patient's chronic kidney disease, her uptrending creatinine over the past few days, and her recent hyperkalemia, a renal consultation was requested.    Ms. Nava received a dose of KCL 20meq po x 1 on 5/6. She was on Cozaar from 5/3-5/5/18, but has not received any ACEI/ARB since then.      PAST MEDICAL & SURGICAL HISTORY:  Thrombus: ? Thrombus in heart - on Xarelto  Hyperlipidemia  Anemia  Hypertension  Sarcoidosis  CHF (congestive heart failure): Stage III, on home O2 @ 2lpm  Diabetes  Ankle fracture: Left Ankle - with plate and screws placed  Hip deformity: left hip had a pin placed to hold bone in place after a fall    Allergies  No Known Allergies    SOCIAL HISTORY:  Denies ETOh,Smoking,     FAMILY HISTORY:  Family history of hypertension (Sibling): Siblings  Family history of diabetes mellitus (Father, Sibling): Father, Siblings  Family history of breast cancer in mother (Mother): Mother  Family history of colon cancer in mother (Mother): Mother    REVIEW OF SYSTEMS:  CONSTITUTIONAL: No weakness, fevers or chills  EYES/ENT: No visual changes;  No vertigo or throat pain   NECK: No pain or stiffness  RESPIRATORY: No cough, wheezing, hemoptysis;  (+)shortness of breath  CARDIOVASCULAR: No chest pain or palpitations; (+)b/l LE swelling  GASTROINTESTINAL: No abdominal or epigastric pain. No nausea, vomiting, or hematemesis; No diarrhea or constipation. No melena or hematochezia.  GENITOURINARY: No dysuria, frequency or hematuria  NEUROLOGICAL: No numbness or weakness  SKIN: No itching, burning, rashes, or lesions   All other review of systems is negative unless indicated above.    VITAL:  T(C): , Max: 36.6 (05-11-18 @ 06:20)  T(F): , Max: 97.8 (05-11-18 @ 06:20)  HR: 109 (05-11-18 @ 12:05)  BP: 120/73 (05-11-18 @ 12:05)  BP(mean): 94 (05-10-18 @ 20:02)  RR: 18 (05-11-18 @ 12:05)  SpO2: 100% (05-11-18 @ 12:05)    PHYSICAL EXAM:  Constitutional: NAD, Alert  HEENT: NCAT, MMM  Neck: Supple, No JVD  Respiratory: CTA-b/l  Cardiovascular: RRR s1s2, no m/r/g  Gastrointestinal: BS+, soft, NT/ND  Extremities: No peripheral edema b/l  Neurological: no focal deficits; strength grossly intact  Psychiatric: Normal mood, normal affect  Back: no CVAT b/l  Skin: No rashes, no nevi    LABS:                        11.2   10.19 )-----------( 317      ( 11 May 2018 05:50 )             38.4     Na(134)/K(5.2)/Cl(96)/HCO3(27)/BUN(87)/Cr(1.72)Glu(430)/Ca(8.3)/Mg(2.0)/PO4(4.8)    05-11 @ 05:50  Na(135)/K(5.0)/Cl(96)/HCO3(24)/BUN(73)/Cr(1.52)Glu(376)/Ca(8.6)/Mg(2.0)/PO4(3.9)    05-10 @ 02:40  Na(137)/K(6.0)/Cl(99)/HCO3(29)/BUN(73)/Cr(1.66)Glu(134)/Ca(8.9)/Mg(2.1)/PO4(3.6)    05-09 @ 21:20  Na(127)/K(6.8)/Cl(98)/HCO3(17)/BUN(68)/Cr(1.39)Glu(169)/Ca(8.7)/Mg(2.1)/PO4(4.7)    05-09 @ 20:21  Na(133)/K(5.8)/Cl(98)/HCO3(26)/BUN(65)/Cr(1.35)Glu(435)/Ca(8.5)/Mg(2.2)/PO4(3.5)    05-09 @ 08:45  Na(132)/K(5.9)/Cl(95)/HCO3(24)/BUN(65)/Cr(1.46)Glu(444)/Ca(8.5)/Mg(2.1)/PO4(3.4)    05-09 @ 06:40    IMAGING:  < from: Xray Chest 1 View- PORTABLE-Routine (05.09.18 @ 07:50) >  Interstitial edema and left effusion, stable.    < from: Transthoracic Echocardiogram (05.08.18 @ 14:59) >  1. Normal mitral valve. Minimal mitral regurgitation.  2. Severe global left ventricular systolic dysfunction.  3. The right ventricle is not well visualized; grossly  normal right ventricular systolic function.  4. Normal pericardium with no pericardial effusion.  5. Left pleural effusion.    < from: Transthoracic Echocardiogram (05.05.18 @ 11:47) >  1. Severe global left ventricular systolic dysfunction.  2. Large circumferential pericardial effusion. The effusion  measures 3.2 cm lateral to the right atrium and 1.6 cm  anterior to the right ventricle. The IVC measures about 2.0  cm and does not appear to collapse, although there is  minimal respiratory variability. Transmitral gradients were  not performed. The RV and RA are not collapsed however they  do not appear to fully expand during diastole.    < end of copied text >        ASSESSMENT:  (1)Renal - CKD3 - mild rise in creatinine over the past few days, in association with obligate diuresis    (2)Hyperkalemia - multifactorial from CKD, diet/Glucerna,     (3)CV - s/p pericardiocentesis/chest tube placement for her pericardial effusion; on Lasix 60mg iv bid. Shortness of breath signfiicantly improved.    RECOMMEND:  (1)Switch from Glucerna to Nepro  (2)No need for dietary K+ restriction for now; would employ a dietary K+ restriction if K+ trends to 5.5 or above  (3)Continue IV Lasix as ordered for now  (4)No ACEI/ARB  (5)Dose new meds for GFR 30-40ml/min      Thank you for involving [facility] in this patient's care.    With warm regards,    Jair Lion MD   [facility]  [phone] HPI:  Ms. Nava is a 52 year-old woman with history of multiple medical issues including type 2 diabetes mellitus, sarcoidosis, systolic congestive heart failure on home O2, LV thrombus on Xarelto, and stage 3 chronic kidney disease. She presented to the [facility] ER back on 5/3/18 with progressively worsening shortness of breath and swelling over a 3 week span, with acute worsening of her symptoms during the 24 hours prior to admission. She was noted on echocardiogram 5/5/18 to have a large pericardial effusion with associated diastolic collapse; in light of this, she was transferred to the CCU. She underwent pericardiocentesis + chest drain placement on 5/7/18.     Over the past few days, her serum potassium has been quite high at times; on 5/9/18 it was 6.8meq/L, although this appears to have been a hemolyzed specimen. It was repeated, and the nonhemolyzed specimen demonstrated a potassium of 6.0meq/L. She received IV calcium, D50, and insulin, as well as oral Kayexalate, on 5/9/18. Her K+ is now down to 5.2meq/L. In light of the patient's chronic kidney disease, her uptrending creatinine over the past few days, and her recent hyperkalemia, a renal consultation was requested.    Ms. Nava received a dose of KCL 20meq po x 1 on 5/6. She was on Cozaar from 5/3-5/5/18, but has not received any ACEI/ARB since then.      PAST MEDICAL & SURGICAL HISTORY:  Thrombus: ? Thrombus in heart - on Xarelto  Hyperlipidemia  Anemia  Hypertension  Sarcoidosis  CHF (congestive heart failure): Stage III, on home O2 @ 2lpm  Diabetes  Ankle fracture: Left Ankle - with plate and screws placed  Hip deformity: left hip had a pin placed to hold bone in place after a fall    Allergies  No Known Allergies    SOCIAL HISTORY:  Denies ETOh,Smoking,     FAMILY HISTORY:  Family history of hypertension (Sibling): Siblings  Family history of diabetes mellitus (Father, Sibling): Father, Siblings  Family history of breast cancer in mother (Mother): Mother  Family history of colon cancer in mother (Mother): Mother    REVIEW OF SYSTEMS:  CONSTITUTIONAL: No weakness, fevers or chills  EYES/ENT: No visual changes;  No vertigo or throat pain   NECK: No pain or stiffness  RESPIRATORY: No cough, wheezing, hemoptysis;  (+)shortness of breath  CARDIOVASCULAR: No chest pain or palpitations; (+)b/l LE swelling  GASTROINTESTINAL: No abdominal or epigastric pain. No nausea, vomiting, or hematemesis; No diarrhea or constipation. No melena or hematochezia.  GENITOURINARY: No dysuria, frequency or hematuria  NEUROLOGICAL: No numbness or weakness  SKIN: No itching, burning, rashes, or lesions   All other review of systems is negative unless indicated above.    VITAL:  T(C): , Max: 36.6 (05-11-18 @ 06:20)  T(F): , Max: 97.8 (05-11-18 @ 06:20)  HR: 109 (05-11-18 @ 12:05)  BP: 120/73 (05-11-18 @ 12:05)  BP(mean): 94 (05-10-18 @ 20:02)  RR: 18 (05-11-18 @ 12:05)  SpO2: 100% (05-11-18 @ 12:05)    PHYSICAL EXAM:  Constitutional: NAD, Alert  HEENT: NCAT, MMM  Neck: Supple, No JVD  Respiratory: CTA-b/l  Cardiovascular: RRR s1s2, no m/r/g  Gastrointestinal: BS+, soft, NT/ND  Extremities: No peripheral edema b/l  Neurological: no focal deficits; strength grossly intact  Psychiatric: Normal mood, normal affect  Back: no CVAT b/l  Skin: No rashes, no nevi    LABS:                        11.2   10.19 )-----------( 317      ( 11 May 2018 05:50 )             38.4     Na(134)/K(5.2)/Cl(96)/HCO3(27)/BUN(87)/Cr(1.72)Glu(430)/Ca(8.3)/Mg(2.0)/PO4(4.8)    05-11 @ 05:50  Na(135)/K(5.0)/Cl(96)/HCO3(24)/BUN(73)/Cr(1.52)Glu(376)/Ca(8.6)/Mg(2.0)/PO4(3.9)    05-10 @ 02:40  Na(137)/K(6.0)/Cl(99)/HCO3(29)/BUN(73)/Cr(1.66)Glu(134)/Ca(8.9)/Mg(2.1)/PO4(3.6)    05-09 @ 21:20  Na(127)/K(6.8)/Cl(98)/HCO3(17)/BUN(68)/Cr(1.39)Glu(169)/Ca(8.7)/Mg(2.1)/PO4(4.7)    05-09 @ 20:21  Na(133)/K(5.8)/Cl(98)/HCO3(26)/BUN(65)/Cr(1.35)Glu(435)/Ca(8.5)/Mg(2.2)/PO4(3.5)    05-09 @ 08:45  Na(132)/K(5.9)/Cl(95)/HCO3(24)/BUN(65)/Cr(1.46)Glu(444)/Ca(8.5)/Mg(2.1)/PO4(3.4)    05-09 @ 06:40    Urinalysis 5/3/18 - >600mg/dL protein; 2-5 RBC; 2-5WBC      IMAGING:  < from: Xray Chest 1 View- PORTABLE-Routine (05.09.18 @ 07:50) >  Interstitial edema and left effusion, stable.    < from: Transthoracic Echocardiogram (05.08.18 @ 14:59) >  1. Normal mitral valve. Minimal mitral regurgitation.  2. Severe global left ventricular systolic dysfunction.  3. The right ventricle is not well visualized; grossly  normal right ventricular systolic function.  4. Normal pericardium with no pericardial effusion.  5. Left pleural effusion.    < from: Transthoracic Echocardiogram (05.05.18 @ 11:47) >  1. Severe global left ventricular systolic dysfunction.  2. Large circumferential pericardial effusion. The effusion  measures 3.2 cm lateral to the right atrium and 1.6 cm  anterior to the right ventricle. The IVC measures about 2.0  cm and does not appear to collapse, although there is  minimal respiratory variability. Transmitral gradients were  not performed. The RV and RA are not collapsed however they  do not appear to fully expand during diastole.    < end of copied text >        ASSESSMENT:  (1)Renal - CKD3 - nephrotic range proteinuria; highly likely from DM nephropathy; mild rise in creatinine over the past few days, in association with obligate diuresis    (2)Hyperkalemia - multifactorial from CKD, diet/Glucerna,     (3)CV - s/p pericardiocentesis/chest tube placement for her pericardial effusion; on Lasix 60mg iv bid. Shortness of breath signfiicantly improved.    RECOMMEND:  (1)Switch from Glucerna to Nepro  (2)No need for dietary K+ restriction for now; would employ a dietary K+ restriction if K+ trends to 5.5 or above  (3)Continue IV Lasix as ordered for now  (4)No ACEI/ARB  (5)Dose new meds for GFR 30-40ml/min      Thank you for involving [facility] in this patient's care.    With warm regards,    Jair Lion MD   [facility]  [phone] HPI:  Ms. Nava is a 52 year-old woman with history of multiple medical issues including type 2 diabetes mellitus, sarcoidosis, systolic congestive heart failure on home O2, LV thrombus on Xarelto, and stage 3 chronic kidney disease. She presented to the [facility] ER back on 5/3/18 with progressively worsening shortness of breath and swelling over a 3 week span, with acute worsening of her symptoms during the 24 hours prior to admission. She was noted on echocardiogram 5/5/18 to have a large pericardial effusion with associated diastolic collapse; in light of this, she was transferred to the CCU. She underwent pericardiocentesis + chest drain placement on 5/7/18.     Over the past few days, her serum potassium has been quite high at times; on 5/9/18 it was 6.8meq/L, although this appears to have been a hemolyzed specimen. It was repeated, and the nonhemolyzed specimen demonstrated a potassium of 6.0meq/L. She received IV calcium, D50, and insulin, as well as oral Kayexalate, on 5/9/18. Her K+ is now down to 5.2meq/L. In light of the patient's chronic kidney disease, her uptrending creatinine over the past few days, and her recent hyperkalemia, a renal consultation was requested.    Ms. Nava received a dose of KCL 20meq po x 1 on 5/6. She was on Cozaar from 5/3-5/5/18, but has not received any ACEI/ARB since then.    Ms. Nava denies known past history of CKD. She has had diabetes "for a couple of years". She has not noted urinary changes such as bloodiness or foaminess. She denies chronic NSAID use.     PAST MEDICAL & SURGICAL HISTORY:  Thrombus: ? Thrombus in heart - on Xarelto  Hyperlipidemia  Anemia  Hypertension  Sarcoidosis  CHF (congestive heart failure): Stage III, on home O2 @ 2lpm  Diabetes  Ankle fracture: Left Ankle - with plate and screws placed  Hip deformity: left hip had a pin placed to hold bone in place after a fall    Allergies  No Known Allergies    SOCIAL HISTORY:  Denies ETOh,Smoking,     FAMILY HISTORY:  Family history of hypertension (Sibling): Siblings  Family history of diabetes mellitus (Father, Sibling): Father, Siblings  Family history of breast cancer in mother (Mother): Mother  Family history of colon cancer in mother (Mother): Mother    REVIEW OF SYSTEMS:  CONSTITUTIONAL: No weakness, fevers or chills  EYES/ENT: No visual changes;  No vertigo or throat pain   NECK: No pain or stiffness  RESPIRATORY: No cough, wheezing, hemoptysis;  (+)shortness of breath  CARDIOVASCULAR: No chest pain or palpitations; (+)b/l LE swelling  GASTROINTESTINAL: No abdominal or epigastric pain. No nausea, vomiting, or hematemesis; No diarrhea or constipation. No melena or hematochezia.  GENITOURINARY: No dysuria, frequency or hematuria  NEUROLOGICAL: No numbness or weakness  SKIN: No itching, burning, rashes, or lesions   All other review of systems is negative unless indicated above.    VITAL:  T(C): , Max: 36.6 (05-11-18 @ 06:20)  T(F): , Max: 97.8 (05-11-18 @ 06:20)  HR: 109 (05-11-18 @ 12:05)  BP: 120/73 (05-11-18 @ 12:05)  BP(mean): 94 (05-10-18 @ 20:02)  RR: 18 (05-11-18 @ 12:05)  SpO2: 100% (05-11-18 @ 12:05)    PHYSICAL EXAM:  Constitutional: NAD, Alert  HEENT: NCAT, DMM  Neck: Supple, (+) JVD; (+)goiter  Respiratory: CTA-b/l  Cardiovascular: RRR soft S1, loud s2, no m/r/g; (+)chest tube  Gastrointestinal: BS+, soft, NT; (+)mildly distended  Extremities: 4+ b/l LE edema  Neurological: no focal deficits; strength grossly intact (difficulty lifting legs due to the excessive edema; not appearing to be due to muscle weakness)  Back: no CVAT b/l  Skin: No rashes, no nevi    LABS:                        11.2   10.19 )-----------( 317      ( 11 May 2018 05:50 )             38.4     Na(134)/K(5.2)/Cl(96)/HCO3(27)/BUN(87)/Cr(1.72)Glu(430)/Ca(8.3)/Mg(2.0)/PO4(4.8)    05-11 @ 05:50  Na(135)/K(5.0)/Cl(96)/HCO3(24)/BUN(73)/Cr(1.52)Glu(376)/Ca(8.6)/Mg(2.0)/PO4(3.9)    05-10 @ 02:40  Na(137)/K(6.0)/Cl(99)/HCO3(29)/BUN(73)/Cr(1.66)Glu(134)/Ca(8.9)/Mg(2.1)/PO4(3.6)    05-09 @ 21:20  Na(127)/K(6.8)/Cl(98)/HCO3(17)/BUN(68)/Cr(1.39)Glu(169)/Ca(8.7)/Mg(2.1)/PO4(4.7)    05-09 @ 20:21  Na(133)/K(5.8)/Cl(98)/HCO3(26)/BUN(65)/Cr(1.35)Glu(435)/Ca(8.5)/Mg(2.2)/PO4(3.5)    05-09 @ 08:45  Na(132)/K(5.9)/Cl(95)/HCO3(24)/BUN(65)/Cr(1.46)Glu(444)/Ca(8.5)/Mg(2.1)/PO4(3.4)    05-09 @ 06:40    Urinalysis 5/3/18 - >600mg/dL protein; 2-5 RBC; 2-5WBC      IMAGING:  < from: Xray Chest 1 View- PORTABLE-Routine (05.09.18 @ 07:50) >  Interstitial edema and left effusion, stable.    < from: Transthoracic Echocardiogram (05.08.18 @ 14:59) >  1. Normal mitral valve. Minimal mitral regurgitation.  2. Severe global left ventricular systolic dysfunction.  3. The right ventricle is not well visualized; grossly  normal right ventricular systolic function.  4. Normal pericardium with no pericardial effusion.  5. Left pleural effusion.    < from: Transthoracic Echocardiogram (05.05.18 @ 11:47) >  1. Severe global left ventricular systolic dysfunction.  2. Large circumferential pericardial effusion. The effusion  measures 3.2 cm lateral to the right atrium and 1.6 cm  anterior to the right ventricle. The IVC measures about 2.0  cm and does not appear to collapse, although there is  minimal respiratory variability. Transmitral gradients were  not performed. The RV and RA are not collapsed however they  do not appear to fully expand during diastole.      ASSESSMENT:  (1)Renal - CKD3 - nephrotic range proteinuria; highly likely from DM nephropathy; mild rise in creatinine over the past few days, in association with obligate diuresis    (2)Hyperkalemia - multifactorial from CKD, diet/Glucerna,     (3)CV - s/p pericardiocentesis/chest tube placement for her pericardial effusion; on Lasix 60mg iv bid. Still quite hypervolemic, in setting of EF <20%. Would she do better with a continuous diuretic gtt? I would favor trying it. Before we try an inotrope, I would favor first discontinuing her beta blocker in effort to maximize urine output.    RECOMMEND:  (1)Switch from Glucerna to Nepro  (2)No need for dietary K+ restriction for now; would employ a dietary K+ restriction if K+ trends to 5.5 or above  (3)No ACEI/ARB  (4)Favor discontinuing Coreg for now  (5)Favor switching from Lasix 60 iv bid to a standing diuretic gtt - could place on a bumex gtt, 1mg/h; if suboptimal urinary response to the bumex over the next 1-2 days, would then add Metolazone as well (5mg po bid)  (6)BMP +Mg level daily  (7)Strict I/Os  (8)Dose new meds for GFR 30-40ml/min  (9)Check urine:protein/creatinine    Thank you for involving [facility] in this patient's care.    With warm regards,    Jair Lion MD   [facility]  [phone]

## 2018-05-12 LAB
BUN SERPL-MCNC: 107 MG/DL — HIGH (ref 7–23)
CALCIUM SERPL-MCNC: 8.6 MG/DL — SIGNIFICANT CHANGE UP (ref 8.4–10.5)
CHLORIDE SERPL-SCNC: 95 MMOL/L — LOW (ref 98–107)
CO2 SERPL-SCNC: 27 MMOL/L — SIGNIFICANT CHANGE UP (ref 22–31)
CREAT SERPL-MCNC: 1.81 MG/DL — HIGH (ref 0.5–1.3)
GLUCOSE BLDC GLUCOMTR-MCNC: 153 MG/DL — HIGH (ref 70–99)
GLUCOSE BLDC GLUCOMTR-MCNC: 285 MG/DL — HIGH (ref 70–99)
GLUCOSE BLDC GLUCOMTR-MCNC: 89 MG/DL — SIGNIFICANT CHANGE UP (ref 70–99)
GLUCOSE BLDC GLUCOMTR-MCNC: 96 MG/DL — SIGNIFICANT CHANGE UP (ref 70–99)
GLUCOSE SERPL-MCNC: 285 MG/DL — HIGH (ref 70–99)
HCT VFR BLD CALC: 40.8 % — SIGNIFICANT CHANGE UP (ref 34.5–45)
HGB BLD-MCNC: 12.3 G/DL — SIGNIFICANT CHANGE UP (ref 11.5–15.5)
MAGNESIUM SERPL-MCNC: 2 MG/DL — SIGNIFICANT CHANGE UP (ref 1.6–2.6)
MCHC RBC-ENTMCNC: 21.1 PG — LOW (ref 27–34)
MCHC RBC-ENTMCNC: 30.1 % — LOW (ref 32–36)
MCV RBC AUTO: 70.1 FL — LOW (ref 80–100)
NON-GYNECOLOGICAL CYTOLOGY STUDY: SIGNIFICANT CHANGE UP
NRBC # FLD: 0.02 — SIGNIFICANT CHANGE UP
PHOSPHATE SERPL-MCNC: 4.8 MG/DL — HIGH (ref 2.5–4.5)
PLATELET # BLD AUTO: 361 K/UL — SIGNIFICANT CHANGE UP (ref 150–400)
PMV BLD: 10.8 FL — SIGNIFICANT CHANGE UP (ref 7–13)
POTASSIUM SERPL-MCNC: 5 MMOL/L — SIGNIFICANT CHANGE UP (ref 3.5–5.3)
POTASSIUM SERPL-SCNC: 5 MMOL/L — SIGNIFICANT CHANGE UP (ref 3.5–5.3)
RBC # BLD: 5.82 M/UL — HIGH (ref 3.8–5.2)
RBC # FLD: 17.3 % — HIGH (ref 10.3–14.5)
SODIUM SERPL-SCNC: 135 MMOL/L — SIGNIFICANT CHANGE UP (ref 135–145)
WBC # BLD: 10.11 K/UL — SIGNIFICANT CHANGE UP (ref 3.8–10.5)
WBC # FLD AUTO: 10.11 K/UL — SIGNIFICANT CHANGE UP (ref 3.8–10.5)

## 2018-05-12 RX ORDER — MILRINONE LACTATE 1 MG/ML
0.5 INJECTION, SOLUTION INTRAVENOUS
Qty: 20 | Refills: 0 | Status: DISCONTINUED | OUTPATIENT
Start: 2018-05-12 | End: 2018-05-16

## 2018-05-12 RX ADMIN — HEPARIN SODIUM 5000 UNIT(S): 5000 INJECTION INTRAVENOUS; SUBCUTANEOUS at 13:42

## 2018-05-12 RX ADMIN — CHLORHEXIDINE GLUCONATE 1 APPLICATION(S): 213 SOLUTION TOPICAL at 10:26

## 2018-05-12 RX ADMIN — Medication 17 UNIT(S): at 17:44

## 2018-05-12 RX ADMIN — MILRINONE LACTATE 11.1 MICROGRAM(S)/KG/MIN: 1 INJECTION, SOLUTION INTRAVENOUS at 22:00

## 2018-05-12 RX ADMIN — Medication 17 UNIT(S): at 08:04

## 2018-05-12 RX ADMIN — Medication 20 MILLIGRAM(S): at 10:26

## 2018-05-12 RX ADMIN — Medication 1 PUFF(S): at 09:26

## 2018-05-12 RX ADMIN — Medication 1 PUFF(S): at 22:01

## 2018-05-12 RX ADMIN — BUMETANIDE 10 MG/HR: 0.25 INJECTION INTRAMUSCULAR; INTRAVENOUS at 10:26

## 2018-05-12 RX ADMIN — HEPARIN SODIUM 5000 UNIT(S): 5000 INJECTION INTRAVENOUS; SUBCUTANEOUS at 05:27

## 2018-05-12 RX ADMIN — Medication 2: at 12:58

## 2018-05-12 RX ADMIN — Medication 6: at 08:03

## 2018-05-12 RX ADMIN — ATORVASTATIN CALCIUM 40 MILLIGRAM(S): 80 TABLET, FILM COATED ORAL at 22:00

## 2018-05-12 RX ADMIN — Medication 20 MILLIGRAM(S): at 17:43

## 2018-05-12 RX ADMIN — Medication 325 MILLIGRAM(S): at 17:43

## 2018-05-12 RX ADMIN — BUMETANIDE 10 MG/HR: 0.25 INJECTION INTRAMUSCULAR; INTRAVENOUS at 16:50

## 2018-05-12 RX ADMIN — Medication 17 UNIT(S): at 12:58

## 2018-05-12 RX ADMIN — Medication 81 MILLIGRAM(S): at 12:59

## 2018-05-12 RX ADMIN — Medication 325 MILLIGRAM(S): at 05:27

## 2018-05-12 RX ADMIN — HEPARIN SODIUM 5000 UNIT(S): 5000 INJECTION INTRAVENOUS; SUBCUTANEOUS at 22:01

## 2018-05-12 NOTE — PROGRESS NOTE ADULT - SUBJECTIVE AND OBJECTIVE BOX
[facility] [phone]        Patient seen and examined in bed.  Good urine outpt        MEDICATIONS  (STANDING):  aspirin enteric coated 81 milliGRAM(s) Oral daily  atorvastatin 40 milliGRAM(s) Oral at bedtime  buMETAnide Infusion 1 mG/Hr (10 mL/Hr) IV Continuous <Continuous>  chlorhexidine 4% Liquid 1 Application(s) Topical <User Schedule>  dextrose 5%. 1000 milliLiter(s) (50 mL/Hr) IV Continuous <Continuous>  dextrose 50% Injectable 12.5 Gram(s) IV Push once  dextrose 50% Injectable 25 Gram(s) IV Push once  dextrose 50% Injectable 25 Gram(s) IV Push once  ferrous    sulfate 325 milliGRAM(s) Oral two times a day  fluticasone propionate   220 MICROgram(s) HFA Inhaler 1 Puff(s) Inhalation two times a day  heparin  Injectable 5000 Unit(s) SubCutaneous every 8 hours  insulin glargine Injectable (LANTUS) 28 Unit(s) SubCutaneous at bedtime  insulin lispro (HumaLOG) corrective regimen sliding scale   SubCutaneous three times a day before meals  insulin lispro (HumaLOG) corrective regimen sliding scale   SubCutaneous at bedtime  insulin lispro Injectable (HumaLOG) 17 Unit(s) SubCutaneous three times a day before meals  insulin lispro Injectable (HumaLOG) 5 Unit(s) SubCutaneous <User Schedule>  predniSONE   Tablet 20 milliGRAM(s) Oral two times a day      VITAL:  T(C): , Max: 36.8 (05-11-18 @ 21:43)  T(F): , Max: 98.2 (05-11-18 @ 21:43)  HR: 110 (05-12-18 @ 12:18)  BP: 122/87 (05-12-18 @ 12:18)  BP(mean): --  RR: 18 (05-12-18 @ 12:18)  SpO2: 97% (05-12-18 @ 12:18)  Wt(kg): --    I and O's:    05-11 @ 07:01  -  05-12 @ 07:00  --------------------------------------------------------  IN: 237 mL / OUT: 3645 mL / NET: -3408 mL    05-12 @ 07:01  -  05-12 @ 13:27  --------------------------------------------------------  IN: 0 mL / OUT: 815 mL / NET: -815 mL          PHYSICAL EXAM:    Constitutional: NAD  HEENT: PERRLA    Neck:  No JVD  Respiratory: CTAB/L  Cardiovascular: S1 and S2 2/6sem  Gastrointestinal: BS+, soft, NT/ND  Extremities: + 4 peripheral edema  Neurological: A/O x 3, no focal deficits  Psychiatric: Normal mood, normal affect  : No Oliva  Skin: No rashes  Access: Not applicable    LABS:                        12.3   10.11 )-----------( 361      ( 12 May 2018 05:47 )             40.8     05-12    135  |  95<L>  |  107<H>  ----------------------------<  285<H>  5.0   |  27  |  1.81<H>    Ca    8.6      12 May 2018 05:47  Phos  4.8     05-12  Mg     2.0     05-12            Urine Studies:    Protein, Random Urine: 71.3 mg/dL (05-11 @ 18:18)        RADIOLOGY & ADDITIONAL STUDIES:

## 2018-05-12 NOTE — PROGRESS NOTE ADULT - PROBLEM SELECTOR PLAN 1
Start Primacor 0.5mcg/kg/min as Bun/Cr continue to rise   Cont Lasix 40 mg IV bid  DC Coreg 3.125 mg bid  EPS consulted for ICD. Plan for CMR followed by ICD once better compensated

## 2018-05-12 NOTE — PROGRESS NOTE ADULT - SUBJECTIVE AND OBJECTIVE BOX
Subjective: Patient seen and examined. No new events except as noted.   Feels weak   No cp or sob     REVIEW OF SYSTEMS:    CONSTITUTIONAL: + weakness, fevers or chills  EYES/ENT: No visual changes;  No vertigo or throat pain   NECK: No pain or stiffness  RESPIRATORY: No cough, wheezing, hemoptysis; No shortness of breath  CARDIOVASCULAR: No chest pain or palpitations  GASTROINTESTINAL: No abdominal or epigastric pain. No nausea, vomiting, or hematemesis; No diarrhea or constipation. No melena or hematochezia.  GENITOURINARY: No dysuria, frequency or hematuria  NEUROLOGICAL: No numbness or weakness  SKIN: No itching, burning, rashes, or lesions   All other review of systems is negative unless indicated above.    MEDICATIONS:  MEDICATIONS  (STANDING):  aspirin enteric coated 81 milliGRAM(s) Oral daily  atorvastatin 40 milliGRAM(s) Oral at bedtime  buMETAnide Infusion 1 mG/Hr (10 mL/Hr) IV Continuous <Continuous>  chlorhexidine 4% Liquid 1 Application(s) Topical <User Schedule>  dextrose 5%. 1000 milliLiter(s) (50 mL/Hr) IV Continuous <Continuous>  dextrose 50% Injectable 12.5 Gram(s) IV Push once  dextrose 50% Injectable 25 Gram(s) IV Push once  dextrose 50% Injectable 25 Gram(s) IV Push once  ferrous    sulfate 325 milliGRAM(s) Oral two times a day  fluticasone propionate   220 MICROgram(s) HFA Inhaler 1 Puff(s) Inhalation two times a day  heparin  Injectable 5000 Unit(s) SubCutaneous every 8 hours  insulin glargine Injectable (LANTUS) 28 Unit(s) SubCutaneous at bedtime  insulin lispro (HumaLOG) corrective regimen sliding scale   SubCutaneous three times a day before meals  insulin lispro (HumaLOG) corrective regimen sliding scale   SubCutaneous at bedtime  insulin lispro Injectable (HumaLOG) 17 Unit(s) SubCutaneous three times a day before meals  insulin lispro Injectable (HumaLOG) 5 Unit(s) SubCutaneous <User Schedule>  milrinone Infusion 0.5 MICROgram(s)/kG/Min (11.1 mL/Hr) IV Continuous <Continuous>  predniSONE   Tablet 20 milliGRAM(s) Oral two times a day      PHYSICAL EXAM:  T(C): 36.4 (05-12-18 @ 20:57), Max: 36.8 (05-12-18 @ 00:43)  HR: 59 (05-12-18 @ 20:57) (59 - 113)  BP: 123/70 (05-12-18 @ 20:57) (110/80 - 127/94)  RR: 18 (05-12-18 @ 20:57) (17 - 18)  SpO2: 100% (05-12-18 @ 20:57) (96% - 100%)  Wt(kg): --  I&O's Summary    11 May 2018 07:01  -  12 May 2018 07:00  --------------------------------------------------------  IN: 237 mL / OUT: 3645 mL / NET: -3408 mL    12 May 2018 07:01  -  12 May 2018 21:47  --------------------------------------------------------  IN: 0 mL / OUT: 1945 mL / NET: -1945 mL          Appearance: Normal	  HEENT:   Normal oral mucosa, PERRL, EOMI	  Lymphatic: No lymphadenopathy , no edema  Cardiovascular: Normal S1 S2, No JVD, No murmurs +pericardial drain   Respiratory: Lungs clear to auscultation, normal effort 	  Gastrointestinal:  Soft, Non-tender, + BS	  Skin: No rashes, No ecchymoses, No cyanosis, warm to touch  Musculoskeletal: Normal range of motion, normal strength  Psychiatry:  Mood & affect appropriate  Ext: +edema      LABS:    CARDIAC MARKERS:                                12.3   10.11 )-----------( 361      ( 12 May 2018 05:47 )             40.8     05-12    135  |  95<L>  |  107<H>  ----------------------------<  285<H>  5.0   |  27  |  1.81<H>    Ca    8.6      12 May 2018 05:47  Phos  4.8     05-12  Mg     2.0     05-12      proBNP:   Lipid Profile:   HgA1c:   TSH:             TELEMETRY: SR	    ECG:  	  RADIOLOGY:   DIAGNOSTIC TESTING:  [ ] Echocardiogram:  [ ]  Catheterization:  [ ] Stress Test:    OTHER:

## 2018-05-12 NOTE — PROGRESS NOTE ADULT - ASSESSMENT
(1)Renal - CKD3 - nephrotic range proteinuria; highly likely from DM nephropathy; mild rise in creatinine over the past few days, in association with obligate diuresis    (2)Hyperkalemia - multifactorial from CKD, diet     (3)CV - s/p pericardiocentesis/chest tube placement for her pericardial effusion;      RECOMMEND:  (1)  Nepro shakes  (2)No need for dietary K+ restriction for now; would employ a dietary K+ restriction if K+ trends to 5.5 or above  (3)No ACEI/ARB  (4)Excellent urine output and would cont the Bumex gtt.  BUN is high.  Is  gtt an option?

## 2018-05-13 DIAGNOSIS — D86.9 SARCOIDOSIS, UNSPECIFIED: ICD-10-CM

## 2018-05-13 LAB
BUN SERPL-MCNC: 120 MG/DL — HIGH (ref 7–23)
CALCIUM SERPL-MCNC: 8.6 MG/DL — SIGNIFICANT CHANGE UP (ref 8.4–10.5)
CHLORIDE SERPL-SCNC: 91 MMOL/L — LOW (ref 98–107)
CO2 SERPL-SCNC: 26 MMOL/L — SIGNIFICANT CHANGE UP (ref 22–31)
CREAT ?TM UR-MCNC: 20.62 MG/DL — SIGNIFICANT CHANGE UP
CREAT SERPL-MCNC: 1.84 MG/DL — HIGH (ref 0.5–1.3)
CULTURE - SURGICAL SITE: SIGNIFICANT CHANGE UP
GLUCOSE BLDC GLUCOMTR-MCNC: 108 MG/DL — HIGH (ref 70–99)
GLUCOSE BLDC GLUCOMTR-MCNC: 111 MG/DL — HIGH (ref 70–99)
GLUCOSE BLDC GLUCOMTR-MCNC: 336 MG/DL — HIGH (ref 70–99)
GLUCOSE BLDC GLUCOMTR-MCNC: 520 MG/DL — CRITICAL HIGH (ref 70–99)
GLUCOSE SERPL-MCNC: 621 MG/DL — CRITICAL HIGH (ref 70–99)
HCT VFR BLD CALC: 41.2 % — SIGNIFICANT CHANGE UP (ref 34.5–45)
HGB BLD-MCNC: 12.2 G/DL — SIGNIFICANT CHANGE UP (ref 11.5–15.5)
MAGNESIUM SERPL-MCNC: 2 MG/DL — SIGNIFICANT CHANGE UP (ref 1.6–2.6)
MCHC RBC-ENTMCNC: 20.7 PG — LOW (ref 27–34)
MCHC RBC-ENTMCNC: 29.6 % — LOW (ref 32–36)
MCV RBC AUTO: 70.1 FL — LOW (ref 80–100)
NRBC # FLD: 0 — SIGNIFICANT CHANGE UP
PHOSPHATE SERPL-MCNC: 4.8 MG/DL — HIGH (ref 2.5–4.5)
PLATELET # BLD AUTO: 366 K/UL — SIGNIFICANT CHANGE UP (ref 150–400)
PMV BLD: 11 FL — SIGNIFICANT CHANGE UP (ref 7–13)
POTASSIUM SERPL-MCNC: 5.5 MMOL/L — HIGH (ref 3.5–5.3)
POTASSIUM SERPL-SCNC: 5.5 MMOL/L — HIGH (ref 3.5–5.3)
RBC # BLD: 5.88 M/UL — HIGH (ref 3.8–5.2)
RBC # FLD: 18 % — HIGH (ref 10.3–14.5)
SODIUM SERPL-SCNC: 132 MMOL/L — LOW (ref 135–145)
SPECIMEN SOURCE: SIGNIFICANT CHANGE UP
WBC # BLD: 8.59 K/UL — SIGNIFICANT CHANGE UP (ref 3.8–10.5)
WBC # FLD AUTO: 8.59 K/UL — SIGNIFICANT CHANGE UP (ref 3.8–10.5)

## 2018-05-13 RX ORDER — INSULIN LISPRO 100/ML
15 VIAL (ML) SUBCUTANEOUS
Qty: 0 | Refills: 0 | Status: DISCONTINUED | OUTPATIENT
Start: 2018-05-13 | End: 2018-05-16

## 2018-05-13 RX ORDER — INSULIN LISPRO 100/ML
11 VIAL (ML) SUBCUTANEOUS
Qty: 0 | Refills: 0 | Status: DISCONTINUED | OUTPATIENT
Start: 2018-05-13 | End: 2018-05-15

## 2018-05-13 RX ORDER — INSULIN LISPRO 100/ML
17 VIAL (ML) SUBCUTANEOUS
Qty: 0 | Refills: 0 | Status: DISCONTINUED | OUTPATIENT
Start: 2018-05-13 | End: 2018-05-16

## 2018-05-13 RX ORDER — INSULIN GLARGINE 100 [IU]/ML
25 INJECTION, SOLUTION SUBCUTANEOUS AT BEDTIME
Qty: 0 | Refills: 0 | Status: DISCONTINUED | OUTPATIENT
Start: 2018-05-13 | End: 2018-05-14

## 2018-05-13 RX ORDER — INSULIN LISPRO 100/ML
3 VIAL (ML) SUBCUTANEOUS
Qty: 0 | Refills: 0 | Status: DISCONTINUED | OUTPATIENT
Start: 2018-05-13 | End: 2018-05-15

## 2018-05-13 RX ADMIN — Medication 20 MILLIGRAM(S): at 18:09

## 2018-05-13 RX ADMIN — Medication 20 MILLIGRAM(S): at 05:56

## 2018-05-13 RX ADMIN — MILRINONE LACTATE 11.1 MICROGRAM(S)/KG/MIN: 1 INJECTION, SOLUTION INTRAVENOUS at 06:44

## 2018-05-13 RX ADMIN — ATORVASTATIN CALCIUM 40 MILLIGRAM(S): 80 TABLET, FILM COATED ORAL at 22:45

## 2018-05-13 RX ADMIN — HEPARIN SODIUM 5000 UNIT(S): 5000 INJECTION INTRAVENOUS; SUBCUTANEOUS at 22:45

## 2018-05-13 RX ADMIN — Medication 1 PUFF(S): at 09:50

## 2018-05-13 RX ADMIN — Medication 17 UNIT(S): at 08:39

## 2018-05-13 RX ADMIN — INSULIN GLARGINE 25 UNIT(S): 100 INJECTION, SOLUTION SUBCUTANEOUS at 22:45

## 2018-05-13 RX ADMIN — Medication 81 MILLIGRAM(S): at 12:58

## 2018-05-13 RX ADMIN — Medication 8: at 12:56

## 2018-05-13 RX ADMIN — Medication 15 UNIT(S): at 12:57

## 2018-05-13 RX ADMIN — HEPARIN SODIUM 5000 UNIT(S): 5000 INJECTION INTRAVENOUS; SUBCUTANEOUS at 14:20

## 2018-05-13 RX ADMIN — Medication 1 PUFF(S): at 22:45

## 2018-05-13 RX ADMIN — Medication 11 UNIT(S): at 18:09

## 2018-05-13 RX ADMIN — Medication 325 MILLIGRAM(S): at 05:56

## 2018-05-13 RX ADMIN — Medication 12: at 08:38

## 2018-05-13 RX ADMIN — Medication 325 MILLIGRAM(S): at 18:08

## 2018-05-13 RX ADMIN — CHLORHEXIDINE GLUCONATE 1 APPLICATION(S): 213 SOLUTION TOPICAL at 09:39

## 2018-05-13 RX ADMIN — HEPARIN SODIUM 5000 UNIT(S): 5000 INJECTION INTRAVENOUS; SUBCUTANEOUS at 05:56

## 2018-05-13 NOTE — CONSULT NOTE ADULT - PROBLEM SELECTOR RECOMMENDATION 2
She has small pneumothorax as well as small hydropneumothorax on the left side: ? post procedural: will need repeat ct scan chest : x-ray from 9th noted: Pt s/p piog tail placement for perciardial effusion:

## 2018-05-13 NOTE — PROGRESS NOTE ADULT - PROBLEM SELECTOR PLAN 1
Cont Primacor 0.5mcg/kg/min and Lasix 40 mg IV bid  EPS consulted for ICD. Plan for CMR followed by ICD once better compensated

## 2018-05-13 NOTE — PROGRESS NOTE ADULT - SUBJECTIVE AND OBJECTIVE BOX
No pain, no shortness of breath      VITAL:  T(C): , Max: 36.5 (05-13-18 @ 06:00)  T(F): , Max: 97.7 (05-13-18 @ 06:00)  HR: 100 (05-13-18 @ 07:22)  BP: 128/72 (05-13-18 @ 07:22)  BP(mean): --  RR: 17 (05-13-18 @ 07:22)  SpO2: 98% (05-13-18 @ 07:22)  UO 2655cc/24h    PHYSICAL EXAM:    Constitutional: NAD; Alert  HEENT:  NCAT; DMM  Neck: No JVD; supple  Respiratory: CTA-b/l  Cardiac: RRR s1s2  Gastrointestinal: BS+, soft, NT/ND  Urologic: No barfield  Extremities: No peripheral edema  Back: No CVAT b/l    LABS:                        12.2   8.59  )-----------( 366      ( 13 May 2018 06:50 )             41.2     Na(132)/K(5.5)/Cl(91)/HCO3(26)/BUN(120)/Cr(1.84)Glu(621)/Ca(8.6)/Mg(2.0)/PO4(4.8)    05-13 @ 06:50  Na(135)/K(5.0)/Cl(95)/HCO3(27)/BUN(107)/Cr(1.81)Glu(285)/Ca(8.6)/Mg(2.0)/PO4(4.8)    05-12 @ 05:47  Na(134)/K(5.2)/Cl(96)/HCO3(27)/BUN(87)/Cr(1.72)Glu(430)/Ca(8.3)/Mg(2.0)/PO4(4.8)    05-11 @ 05:50  Protein, Random Urine: 71.3 mg/dL (05-11 @ 18:18)      IMPRESSION: 52F w/ DM2, sarcoidosis, HFrEF, LV thrombus on Xarelto, and CKD3, 5/3/18 a/w acute on chronic HFrEF/pericardial effusion requiring drainage    (1)Renal - CKD3 - ~2gm proteinuria it appears; presumed diabetic nephropathy - superimposed YANA; prerenally mediated in setting of obligate diuresis; BUN uptrending and creatinine rather stable. Rise in BUN not only from diuresis but also from steroids. GFR now 25-35ml/min.    (2)Hyperkalemia - now on low-K diet, on Nepro rather than Glucerna, and on Bumex gtt (and Milrinone) - despite this, K+ is up to 5.5 today. The rise in K+ today is likely primarily a transient phenomenon from cellular shifts from hypoinsulinemia. Mainstay of therapy for this for now would be improving glycemic control. Cutting back steroids may allow us to achieve this objective.    (3)CV - resolving acute on chronic HFrEF      RECOMMEND:  (1)Milrinone + bumex as ordered; Is<Os as able  (2)Low K+ diet/Nepro as ordered  (3)Kayexalate prn K of 5.7 or higher (no Kayexalate today)  (4)BMP daily  (5)Wean down steroids as able  (6)Endo evaluation if needed to improve glycemic control        Jair Lion MD  [facility]  [phone] No pain, no shortness of breath. LE swelling starting to improve      VITAL:  T(C): , Max: 36.5 (05-13-18 @ 06:00)  T(F): , Max: 97.7 (05-13-18 @ 06:00)  HR: 100 (05-13-18 @ 07:22)  BP: 128/72 (05-13-18 @ 07:22)  BP(mean): --  RR: 17 (05-13-18 @ 07:22)  SpO2: 98% (05-13-18 @ 07:22)  UO 2655cc/24h    PHYSICAL EXAM:    Constitutional: NAD; Alert  HEENT:  NCAT; DMM  Neck: (+) JVD; supple  Respiratory: CTA-b/l  Cardiac: RRR s1s2; (+)pericardial drain  Gastrointestinal: BS+, soft, NT/ND  Urologic: No barfield  Extremities: 3+ b/l LE edema  Back: No CVAT b/l    LABS:                        12.2   8.59  )-----------( 366      ( 13 May 2018 06:50 )             41.2     Na(132)/K(5.5)/Cl(91)/HCO3(26)/BUN(120)/Cr(1.84)Glu(621)/Ca(8.6)/Mg(2.0)/PO4(4.8)    05-13 @ 06:50  Na(135)/K(5.0)/Cl(95)/HCO3(27)/BUN(107)/Cr(1.81)Glu(285)/Ca(8.6)/Mg(2.0)/PO4(4.8)    05-12 @ 05:47  Na(134)/K(5.2)/Cl(96)/HCO3(27)/BUN(87)/Cr(1.72)Glu(430)/Ca(8.3)/Mg(2.0)/PO4(4.8)    05-11 @ 05:50  Protein, Random Urine: 71.3 mg/dL (05-11 @ 18:18)      IMPRESSION: 52F w/ DM2, sarcoidosis, HFrEF, LV thrombus on Xarelto, and CKD3, 5/3/18 a/w acute on chronic HFrEF/pericardial effusion requiring drainage    (1)Renal - CKD3 - ~2gm proteinuria it appears; presumed diabetic nephropathy - superimposed YANA; prerenally mediated in setting of obligate diuresis; BUN uptrending and creatinine rather stable. Rise in BUN not only from diuresis but also from steroids. GFR now 25-35ml/min.    (2)Hyperkalemia - now on low-K diet, on Nepro rather than Glucerna, and on Bumex gtt (and Milrinone) - despite this, K+ is up to 5.5 today. The rise in K+ today is likely primarily a transient phenomenon from cellular shifts from hypoinsulinemia. Mainstay of therapy for this for now would be improving glycemic control. Cutting back steroids may allow us to achieve this objective.    (3)CV - resolving acute on chronic HFrEF      RECOMMEND:  (1)Milrinone + bumex as ordered; Is<Os as able  (2)Low K+ diet/Nepro as ordered  (3)Kayexalate prn K of 5.7 or higher (no Kayexalate today)  (4)BMP daily  (5)Wean down steroids as able  (6)Endo evaluation if needed to improve glycemic control        Jair Lion MD  [facility]  [phone]

## 2018-05-13 NOTE — PROGRESS NOTE ADULT - SUBJECTIVE AND OBJECTIVE BOX
Chief Complaint/Follow-up on: Uncontrolled T2DM    Subjective: 53 y/o female with uncontrolled T2DM, sarcoidosis on Prednisone 20mg BID since May 3, acute on chronic heart failure, YANA on CKD3 with hyperkalemia. States that she ate 1/2 roast beef sandwich yesterday night and a pice of pound cake. She worries about HS hypoglycemia and refuses Lantus, eats snack and gets PM Steroid dose which leads to fasting hyperglycemia.     MEDICATIONS  (STANDING):  aspirin enteric coated 81 milliGRAM(s) Oral daily  atorvastatin 40 milliGRAM(s) Oral at bedtime  buMETAnide Infusion 1 mG/Hr (10 mL/Hr) IV Continuous <Continuous>  chlorhexidine 4% Liquid 1 Application(s) Topical <User Schedule>  dextrose 5%. 1000 milliLiter(s) (50 mL/Hr) IV Continuous <Continuous>  dextrose 50% Injectable 12.5 Gram(s) IV Push once  dextrose 50% Injectable 25 Gram(s) IV Push once  dextrose 50% Injectable 25 Gram(s) IV Push once  ferrous    sulfate 325 milliGRAM(s) Oral two times a day  fluticasone propionate   220 MICROgram(s) HFA Inhaler 1 Puff(s) Inhalation two times a day  heparin  Injectable 5000 Unit(s) SubCutaneous every 8 hours  insulin glargine Injectable (LANTUS) 28 Unit(s) SubCutaneous at bedtime  insulin lispro (HumaLOG) corrective regimen sliding scale   SubCutaneous three times a day before meals  insulin lispro (HumaLOG) corrective regimen sliding scale   SubCutaneous at bedtime  insulin lispro Injectable (HumaLOG) 17 Unit(s) SubCutaneous three times a day before meals  insulin lispro Injectable (HumaLOG) 5 Unit(s) SubCutaneous <User Schedule>  milrinone Infusion 0.5 MICROgram(s)/kG/Min (11.1 mL/Hr) IV Continuous <Continuous>  predniSONE   Tablet 20 milliGRAM(s) Oral two times a day    MEDICATIONS  (PRN):  acetaminophen   Tablet. 650 milliGRAM(s) Oral every 6 hours PRN temp> 101  acetaminophen   Tablet. 650 milliGRAM(s) Oral every 6 hours PRN mild, moderate pain  dextrose Gel 1 Dose(s) Oral once PRN Blood Glucose LESS THAN 70 milliGRAM(s)/deciliter  glucagon  Injectable 1 milliGRAM(s) IntraMuscular once PRN Glucose LESS THAN 70 milligrams/deciliter  levalbuterol Inhalation 0.63 milliGRAM(s) Inhalation every 6 hours PRN bronchospasm      PHYSICAL EXAM:  VITALS: T(C): 36.4 (05-13-18 @ 07:22)  T(F): 97.6 (05-13-18 @ 07:22), Max: 97.7 (05-13-18 @ 06:00)  HR: 100 (05-13-18 @ 07:22) (100 - 113)  BP: 128/72 (05-13-18 @ 07:22) (122/87 - 138/69)  RR:  (17 - 18)  SpO2:  (96% - 100%)    GENERAL: NAD, well-groomed, well-developed, thin female  EYES: No proptosis, no injection  HEENT:  Atraumatic, Normocephalic, moist mucous membranes  RESPIRATORY: Clear to auscultation bilaterally; No rales, rhonchi, wheezing, or rubs  CARDIOVASCULAR: Regular rate and rhythm; No murmurs; +3 pitting peripheral edema  GI: Soft, nontender, non distended, normal bowel sounds  CUSHING'S SIGNS: no striae    POCT Blood Glucose.: 520 mg/dL (05-13-18 @ 07:51) H17+H12    POCT Blood Glucose.: 96 mg/dL (05-12-18 @ 21:45) xLantus  POCT Blood Glucose.: 89 mg/dL (05-12-18 @ 17:21) H17  POCT Blood Glucose.: 153 mg/dL (05-12-18 @ 11:59) H17+2  POCT Blood Glucose.: 285 mg/dL (05-12-18 @ 07:49) H17+6    POCT Blood Glucose.: 78 mg/dL (05-11-18 @ 21:03) xLantus  POCT Blood Glucose.: 94 mg/dL (05-11-18 @ 16:55) H17  POCT Blood Glucose.: 224 mg/dL (05-11-18 @ 12:00) H15+4  POCT Blood Glucose.: 451 mg/dL (05-11-18 @ 08:48) H15+12  POCT Blood Glucose.: 439 mg/dL (05-11-18 @ 08:35)    POCT Blood Glucose.: 91 mg/dL (05-10-18 @ 21:39)  POCT Blood Glucose.: 177 mg/dL (05-10-18 @ 18:24)  POCT Blood Glucose.: 272 mg/dL (05-10-18 @ 13:10)    05-13    132<L>  |  91<L>  |  120<H>  ----------------------------<  621<HH>  5.5<H>   |  26  |  1.84<H>    EGFR if : 36  EGFR if non : 31    Ca    8.6      05-13  Mg     2.0     05-13  Phos  4.8     05-13      Thyroid Function Tests:  05-03 @ 16:59   TSH 6.40   FreeT4 -- T3 -- Anti TPO -- Anti Thyroglobulin Ab -- TSI --      Hemoglobin A1C, Whole Blood: 14.3 % <H> [4.0 - 5.6] (05-04-18 @ 06:45)  Hemoglobin A1C, Whole Blood: 14.3 % <H> [4.0 - 5.6] (05-03-18 @ 16:59)

## 2018-05-13 NOTE — CONSULT NOTE ADULT - ATTENDING COMMENTS
to obtain outside pulmonary records and more histtory about her sarcoidosis  tried to call Dr Youngblood: the number is not in service: will try again in AM to obtain outside pulmonary records and more history about her sarcoidosis  tried to call Dr Youngblood: the number is not in service: will try again in AM  pt says she has sarcoidosis for atleast a year: and is being followed at [facility] and has been on steroids for last one year: Would need to obtain records from [facility]

## 2018-05-13 NOTE — CONSULT NOTE ADULT - PROBLEM SELECTOR RECOMMENDATION 9
the ct noted: seems ot have fibrosis as well as bronchiectasis secondary to underlying sarcoidosis? Would need to obtain more history from her pvt pulmonologist tomorrow: She has been on chronic steroids which would be cont d for now: For now cont current medications:

## 2018-05-13 NOTE — CONSULT NOTE ADULT - SUBJECTIVE AND OBJECTIVE BOX
Patient is a 52y old  Female who presents with a chief complaint of LE edema/pain and SOB (09 May 2018 11:57)      HPI:  51 y/o female, with a PmHx of CHF stage III, on home O2, Sarcoidosis, ? blood clot in heart on Xarelto, DM, presented with sob and LE edema/pain. Pt states for the past 3 weeks she has been having LE edema/pain and sob but in the last 24 hours was started to get worse.  Pt states she was recently admitted to [facility] in February for a similar complaint and was eating much so she states her normal weight used to be 165lb but she has lost a lot of weight and now weights about 110. She states she has been having a  lot pain to both her legs so she decided to come to [facility] ED today for an evaluation. She denies any fever, chills, chest pain, HA, dizziness, blurred vision, n/v, abd pain, recent travel. The only complaints she has been having are nasal congestion, cold intolerance, diaphoresis, bilateral leg pain and sob (more than normal because she is on o2 via nasal cannula at home. She appears comfortable at this time. She is being admitted to telemetry for acute on chronic diastolic heart failure and chest pain r/o acs. (03 May 2018 16:55)    maxim noted: pt has been on steroids for ? sarcoidosis as well as has asthma and had been on advair as well as Proventil prn : Sheis on ionotropes as well as bumex drip and she has been improving on her SOB . She has beeno n chronic steroids ? for sarcoidosis: need to discuss with her pulm tomorrow:   ?FOLLOWING PRESENT  [x ] Hx of PE/DVT, [ x] Hx COPD, [x ] Hx of Asthma, [y ] Hx of Hospitalization, [x ]  Hx of BiPAP/CPAP use, [ x] Hx of SAM    Allergies    No Known Allergies    Intolerances        PAST MEDICAL & SURGICAL HISTORY:  Thrombus: ? Thrombus in heart - on Xarelto  Hyperlipidemia  Anemia  Hypertension  Sarcoidosis  CHF (congestive heart failure): Stage III, on home O2 @ 2lpm  Diabetes  Ankle fracture: Left Ankle - with plate and screws placed  Hip deformity: left hip had a pin placed to hold bone in place after a fall      FAMILY HISTORY:  Family history of hypertension (Sibling): Siblings  Family history of diabetes mellitus (Father, Sibling): Father, Siblings  Family history of breast cancer in mother (Mother): Mother  Family history of colon cancer in mother (Mother): Mother      Social History: [ x ] TOBACCO                  [ x ETOH                                 [ x ] IVDA/DRUGS    REVIEW OF SYSTEMS      General:	x    Skin/Breast:x  	  Ophthalmologic:x  	  ENMT:	x    Respiratory and Thorax: sob  	  Cardiovascular:	x    Gastrointestinal:	x    Genitourinary:	x    Musculoskeletal:	 leg swelling    Neurological:	x    Psychiatric:	x    Hematology/Lymphatics:	x    Endocrine:	x    Allergic/Immunologic:	x    MEDICATIONS  (STANDING):  aspirin enteric coated 81 milliGRAM(s) Oral daily  atorvastatin 40 milliGRAM(s) Oral at bedtime  buMETAnide Infusion 1 mG/Hr (10 mL/Hr) IV Continuous <Continuous>  chlorhexidine 4% Liquid 1 Application(s) Topical <User Schedule>  dextrose 5%. 1000 milliLiter(s) (50 mL/Hr) IV Continuous <Continuous>  dextrose 50% Injectable 12.5 Gram(s) IV Push once  dextrose 50% Injectable 25 Gram(s) IV Push once  dextrose 50% Injectable 25 Gram(s) IV Push once  ferrous    sulfate 325 milliGRAM(s) Oral two times a day  fluticasone propionate   220 MICROgram(s) HFA Inhaler 1 Puff(s) Inhalation two times a day  heparin  Injectable 5000 Unit(s) SubCutaneous every 8 hours  insulin glargine Injectable (LANTUS) 25 Unit(s) SubCutaneous at bedtime  insulin lispro (HumaLOG) corrective regimen sliding scale   SubCutaneous three times a day before meals  insulin lispro (HumaLOG) corrective regimen sliding scale   SubCutaneous at bedtime  insulin lispro Injectable (HumaLOG) 17 Unit(s) SubCutaneous before breakfast  insulin lispro Injectable (HumaLOG) 15 Unit(s) SubCutaneous before lunch  insulin lispro Injectable (HumaLOG) 11 Unit(s) SubCutaneous before dinner  insulin lispro Injectable (HumaLOG) 3 Unit(s) SubCutaneous <User Schedule>  milrinone Infusion 0.5 MICROgram(s)/kG/Min (11.1 mL/Hr) IV Continuous <Continuous>  predniSONE   Tablet 20 milliGRAM(s) Oral two times a day    MEDICATIONS  (PRN):  acetaminophen   Tablet. 650 milliGRAM(s) Oral every 6 hours PRN temp> 101  acetaminophen   Tablet. 650 milliGRAM(s) Oral every 6 hours PRN mild, moderate pain  dextrose Gel 1 Dose(s) Oral once PRN Blood Glucose LESS THAN 70 milliGRAM(s)/deciliter  glucagon  Injectable 1 milliGRAM(s) IntraMuscular once PRN Glucose LESS THAN 70 milligrams/deciliter  levalbuterol Inhalation 0.63 milliGRAM(s) Inhalation every 6 hours PRN bronchospasm       Vital Signs Last 24 Hrs  T(C): 36.4 (13 May 2018 07:22), Max: 36.5 (13 May 2018 06:00)  T(F): 97.6 (13 May 2018 07:22), Max: 97.7 (13 May 2018 06:00)  HR: 100 (13 May 2018 07:22) (100 - 113)  BP: 128/72 (13 May 2018 07:22) (123/70 - 138/69)  BP(mean): --  RR: 17 (13 May 2018 07:22) (17 - 18)  SpO2: 98% (13 May 2018 07:22) (96% - 100%)        I&O's Summary    12 May 2018 07:01  -  13 May 2018 07:00  --------------------------------------------------------  IN: 0 mL / OUT: 2655 mL / NET: -2655 mL        Physical Exam:   GENERAL: NAD, well-groomed, well-developed  HEENT: PAVEL/   Atraumatic, Normocephalic  ENMT: No tonsillar erythema, exudates, or enlargement; Moist mucous membranes, Good dentition, No lesions  NECK: Supple, No JVD, Normal thyroid  CHEST/LUNG: Clear to auscultation bilaterally; No rales, rhonchi, wheezing, or rubs  CVS: Regular rate and rhythm; No murmurs, rubs, or gallops  GI: : Soft, Nontender, Nondistended; Bowel sounds present  NERVOUS SYSTEM:  Alert & Oriented X3  EXTREMITIES:  2+  edema  LYMPH: No lymphadenopathy noted  SKIN: No rashes or lesions  ENDOCRINOLOGY: No Thyromegaly  PSYCH: Appropriate    Labs:                              12.2   8.59  )-----------( 366      ( 13 May 2018 06:50 )             41.2                         12.3   10.11 )-----------( 361      ( 12 May 2018 05:47 )             40.8                         11.2   10.19 )-----------( 317      ( 11 May 2018 05:50 )             38.4                         12.0   10.03 )-----------( 325      ( 10 May 2018 02:40 )             40.4     05-13    132<L>  |  91<L>  |  120<H>  ----------------------------<  621<HH>  5.5<H>   |  26  |  1.84<H>  05-12    135  |  95<L>  |  107<H>  ----------------------------<  285<H>  5.0   |  27  |  1.81<H>  05-11    134<L>  |  96<L>  |  87<H>  ----------------------------<  430<H>  5.2   |  27  |  1.72<H>  05-10    135  |  96<L>  |  73<H>  ----------------------------<  376<H>  5.0   |  24  |  1.52<H>  05-09    137  |  99  |  73<H>  ----------------------------<  134<H>  6.0<H>   |  29  |  1.66<H>  05-09    127<L>  |  98  |  68<H>  ----------------------------<  169<H>  6.8<HH>   |  17<L>  |  1.39<H>    Ca    8.6      13 May 2018 06:50  Ca    8.6      12 May 2018 05:47  Phos  4.8     05-13  Phos  4.8     05-12  Mg     2.0     05-13  Mg     2.0     05-12      CAPILLARY BLOOD GLUCOSE      POCT Blood Glucose.: 336 mg/dL (13 May 2018 12:14)  POCT Blood Glucose.: 520 mg/dL (13 May 2018 07:51)  POCT Blood Glucose.: 96 mg/dL (12 May 2018 21:45)  POCT Blood Glucose.: 89 mg/dL (12 May 2018 17:21)          D DImer    Cultures:           Wound culture:                05-09 @ 10:48  Organism --  Culture w/ gram stain --  Specimen Source BLOOD VENOUS    Wound culture:                05-09 @ 06:52  Organism --  Culture w/ gram stain --  Specimen Source BLOOD    Wound culture:                05-07 @ 18:56  Organism --  Culture w/ gram stain --  Specimen Source DRAINAGE      Abscess culture:             05-09 @ 10:48  Organism --  Gram Stain --  Specimen Source BLOOD VENOUS    Abscess culture:             05-09 @ 06:52  Organism --  Gram Stain --  Specimen Source BLOOD    Abscess culture:             05-07 @ 18:56  Organism --  Gram Stain --  Specimen Source DRAINAGE        Tissue culture:           05-09 @ 10:48  Organism --  Gram Stain --  Specimen Source BLOOD VENOUS    Tissue culture:           05-09 @ 06:52  Organism --  Gram Stain --  Specimen Source BLOOD    Tissue culture:           05-07 @ 18:56  Organism --  Gram Stain --  Specimen Source DRAINAGE      Body Fluid Smear & Culture:                        05-09 @ 10:48  AFB Smear  --  Culture Acid Fast Body Fluid w/ Smear  --  Culture Acid Fast Smear Concentrated   --    Culture Results:     --  Specimen Source BLOOD VENOUS    Body Fluid Smear & Culture:                        05-09 @ 06:52  AFB Smear  --  Culture Acid Fast Body Fluid w/ Smear  --  Culture Acid Fast Smear Concentrated   --    Culture Results:     --  Specimen Source BLOOD    Body Fluid Smear & Culture:                        05-07 @ 18:56  AFB Smear  --  Culture Acid Fast Body Fluid w/ Smear  --  Culture Acid Fast Smear Concentrated   --    Culture Results:     --  Specimen Source DRAINAGE      < from: Xray Chest 1 View- PORTABLE-Routine (05.09.18 @ 07:50) >  PRETATION:    Portable chest xray: Shortness of Breath    Comparison: Most recent prior     FINDINGS:    Lines/Tubes: None    Heart and mediastinum:  Unchanged.    Lungs, pleura, and airways: Patchy opacities bilaterally suggestive of   pulmonary edema. Small left effusion, stable    Bones and soft tissues: The bones and soft tissues are unchanged.    Impression:    Interstitial edema and left effusion, stable.            < from: US Duplex Venous Lower Ext Complete, Bilateral (05.03.18 @ 13:08) >    EXAM:  US DPLX LWR EXT VEINS COMPL BI        PROCEDURE DATE:  May  3 2018         INTERPRETATION:  CLINICAL INFORMATION: Bilateral lower extremity edema.    COMPARISON: None available.    TECHNIQUE: Duplex sonography of the BILATERAL LOWER extremities with   color and spectral Doppler, with and without compression.      FINDINGS:    There is normal compressibility of the bilateral common femoral, femoral   and popliteal veins. No calf vein thrombosis is detected.    Doppler examination shows normal spontaneous and phasic flow.    IMPRESSION:     No evidence of bilateral lower extremity deep venous thrombosis.                      YVETTE FOX M.D., ATTENDING RADIOLOGIST  This document has been electronically signed. May  3 2018  1:18PM    < end of copied text >        AUNG HITCHCOCK M.D., ATTENDING RADIOLOGIST  This document has been electronically signed. May  9 2018 11:52AM        < end of copied text >        Studies  Chest X-RAY  CT SCAN Chest   CT Abdomen  Venous Dopplers: LE:   Others

## 2018-05-13 NOTE — PROGRESS NOTE ADULT - SUBJECTIVE AND OBJECTIVE BOX
Subjective: Patient seen and examined. No new events except as noted.   feels ok   diuresed will this am   +dry mouth     REVIEW OF SYSTEMS:    CONSTITUTIONAL: + weakness, fevers or chills  EYES/ENT: No visual changes;  No vertigo or throat pain   NECK: No pain or stiffness  RESPIRATORY: No cough, wheezing, hemoptysis; No shortness of breath  CARDIOVASCULAR: No chest pain or palpitations  GASTROINTESTINAL: No abdominal or epigastric pain. No nausea, vomiting, or hematemesis; No diarrhea or constipation. No melena or hematochezia.  GENITOURINARY: No dysuria, frequency or hematuria  NEUROLOGICAL: No numbness or weakness  SKIN: No itching, burning, rashes, or lesions   All other review of systems is negative unless indicated above.    MEDICATIONS:  MEDICATIONS  (STANDING):  aspirin enteric coated 81 milliGRAM(s) Oral daily  atorvastatin 40 milliGRAM(s) Oral at bedtime  buMETAnide Infusion 1 mG/Hr (10 mL/Hr) IV Continuous <Continuous>  chlorhexidine 4% Liquid 1 Application(s) Topical <User Schedule>  dextrose 5%. 1000 milliLiter(s) (50 mL/Hr) IV Continuous <Continuous>  dextrose 50% Injectable 12.5 Gram(s) IV Push once  dextrose 50% Injectable 25 Gram(s) IV Push once  dextrose 50% Injectable 25 Gram(s) IV Push once  ferrous    sulfate 325 milliGRAM(s) Oral two times a day  fluticasone propionate   220 MICROgram(s) HFA Inhaler 1 Puff(s) Inhalation two times a day  heparin  Injectable 5000 Unit(s) SubCutaneous every 8 hours  insulin glargine Injectable (LANTUS) 25 Unit(s) SubCutaneous at bedtime  insulin lispro (HumaLOG) corrective regimen sliding scale   SubCutaneous three times a day before meals  insulin lispro (HumaLOG) corrective regimen sliding scale   SubCutaneous at bedtime  insulin lispro Injectable (HumaLOG) 17 Unit(s) SubCutaneous before breakfast  insulin lispro Injectable (HumaLOG) 15 Unit(s) SubCutaneous before lunch  insulin lispro Injectable (HumaLOG) 11 Unit(s) SubCutaneous before dinner  insulin lispro Injectable (HumaLOG) 3 Unit(s) SubCutaneous <User Schedule>  milrinone Infusion 0.5 MICROgram(s)/kG/Min (11.1 mL/Hr) IV Continuous <Continuous>  predniSONE   Tablet 20 milliGRAM(s) Oral two times a day      PHYSICAL EXAM:  T(C): 36.4 (05-13-18 @ 07:22), Max: 36.5 (05-13-18 @ 06:00)  HR: 100 (05-13-18 @ 07:22) (100 - 113)  BP: 128/72 (05-13-18 @ 07:22) (122/87 - 138/69)  RR: 17 (05-13-18 @ 07:22) (17 - 18)  SpO2: 98% (05-13-18 @ 07:22) (96% - 100%)  Wt(kg): --  I&O's Summary    12 May 2018 07:01  -  13 May 2018 07:00  --------------------------------------------------------  IN: 0 mL / OUT: 2655 mL / NET: -2655 mL          Appearance: Normal	  HEENT:   Normal oral mucosa, PERRL, EOMI	  Lymphatic: No lymphadenopathy , no edema  Cardiovascular: Normal S1 S2, No JVD, No murmurs ,+pericardial drain   Respiratory: Lungs clear to auscultation, normal effort 	  Gastrointestinal:  Soft, Non-tender, + BS	  Skin: No rashes, No ecchymoses, No cyanosis, warm to touch  Musculoskeletal: Normal range of motion, normal strength  Psychiatry:  Mood & affect appropriate  Ext: + edema      LABS:    CARDIAC MARKERS:                                12.2   8.59  )-----------( 366      ( 13 May 2018 06:50 )             41.2     05-13    132<L>  |  91<L>  |  120<H>  ----------------------------<  621<HH>  5.5<H>   |  26  |  1.84<H>    Ca    8.6      13 May 2018 06:50  Phos  4.8     05-13  Mg     2.0     05-13      proBNP:   Lipid Profile:   HgA1c:   TSH:             TELEMETRY: SR	    ECG:  	  RADIOLOGY:   DIAGNOSTIC TESTING:  [ ] Echocardiogram:  [ ]  Catheterization:  [ ] Stress Test:    OTHER:

## 2018-05-13 NOTE — PROGRESS NOTE ADULT - PROBLEM SELECTOR PLAN 1
Inpatient Plan  - IF REDUCING STEROIDS, PLEASE PAGE US AND NOTIFY US  - IF NEED HELP IN INSULIN ADJUSTMENT, PLEASE PAGE US AND INQUIRE  - Based on current regimen of Prednisone 20 mg BID   - Recommend Lantus 25 units QHS  - Recommend Humalog 17-15-11 and moderate ssi ac and hs.  - Please note Humalog pre-dinner has been significantly reduced to avoid low/normal FS at bedtime and reducing the need to hold Lantus doses.  - Discussed the importance of eating 3 meals with minimal snacks and if she choses to eat snack, discussed importance of healthy snack or the correct quantity of food to correct hypoglycemia type symptoms.  - Hyperglycemia is multifactorial with steroid induced, stress induced, and snack induced and we will do our best to manage it at this time. Correct insulin doses will also assist with hyperkalemia management    Outpatient Plan  - Basal/bolus, final doses to be determined  - F/u [address] [place] [address]  Please page us with questions or concerns or steroid dose changes.  Our service will follow      Connie Mendoza DO  Endocrine Fellow   Pager: [phone] Inpatient Plan  - IF REDUCING STEROIDS, PLEASE PAGE US AND NOTIFY US  - IF NEED HELP IN INSULIN ADJUSTMENT, PLEASE PAGE US AND INQUIRE  - Based on current regimen of Prednisone 20 mg BID   - Recommend Lantus 25 units QHS  - Recommend Humalog 17-15-11 and moderate ssi ac and hs.  - Recommend Humalog 3 units at 9pm IF eating a snack, hold if not eating snack  - Please note Humalog pre-dinner has been significantly reduced to avoid low/normal FS at bedtime and reducing the need to hold Lantus doses.  - Discussed the importance of eating 3 meals with minimal snacks.  - Hyperglycemia is multifactorial with steroid induced, stress induced, and snack   Outpatient Plan  - Basal/bolus, final doses to be determined  - F/u [address] [place] [address]  Please page us with questions or concerns or steroid dose changes.  Our service will follow      Connie Mendoza DO  Endocrine Fellow   Pager: [phone]

## 2018-05-14 LAB
BACTERIA BLD CULT: SIGNIFICANT CHANGE UP
BACTERIA BLD CULT: SIGNIFICANT CHANGE UP
GLUCOSE BLDC GLUCOMTR-MCNC: 162 MG/DL — HIGH (ref 70–99)
GLUCOSE BLDC GLUCOMTR-MCNC: 196 MG/DL — HIGH (ref 70–99)
GLUCOSE BLDC GLUCOMTR-MCNC: 273 MG/DL — HIGH (ref 70–99)
GLUCOSE BLDC GLUCOMTR-MCNC: 303 MG/DL — HIGH (ref 70–99)
GLUCOSE BLDC GLUCOMTR-MCNC: 370 MG/DL — HIGH (ref 70–99)

## 2018-05-14 PROCEDURE — 93010 ELECTROCARDIOGRAM REPORT: CPT | Mod: 77

## 2018-05-14 PROCEDURE — 93010 ELECTROCARDIOGRAM REPORT: CPT

## 2018-05-14 PROCEDURE — 12345: CPT | Mod: NC

## 2018-05-14 PROCEDURE — 99232 SBSQ HOSP IP/OBS MODERATE 35: CPT

## 2018-05-14 PROCEDURE — 99233 SBSQ HOSP IP/OBS HIGH 50: CPT

## 2018-05-14 RX ORDER — BENZOCAINE AND MENTHOL 5; 1 G/100ML; G/100ML
1 LIQUID ORAL
Qty: 0 | Refills: 0 | Status: DISCONTINUED | OUTPATIENT
Start: 2018-05-14 | End: 2018-05-20

## 2018-05-14 RX ORDER — INSULIN GLARGINE 100 [IU]/ML
32 INJECTION, SOLUTION SUBCUTANEOUS AT BEDTIME
Qty: 0 | Refills: 0 | Status: DISCONTINUED | OUTPATIENT
Start: 2018-05-14 | End: 2018-05-15

## 2018-05-14 RX ADMIN — ATORVASTATIN CALCIUM 40 MILLIGRAM(S): 80 TABLET, FILM COATED ORAL at 21:06

## 2018-05-14 RX ADMIN — Medication 325 MILLIGRAM(S): at 18:03

## 2018-05-14 RX ADMIN — INSULIN GLARGINE 32 UNIT(S): 100 INJECTION, SOLUTION SUBCUTANEOUS at 21:19

## 2018-05-14 RX ADMIN — HEPARIN SODIUM 5000 UNIT(S): 5000 INJECTION INTRAVENOUS; SUBCUTANEOUS at 14:34

## 2018-05-14 RX ADMIN — Medication 325 MILLIGRAM(S): at 05:16

## 2018-05-14 RX ADMIN — Medication 20 MILLIGRAM(S): at 18:03

## 2018-05-14 RX ADMIN — Medication 2: at 12:18

## 2018-05-14 RX ADMIN — Medication 1 PUFF(S): at 21:08

## 2018-05-14 RX ADMIN — Medication 10: at 08:02

## 2018-05-14 RX ADMIN — BUMETANIDE 10 MG/HR: 0.25 INJECTION INTRAMUSCULAR; INTRAVENOUS at 00:20

## 2018-05-14 RX ADMIN — HEPARIN SODIUM 5000 UNIT(S): 5000 INJECTION INTRAVENOUS; SUBCUTANEOUS at 21:08

## 2018-05-14 RX ADMIN — CHLORHEXIDINE GLUCONATE 1 APPLICATION(S): 213 SOLUTION TOPICAL at 09:39

## 2018-05-14 RX ADMIN — Medication 17 UNIT(S): at 08:03

## 2018-05-14 RX ADMIN — Medication 11 UNIT(S): at 18:03

## 2018-05-14 RX ADMIN — Medication 2: at 18:02

## 2018-05-14 RX ADMIN — Medication 4: at 22:58

## 2018-05-14 RX ADMIN — Medication 81 MILLIGRAM(S): at 12:18

## 2018-05-14 RX ADMIN — Medication 15 UNIT(S): at 12:19

## 2018-05-14 RX ADMIN — Medication 20 MILLIGRAM(S): at 05:15

## 2018-05-14 RX ADMIN — MILRINONE LACTATE 11.1 MICROGRAM(S)/KG/MIN: 1 INJECTION, SOLUTION INTRAVENOUS at 00:27

## 2018-05-14 RX ADMIN — HEPARIN SODIUM 5000 UNIT(S): 5000 INJECTION INTRAVENOUS; SUBCUTANEOUS at 05:16

## 2018-05-14 RX ADMIN — Medication 3 UNIT(S): at 21:19

## 2018-05-14 RX ADMIN — Medication 1 PUFF(S): at 09:40

## 2018-05-14 NOTE — PROGRESS NOTE ADULT - SUBJECTIVE AND OBJECTIVE BOX
Patient seen and examined at bedside on [address]    Overnight Events: No events overnight. On 5/11 Coreg and Lasix were D/C'ed and the patient was started on a Bumex gtt @ 1. On 5/12 Milrinone was started at 0.5. Cardiac MRI has not been ordered.    Review Of Systems: No chest pain, shortness of breath, or palpitations. She reports he legs are less swollen. She states she has a, "stuffy nose" and "sore throat" and is "sore all over."           Current Meds:  acetaminophen   Tablet. 650 milliGRAM(s) Oral every 6 hours PRN  acetaminophen   Tablet. 650 milliGRAM(s) Oral every 6 hours PRN  aspirin enteric coated 81 milliGRAM(s) Oral daily  atorvastatin 40 milliGRAM(s) Oral at bedtime  benzocaine 15 mG/menthol 3.6 mG Lozenge 1 Lozenge Oral two times a day PRN  buMETAnide Infusion 1 mG/Hr IV Continuous <Continuous>  ferrous    sulfate 325 milliGRAM(s) Oral two times a day  fluticasone propionate   220 MICROgram(s) HFA Inhaler 1 Puff(s) Inhalation two times a day  heparin  Injectable 5000 Unit(s) SubCutaneous every 8 hours  insulin glargine Injectable (LANTUS) 25 Unit(s) SubCutaneous at bedtime  insulin lispro (HumaLOG) corrective regimen sliding scale   SubCutaneous three times a day before meals  insulin lispro (HumaLOG) corrective regimen sliding scale   SubCutaneous at bedtime  insulin lispro Injectable (HumaLOG) 17 Unit(s) SubCutaneous before breakfast  insulin lispro Injectable (HumaLOG) 15 Unit(s) SubCutaneous before lunch  insulin lispro Injectable (HumaLOG) 11 Unit(s) SubCutaneous before dinner  insulin lispro Injectable (HumaLOG) 3 Unit(s) SubCutaneous <User Schedule>  levalbuterol Inhalation 0.63 milliGRAM(s) Inhalation every 6 hours PRN  milrinone Infusion 0.5 MICROgram(s)/kG/Min IV Continuous <Continuous>  predniSONE   Tablet 20 milliGRAM(s) Oral two times a day    Vitals:  T(F): 97.6 (05-14), Max: 97.8 (05-13)  HR: 105 (05-14) (98 - 131)  BP: 133/63 (05-14) (115/58 - 137/65)  RR: 17 (05-14)  SpO2: 97% on RA    I&O's Summary    13 May 2018 07:01  -  14 May 2018 07:00  --------------------------------------------------------  IN: 253.2 mL / OUT: 1647 mL / NET: -1393.8 mL    47cc out from the pericardial drain    Weight: daily weights are not being documented?    Physical Exam:  Appearance: No acute distress; well appearing, sitting up in bed, breathing comfortably on RA  Eyes: PERRL, EOMI, pink conjunctiva  HENT: Normal oral mucosa  Cardiovascular: tachy, regular, S1, S2, no murmurs, rubs, or gallops; 2+ b/l LE; no JVD, b/l LE are warm to touch  Respiratory: LLL rales, otherwise clear  Gastrointestinal: soft, non-tender, non-distended with normal bowel sounds  Musculoskeletal: No clubbing; no joint deformity   Neurologic: Non-focal  Lymphatic: No lymphadenopathy  Psychiatry: AAOx3, mood & affect appropriate  Skin: No rashes, ecchymoses, or cyanosis  Pericardial drain in place                        12.2   8.59  )-----------( 366      ( 13 May 2018 06:50 )             41.2     05-13    132<L>  |  91<L>  |  120<H>  ----------------------------<  621<HH>  5.5<H>   |  26  |  1.84<H> - Cr continues to uptrend    K not hemolyzed    Ca    8.6      13 May 2018 06:50  Phos  4.8     05-13  Mg     2.0     05-13    Cardiac MRI not ordered    Interpretation of Telemetry: Sinus 100s - 130s

## 2018-05-14 NOTE — PROGRESS NOTE ADULT - PROBLEM SELECTOR PLAN 1
Inpatient Plan  - IF REDUCING STEROIDS, PLEASE PAGE US AND NOTIFY US  - IF NEED HELP IN INSULIN ADJUSTMENT, PLEASE PAGE US AND INQUIRE  - Based on current regimen of Prednisone 20 mg BID   - Recommend Lantus 32 units QHS  - Recommend Humalog 17-15-11 and moderate ssi ac and hs.  - Recommend Humalog 3 units at 9pm IF eating a snack, hold if not eating snack  - Please note Humalog pre-dinner has been significantly reduced to avoid low/normal FS at bedtime. PLease do not hold Lantus dose for low or normoglycemia.  If uncomfortable giving a full dose, recommend decreasing by 20%.  - Discussed the importance of eating 3 meals with minimal snacks.  - Hyperglycemia is multifactorial with steroid induced, stress induced, and snack   Outpatient Plan  - Basal/bolus, final doses to be determined  - F/u [address] [place] [address]  Please page us with questions or concerns or steroid dose changes.  Our service will follow Inpatient Plan  - IF REDUCING STEROIDS, PLEASE PAGE US AND NOTIFY US  - IF NEED HELP IN INSULIN ADJUSTMENT, PLEASE PAGE US AND INQUIRE  - Based on current regimen of Prednisone 20 mg BID   - Recommend Lantus 32 units QHS  - Recommend Humalog 17-15-11 and moderate ssi ac and hs.  - Recommend Humalog 3 units at 9pm IF eating a snack, hold if not eating snack  - Please note Humalog pre-dinner has been significantly reduced to avoid low/normal FS at bedtime. PLease do not hold Lantus dose for low or normoglycemia.  If uncomfortable giving a full dose, recommend decreasing by 20%.  - Discussed the importance of eating 3 meals with minimal snacks.  - Hyperglycemia is multifactorial with steroid induced, stress induced, and snack   Outpatient Plan  - Basal/bolus, final doses to be determined- PATIENT NEEDS TO BE EDUCATED ON INSULIN PENS.  Please have nursing teach.  Oroginal order for pen teaching placed 5/4  - F/u [address] [facility]  Please page us with questions or concerns or steroid dose changes.  Our service will follow

## 2018-05-14 NOTE — PROGRESS NOTE ADULT - SUBJECTIVE AND OBJECTIVE BOX
No pain, no shortness of breath      VITAL:  T(C): , Max: 36.6 (05-13-18 @ 14:01)  T(F): , Max: 97.8 (05-13-18 @ 14:01)  HR: 105 (05-14-18 @ 07:00)  BP: 133/63 (05-14-18 @ 07:00)  RR: 17 (05-14-18 @ 07:00)  SpO2: 97% (05-14-18 @ 07:00)      PHYSICAL EXAM:  Constitutional: NAD; Alert  HEENT:  NCAT; DMM  Neck: (+) JVD; supple  Respiratory: CTA-b/l  Cardiac: RRR s1s2; (+)pericardial drain  Gastrointestinal: BS+, soft, NT/ND  Urologic: No barfield  Extremities: 3+ b/l LE edema  Back: No CVAT b/l      LABS:                        12.2   8.59  )-----------( 366      ( 13 May 2018 06:50 )             41.2     Na(132)/K(5.5)/Cl(91)/HCO3(26)/BUN(120)/Cr(1.84)Glu(621)/Ca(8.6)/Mg(2.0)/PO4(4.8)    05-13 @ 06:50  Na(135)/K(5.0)/Cl(95)/HCO3(27)/BUN(107)/Cr(1.81)Glu(285)/Ca(8.6)/Mg(2.0)/PO4(4.8)    05-12 @ 05:47  Creatinine, Random Urine: 20.62 mg/dL (05-13 @ 09:25)      IMPRESSION: 52F w/ DM2, sarcoidosis, HFrEF, LV thrombus on Xarelto, and CKD3, 5/3/18 a/w acute on chronic HFrEF/pericardial effusion requiring drainage    (1)Renal - CKD3 - ~2gm proteinuria it appears; presumed diabetic nephropathy - superimposed YANA; prerenally mediated in setting of obligate diuresis; BUN uptrending and creatinine rather stable, as of 5/13 - labs today are pending. Rise in BUN not only from diuresis but also from steroids.     (2)Hyperkalemia - largely a transient phenomenon from cellular shifts from hypoinsulinemia. Mainstay of therapy for this for now would be improving glycemic control. Cutting back steroids may allow us to achieve this objective. Pulmonary (Dr. Emerson) now on board.    (3)CV - resolving acute on chronic HFrEF      RECOMMEND:  (1)Milrinone + bumex as ordered; Is<Os as able  (2)Low K+ diet/Nepro as ordered  (3)Kayexalate prn K of 5.7 or higher (no Kayexalate today)  (4)BMP daily  (5)F/U with Dr. Emerson regarding potential cutback in steroids  (6)Endo evaluation if needed to improve glycemic control          Jair Lion MD  [facility]  [phone] C/O sore legs, sore throat. No SOB.      VITAL:  T(C): , Max: 36.6 (05-13-18 @ 14:01)  T(F): , Max: 97.8 (05-13-18 @ 14:01)  HR: 105 (05-14-18 @ 07:00)  BP: 133/63 (05-14-18 @ 07:00)  RR: 17 (05-14-18 @ 07:00)  SpO2: 97% (05-14-18 @ 07:00)  UO 1600cc/24h    PHYSICAL EXAM:  Constitutional: NAD; Alert  HEENT:  NCAT; DMM  Neck: (+) JVD; supple  Respiratory: CTA-b/l  Cardiac: RRR s1s2; (+)pericardial drain  Gastrointestinal: BS+, soft, NT/ND  Urologic: No barfield  Extremities: 2+ b/l LE edema  Back: No CVAT b/l      LABS:                        12.2   8.59  )-----------( 366      ( 13 May 2018 06:50 )             41.2     Na(132)/K(5.5)/Cl(91)/HCO3(26)/BUN(120)/Cr(1.84)Glu(621)/Ca(8.6)/Mg(2.0)/PO4(4.8)    05-13 @ 06:50  Na(135)/K(5.0)/Cl(95)/HCO3(27)/BUN(107)/Cr(1.81)Glu(285)/Ca(8.6)/Mg(2.0)/PO4(4.8)    05-12 @ 05:47  Creatinine, Random Urine: 20.62 mg/dL (05-13 @ 09:25)      IMPRESSION: 52F w/ DM2, sarcoidosis, HFrEF, LV thrombus on Xarelto, and CKD3, 5/3/18 a/w acute on chronic HFrEF/pericardial effusion requiring drainage    (1)Renal - CKD3 - ~2gm proteinuria it appears; presumed diabetic nephropathy - superimposed YANA; prerenally mediated in setting of obligate diuresis; BUN uptrending and creatinine rather stable, as of 5/13 - labs today are pending. Rise in BUN not only from diuresis but also from steroids.     (2)Hyperkalemia - largely a transient phenomenon from cellular shifts from hypoinsulinemia. Mainstay of therapy for this for now would be improving glycemic control. Cutting back steroids may allow us to achieve this objective. Pulmonary (Dr. Emerson) now on board.    (3)CV - resolving acute on chronic HFrEF      RECOMMEND:  (1)Milrinone + bumex as ordered; Is<Os as able  (2)Low K+ diet/Nepro as ordered  (3)Kayexalate prn K of 5.7 or higher   (4)BMP daily  (5)F/U with Dr. Emerson regarding potential cutback in steroids  (6)Endo evaluation if needed to improve glycemic control  (7)Cepacol prn for sore throat        Jair Lion MD  [facility]  [phone]

## 2018-05-14 NOTE — PROGRESS NOTE ADULT - ASSESSMENT
52F w/ PMHx of CAD (s/p PCI to mLAD 4/2017), chronic HFrEF (likely 2/2 NICM + ICM, NYHA III, w/ LV thrombus - on Xarelto as outpt), COPD (on 2L NC @ home), Sarcoidosis, DM2 (HbA1c:14.3) who presented on 5/3 w/ LE edema - admitted w/ acute on chronic HFrEF exacerbation and found to have a large pericardial effusion w/ tamponade physiology (s/p pericardial drain that is still in place).     EP called to evaluate patient given LVEF:19% (pt was on B-blocker and ARB as outpt). She is still clinically hypervolemic, on Bumex and Milrinone gtts w/ uptrending Cr, poor glycemic control, a pericardial drain that has put out 47cc the patient 24hrs.      Plan:    1. acute on chronic HFrEF (LVEF:19%) - likely 2/2 NICM + ICM  -given that the patient was on ARB + B-blocker as an outpatient and LVEF 19%, pt last revascularized 4/2017 (that PCI was driven by a viability study per Dr. Berger) will consider AICD implantation this admission when pt more optimized  -Would prefer pericardial drain remove and pt more optimized from a volume standpoint with improved glycemic control (improved would healing)  -c/w Medical optimization of HF per general Cardiology  -Monitor on Tele, K>4, Mg>2  -check cardiac MRI - is there myocardial involvement of the sarcoid? - the patient's tachycardia (sinus) while on Milrinone will likely make performing the cMR difficult at this time    EP will follow    SONDRA Sanford  Cardiology Fellow  (p): [phone]

## 2018-05-14 NOTE — PROGRESS NOTE ADULT - SUBJECTIVE AND OBJECTIVE BOX
Chief Complaint/Follow-up on: Uncontrolled T2DM    Subjective: 53 y/o female with uncontrolled T2DM, sarcoidosis on Prednisone 20mg BID since May 3, acute on chronic heart failure, YANA on CKD3 with hyperkalemia. States that she ate 1/2 roast beef sandwich yesterday night and a pice of pound cake. She worries about HS hypoglycemia and refuses Lantus, eats snack and gets PM Steroid dose which leads to fasting hyperglycemia.     MEDICATIONS  (STANDING):  aspirin enteric coated 81 milliGRAM(s) Oral daily  atorvastatin 40 milliGRAM(s) Oral at bedtime  buMETAnide Infusion 1 mG/Hr (10 mL/Hr) IV Continuous <Continuous>  chlorhexidine 4% Liquid 1 Application(s) Topical <User Schedule>  dextrose 5%. 1000 milliLiter(s) (50 mL/Hr) IV Continuous <Continuous>  dextrose 50% Injectable 12.5 Gram(s) IV Push once  dextrose 50% Injectable 25 Gram(s) IV Push once  dextrose 50% Injectable 25 Gram(s) IV Push once  ferrous    sulfate 325 milliGRAM(s) Oral two times a day  fluticasone propionate   220 MICROgram(s) HFA Inhaler 1 Puff(s) Inhalation two times a day  heparin  Injectable 5000 Unit(s) SubCutaneous every 8 hours  insulin glargine Injectable (LANTUS) 25 Unit(s) SubCutaneous at bedtime  insulin lispro (HumaLOG) corrective regimen sliding scale   SubCutaneous three times a day before meals  insulin lispro (HumaLOG) corrective regimen sliding scale   SubCutaneous at bedtime  insulin lispro Injectable (HumaLOG) 17 Unit(s) SubCutaneous before breakfast  insulin lispro Injectable (HumaLOG) 15 Unit(s) SubCutaneous before lunch  insulin lispro Injectable (HumaLOG) 11 Unit(s) SubCutaneous before dinner  insulin lispro Injectable (HumaLOG) 3 Unit(s) SubCutaneous <User Schedule>  milrinone Infusion 0.5 MICROgram(s)/kG/Min (11.1 mL/Hr) IV Continuous <Continuous>  predniSONE   Tablet 20 milliGRAM(s) Oral two times a day    MEDICATIONS  (PRN):  acetaminophen   Tablet. 650 milliGRAM(s) Oral every 6 hours PRN temp> 101  acetaminophen   Tablet. 650 milliGRAM(s) Oral every 6 hours PRN mild, moderate pain  dextrose Gel 1 Dose(s) Oral once PRN Blood Glucose LESS THAN 70 milliGRAM(s)/deciliter  glucagon  Injectable 1 milliGRAM(s) IntraMuscular once PRN Glucose LESS THAN 70 milligrams/deciliter  levalbuterol Inhalation 0.63 milliGRAM(s) Inhalation every 6 hours PRN bronchospasm      PHYSICAL EXAM:  Vital Signs Last 24 Hrs  T(C): 36.4 (14 May 2018 07:00), Max: 36.6 (13 May 2018 14:01)  T(F): 97.6 (14 May 2018 07:00), Max: 97.8 (13 May 2018 14:01)  HR: 105 (14 May 2018 07:00) (98 - 131)  BP: 133/63 (14 May 2018 07:00) (115/58 - 137/65)  BP(mean): --  RR: 17 (14 May 2018 07:00) (17 - 18)  SpO2: 97% (14 May 2018 07:00) (96% - 99%)    GENERAL: NAD, well-groomed, well-developed, thin female  EYES: No proptosis, no injection  HEENT:  Atraumatic, Normocephalic, moist mucous membranes  RESPIRATORY: Clear to auscultation bilaterally; No rales, rhonchi, wheezing, or rubs  CARDIOVASCULAR: Regular rate and rhythm; No murmurs; +3 pitting peripheral edema  GI: Soft, nontender, non distended, normal bowel sounds  CUSHING'S SIGNS: no striae    CAPILLARY BLOOD GLUCOSE  POCT Blood Glucose.: 196 mg/dL (14 May 2018 11:55)  POCT Blood Glucose.: 370 mg/dL (14 May 2018 07:44)  POCT Blood Glucose.: 111 mg/dL (13 May 2018 22:02)  POCT Blood Glucose.: 108 mg/dL (13 May 2018 16:58)  POCT Blood Glucose.: 336 mg/dL (13 May 2018 12:14)    POCT Blood Glucose.: 520 mg/dL (05-13-18 @ 07:51) H17+H12    POCT Blood Glucose.: 96 mg/dL (05-12-18 @ 21:45) xLantus  POCT Blood Glucose.: 89 mg/dL (05-12-18 @ 17:21) H17  POCT Blood Glucose.: 153 mg/dL (05-12-18 @ 11:59) H17+2  POCT Blood Glucose.: 285 mg/dL (05-12-18 @ 07:49) H17+6    POCT Blood Glucose.: 78 mg/dL (05-11-18 @ 21:03) xLantus  POCT Blood Glucose.: 94 mg/dL (05-11-18 @ 16:55) H17  POCT Blood Glucose.: 224 mg/dL (05-11-18 @ 12:00) H15+4  POCT Blood Glucose.: 451 mg/dL (05-11-18 @ 08:48) H15+12  POCT Blood Glucose.: 439 mg/dL (05-11-18 @ 08:35)    POCT Blood Glucose.: 91 mg/dL (05-10-18 @ 21:39)  POCT Blood Glucose.: 177 mg/dL (05-10-18 @ 18:24)  POCT Blood Glucose.: 272 mg/dL (05-10-18 @ 13:10)    05-13    132<L>  |  91<L>  |  120<H>  ----------------------------<  621<HH>  5.5<H>   |  26  |  1.84<H>    EGFR if : 36  EGFR if non : 31    Ca    8.6      05-13  Mg     2.0     05-13  Phos  4.8     05-13      Thyroid Function Tests:  05-03 @ 16:59   TSH 6.40   FreeT4 -- T3 -- Anti TPO -- Anti Thyroglobulin Ab -- TSI --      Hemoglobin A1C, Whole Blood: 14.3 % <H> [4.0 - 5.6] (05-04-18 @ 06:45)  Hemoglobin A1C, Whole Blood: 14.3 % <H> [4.0 - 5.6] (05-03-18 @ 16:59) Chief Complaint/Follow-up on: Uncontrolled T2DM    Subjective: 53 y/o female with uncontrolled T2DM, sarcoidosis on Prednisone 20mg BID since May 3, acute on chronic heart failure, YANA on CKD3 with hyperkalemia. reports her appetite has improved.      MEDICATIONS  (STANDING):  aspirin enteric coated 81 milliGRAM(s) Oral daily  atorvastatin 40 milliGRAM(s) Oral at bedtime  buMETAnide Infusion 1 mG/Hr (10 mL/Hr) IV Continuous <Continuous>  chlorhexidine 4% Liquid 1 Application(s) Topical <User Schedule>  dextrose 5%. 1000 milliLiter(s) (50 mL/Hr) IV Continuous <Continuous>  dextrose 50% Injectable 12.5 Gram(s) IV Push once  dextrose 50% Injectable 25 Gram(s) IV Push once  dextrose 50% Injectable 25 Gram(s) IV Push once  ferrous    sulfate 325 milliGRAM(s) Oral two times a day  fluticasone propionate   220 MICROgram(s) HFA Inhaler 1 Puff(s) Inhalation two times a day  heparin  Injectable 5000 Unit(s) SubCutaneous every 8 hours  insulin glargine Injectable (LANTUS) 25 Unit(s) SubCutaneous at bedtime  insulin lispro (HumaLOG) corrective regimen sliding scale   SubCutaneous three times a day before meals  insulin lispro (HumaLOG) corrective regimen sliding scale   SubCutaneous at bedtime  insulin lispro Injectable (HumaLOG) 17 Unit(s) SubCutaneous before breakfast  insulin lispro Injectable (HumaLOG) 15 Unit(s) SubCutaneous before lunch  insulin lispro Injectable (HumaLOG) 11 Unit(s) SubCutaneous before dinner  insulin lispro Injectable (HumaLOG) 3 Unit(s) SubCutaneous <User Schedule>  milrinone Infusion 0.5 MICROgram(s)/kG/Min (11.1 mL/Hr) IV Continuous <Continuous>  predniSONE   Tablet 20 milliGRAM(s) Oral two times a day    MEDICATIONS  (PRN):  acetaminophen   Tablet. 650 milliGRAM(s) Oral every 6 hours PRN temp> 101  acetaminophen   Tablet. 650 milliGRAM(s) Oral every 6 hours PRN mild, moderate pain  dextrose Gel 1 Dose(s) Oral once PRN Blood Glucose LESS THAN 70 milliGRAM(s)/deciliter  glucagon  Injectable 1 milliGRAM(s) IntraMuscular once PRN Glucose LESS THAN 70 milligrams/deciliter  levalbuterol Inhalation 0.63 milliGRAM(s) Inhalation every 6 hours PRN bronchospasm      PHYSICAL EXAM:  Vital Signs Last 24 Hrs  T(C): 36.4 (14 May 2018 07:00), Max: 36.6 (13 May 2018 14:01)  T(F): 97.6 (14 May 2018 07:00), Max: 97.8 (13 May 2018 14:01)  HR: 105 (14 May 2018 07:00) (98 - 131)  BP: 133/63 (14 May 2018 07:00) (115/58 - 137/65)  BP(mean): --  RR: 17 (14 May 2018 07:00) (17 - 18)  SpO2: 97% (14 May 2018 07:00) (96% - 99%)    GENERAL: NAD, well-groomed, well-developed, thin female  EYES: No proptosis, no injection  HEENT:  Atraumatic, Normocephalic, moist mucous membranes  RESPIRATORY: Clear to auscultation bilaterally; No rales, rhonchi, wheezing, or rubs  CARDIOVASCULAR: Regular rate and rhythm; No murmurs; +3 pitting peripheral edema  GI: Soft, nontender, non distended, normal bowel sounds  CUSHING'S SIGNS: no striae    CAPILLARY BLOOD GLUCOSE  POCT Blood Glucose.: 196 mg/dL (14 May 2018 11:55)  POCT Blood Glucose.: 370 mg/dL (14 May 2018 07:44)  POCT Blood Glucose.: 111 mg/dL (13 May 2018 22:02)  POCT Blood Glucose.: 108 mg/dL (13 May 2018 16:58)  POCT Blood Glucose.: 336 mg/dL (13 May 2018 12:14)    POCT Blood Glucose.: 520 mg/dL (05-13-18 @ 07:51) H17+H12    POCT Blood Glucose.: 96 mg/dL (05-12-18 @ 21:45) xLantus  POCT Blood Glucose.: 89 mg/dL (05-12-18 @ 17:21) H17  POCT Blood Glucose.: 153 mg/dL (05-12-18 @ 11:59) H17+2  POCT Blood Glucose.: 285 mg/dL (05-12-18 @ 07:49) H17+6    POCT Blood Glucose.: 78 mg/dL (05-11-18 @ 21:03) xLantus  POCT Blood Glucose.: 94 mg/dL (05-11-18 @ 16:55) H17  POCT Blood Glucose.: 224 mg/dL (05-11-18 @ 12:00) H15+4  POCT Blood Glucose.: 451 mg/dL (05-11-18 @ 08:48) H15+12  POCT Blood Glucose.: 439 mg/dL (05-11-18 @ 08:35)    POCT Blood Glucose.: 91 mg/dL (05-10-18 @ 21:39)  POCT Blood Glucose.: 177 mg/dL (05-10-18 @ 18:24)  POCT Blood Glucose.: 272 mg/dL (05-10-18 @ 13:10)    05-13    132<L>  |  91<L>  |  120<H>  ----------------------------<  621<HH>  5.5<H>   |  26  |  1.84<H>    EGFR if : 36  EGFR if non : 31    Ca    8.6      05-13  Mg     2.0     05-13  Phos  4.8     05-13      Thyroid Function Tests:  05-03 @ 16:59   TSH 6.40   FreeT4 -- T3 -- Anti TPO -- Anti Thyroglobulin Ab -- TSI --      Hemoglobin A1C, Whole Blood: 14.3 % <H> [4.0 - 5.6] (05-04-18 @ 06:45)  Hemoglobin A1C, Whole Blood: 14.3 % <H> [4.0 - 5.6] (05-03-18 @ 16:59)

## 2018-05-14 NOTE — PROGRESS NOTE ADULT - SUBJECTIVE AND OBJECTIVE BOX
Patient is a 52y old  Female who presents with a chief complaint of LE edema/pain and SOB (09 May 2018 11:57)      Any change in ROS: [phone]: [facility] : pulmonary division left message    pt says she has asthma, copd as well as sarcoidosis and her lungs are damaged secondary to sarcoidosis:     MEDICATIONS  (STANDING):  aspirin enteric coated 81 milliGRAM(s) Oral daily  atorvastatin 40 milliGRAM(s) Oral at bedtime  buMETAnide Infusion 1 mG/Hr (10 mL/Hr) IV Continuous <Continuous>  chlorhexidine 4% Liquid 1 Application(s) Topical <User Schedule>  dextrose 5%. 1000 milliLiter(s) (50 mL/Hr) IV Continuous <Continuous>  dextrose 50% Injectable 12.5 Gram(s) IV Push once  dextrose 50% Injectable 25 Gram(s) IV Push once  dextrose 50% Injectable 25 Gram(s) IV Push once  ferrous    sulfate 325 milliGRAM(s) Oral two times a day  fluticasone propionate   220 MICROgram(s) HFA Inhaler 1 Puff(s) Inhalation two times a day  heparin  Injectable 5000 Unit(s) SubCutaneous every 8 hours  insulin glargine Injectable (LANTUS) 32 Unit(s) SubCutaneous at bedtime  insulin lispro (HumaLOG) corrective regimen sliding scale   SubCutaneous three times a day before meals  insulin lispro (HumaLOG) corrective regimen sliding scale   SubCutaneous at bedtime  insulin lispro Injectable (HumaLOG) 17 Unit(s) SubCutaneous before breakfast  insulin lispro Injectable (HumaLOG) 15 Unit(s) SubCutaneous before lunch  insulin lispro Injectable (HumaLOG) 11 Unit(s) SubCutaneous before dinner  insulin lispro Injectable (HumaLOG) 3 Unit(s) SubCutaneous <User Schedule>  milrinone Infusion 0.5 MICROgram(s)/kG/Min (11.1 mL/Hr) IV Continuous <Continuous>  predniSONE   Tablet 20 milliGRAM(s) Oral two times a day    MEDICATIONS  (PRN):  acetaminophen   Tablet. 650 milliGRAM(s) Oral every 6 hours PRN temp> 101  acetaminophen   Tablet. 650 milliGRAM(s) Oral every 6 hours PRN mild, moderate pain  benzocaine 15 mG/menthol 3.6 mG Lozenge 1 Lozenge Oral two times a day PRN Sore Throat  dextrose Gel 1 Dose(s) Oral once PRN Blood Glucose LESS THAN 70 milliGRAM(s)/deciliter  glucagon  Injectable 1 milliGRAM(s) IntraMuscular once PRN Glucose LESS THAN 70 milligrams/deciliter  levalbuterol Inhalation 0.63 milliGRAM(s) Inhalation every 6 hours PRN bronchospasm    Vital Signs Last 24 Hrs  T(C): 36.4 (14 May 2018 07:00), Max: 36.6 (13 May 2018 14:01)  T(F): 97.6 (14 May 2018 07:00), Max: 97.8 (13 May 2018 14:01)  HR: 105 (14 May 2018 07:00) (98 - 131)  BP: 133/63 (14 May 2018 07:00) (115/58 - 137/65)  BP(mean): --  RR: 17 (14 May 2018 07:00) (17 - 18)  SpO2: 97% (14 May 2018 07:00) (96% - 99%)    I&O's Summary    13 May 2018 07:01  -  14 May 2018 07:00  --------------------------------------------------------  IN: 253.2 mL / OUT: 1647 mL / NET: -1393.8 mL          Physical Exam:   GENERAL: NAD, well-groomed, well-developed  HEENT: PAVEL/   Atraumatic, Normocephalic  ENMT: No tonsillar erythema, exudates, or enlargement; Moist mucous membranes, Good dentition, No lesions  NECK: Supple, No JVD, Normal thyroid  CHEST/LUNG: Crackles +  CVS: Regular rate and rhythm; No murmurs, rubs, or gallops  GI: : Soft, Nontender, Nondistended; Bowel sounds present  NERVOUS SYSTEM:  Alert & Oriented X3  EXTREMITIES:  2+ Peripheral Pulses, No clubbing, cyanosis, or edema  LYMPH: No lymphadenopathy noted  SKIN: No rashes or lesions  ENDOCRINOLOGY: No Thyromegaly  PSYCH: Appropriate    Labs:                              12.2   8.59  )-----------( 366      ( 13 May 2018 06:50 )             41.2                         12.3   10.11 )-----------( 361      ( 12 May 2018 05:47 )             40.8                         11.2   10.19 )-----------( 317      ( 11 May 2018 05:50 )             38.4     05-13    132<L>  |  91<L>  |  120<H>  ----------------------------<  621<HH>  5.5<H>   |  26  |  1.84<H>  05-12    135  |  95<L>  |  107<H>  ----------------------------<  285<H>  5.0   |  27  |  1.81<H>  05-11    134<L>  |  96<L>  |  87<H>  ----------------------------<  430<H>  5.2   |  27  |  1.72<H>    Ca    8.6      13 May 2018 06:50  Phos  4.8     05-13  Mg     2.0     05-13      CAPILLARY BLOOD GLUCOSE      POCT Blood Glucose.: 196 mg/dL (14 May 2018 11:55)  POCT Blood Glucose.: 370 mg/dL (14 May 2018 07:44)  POCT Blood Glucose.: 111 mg/dL (13 May 2018 22:02)  POCT Blood Glucose.: 108 mg/dL (13 May 2018 16:58)     (05-10 @ 09:40)          Fluid Source --  Albumin, Fluid--  Glucose, Fluid--  Protein total, Fluid--  Lacatate Dehydrogenase, Fluid--  pH, Fluid--  Cytopathology-Non Gyn Report  ACCESSION No:  [id number]    PEYMAN WATTS                        1        Cytopathology Report            Specimen(s) Submitted  PERICARDIAL FLUID      Clinical History  51 y/o female PMH HTN, DM, HLD, Sarcoidosis, HF EF, CAD COPD, pw  heart failure found to have Large Pericardial Effusion.      Gross Description  Received: 30 ml of bloody unfixed fluid  Prepared: 1 ThinPrep slide, 1 smear and 1 cell block      Final Diagnosis  PERICARDIAL FLUID  NEGATIVE FOR MALIGNANT CELLS.    The cytology slides and cell block consist of acute inflammation,  histiocytes and few benign mesothelial cells in a  background of proteinaceous material.    Screened by: Shar ERNANDEZ(ASCP)  Verified by: Dieudonne German M.D.  (Electronic Signature)  Reported on: 05/12/18 15:27 EDT, [address] [place] [address]  [id number]  Cytology technical processing performed at [address] [place] [id number]  _________________________________________________________________  Cultures:           Wound culture:                05-09 @ 10:48  Organism --  Culture w/ gram stain --  Specimen Source BLOOD VENOUS    Wound culture:                05-09 @ 06:52  Organism --  Culture w/ gram stain --  Specimen Source BLOOD    Wound culture:                05-07 @ 18:56  Organism --  Culture w/ gram stain --  Specimen Source DRAINAGE      Abscess culture:             05-09 @ 10:48  Organism --  Gram Stain --  Specimen Source BLOOD VENOUS    Abscess culture:             05-09 @ 06:52  Organism --  Gram Stain --  Specimen Source BLOOD    Abscess culture:             05-07 @ 18:56  Organism --  Gram Stain --  Specimen Source DRAINAGE        Tissue culture:           05-09 @ 10:48  Organism --  Gram Stain --  Specimen Source BLOOD VENOUS    Tissue culture:           05-09 @ 06:52  Organism --  Gram Stain --  Specimen Source BLOOD    Tissue culture:           05-07 @ 18:56  Organism --  Gram Stain --  Specimen Source DRAINAGE      Body Fluid Smear & Culture:                        05-09 @ 10:48  AFB Smear  --  Culture Acid Fast Body Fluid w/ Smear  --  Culture Acid Fast Smear Concentrated   --    Culture Results:     --  Specimen Source BLOOD VENOUS    Body Fluid Smear & Culture:                        05-09 @ 06:52  AFB Smear  --  Culture Acid Fast Body Fluid w/ Smear  --  Culture Acid Fast Smear Concentrated   --    Culture Results:     --  Specimen Source BLOOD    Body Fluid Smear & Culture:                        05-07 @ 18:56  AFB Smear  --  Culture Acid Fast Body Fluid w/ Smear  --  Culture Acid Fast Smear Concentrated   --    Culture Results:     --  Specimen Source DRAINAGE              Studies  Chest X-RAY  CT SCAN Chest   Venous Dopplers: LE:   CT Abdomen  Others      < from: Xray Chest 1 View- PORTABLE-Routine (05.09.18 @ 07:50) >  PROCEDURE DATE:  May  9 2018         INTERPRETATION:    Portable chest xray: Shortness of Breath    Comparison: Most recent prior     FINDINGS:    Lines/Tubes: None    Heart and mediastinum:  Unchanged.    Lungs, pleura, and airways: Patchy opacities bilaterally suggestive of   pulmonary edema. Small left effusion, stable    Bones and soft tissues: The bones and soft tissues are unchanged.    Impression:    Interstitial edema and left effusion, stable.                  AUNG HITCHCOCK M.D., ATTENDING RADIOLOGIST  This document has been electronically signed. May  9 2018 11:52AM        < end of copied text >

## 2018-05-14 NOTE — PROGRESS NOTE ADULT - SUBJECTIVE AND OBJECTIVE BOX
Subjective: Patient seen and examined. No new events except as noted.   feels ok   breathing comfortably     REVIEW OF SYSTEMS:    CONSTITUTIONAL:+ weakness, fevers or chills  EYES/ENT: No visual changes;  No vertigo or throat pain   NECK: No pain or stiffness  RESPIRATORY: No cough, wheezing, hemoptysis; No shortness of breath  CARDIOVASCULAR: No chest pain or palpitations  GASTROINTESTINAL: No abdominal or epigastric pain. No nausea, vomiting, or hematemesis; No diarrhea or constipation. No melena or hematochezia.  GENITOURINARY: No dysuria, frequency or hematuria  NEUROLOGICAL: No numbness or weakness  SKIN: No itching, burning, rashes, or lesions   All other review of systems is negative unless indicated above.    MEDICATIONS:  MEDICATIONS  (STANDING):  aspirin enteric coated 81 milliGRAM(s) Oral daily  atorvastatin 40 milliGRAM(s) Oral at bedtime  buMETAnide Infusion 1 mG/Hr (10 mL/Hr) IV Continuous <Continuous>  chlorhexidine 4% Liquid 1 Application(s) Topical <User Schedule>  dextrose 5%. 1000 milliLiter(s) (50 mL/Hr) IV Continuous <Continuous>  dextrose 50% Injectable 12.5 Gram(s) IV Push once  dextrose 50% Injectable 25 Gram(s) IV Push once  dextrose 50% Injectable 25 Gram(s) IV Push once  ferrous    sulfate 325 milliGRAM(s) Oral two times a day  fluticasone propionate   220 MICROgram(s) HFA Inhaler 1 Puff(s) Inhalation two times a day  heparin  Injectable 5000 Unit(s) SubCutaneous every 8 hours  insulin glargine Injectable (LANTUS) 25 Unit(s) SubCutaneous at bedtime  insulin lispro (HumaLOG) corrective regimen sliding scale   SubCutaneous three times a day before meals  insulin lispro (HumaLOG) corrective regimen sliding scale   SubCutaneous at bedtime  insulin lispro Injectable (HumaLOG) 17 Unit(s) SubCutaneous before breakfast  insulin lispro Injectable (HumaLOG) 15 Unit(s) SubCutaneous before lunch  insulin lispro Injectable (HumaLOG) 11 Unit(s) SubCutaneous before dinner  insulin lispro Injectable (HumaLOG) 3 Unit(s) SubCutaneous <User Schedule>  milrinone Infusion 0.5 MICROgram(s)/kG/Min (11.1 mL/Hr) IV Continuous <Continuous>  predniSONE   Tablet 20 milliGRAM(s) Oral two times a day      PHYSICAL EXAM:  T(C): 36.4 (05-14-18 @ 07:00), Max: 36.6 (05-13-18 @ 14:01)  HR: 105 (05-14-18 @ 07:00) (98 - 131)  BP: 133/63 (05-14-18 @ 07:00) (115/58 - 137/65)  RR: 17 (05-14-18 @ 07:00) (17 - 18)  SpO2: 97% (05-14-18 @ 07:00) (96% - 99%)  Wt(kg): --  I&O's Summary    13 May 2018 07:01  -  14 May 2018 07:00  --------------------------------------------------------  IN: 253.2 mL / OUT: 1647 mL / NET: -1393.8 mL          Appearance: Normal	  HEENT:   Normal oral mucosa, PERRL, EOMI	  Lymphatic: No lymphadenopathy , no edema  Cardiovascular: Normal S1 S2, No JVD, No murmurs , Peripheral pulses palpable 2+ bilaterally  Respiratory: Lungs clear to auscultation, normal effort 	  Gastrointestinal:  Soft, Non-tender, + BS	  Skin: No rashes, No ecchymoses, No cyanosis, warm to touch  Musculoskeletal: Normal range of motion, normal strength  Psychiatry:  Mood & affect appropriate  Ext: + edema much improved       LABS:    CARDIAC MARKERS:                                12.2   8.59  )-----------( 366      ( 13 May 2018 06:50 )             41.2     05-13    132<L>  |  91<L>  |  120<H>  ----------------------------<  621<HH>  5.5<H>   |  26  |  1.84<H>    Ca    8.6      13 May 2018 06:50  Phos  4.8     05-13  Mg     2.0     05-13      proBNP:   Lipid Profile:   HgA1c:   TSH:             TELEMETRY: 	    ECG:  	  RADIOLOGY:   DIAGNOSTIC TESTING:  [ ] Echocardiogram:  [ ]  Catheterization:  [ ] Stress Test:    OTHER:

## 2018-05-14 NOTE — PROGRESS NOTE ADULT - PROBLEM SELECTOR PLAN 1
called pulmonary division: [facility]: awaiting message: Pt says she has been using prednisone for last one year at least and it has helped her:

## 2018-05-15 LAB
BUN SERPL-MCNC: 142 MG/DL — HIGH (ref 7–23)
CALCIUM SERPL-MCNC: 9 MG/DL — SIGNIFICANT CHANGE UP (ref 8.4–10.5)
CHLORIDE SERPL-SCNC: 90 MMOL/L — LOW (ref 98–107)
CO2 SERPL-SCNC: 33 MMOL/L — HIGH (ref 22–31)
CREAT SERPL-MCNC: 2.04 MG/DL — HIGH (ref 0.5–1.3)
GLUCOSE BLDC GLUCOMTR-MCNC: 123 MG/DL — HIGH (ref 70–99)
GLUCOSE BLDC GLUCOMTR-MCNC: 135 MG/DL — HIGH (ref 70–99)
GLUCOSE BLDC GLUCOMTR-MCNC: 273 MG/DL — HIGH (ref 70–99)
GLUCOSE BLDC GLUCOMTR-MCNC: 367 MG/DL — HIGH (ref 70–99)
GLUCOSE SERPL-MCNC: 334 MG/DL — HIGH (ref 70–99)
HCT VFR BLD CALC: 36.2 % — SIGNIFICANT CHANGE UP (ref 34.5–45)
HGB BLD-MCNC: 10.8 G/DL — LOW (ref 11.5–15.5)
MCHC RBC-ENTMCNC: 20.8 PG — LOW (ref 27–34)
MCHC RBC-ENTMCNC: 29.8 % — LOW (ref 32–36)
MCV RBC AUTO: 69.6 FL — LOW (ref 80–100)
NRBC # FLD: 0.05 — SIGNIFICANT CHANGE UP
PLATELET # BLD AUTO: 309 K/UL — SIGNIFICANT CHANGE UP (ref 150–400)
PMV BLD: 11.2 FL — SIGNIFICANT CHANGE UP (ref 7–13)
POTASSIUM SERPL-MCNC: 5.3 MMOL/L — SIGNIFICANT CHANGE UP (ref 3.5–5.3)
POTASSIUM SERPL-SCNC: 5.3 MMOL/L — SIGNIFICANT CHANGE UP (ref 3.5–5.3)
RBC # BLD: 5.2 M/UL — SIGNIFICANT CHANGE UP (ref 3.8–5.2)
RBC # FLD: 17 % — HIGH (ref 10.3–14.5)
SODIUM SERPL-SCNC: 134 MMOL/L — LOW (ref 135–145)
WBC # BLD: 9.8 K/UL — SIGNIFICANT CHANGE UP (ref 3.8–10.5)
WBC # FLD AUTO: 9.8 K/UL — SIGNIFICANT CHANGE UP (ref 3.8–10.5)

## 2018-05-15 PROCEDURE — 99232 SBSQ HOSP IP/OBS MODERATE 35: CPT

## 2018-05-15 RX ORDER — INSULIN LISPRO 100/ML
4 VIAL (ML) SUBCUTANEOUS
Qty: 0 | Refills: 0 | Status: DISCONTINUED | OUTPATIENT
Start: 2018-05-15 | End: 2018-05-16

## 2018-05-15 RX ORDER — INSULIN LISPRO 100/ML
12 VIAL (ML) SUBCUTANEOUS
Qty: 0 | Refills: 0 | Status: DISCONTINUED | OUTPATIENT
Start: 2018-05-15 | End: 2018-05-16

## 2018-05-15 RX ORDER — INSULIN GLARGINE 100 [IU]/ML
40 INJECTION, SOLUTION SUBCUTANEOUS AT BEDTIME
Qty: 0 | Refills: 0 | Status: DISCONTINUED | OUTPATIENT
Start: 2018-05-15 | End: 2018-05-16

## 2018-05-15 RX ORDER — POLYETHYLENE GLYCOL 3350 17 G/17G
17 POWDER, FOR SOLUTION ORAL DAILY
Qty: 0 | Refills: 0 | Status: DISCONTINUED | OUTPATIENT
Start: 2018-05-15 | End: 2018-05-20

## 2018-05-15 RX ADMIN — Medication 17 UNIT(S): at 08:06

## 2018-05-15 RX ADMIN — Medication 10: at 08:05

## 2018-05-15 RX ADMIN — BUMETANIDE 10 MG/HR: 0.25 INJECTION INTRAMUSCULAR; INTRAVENOUS at 08:07

## 2018-05-15 RX ADMIN — Medication 1 PUFF(S): at 21:45

## 2018-05-15 RX ADMIN — Medication 6: at 12:13

## 2018-05-15 RX ADMIN — Medication 15 UNIT(S): at 12:13

## 2018-05-15 RX ADMIN — HEPARIN SODIUM 5000 UNIT(S): 5000 INJECTION INTRAVENOUS; SUBCUTANEOUS at 21:08

## 2018-05-15 RX ADMIN — HEPARIN SODIUM 5000 UNIT(S): 5000 INJECTION INTRAVENOUS; SUBCUTANEOUS at 14:48

## 2018-05-15 RX ADMIN — Medication 4 UNIT(S): at 21:08

## 2018-05-15 RX ADMIN — HEPARIN SODIUM 5000 UNIT(S): 5000 INJECTION INTRAVENOUS; SUBCUTANEOUS at 06:02

## 2018-05-15 RX ADMIN — Medication 12 UNIT(S): at 17:02

## 2018-05-15 RX ADMIN — Medication 325 MILLIGRAM(S): at 17:02

## 2018-05-15 RX ADMIN — Medication 325 MILLIGRAM(S): at 06:02

## 2018-05-15 RX ADMIN — ATORVASTATIN CALCIUM 40 MILLIGRAM(S): 80 TABLET, FILM COATED ORAL at 21:08

## 2018-05-15 RX ADMIN — INSULIN GLARGINE 40 UNIT(S): 100 INJECTION, SOLUTION SUBCUTANEOUS at 21:08

## 2018-05-15 RX ADMIN — Medication 20 MILLIGRAM(S): at 06:02

## 2018-05-15 RX ADMIN — MILRINONE LACTATE 11.1 MICROGRAM(S)/KG/MIN: 1 INJECTION, SOLUTION INTRAVENOUS at 08:07

## 2018-05-15 RX ADMIN — Medication 81 MILLIGRAM(S): at 12:13

## 2018-05-15 NOTE — PROGRESS NOTE ADULT - SUBJECTIVE AND OBJECTIVE BOX
No pain, no shortness of breath      VITAL:  T(C): , Max: 36.8 (05-14-18 @ 23:57)  T(F): , Max: 98.3 (05-14-18 @ 23:57)  HR: 85 (05-15-18 @ 11:34)  BP: 109/67 (05-15-18 @ 11:34)  BP(mean): 98 (05-15-18 @ 08:11)  RR: 18 (05-15-18 @ 11:34)  SpO2: 100% (05-15-18 @ 11:34)  UO 3179cc/24h      PHYSICAL EXAM:  Constitutional: NAD; Alert  HEENT:  NCAT; DMM  Neck: (+) JVD; supple  Respiratory: CTA-b/l  Cardiac: RRR s1s2; (+)pericardial drain  Gastrointestinal: BS+, soft, NT/ND  Urologic: No barfield  Extremities: 2+ b/l LE edema  Back: No CVAT b/l      LABS:                        10.8   9.80  )-----------( 309      ( 15 May 2018 05:45 )             36.2     Na(134)/K(5.3)/Cl(90)/HCO3(33)/BUN(142)/Cr(2.04)Glu(334)/Ca(9.0)/Mg(--)/PO4(--)    05-15 @ 05:45  Na(132)/K(5.5)/Cl(91)/HCO3(26)/BUN(120)/Cr(1.84)Glu(621)/Ca(8.6)/Mg(2.0)/PO4(4.8)    05-13 @ 06:50        IMPRESSION: 52F w/ DM2, sarcoidosis, HFrEF, LV thrombus on Xarelto, and CKD3, 5/3/18 a/w acute on chronic HFrEF/pericardial effusion requiring drainage    (1)Renal - CKD3 - ~2gm proteinuria it appears; presumed diabetic nephropathy - superimposed YANA; prerenally mediated in setting of obligate diuresis; BUN uptrending rapidly; creatinine rising but not at nearly as high a rate. Rise in BUN not only from diuresis but also from steroids. That being said, BUN is reaching point at which we must start to cut back on diuresis.    (2)Hyperkalemia - largely a transient phenomenon from cellular shifts from hypoinsulinemia. Mainstay of therapy for this for now would be improving glycemic control. Cutting back steroids may allow us to achieve this objective. Pulmonary (Dr. Emerson) now on board.    (3)CV - resolving acute on chronic HFrEF      RECOMMEND:  (1)Would d/c Milrinone at this point, and would switch diuretics to a PO regimen.  (2)Would start to cut back on steroids (reduce Prednisone to 30qd?)  (3)Low K+ diet/Nepro as ordered  (4)Kayexalate prn K of 5.7 or higher   (5)BMP daily              Jair Lion MD  [facility]  [phone] No pain, no shortness of breath; (+)muscle weakness UEs/LEs. (+)breast swelling.      VITAL:  T(C): , Max: 36.8 (05-14-18 @ 23:57)  T(F): , Max: 98.3 (05-14-18 @ 23:57)  HR: 85 (05-15-18 @ 11:34)  BP: 109/67 (05-15-18 @ 11:34)  BP(mean): 98 (05-15-18 @ 08:11)  RR: 18 (05-15-18 @ 11:34)  SpO2: 100% (05-15-18 @ 11:34)  UO 3179cc/24h      PHYSICAL EXAM:  Constitutional: NAD; Alert  HEENT:  NCAT; DMM  Neck: (+) JVD; supple  Respiratory: CTA-b/l  Cardiac: RRR s1s2; (+)pericardial drain  Gastrointestinal: BS+, soft, NT/ND  Urologic: No barfield  Extremities: 2+ b/l LE edema  Back: No CVAT b/l      LABS:                        10.8   9.80  )-----------( 309      ( 15 May 2018 05:45 )             36.2     Na(134)/K(5.3)/Cl(90)/HCO3(33)/BUN(142)/Cr(2.04)Glu(334)/Ca(9.0)/Mg(--)/PO4(--)    05-15 @ 05:45  Na(132)/K(5.5)/Cl(91)/HCO3(26)/BUN(120)/Cr(1.84)Glu(621)/Ca(8.6)/Mg(2.0)/PO4(4.8)    05-13 @ 06:50        IMPRESSION: 52F w/ DM2, sarcoidosis, HFrEF, LV thrombus on Xarelto, and CKD3, 5/3/18 a/w acute on chronic HFrEF/pericardial effusion requiring drainage    (1)Renal - CKD3 - ~2gm proteinuria it appears; presumed diabetic nephropathy - superimposed YANA; prerenally mediated in setting of obligate diuresis; BUN uptrending rapidly; creatinine rising but not at nearly as high a rate. Rise in BUN not only from diuresis but also from steroids. That being said, BUN is reaching point at which we must start to cut back on diuresis.    (2)Hyperkalemia - largely a transient phenomenon from cellular shifts from hypoinsulinemia. Mainstay of therapy for this for now would be improving glycemic control. Cutting back steroids may allow us to achieve this objective. Pulmonary (Dr. Emerson) now on board.    (3)CV - resolving acute on chronic HFrEF    (4)Pulm - she tells me that although she is ordered for 20mg po BID of Prednisone as an outpatient, she has in fact only been taking it daily. Her BUN is rapidly rising and she is at significant risk for uremic symptom development if this continues.    RECOMMEND:  (1)Within the next 24hours would d/c Milrinone and switch diuretics to a PO regimen (we can plan for this for tomorrow)  (2)Would reduce Prednisone to 20qd for now  (3)Low K+ diet/Nepro as ordered  (4)Kayexalate prn K of 5.7 or higher   (5)BMP daily      Discussed with Cardiology Dr. Sam and Pulmonary Dr. Ki Lion MD  [facility]  [phone]

## 2018-05-15 NOTE — PROGRESS NOTE ADULT - SUBJECTIVE AND OBJECTIVE BOX
Patient is a 52y old  Female who presents with a chief complaint of LE edema/pain and SOB (09 May 2018 11:57)      Any change in ROS:   pt is doing ok :      MEDICATIONS  (STANDING):  aspirin enteric coated 81 milliGRAM(s) Oral daily  atorvastatin 40 milliGRAM(s) Oral at bedtime  buMETAnide Infusion 1 mG/Hr (10 mL/Hr) IV Continuous <Continuous>  chlorhexidine 4% Liquid 1 Application(s) Topical <User Schedule>  dextrose 5%. 1000 milliLiter(s) (50 mL/Hr) IV Continuous <Continuous>  dextrose 50% Injectable 12.5 Gram(s) IV Push once  dextrose 50% Injectable 25 Gram(s) IV Push once  dextrose 50% Injectable 25 Gram(s) IV Push once  ferrous    sulfate 325 milliGRAM(s) Oral two times a day  fluticasone propionate   220 MICROgram(s) HFA Inhaler 1 Puff(s) Inhalation two times a day  heparin  Injectable 5000 Unit(s) SubCutaneous every 8 hours  insulin glargine Injectable (LANTUS) 40 Unit(s) SubCutaneous at bedtime  insulin lispro (HumaLOG) corrective regimen sliding scale   SubCutaneous three times a day before meals  insulin lispro (HumaLOG) corrective regimen sliding scale   SubCutaneous at bedtime  insulin lispro Injectable (HumaLOG) 12 Unit(s) SubCutaneous before dinner  insulin lispro Injectable (HumaLOG) 4 Unit(s) SubCutaneous <User Schedule>  insulin lispro Injectable (HumaLOG) 17 Unit(s) SubCutaneous before breakfast  insulin lispro Injectable (HumaLOG) 15 Unit(s) SubCutaneous before lunch  milrinone Infusion 0.5 MICROgram(s)/kG/Min (11.1 mL/Hr) IV Continuous <Continuous>  polyethylene glycol 3350 17 Gram(s) Oral daily  predniSONE   Tablet 20 milliGRAM(s) Oral two times a day    MEDICATIONS  (PRN):  acetaminophen   Tablet. 650 milliGRAM(s) Oral every 6 hours PRN temp> 101  acetaminophen   Tablet. 650 milliGRAM(s) Oral every 6 hours PRN mild, moderate pain  benzocaine 15 mG/menthol 3.6 mG Lozenge 1 Lozenge Oral two times a day PRN Sore Throat  dextrose Gel 1 Dose(s) Oral once PRN Blood Glucose LESS THAN 70 milliGRAM(s)/deciliter  glucagon  Injectable 1 milliGRAM(s) IntraMuscular once PRN Glucose LESS THAN 70 milligrams/deciliter  levalbuterol Inhalation 0.63 milliGRAM(s) Inhalation every 6 hours PRN bronchospasm    Vital Signs Last 24 Hrs  T(C): 36.6 (15 May 2018 11:34), Max: 36.8 (14 May 2018 23:57)  T(F): 97.8 (15 May 2018 11:34), Max: 98.3 (14 May 2018 23:57)  HR: 85 (15 May 2018 11:34) (54 - 150)  BP: 109/67 (15 May 2018 11:34) (109/67 - 148/81)  BP(mean): 98 (15 May 2018 08:11) (98 - 98)  RR: 18 (15 May 2018 11:34) (17 - 18)  SpO2: 100% (15 May 2018 11:34) (97% - 100%)    I&O's Summary    14 May 2018 07:01  -  15 May 2018 07:00  --------------------------------------------------------  IN: 0 mL / OUT: 3179 mL / NET: -3179 mL    15 May 2018 07:01  -  15 May 2018 12:42  --------------------------------------------------------  IN: 0 mL / OUT: 1100 mL / NET: -1100 mL          Physical Exam:   GENERAL: NAD, well-groomed, well-developed  HEENT: PAVEL/   Atraumatic, Normocephalic  ENMT: No tonsillar erythema, exudates, or enlargement; Moist mucous membranes, Good dentition, No lesions  NECK: Supple, No JVD, Normal thyroid  CHEST/LUNG: Cracles  CVS: Regular rate and rhythm; No murmurs, rubs, or gallops  GI: : Soft, Nontender, Nondistended; Bowel sounds present  NERVOUS SYSTEM:  Alert & Oriented X3  EXTREMITIES+ edema  LYMPH: No lymphadenopathy noted  SKIN: No rashes or lesions  ENDOCRINOLOGY: No Thyromegaly  PSYCH: Appropriate    Labs:                              10.8   9.80  )-----------( 309      ( 15 May 2018 05:45 )             36.2                         12.2   8.59  )-----------( 366      ( 13 May 2018 06:50 )             41.2                         12.3   10.11 )-----------( 361      ( 12 May 2018 05:47 )             40.8     05-15    134<L>  |  90<L>  |  142<H>  ----------------------------<  334<H>  5.3   |  33<H>  |  2.04<H>  05-13    132<L>  |  91<L>  |  120<H>  ----------------------------<  621<HH>  5.5<H>   |  26  |  1.84<H>  05-12    135  |  95<L>  |  107<H>  ----------------------------<  285<H>  5.0   |  27  |  1.81<H>    Ca    9.0      15 May 2018 05:45      CAPILLARY BLOOD GLUCOSE      POCT Blood Glucose.: 273 mg/dL (15 May 2018 11:31)  POCT Blood Glucose.: 367 mg/dL (15 May 2018 07:58)  POCT Blood Glucose.: 303 mg/dL (14 May 2018 22:22)  POCT Blood Glucose.: 273 mg/dL (14 May 2018 21:16)  POCT Blood Glucose.: 162 mg/dL (14 May 2018 17:20)     (05-10 @ 09:40)          Fluid Source --  Albumin, Fluid--  Glucose, Fluid--  Protein total, Fluid--  Lacatate Dehydrogenase, Fluid--  pH, Fluid--  Cytopathology-Non Gyn Report  ACCESSION No:  [id number]    PEYMAN WATTS                        1        Cytopathology Report            Specimen(s) Submitted  PERICARDIAL FLUID      Clinical History  51 y/o female PMH HTN, DM, HLD, Sarcoidosis, HF EF, CAD COPD, pw  heart failure found to have Large Pericardial Effusion.      Gross Description  Received: 30 ml of bloody unfixed fluid  Prepared: 1 ThinPrep slide, 1 smear and 1 cell block      Final Diagnosis  PERICARDIAL FLUID  NEGATIVE FOR MALIGNANT CELLS.    The cytology slides and cell block consist of acute inflammation,  histiocytes and few benign mesothelial cells in a  background of proteinaceous material.    Screened by: Shar ERNANDEZ(ASCP)  Verified by: Dieudonne German M.D.  (Electronic Signature)  Reported on: 05/12/18 15:27 EDT, [address] [place] [address]  [id number]  Cytology technical processing performed at [address] [place] [id number]  _________________________________________________________________  Cultures:           Wound culture:                05-09 @ 10:48  Organism --  Culture w/ gram stain --  Specimen Source BLOOD VENOUS    Wound culture:                05-09 @ 06:52  Organism --  Culture w/ gram stain --  Specimen Source BLOOD      Abscess culture:             05-09 @ 10:48  Organism --  Gram Stain --  Specimen Source BLOOD VENOUS    Abscess culture:             05-09 @ 06:52  Organism --  Gram Stain --  Specimen Source BLOOD        Tissue culture:           05-09 @ 10:48  Organism --  Gram Stain --  Specimen Source BLOOD VENOUS    Tissue culture:           05-09 @ 06:52  Organism --  Gram Stain --  Specimen Source BLOOD      Body Fluid Smear & Culture:                        05-09 @ 10:48  AFB Smear  --  Culture Acid Fast Body Fluid w/ Smear  --  Culture Acid Fast Smear Concentrated   --    Culture Results:     --  Specimen Source BLOOD VENOUS    Body Fluid Smear & Culture:                        05-09 @ 06:52  AFB Smear  --  Culture Acid Fast Body Fluid w/ Smear  --  Culture Acid Fast Smear Concentrated   --    Culture Results:     --  Specimen Source BLOOD              Studies  Chest X-RAY  CT SCAN Chest   Venous Dopplers: LE:   CT Abdomen  Others            < from: Xray Chest 1 View- PORTABLE-Routine (05.09.18 @ 07:50) >  PROCEDURE DATE:  May  9 2018         INTERPRETATION:    Portable chest xray: Shortness of Breath    Comparison: Most recent prior     FINDINGS:    Lines/Tubes: None    Heart and mediastinum:  Unchanged.    Lungs, pleura, and airways: Patchy opacities bilaterally suggestive of   pulmonary edema. Small left effusion, stable    Bones and soft tissues: The bones and soft tissues are unchanged.    Impression:    Interstitial edema and left effusion, stable.                  AUNG HITCHCOCK M.D., ATTENDING RADIOLOGIST  This document has been electronically signed. May  9 2018 11:52AM        < end of copied text >

## 2018-05-15 NOTE — PROGRESS NOTE ADULT - PROBLEM SELECTOR PLAN 3
cont current care: ? MRI heart ? sarcoid heart  5/15: She did have stent placed in 2016 at [facility]

## 2018-05-15 NOTE — PROGRESS NOTE ADULT - SUBJECTIVE AND OBJECTIVE BOX
Chief Complaint/Follow-up on: Uncontrolled T2DM    Subjective: 51 y/o female with uncontrolled T2DM, sarcoidosis on Prednisone 20mg BID since May 3, acute on chronic heart failure, YANA on CKD3 with hyperkalemia. reports her appetite has improved.  CDE sat with patient yesterday and reviewed DSME.  patient needs reinforemnt    MEDICATIONS  (STANDING):  aspirin enteric coated 81 milliGRAM(s) Oral daily  atorvastatin 40 milliGRAM(s) Oral at bedtime  buMETAnide Infusion 1 mG/Hr (10 mL/Hr) IV Continuous <Continuous>  chlorhexidine 4% Liquid 1 Application(s) Topical <User Schedule>  dextrose 5%. 1000 milliLiter(s) (50 mL/Hr) IV Continuous <Continuous>  dextrose 50% Injectable 12.5 Gram(s) IV Push once  dextrose 50% Injectable 25 Gram(s) IV Push once  dextrose 50% Injectable 25 Gram(s) IV Push once  ferrous    sulfate 325 milliGRAM(s) Oral two times a day  fluticasone propionate   220 MICROgram(s) HFA Inhaler 1 Puff(s) Inhalation two times a day  heparin  Injectable 5000 Unit(s) SubCutaneous every 8 hours  insulin glargine Injectable (LANTUS) 25 Unit(s) SubCutaneous at bedtime  insulin lispro (HumaLOG) corrective regimen sliding scale   SubCutaneous three times a day before meals  insulin lispro (HumaLOG) corrective regimen sliding scale   SubCutaneous at bedtime  insulin lispro Injectable (HumaLOG) 17 Unit(s) SubCutaneous before breakfast  insulin lispro Injectable (HumaLOG) 15 Unit(s) SubCutaneous before lunch  insulin lispro Injectable (HumaLOG) 11 Unit(s) SubCutaneous before dinner  insulin lispro Injectable (HumaLOG) 3 Unit(s) SubCutaneous <User Schedule>  milrinone Infusion 0.5 MICROgram(s)/kG/Min (11.1 mL/Hr) IV Continuous <Continuous>  predniSONE   Tablet 20 milliGRAM(s) Oral two times a day    MEDICATIONS  (PRN):  acetaminophen   Tablet. 650 milliGRAM(s) Oral every 6 hours PRN temp> 101  acetaminophen   Tablet. 650 milliGRAM(s) Oral every 6 hours PRN mild, moderate pain  dextrose Gel 1 Dose(s) Oral once PRN Blood Glucose LESS THAN 70 milliGRAM(s)/deciliter  glucagon  Injectable 1 milliGRAM(s) IntraMuscular once PRN Glucose LESS THAN 70 milligrams/deciliter  levalbuterol Inhalation 0.63 milliGRAM(s) Inhalation every 6 hours PRN bronchospasm      PHYSICAL EXAM:  Vital Signs Last 24 Hrs  T(C): 36.6 (15 May 2018 11:34), Max: 36.8 (14 May 2018 23:57)  T(F): 97.8 (15 May 2018 11:34), Max: 98.3 (14 May 2018 23:57)  HR: 85 (15 May 2018 11:34) (54 - 150)  BP: 109/67 (15 May 2018 11:34) (109/67 - 148/81)  BP(mean): 98 (15 May 2018 08:11) (98 - 98)  RR: 18 (15 May 2018 11:34) (17 - 18)  SpO2: 100% (15 May 2018 11:34) (97% - 100%)    GENERAL: NAD, well-groomed, well-developed, thin female  EYES: No proptosis, no injection  HEENT:  Atraumatic, Normocephalic, moist mucous membranes  RESPIRATORY: Clear to auscultation bilaterally; No rales, rhonchi, wheezing, or rubs  CARDIOVASCULAR: Regular rate and rhythm; No murmurs; +3 pitting peripheral edema  GI: Soft, nontender, non distended, normal bowel sounds  CUSHING'S SIGNS: no striae    CAPILLARY BLOOD GLUCOSE  POCT Blood Glucose.: 273 mg/dL (15 May 2018 11:31)  POCT Blood Glucose.: 367 mg/dL (15 May 2018 07:58)  POCT Blood Glucose.: 303 mg/dL (14 May 2018 22:22)  POCT Blood Glucose.: 273 mg/dL (14 May 2018 21:16)  POCT Blood Glucose.: 162 mg/dL (14 May 2018 17:20)    POCT Blood Glucose.: 196 mg/dL (14 May 2018 11:55)  POCT Blood Glucose.: 370 mg/dL (14 May 2018 07:44)  POCT Blood Glucose.: 111 mg/dL (13 May 2018 22:02)  POCT Blood Glucose.: 108 mg/dL (13 May 2018 16:58)  POCT Blood Glucose.: 336 mg/dL (13 May 2018 12:14)    POCT Blood Glucose.: 520 mg/dL (05-13-18 @ 07:51) H17+H12    POCT Blood Glucose.: 96 mg/dL (05-12-18 @ 21:45) xLantus  POCT Blood Glucose.: 89 mg/dL (05-12-18 @ 17:21) H17  POCT Blood Glucose.: 153 mg/dL (05-12-18 @ 11:59) H17+2  POCT Blood Glucose.: 285 mg/dL (05-12-18 @ 07:49) H17+6    POCT Blood Glucose.: 78 mg/dL (05-11-18 @ 21:03) xLantus  POCT Blood Glucose.: 94 mg/dL (05-11-18 @ 16:55) H17  POCT Blood Glucose.: 224 mg/dL (05-11-18 @ 12:00) H15+4  POCT Blood Glucose.: 451 mg/dL (05-11-18 @ 08:48) H15+12  POCT Blood Glucose.: 439 mg/dL (05-11-18 @ 08:35)    POCT Blood Glucose.: 91 mg/dL (05-10-18 @ 21:39)  POCT Blood Glucose.: 177 mg/dL (05-10-18 @ 18:24)  POCT Blood Glucose.: 272 mg/dL (05-10-18 @ 13:10)    05-13    132<L>  |  91<L>  |  120<H>  ----------------------------<  621<HH>  5.5<H>   |  26  |  1.84<H>    EGFR if : 36  EGFR if non : 31    Ca    8.6      05-13  Mg     2.0     05-13  Phos  4.8     05-13      Thyroid Function Tests:  05-03 @ 16:59   TSH 6.40   FreeT4 -- T3 -- Anti TPO -- Anti Thyroglobulin Ab -- TSI --      Hemoglobin A1C, Whole Blood: 14.3 % <H> [4.0 - 5.6] (05-04-18 @ 06:45)  Hemoglobin A1C, Whole Blood: 14.3 % <H> [4.0 - 5.6] (05-03-18 @ 16:59)

## 2018-05-15 NOTE — PROGRESS NOTE ADULT - PROBLEM SELECTOR PLAN 1
Inpatient Plan  - IF REDUCING STEROIDS, PLEASE PAGE US AND NOTIFY US  - IF NEED HELP IN INSULIN ADJUSTMENT, PLEASE PAGE US AND INQUIRE  - Based on current regimen of Prednisone 20 mg BID   - Recommend Lantus 40 units QHS  - Recommend Humalog 17-15-12 and moderate ssi ac and hs.  - Recommend Humalog 4 units at 9pm IF eating a snack, hold if not eating snack  - PLease do not hold Lantus dose for low or normoglycemia.  If uncomfortable giving a full dose, recommend decreasing by 20%.  Outpatient Plan  - Basal/bolus, final doses to be determined- PATIENT NEEDS TO BE EDUCATED ON INSULIN PENS.  Please have nursing teach.  Original order for pen teaching placed 5/4- diabetes educator to follow up  - F/u [address] [place] [address]  Please page us with questions or concerns or steroid dose changes.  Our service will follow

## 2018-05-15 NOTE — PROGRESS NOTE ADULT - PROBLEM SELECTOR PLAN 1
called pulmonary division: [facility]: awaiting message: Pt says she has been using prednisone for last one year at least and it has helped her:  5/15; first presented to dr mc with chf: She was dx with sarcoid with hilar and axillary LN. She had ax LN biopsy done which showed sarcoid: She was put on prednisone on 5/5/ 2015: 20 mg a day , she got much better , especially the cough improved: He was concerned about her cardiac status: The she stopped gong there again there for a year: In 2016 she was seen by cardiologist , she was found to have severe two vessel disease LAD and RCA, on cardiac cath IN may 16 2016: She also had stent placed at that time: That was the last time she was seen there: She was initially placed on steroids at 20 mg and after may 2016 , she stopped following there. So at this time, her dose of steroids is not very clear: and only pulmonologist name she gives is Dr Mc , who has not seen er for last 2 years:

## 2018-05-15 NOTE — PROGRESS NOTE ADULT - SUBJECTIVE AND OBJECTIVE BOX
Subjective: Patient seen and examined. No new events except as noted.     REVIEW OF SYSTEMS:    CONSTITUTIONAL: No weakness, fevers or chills  EYES/ENT: No visual changes;  No vertigo or throat pain   NECK: No pain or stiffness  RESPIRATORY: No cough, wheezing, hemoptysis; No shortness of breath  CARDIOVASCULAR: No chest pain or palpitations  GASTROINTESTINAL: No abdominal or epigastric pain. No nausea, vomiting, or hematemesis; No diarrhea or constipation. No melena or hematochezia.  GENITOURINARY: No dysuria, frequency or hematuria  NEUROLOGICAL: No numbness or weakness  SKIN: No itching, burning, rashes, or lesions   All other review of systems is negative unless indicated above.    MEDICATIONS:  MEDICATIONS  (STANDING):  aspirin enteric coated 81 milliGRAM(s) Oral daily  atorvastatin 40 milliGRAM(s) Oral at bedtime  buMETAnide Infusion 1 mG/Hr (10 mL/Hr) IV Continuous <Continuous>  chlorhexidine 4% Liquid 1 Application(s) Topical <User Schedule>  dextrose 5%. 1000 milliLiter(s) (50 mL/Hr) IV Continuous <Continuous>  dextrose 50% Injectable 12.5 Gram(s) IV Push once  dextrose 50% Injectable 25 Gram(s) IV Push once  dextrose 50% Injectable 25 Gram(s) IV Push once  ferrous    sulfate 325 milliGRAM(s) Oral two times a day  fluticasone propionate   220 MICROgram(s) HFA Inhaler 1 Puff(s) Inhalation two times a day  heparin  Injectable 5000 Unit(s) SubCutaneous every 8 hours  insulin glargine Injectable (LANTUS) 40 Unit(s) SubCutaneous at bedtime  insulin lispro (HumaLOG) corrective regimen sliding scale   SubCutaneous three times a day before meals  insulin lispro (HumaLOG) corrective regimen sliding scale   SubCutaneous at bedtime  insulin lispro Injectable (HumaLOG) 12 Unit(s) SubCutaneous before dinner  insulin lispro Injectable (HumaLOG) 4 Unit(s) SubCutaneous <User Schedule>  insulin lispro Injectable (HumaLOG) 17 Unit(s) SubCutaneous before breakfast  insulin lispro Injectable (HumaLOG) 15 Unit(s) SubCutaneous before lunch  milrinone Infusion 0.5 MICROgram(s)/kG/Min (11.1 mL/Hr) IV Continuous <Continuous>  polyethylene glycol 3350 17 Gram(s) Oral daily  predniSONE   Tablet 20 milliGRAM(s) Oral two times a day      PHYSICAL EXAM:  T(C): 36.6 (05-15-18 @ 11:34), Max: 36.8 (05-14-18 @ 23:57)  HR: 85 (05-15-18 @ 11:34) (54 - 150)  BP: 109/67 (05-15-18 @ 11:34) (109/67 - 148/81)  RR: 18 (05-15-18 @ 11:34) (17 - 18)  SpO2: 100% (05-15-18 @ 11:34) (97% - 100%)  Wt(kg): --  I&O's Summary    14 May 2018 07:01  -  15 May 2018 07:00  --------------------------------------------------------  IN: 0 mL / OUT: 3179 mL / NET: -3179 mL    15 May 2018 07:01  -  15 May 2018 12:26  --------------------------------------------------------  IN: 0 mL / OUT: 1100 mL / NET: -1100 mL      Height (cm): 160.02 (05-15 @ 13:00)    Appearance: Normal	  HEENT:   Normal oral mucosa, PERRL, EOMI	  Lymphatic: No lymphadenopathy , no edema  Cardiovascular: Normal S1 S2, No JVD, No murmurs +pericardial drain   Respiratory: Lungs clear to auscultation, normal effort 	  Gastrointestinal:  Soft, Non-tender, + BS	  Skin: No rashes, No ecchymoses, No cyanosis, warm to touch  Musculoskeletal: Normal range of motion, normal strength  Psychiatry:  Mood & affect appropriate  Ext: + edema improved       LABS:    CARDIAC MARKERS:                                10.8   9.80  )-----------( 309      ( 15 May 2018 05:45 )             36.2     05-15    134<L>  |  90<L>  |  142<H>  ----------------------------<  334<H>  5.3   |  33<H>  |  2.04<H>    Ca    9.0      15 May 2018 05:45      proBNP:   Lipid Profile:   HgA1c:   TSH:             TELEMETRY: 	SR    ECG:  	  RADIOLOGY:   DIAGNOSTIC TESTING:  [ ] Echocardiogram:  [ ]  Catheterization:  [ ] Stress Test:    OTHER:

## 2018-05-16 DIAGNOSIS — I48.91 UNSPECIFIED ATRIAL FIBRILLATION: ICD-10-CM

## 2018-05-16 LAB
BUN SERPL-MCNC: 152 MG/DL — HIGH (ref 7–23)
CALCIUM SERPL-MCNC: 9.7 MG/DL — SIGNIFICANT CHANGE UP (ref 8.4–10.5)
CHLORIDE SERPL-SCNC: 94 MMOL/L — LOW (ref 98–107)
CO2 SERPL-SCNC: 37 MMOL/L — HIGH (ref 22–31)
CREAT SERPL-MCNC: 2.01 MG/DL — HIGH (ref 0.5–1.3)
GLUCOSE BLDC GLUCOMTR-MCNC: 155 MG/DL — HIGH (ref 70–99)
GLUCOSE BLDC GLUCOMTR-MCNC: 219 MG/DL — HIGH (ref 70–99)
GLUCOSE BLDC GLUCOMTR-MCNC: 284 MG/DL — HIGH (ref 70–99)
GLUCOSE BLDC GLUCOMTR-MCNC: 287 MG/DL — HIGH (ref 70–99)
GLUCOSE BLDC GLUCOMTR-MCNC: 70 MG/DL — SIGNIFICANT CHANGE UP (ref 70–99)
GLUCOSE SERPL-MCNC: 127 MG/DL — HIGH (ref 70–99)
HCT VFR BLD CALC: 39.5 % — SIGNIFICANT CHANGE UP (ref 34.5–45)
HGB BLD-MCNC: 11.6 G/DL — SIGNIFICANT CHANGE UP (ref 11.5–15.5)
MAGNESIUM SERPL-MCNC: 1.9 MG/DL — SIGNIFICANT CHANGE UP (ref 1.6–2.6)
MCHC RBC-ENTMCNC: 21 PG — LOW (ref 27–34)
MCHC RBC-ENTMCNC: 29.4 % — LOW (ref 32–36)
MCV RBC AUTO: 71.4 FL — LOW (ref 80–100)
NRBC # FLD: 0.06 — SIGNIFICANT CHANGE UP
PLATELET # BLD AUTO: 287 K/UL — SIGNIFICANT CHANGE UP (ref 150–400)
PMV BLD: 10.7 FL — SIGNIFICANT CHANGE UP (ref 7–13)
POTASSIUM SERPL-MCNC: 4.2 MMOL/L — SIGNIFICANT CHANGE UP (ref 3.5–5.3)
POTASSIUM SERPL-SCNC: 4.2 MMOL/L — SIGNIFICANT CHANGE UP (ref 3.5–5.3)
RBC # BLD: 5.53 M/UL — HIGH (ref 3.8–5.2)
RBC # FLD: 18.2 % — HIGH (ref 10.3–14.5)
SODIUM SERPL-SCNC: 141 MMOL/L — SIGNIFICANT CHANGE UP (ref 135–145)
WBC # BLD: 10.98 K/UL — HIGH (ref 3.8–10.5)
WBC # FLD AUTO: 10.98 K/UL — HIGH (ref 3.8–10.5)

## 2018-05-16 PROCEDURE — 99232 SBSQ HOSP IP/OBS MODERATE 35: CPT

## 2018-05-16 PROCEDURE — 71250 CT THORAX DX C-: CPT | Mod: 26

## 2018-05-16 PROCEDURE — 99233 SBSQ HOSP IP/OBS HIGH 50: CPT

## 2018-05-16 PROCEDURE — 93010 ELECTROCARDIOGRAM REPORT: CPT

## 2018-05-16 RX ORDER — INSULIN LISPRO 100/ML
10 VIAL (ML) SUBCUTANEOUS
Qty: 0 | Refills: 0 | Status: DISCONTINUED | OUTPATIENT
Start: 2018-05-17 | End: 2018-05-17

## 2018-05-16 RX ORDER — INSULIN LISPRO 100/ML
2 VIAL (ML) SUBCUTANEOUS
Qty: 0 | Refills: 0 | Status: DISCONTINUED | OUTPATIENT
Start: 2018-05-16 | End: 2018-05-18

## 2018-05-16 RX ORDER — INSULIN GLARGINE 100 [IU]/ML
40 INJECTION, SOLUTION SUBCUTANEOUS AT BEDTIME
Qty: 0 | Refills: 0 | Status: DISCONTINUED | OUTPATIENT
Start: 2018-05-16 | End: 2018-05-17

## 2018-05-16 RX ORDER — METOPROLOL TARTRATE 50 MG
5 TABLET ORAL ONCE
Qty: 0 | Refills: 0 | Status: COMPLETED | OUTPATIENT
Start: 2018-05-16 | End: 2018-05-16

## 2018-05-16 RX ORDER — INSULIN LISPRO 100/ML
8 VIAL (ML) SUBCUTANEOUS ONCE
Qty: 0 | Refills: 0 | Status: COMPLETED | OUTPATIENT
Start: 2018-05-16 | End: 2018-05-16

## 2018-05-16 RX ORDER — CARVEDILOL PHOSPHATE 80 MG/1
6.25 CAPSULE, EXTENDED RELEASE ORAL EVERY 12 HOURS
Qty: 0 | Refills: 0 | Status: DISCONTINUED | OUTPATIENT
Start: 2018-05-16 | End: 2018-05-20

## 2018-05-16 RX ORDER — INSULIN LISPRO 100/ML
12 VIAL (ML) SUBCUTANEOUS
Qty: 0 | Refills: 0 | Status: DISCONTINUED | OUTPATIENT
Start: 2018-05-17 | End: 2018-05-17

## 2018-05-16 RX ORDER — INSULIN LISPRO 100/ML
8 VIAL (ML) SUBCUTANEOUS
Qty: 0 | Refills: 0 | Status: DISCONTINUED | OUTPATIENT
Start: 2018-05-16 | End: 2018-05-18

## 2018-05-16 RX ORDER — METOPROLOL TARTRATE 50 MG
5 TABLET ORAL ONCE
Qty: 0 | Refills: 0 | Status: DISCONTINUED | OUTPATIENT
Start: 2018-05-16 | End: 2018-05-16

## 2018-05-16 RX ADMIN — Medication 325 MILLIGRAM(S): at 06:15

## 2018-05-16 RX ADMIN — Medication 8 UNIT(S): at 14:03

## 2018-05-16 RX ADMIN — ATORVASTATIN CALCIUM 40 MILLIGRAM(S): 80 TABLET, FILM COATED ORAL at 22:07

## 2018-05-16 RX ADMIN — CARVEDILOL PHOSPHATE 6.25 MILLIGRAM(S): 80 CAPSULE, EXTENDED RELEASE ORAL at 17:09

## 2018-05-16 RX ADMIN — Medication 17 UNIT(S): at 08:11

## 2018-05-16 RX ADMIN — Medication 20 MILLIGRAM(S): at 06:15

## 2018-05-16 RX ADMIN — Medication 81 MILLIGRAM(S): at 12:35

## 2018-05-16 RX ADMIN — HEPARIN SODIUM 5000 UNIT(S): 5000 INJECTION INTRAVENOUS; SUBCUTANEOUS at 12:35

## 2018-05-16 RX ADMIN — Medication 325 MILLIGRAM(S): at 17:09

## 2018-05-16 RX ADMIN — INSULIN GLARGINE 40 UNIT(S): 100 INJECTION, SOLUTION SUBCUTANEOUS at 22:08

## 2018-05-16 RX ADMIN — Medication 1 PUFF(S): at 10:50

## 2018-05-16 RX ADMIN — Medication 8 UNIT(S): at 17:09

## 2018-05-16 RX ADMIN — HEPARIN SODIUM 5000 UNIT(S): 5000 INJECTION INTRAVENOUS; SUBCUTANEOUS at 06:15

## 2018-05-16 RX ADMIN — Medication 2 UNIT(S): at 22:07

## 2018-05-16 RX ADMIN — BUMETANIDE 10 MG/HR: 0.25 INJECTION INTRAMUSCULAR; INTRAVENOUS at 09:03

## 2018-05-16 RX ADMIN — MILRINONE LACTATE 11.1 MICROGRAM(S)/KG/MIN: 1 INJECTION, SOLUTION INTRAVENOUS at 09:03

## 2018-05-16 RX ADMIN — Medication 1 PUFF(S): at 21:48

## 2018-05-16 RX ADMIN — Medication 2: at 22:07

## 2018-05-16 RX ADMIN — Medication 5 MILLIGRAM(S): at 10:48

## 2018-05-16 RX ADMIN — Medication 2: at 08:11

## 2018-05-16 RX ADMIN — HEPARIN SODIUM 5000 UNIT(S): 5000 INJECTION INTRAVENOUS; SUBCUTANEOUS at 22:07

## 2018-05-16 RX ADMIN — Medication 6: at 17:10

## 2018-05-16 NOTE — PROGRESS NOTE ADULT - PROBLEM SELECTOR PLAN 1
Inpatient Plan  - Please- if plan to reduce steroids further or discontinue, call endocrine team.  Prednisone decreased for today on predniosone 20 mg PO daily.  Last BID dosing was 5/14.  Yesterday received only one dose of BID order.  Today FS trending lower.  Insulins were not adjusted for steroid taper and had near hypoglycemia epsiode at lunch today.  - IF NEED HELP IN INSULIN ADJUSTMENT, PLEASE PAGE US AND INQUIRE  - Based on current regimen of Prednisone 20 mg daily:  - Recommend Lantus 40 units QHS  - Recommend changing Humalog to 12-10-8 and moderate ssi ac and hs.  - Recommend Humalog 2 units at 9pm IF eating a snack, hold if not eating snack  - PLease do not hold Lantus dose for low or normoglycemia.  If uncomfortable giving a full dose of insulin, recommend decreasing by 20% at least.  Outpatient Plan  - Basal/bolus, final doses to be determined- PATIENT NEEDS TO BE EDUCATED ON INSULIN PENS.  Please have nursing teach.  Original order for pen teaching placed 5/4- diabetes educator to follow up  - F/u [address] [place] [address]  Please page us with questions or concerns or steroid dose changes.  Our service will follow

## 2018-05-16 NOTE — PROVIDER CONTACT NOTE (OTHER) - ASSESSMENT
Pt denies chest pain, SOB, palpitations, dizziness. States comfortable. No acute distress noted. BP stable, HR tachycardic 129 per cardiac monitor.

## 2018-05-16 NOTE — PROGRESS NOTE ADULT - ASSESSMENT
52F w/ PMHx of CAD (s/p PCI to mLAD 4/2017), chronic HFrEF (likely 2/2 NICM + ICM, NYHA III, w/ LV thrombus - on Xarelto as outpt), COPD (on 2L NC @ home), Sarcoidosis, DM2 (HbA1c:14.3) who presented on 5/3 w/ acute on chronic HFrEF exacerbation and found to have a large pericardial effusion (still w/ pericardial drain).     EP following given LVEF:19% (pt was on B-blocker and ARB as outpt). She has improved volume status, still hypervolemic, currently on Bumex and Milrinone gtts w/ a Cr that prohibits cMR.      Plan:    1. acute on chronic HFrEF (LVEF:19%) - likely 2/2 NICM + ICM  -given that the patient was on ARB + B-blocker as an outpatient and LVEF 19%, pt last revascularized 4/2017 (that PCI was driven by a viability study per Dr. Berger) will consider AICD implantation this admission when pt more optimized  -Would prefer pericardial drain removed (output is 25cc in the past 24hrs) - consider drain removal today  -c/w Medical optimization of HF per general Cardiology  -Monitor on Tele, K>4, Mg>2 - Mg was not repleted today  -check cardiac MRI once renal function improves    EP will follow    SONDRA Sanford  Cardiology Fellow  (p): [phone]

## 2018-05-16 NOTE — PROGRESS NOTE ADULT - SUBJECTIVE AND OBJECTIVE BOX
Patient is a 52y old  Female who presents with a chief complaint of LE edema/pain and SOB (09 May 2018 11:57)      Any change in ROS: pt is doing ok :     MEDICATIONS  (STANDING):  aspirin enteric coated 81 milliGRAM(s) Oral daily  atorvastatin 40 milliGRAM(s) Oral at bedtime  carvedilol 6.25 milliGRAM(s) Oral every 12 hours  chlorhexidine 4% Liquid 1 Application(s) Topical <User Schedule>  dextrose 5%. 1000 milliLiter(s) (50 mL/Hr) IV Continuous <Continuous>  dextrose 50% Injectable 12.5 Gram(s) IV Push once  dextrose 50% Injectable 25 Gram(s) IV Push once  dextrose 50% Injectable 25 Gram(s) IV Push once  ferrous    sulfate 325 milliGRAM(s) Oral two times a day  fluticasone propionate   220 MICROgram(s) HFA Inhaler 1 Puff(s) Inhalation two times a day  heparin  Injectable 5000 Unit(s) SubCutaneous every 8 hours  insulin glargine Injectable (LANTUS) 40 Unit(s) SubCutaneous at bedtime  insulin lispro (HumaLOG) corrective regimen sliding scale   SubCutaneous three times a day before meals  insulin lispro (HumaLOG) corrective regimen sliding scale   SubCutaneous at bedtime  insulin lispro Injectable (HumaLOG) 12 Unit(s) SubCutaneous before dinner  insulin lispro Injectable (HumaLOG) 4 Unit(s) SubCutaneous <User Schedule>  insulin lispro Injectable (HumaLOG) 17 Unit(s) SubCutaneous before breakfast  insulin lispro Injectable (HumaLOG) 15 Unit(s) SubCutaneous before lunch  polyethylene glycol 3350 17 Gram(s) Oral daily  predniSONE   Tablet 20 milliGRAM(s) Oral daily    MEDICATIONS  (PRN):  acetaminophen   Tablet. 650 milliGRAM(s) Oral every 6 hours PRN temp> 101  acetaminophen   Tablet. 650 milliGRAM(s) Oral every 6 hours PRN mild, moderate pain  benzocaine 15 mG/menthol 3.6 mG Lozenge 1 Lozenge Oral two times a day PRN Sore Throat  dextrose Gel 1 Dose(s) Oral once PRN Blood Glucose LESS THAN 70 milliGRAM(s)/deciliter  glucagon  Injectable 1 milliGRAM(s) IntraMuscular once PRN Glucose LESS THAN 70 milligrams/deciliter  levalbuterol Inhalation 0.63 milliGRAM(s) Inhalation every 6 hours PRN bronchospasm    Vital Signs Last 24 Hrs  T(C): 36.6 (16 May 2018 11:51), Max: 36.7 (16 May 2018 01:55)  T(F): 97.8 (16 May 2018 11:51), Max: 98.1 (16 May 2018 01:55)  HR: 70 (16 May 2018 11:51) (56 - 130)  BP: 120/81 (16 May 2018 11:51) (114/53 - 145/89)  BP(mean): --  RR: 18 (16 May 2018 11:51) (17 - 19)  SpO2: 100% (16 May 2018 11:51) (97% - 100%)    I&O's Summary    15 May 2018 07:01  -  16 May 2018 07:00  --------------------------------------------------------  IN: 0 mL / OUT: 2700 mL / NET: -2700 mL    16 May 2018 07:01  -  16 May 2018 12:59  --------------------------------------------------------  IN: 0 mL / OUT: 1300 mL / NET: -1300 mL          Physical Exam:   GENERAL: NAD, well-groomed, well-developed  HEENT: PAVEL/   Atraumatic, Normocephalic  ENMT: No tonsillar erythema, exudates, or enlargement; Moist mucous membranes, Good dentition, No lesions  NECK: Supple, No JVD, Normal thyroid  CHEST/LUNG: bilateral crackles: +  CVS: Regular rate and rhythm; No murmurs, rubs, or gallops  GI: : Soft, Nontender, Nondistended; Bowel sounds present  NERVOUS SYSTEM:  Alert & Oriented X3  EXTREMITIES:  2+ Peripheral Pulses, No clubbing, cyanosis, or edema  LYMPH: No lymphadenopathy noted  SKIN: No rashes or lesions  ENDOCRINOLOGY: No Thyromegaly  PSYCH: Appropriate    Labs:                              11.6   10.98 )-----------( 287      ( 16 May 2018 07:00 )             39.5                         10.8   9.80  )-----------( 309      ( 15 May 2018 05:45 )             36.2                         12.2   8.59  )-----------( 366      ( 13 May 2018 06:50 )             41.2     05-16    141  |  94<L>  |  152<H>  ----------------------------<  127<H>  4.2   |  37<H>  |  2.01<H>  05-15    134<L>  |  90<L>  |  142<H>  ----------------------------<  334<H>  5.3   |  33<H>  |  2.04<H>  05-13    132<L>  |  91<L>  |  120<H>  ----------------------------<  621<HH>  5.5<H>   |  26  |  1.84<H>    Ca    9.7      16 May 2018 07:00  Ca    9.0      15 May 2018 05:45  Mg     1.9     05-16      CAPILLARY BLOOD GLUCOSE      POCT Blood Glucose.: 70 mg/dL (16 May 2018 11:49)  POCT Blood Glucose.: 155 mg/dL (16 May 2018 07:59)  POCT Blood Glucose.: 135 mg/dL (15 May 2018 20:56)  POCT Blood Glucose.: 123 mg/dL (15 May 2018 16:35)     (05-10 @ 09:40)          Fluid Source --  Albumin, Fluid--  Glucose, Fluid--  Protein total, Fluid--  Lacatate Dehydrogenase, Fluid--  pH, Fluid--  Cytopathology-Non Gyn Report  ACCESSION No:  [id number]    PEYMAN WATTS                        1        Cytopathology Report            Specimen(s) Submitted  PERICARDIAL FLUID      Clinical History  51 y/o female PMH HTN, DM, HLD, Sarcoidosis, HF EF, CAD COPD, pw  heart failure found to have Large Pericardial Effusion.      Gross Description  Received: 30 ml of bloody unfixed fluid  Prepared: 1 ThinPrep slide, 1 smear and 1 cell block      Final Diagnosis  PERICARDIAL FLUID  NEGATIVE FOR MALIGNANT CELLS.    The cytology slides and cell block consist of acute inflammation,  histiocytes and few benign mesothelial cells in a  background of proteinaceous material.    Screened by: Shar ERNANDEZ(ASCP)  Verified by: Dieudonne German M.D.  (Electronic Signature)  Reported on: 05/12/18 15:27 EDT, [address] [place] [address]  [id number]  Cytology technical processing performed at [address] [place] [id number]  _________________________________________________________________  Cultures:               < from: Xray Chest 1 View- PORTABLE-Routine (05.09.18 @ 07:50) >    EXAM:  XR CHEST PORTABLE ROUTINE 1V        PROCEDURE DATE:  May  9 2018         INTERPRETATION:    Portable chest xray: Shortness of Breath    Comparison: Most recent prior     FINDINGS:    Lines/Tubes: None    Heart and mediastinum:  Unchanged.    Lungs, pleura, and airways: Patchy opacities bilaterally suggestive of   pulmonary edema. Small left effusion, stable    Bones and soft tissues: The bones and soft tissues are unchanged.    Impression:    Interstitial edema and left effusion, stable.                  AUNG HITCHCOCK M.D., ATTENDING RADIOLOGIST  This document has been electronically signed. May  9 2018 11:52AM        < end of copied text >                Studies  Chest X-RAY  CT SCAN Chest   Venous Dopplers: LE:   CT Abdomen  Others

## 2018-05-16 NOTE — PROGRESS NOTE ADULT - SUBJECTIVE AND OBJECTIVE BOX
Patient seen and examined at bedside on [address] [place]    Overnight Events: No events overnight    Review Of Systems: No chest pain, shortness of breath, or palpitations. She reports that she woke up in a cold sweat when her FS was 123 - she reports feeling symptoms of hypoglycemia. Still complaining of overall weakness           Current Meds:  acetaminophen   Tablet. 650 milliGRAM(s) Oral every 6 hours PRN  acetaminophen   Tablet. 650 milliGRAM(s) Oral every 6 hours PRN  aspirin enteric coated 81 milliGRAM(s) Oral daily  atorvastatin 40 milliGRAM(s) Oral at bedtime  benzocaine 15 mG/menthol 3.6 mG Lozenge 1 Lozenge Oral two times a day PRN  buMETAnide Infusion 1 mG/Hr IV Continuous <Continuous>  chlorhexidine 4% Liquid 1 Application(s) Topical <User Schedule>  ferrous    sulfate 325 milliGRAM(s) Oral two times a day  fluticasone propionate   220 MICROgram(s) HFA Inhaler 1 Puff(s) Inhalation two times a day  heparin  Injectable 5000 Unit(s) SubCutaneous every 8 hours  insulin glargine Injectable (LANTUS) 40 Unit(s) SubCutaneous at bedtime  insulin lispro (HumaLOG) corrective regimen sliding scale   SubCutaneous three times a day before meals  insulin lispro (HumaLOG) corrective regimen sliding scale   SubCutaneous at bedtime  insulin lispro Injectable (HumaLOG) 12 Unit(s) SubCutaneous before dinner  insulin lispro Injectable (HumaLOG) 4 Unit(s) SubCutaneous <User Schedule>  insulin lispro Injectable (HumaLOG) 17 Unit(s) SubCutaneous before breakfast  insulin lispro Injectable (HumaLOG) 15 Unit(s) SubCutaneous before lunch  levalbuterol Inhalation 0.63 milliGRAM(s) Inhalation every 6 hours PRN  milrinone Infusion 0.5 MICROgram(s)/kG/Min IV Continuous <Continuous>  polyethylene glycol 3350 17 Gram(s) Oral daily  predniSONE   Tablet 20 milliGRAM(s) Oral daily    Vitals:  T(F): 96.9 (05-16), Max: 98.1 (05-16)  HR: 109 (05-16) (56 - 150)  BP: 130/64 (05-16) (109/67 - 145/89)  RR: 19 (05-16)  SpO2: 100% on RA    I&O's Summary    15 May 2018 07:01  -  16 May 2018 07:00  --------------------------------------------------------  IN: 0 mL / OUT: 2700 mL / NET: -2700 mL - likely not accurate as no intake recorded    Daily Weight in k.9 (16 May 2018 01:55) - No weight documented on 5/15    Physical Exam:  Appearance: No acute distress; well appearing  Eyes: PERRL, EOMI, pink conjunctiva  HENT: Normal oral mucosa  Cardiovascular: RRR, S1, S2, no murmurs, rubs, or gallops; 1+ b/l LE edema (improved c/w prior exams); no JVD  Respiratory: Clear to auscultation bilaterally  Gastrointestinal: soft, non-tender, non-distended with normal bowel sounds  Musculoskeletal: No clubbing; no joint deformity   Neurologic: Non-focal  Lymphatic: No lymphadenopathy  Psychiatry: AAOx3, mood & affect appropriate  Skin: No rashes, ecchymoses, or cyanosis  Pericardial drain (output 25cc in the past 25 hrs)                          11.6   10.98 )-----------( 287      ( 16 May 2018 07:00 )             39.5     05-16    141  |  94<L>  |  152<H>  ----------------------------<  127<H>  4.2   |  37<H>  |  2.01 <--2.04 GFR: 32    Ca    9.7      16 May 2018 07:00  Mg     1.9     05-16    Imaging: cMR pending    Interpretation of Telemetry: Sinus 100s - 110s

## 2018-05-16 NOTE — PROGRESS NOTE ADULT - SUBJECTIVE AND OBJECTIVE BOX
Subjective: Patient seen and examined. No new events except as noted.   feeling better   no cp or sob   REVIEW OF SYSTEMS:    CONSTITUTIONAL: + weakness, fevers or chills  EYES/ENT: No visual changes;  No vertigo or throat pain   NECK: No pain or stiffness  RESPIRATORY: No cough, wheezing, hemoptysis; No shortness of breath  CARDIOVASCULAR: No chest pain or palpitations  GASTROINTESTINAL: No abdominal or epigastric pain. No nausea, vomiting, or hematemesis; No diarrhea or constipation. No melena or hematochezia.  GENITOURINARY: No dysuria, frequency or hematuria  NEUROLOGICAL: No numbness or weakness  SKIN: No itching, burning, rashes, or lesions   All other review of systems is negative unless indicated above.    MEDICATIONS:  MEDICATIONS  (STANDING):  aspirin enteric coated 81 milliGRAM(s) Oral daily  atorvastatin 40 milliGRAM(s) Oral at bedtime  carvedilol 6.25 milliGRAM(s) Oral every 12 hours  chlorhexidine 4% Liquid 1 Application(s) Topical <User Schedule>  dextrose 5%. 1000 milliLiter(s) (50 mL/Hr) IV Continuous <Continuous>  dextrose 50% Injectable 12.5 Gram(s) IV Push once  dextrose 50% Injectable 25 Gram(s) IV Push once  dextrose 50% Injectable 25 Gram(s) IV Push once  ferrous    sulfate 325 milliGRAM(s) Oral two times a day  fluticasone propionate   220 MICROgram(s) HFA Inhaler 1 Puff(s) Inhalation two times a day  heparin  Injectable 5000 Unit(s) SubCutaneous every 8 hours  insulin glargine Injectable (LANTUS) 40 Unit(s) SubCutaneous at bedtime  insulin lispro (HumaLOG) corrective regimen sliding scale   SubCutaneous three times a day before meals  insulin lispro (HumaLOG) corrective regimen sliding scale   SubCutaneous at bedtime  insulin lispro Injectable (HumaLOG) 12 Unit(s) SubCutaneous before dinner  insulin lispro Injectable (HumaLOG) 4 Unit(s) SubCutaneous <User Schedule>  insulin lispro Injectable (HumaLOG) 17 Unit(s) SubCutaneous before breakfast  insulin lispro Injectable (HumaLOG) 15 Unit(s) SubCutaneous before lunch  polyethylene glycol 3350 17 Gram(s) Oral daily  predniSONE   Tablet 20 milliGRAM(s) Oral daily      PHYSICAL EXAM:  T(C): 36.6 (05-16-18 @ 11:51), Max: 36.7 (05-16-18 @ 01:55)  HR: 70 (05-16-18 @ 11:51) (56 - 130)  BP: 120/81 (05-16-18 @ 11:51) (114/53 - 145/89)  RR: 18 (05-16-18 @ 11:51) (17 - 19)  SpO2: 100% (05-16-18 @ 11:51) (97% - 100%)  Wt(kg): --  I&O's Summary    15 May 2018 07:01  -  16 May 2018 07:00  --------------------------------------------------------  IN: 0 mL / OUT: 2700 mL / NET: -2700 mL    16 May 2018 07:01  -  16 May 2018 12:15  --------------------------------------------------------  IN: 0 mL / OUT: 1300 mL / NET: -1300 mL      Height (cm): 160.02 (05-15 @ 13:00)    Appearance: Normal	  HEENT:   Normal oral mucosa, PERRL, EOMI	  Lymphatic: No lymphadenopathy , no edema  Cardiovascular: tachy irregular S1 S2, No JVD, No murmurs , Peripheral pulses palpable 2+ bilaterally  Respiratory: Lungs clear to auscultation, normal effort 	  Gastrointestinal:  Soft, Non-tender, + BS	  Skin: No rashes, No ecchymoses, No cyanosis, warm to touch  Musculoskeletal: Normal range of motion, normal strength  Psychiatry:  Mood & affect appropriate  Ext: No edema      LABS:    CARDIAC MARKERS:                                11.6   10.98 )-----------( 287      ( 16 May 2018 07:00 )             39.5     05-16    141  |  94<L>  |  152<H>  ----------------------------<  127<H>  4.2   |  37<H>  |  2.01<H>    Ca    9.7      16 May 2018 07:00  Mg     1.9     05-16      proBNP:   Lipid Profile:   HgA1c:   TSH:             TELEMETRY: 	Af     ECG:  	  RADIOLOGY:   DIAGNOSTIC TESTING:  [ ] Echocardiogram:  [ ]  Catheterization:  [ ] Stress Test:    OTHER:

## 2018-05-16 NOTE — PROGRESS NOTE ADULT - PROBLEM SELECTOR PLAN 3
cont current care: ? MRI heart ? sarcoid heart  5/15: She did have stent placed in 2016 at [facility]  5/16: cont current treatment per cardiology

## 2018-05-16 NOTE — PROGRESS NOTE ADULT - SUBJECTIVE AND OBJECTIVE BOX
Chief Complaint/Follow-up on: Uncontrolled T2DM    Subjective: 51 y/o female with uncontrolled T2DM, sarcoidosis on Prednisone 20mg po BID, now titrated down to 20mg po daily.  FS has improved, however pre lunch 70.  No n/v.  pt reports she just finished eating lunch    MEDICATIONS  (STANDING):  aspirin enteric coated 81 milliGRAM(s) Oral daily  atorvastatin 40 milliGRAM(s) Oral at bedtime  buMETAnide Infusion 1 mG/Hr (10 mL/Hr) IV Continuous <Continuous>  chlorhexidine 4% Liquid 1 Application(s) Topical <User Schedule>  dextrose 5%. 1000 milliLiter(s) (50 mL/Hr) IV Continuous <Continuous>  dextrose 50% Injectable 12.5 Gram(s) IV Push once  dextrose 50% Injectable 25 Gram(s) IV Push once  dextrose 50% Injectable 25 Gram(s) IV Push once  ferrous    sulfate 325 milliGRAM(s) Oral two times a day  fluticasone propionate   220 MICROgram(s) HFA Inhaler 1 Puff(s) Inhalation two times a day  heparin  Injectable 5000 Unit(s) SubCutaneous every 8 hours  insulin glargine Injectable (LANTUS) 25 Unit(s) SubCutaneous at bedtime  insulin lispro (HumaLOG) corrective regimen sliding scale   SubCutaneous three times a day before meals  insulin lispro (HumaLOG) corrective regimen sliding scale   SubCutaneous at bedtime  insulin lispro Injectable (HumaLOG) 17 Unit(s) SubCutaneous before breakfast  insulin lispro Injectable (HumaLOG) 15 Unit(s) SubCutaneous before lunch  insulin lispro Injectable (HumaLOG) 11 Unit(s) SubCutaneous before dinner  insulin lispro Injectable (HumaLOG) 3 Unit(s) SubCutaneous <User Schedule>  milrinone Infusion 0.5 MICROgram(s)/kG/Min (11.1 mL/Hr) IV Continuous <Continuous>  predniSONE   Tablet 20 milliGRAM(s) Oral two times a day    MEDICATIONS  (PRN):  acetaminophen   Tablet. 650 milliGRAM(s) Oral every 6 hours PRN temp> 101  acetaminophen   Tablet. 650 milliGRAM(s) Oral every 6 hours PRN mild, moderate pain  dextrose Gel 1 Dose(s) Oral once PRN Blood Glucose LESS THAN 70 milliGRAM(s)/deciliter  glucagon  Injectable 1 milliGRAM(s) IntraMuscular once PRN Glucose LESS THAN 70 milligrams/deciliter  levalbuterol Inhalation 0.63 milliGRAM(s) Inhalation every 6 hours PRN bronchospasm      PHYSICAL EXAM:  Vital Signs Last 24 Hrs  T(C): 36.6 (16 May 2018 11:51), Max: 36.7 (16 May 2018 01:55)  T(F): 97.8 (16 May 2018 11:51), Max: 98.1 (16 May 2018 01:55)  HR: 70 (16 May 2018 11:51) (56 - 130)  BP: 120/81 (16 May 2018 11:51) (114/53 - 145/89)  BP(mean): --  RR: 18 (16 May 2018 11:51) (17 - 19)  SpO2: 100% (16 May 2018 11:51) (97% - 100%)  GENERAL: NAD, well-developed, thin female  EYES: No proptosis  GI: Soft, nontender, non distended, normal bowel sounds    CAPILLARY BLOOD GLUCOSE  POCT Blood Glucose.: 70 mg/dL (16 May 2018 11:49)  POCT Blood Glucose.: 155 mg/dL (16 May 2018 07:59)  POCT Blood Glucose.: 135 mg/dL (15 May 2018 20:56)  POCT Blood Glucose.: 123 mg/dL (15 May 2018 16:35)    POCT Blood Glucose.: 273 mg/dL (15 May 2018 11:31)  POCT Blood Glucose.: 367 mg/dL (15 May 2018 07:58)  POCT Blood Glucose.: 303 mg/dL (14 May 2018 22:22)  POCT Blood Glucose.: 273 mg/dL (14 May 2018 21:16)  POCT Blood Glucose.: 162 mg/dL (14 May 2018 17:20)    POCT Blood Glucose.: 196 mg/dL (14 May 2018 11:55)  POCT Blood Glucose.: 370 mg/dL (14 May 2018 07:44)  POCT Blood Glucose.: 111 mg/dL (13 May 2018 22:02)  POCT Blood Glucose.: 108 mg/dL (13 May 2018 16:58)  POCT Blood Glucose.: 336 mg/dL (13 May 2018 12:14)    POCT Blood Glucose.: 520 mg/dL (05-13-18 @ 07:51) H17+H12    POCT Blood Glucose.: 96 mg/dL (05-12-18 @ 21:45) xLantus  POCT Blood Glucose.: 89 mg/dL (05-12-18 @ 17:21) H17  POCT Blood Glucose.: 153 mg/dL (05-12-18 @ 11:59) H17+2  POCT Blood Glucose.: 285 mg/dL (05-12-18 @ 07:49) H17+6    POCT Blood Glucose.: 78 mg/dL (05-11-18 @ 21:03) xLantus  POCT Blood Glucose.: 94 mg/dL (05-11-18 @ 16:55) H17  POCT Blood Glucose.: 224 mg/dL (05-11-18 @ 12:00) H15+4  POCT Blood Glucose.: 451 mg/dL (05-11-18 @ 08:48) H15+12  POCT Blood Glucose.: 439 mg/dL (05-11-18 @ 08:35)    POCT Blood Glucose.: 91 mg/dL (05-10-18 @ 21:39)  POCT Blood Glucose.: 177 mg/dL (05-10-18 @ 18:24)  POCT Blood Glucose.: 272 mg/dL (05-10-18 @ 13:10)    05-13    132<L>  |  91<L>  |  120<H>  ----------------------------<  621<HH>  5.5<H>   |  26  |  1.84<H>    EGFR if : 36  EGFR if non : 31    Ca    8.6      05-13  Mg     2.0     05-13  Phos  4.8     05-13      Thyroid Function Tests:  05-03 @ 16:59   TSH 6.40   FreeT4 -- T3 -- Anti TPO -- Anti Thyroglobulin Ab -- TSI --      Hemoglobin A1C, Whole Blood: 14.3 % <H> [4.0 - 5.6] (05-04-18 @ 06:45)  Hemoglobin A1C, Whole Blood: 14.3 % <H> [4.0 - 5.6] (05-03-18 @ 16:59)

## 2018-05-16 NOTE — PROGRESS NOTE ADULT - PROBLEM SELECTOR PLAN 1
Much better compensated   DC primacor and Bumex gtt  Cardiac MRI once more stable from kidney standpoint   nephrology follow up

## 2018-05-16 NOTE — PROGRESS NOTE ADULT - SUBJECTIVE AND OBJECTIVE BOX
No pain, no shortness of breath      VITAL:  T(C): , Max: 36.7 (05-16-18 @ 01:55)  T(F): , Max: 98.1 (05-16-18 @ 01:55)  HR: 97 (05-16-18 @ 06:00)  BP: 145/89 (05-16-18 @ 06:00)  BP(mean): --  RR: 17 (05-16-18 @ 06:00)  SpO2: 100% (05-16-18 @ 06:00)      PHYSICAL EXAM:  Constitutional: NAD; Alert  HEENT:  NCAT; DMM  Neck: (+) JVD; supple  Respiratory: CTA-b/l  Cardiac: RRR s1s2; (+)pericardial drain  Gastrointestinal: BS+, soft, NT/ND  Urologic: No barfield  Extremities: 2+ b/l LE edema  Back: No CVAT b/l      LABS:                        11.6   10.98 )-----------( 287      ( 16 May 2018 07:00 )             39.5     Na(141)/K(4.2)/Cl(94)/HCO3(37)/BUN(152)/Cr(2.01)Glu(127)/Ca(9.7)/Mg(1.9)/PO4(--)    05-16 @ 07:00  Na(134)/K(5.3)/Cl(90)/HCO3(33)/BUN(142)/Cr(2.04)Glu(334)/Ca(9.0)/Mg(--)/PO4(--)    05-15 @ 05:45      IMPRESSION: 52F w/ DM2, sarcoidosis, HFrEF, LV thrombus on Xarelto, and CKD3, 5/3/18 a/w acute on chronic HFrEF/pericardial effusion requiring drainage    (1)Renal - CKD3 - ~2gm proteinuria it appears; presumed diabetic nephropathy - superimposed YANA; prerenally mediated in setting of obligate diuresis; BUN uptrending rapidly; creatinine has risen over the past few days but not nearly at the rate of the rise in BUN; creatinine plateaued as of today. Rise in BUN not only from diuresis but also from steroids. Prednisone dosage reduced yesterday from 20mg bid to 20mg qd.    (2)Hyperkalemia -multifactorial - involved factors include her CKD+YANA, as well as her recent hypoinsulinemia The reduction in the steroid dosage should serve to help here.    (3)Acid-base - high/rising HCO3 - primary alkalosis? Or is it largely compensatory for respiratory acidosis associated with her intrinsic pulmonary disease?    (4)CV - resolving/resolved acute on chronic HFrEF      RECOMMEND:  (1)D/C Milrinone  (2)Advance diuretics to PO  (3)Taper of Prednisone by Pulmonary   (4)ABG  (5)F/U CT chest        Jair Lion MD  [facility]  [phone] No pain, no shortness of breath. Attests to poor sleep due to nocturia      VITAL:  T(C): , Max: 36.7 (05-16-18 @ 01:55)  T(F): , Max: 98.1 (05-16-18 @ 01:55)  HR: 97 (05-16-18 @ 06:00)  BP: 145/89 (05-16-18 @ 06:00)  BP(mean): --  RR: 17 (05-16-18 @ 06:00)  SpO2: 100% (05-16-18 @ 06:00)      PHYSICAL EXAM:  Constitutional: NAD; Alert  HEENT:  NCAT; DMM  Neck: (+) JVD; supple  Respiratory: CTA-b/l  Cardiac: RRR s1s2; (+)pericardial drain  Gastrointestinal: BS+, soft, NT/ND  Urologic: No barfield  Extremities: 1-2+ b/l LE edema  Back: No CVAT b/l      LABS:                        11.6   10.98 )-----------( 287      ( 16 May 2018 07:00 )             39.5     Na(141)/K(4.2)/Cl(94)/HCO3(37)/BUN(152)/Cr(2.01)Glu(127)/Ca(9.7)/Mg(1.9)/PO4(--)    05-16 @ 07:00  Na(134)/K(5.3)/Cl(90)/HCO3(33)/BUN(142)/Cr(2.04)Glu(334)/Ca(9.0)/Mg(--)/PO4(--)    05-15 @ 05:45      IMPRESSION: 52F w/ DM2, sarcoidosis, HFrEF, LV thrombus on Xarelto, and CKD3, 5/3/18 a/w acute on chronic HFrEF/pericardial effusion requiring drainage    (1)Renal - CKD3 - ~2gm proteinuria it appears; presumed diabetic nephropathy - superimposed YANA; prerenally mediated in setting of obligate diuresis; BUN uptrending rapidly; creatinine has risen over the past few days but not nearly at the rate of the rise in BUN; creatinine plateaued as of today. Rise in BUN not only from diuresis but also from steroids. Prednisone dosage reduced yesterday from 20mg bid to 20mg qd.    (2)Hyperkalemia -multifactorial - involved factors include her CKD+YANA, as well as her recent hypoinsulinemia The reduction in the steroid dosage should serve to help here.    (3)Acid-base - high/rising HCO3 - primary alkalosis? Or is it largely compensatory for respiratory acidosis associated with her intrinsic pulmonary disease?    (4)CV - resolving/resolved acute on chronic HFrEF      RECOMMEND:  (1)D/C Milrinone  (2)Advance diuretics to PO  (3)Taper of Prednisone by Pulmonary   (4)ABG  (5)F/U CT chest        Jair Lion MD  [facility]  [phone]

## 2018-05-16 NOTE — PROGRESS NOTE ADULT - PROBLEM SELECTOR PLAN 1
called pulmonary division: [facility]: awaiting message: Pt says she has been using prednisone for last one year at least and it has helped her:  5/15; first presented to dr mc with chf: She was dx with sarcoid with hilar and axillary LN. She had ax LN biopsy done which showed sarcoid: She was put on prednisone on 5/5/ 2015: 20 mg a day , she got much better , especially the cough improved: He was concerned about her cardiac status: The she stopped gong there again there for a year: In 2016 she was seen by cardiologist , she was found to have severe two vessel disease LAD and RCA, on cardiac cath IN may 16 2016: She also had stent placed at that time: That was the last time she was seen there: She was initially placed on steroids at 20 mg and after may 2016 , she stopped following there. So at this time, her dose of steroids is not very clear: and only pulmonologist name she gives is Dr Mc , who has not seen er for last 2 years:  5/16: pt agrees she could not follow with pulmonary in last two years as she was sick: Now she says she was taking steroids 20 mg a day, and the current dose is decreased to 20 mg a day again: Await ct chest today

## 2018-05-17 DIAGNOSIS — E03.9 HYPOTHYROIDISM, UNSPECIFIED: ICD-10-CM

## 2018-05-17 LAB
GLUCOSE BLDC GLUCOMTR-MCNC: 101 MG/DL — HIGH (ref 70–99)
GLUCOSE BLDC GLUCOMTR-MCNC: 103 MG/DL — HIGH (ref 70–99)
GLUCOSE BLDC GLUCOMTR-MCNC: 230 MG/DL — HIGH (ref 70–99)
GLUCOSE BLDC GLUCOMTR-MCNC: 295 MG/DL — HIGH (ref 70–99)
GLUCOSE BLDC GLUCOMTR-MCNC: 52 MG/DL — LOW (ref 70–99)

## 2018-05-17 PROCEDURE — 99232 SBSQ HOSP IP/OBS MODERATE 35: CPT

## 2018-05-17 PROCEDURE — 99233 SBSQ HOSP IP/OBS HIGH 50: CPT

## 2018-05-17 PROCEDURE — 12345: CPT | Mod: NC

## 2018-05-17 RX ORDER — INSULIN GLARGINE 100 [IU]/ML
30 INJECTION, SOLUTION SUBCUTANEOUS AT BEDTIME
Qty: 0 | Refills: 0 | Status: DISCONTINUED | OUTPATIENT
Start: 2018-05-17 | End: 2018-05-18

## 2018-05-17 RX ORDER — INSULIN LISPRO 100/ML
8 VIAL (ML) SUBCUTANEOUS
Qty: 0 | Refills: 0 | Status: DISCONTINUED | OUTPATIENT
Start: 2018-05-17 | End: 2018-05-18

## 2018-05-17 RX ADMIN — Medication 4: at 12:27

## 2018-05-17 RX ADMIN — Medication 1 PUFF(S): at 22:02

## 2018-05-17 RX ADMIN — Medication 8 UNIT(S): at 12:27

## 2018-05-17 RX ADMIN — Medication 1 PUFF(S): at 12:32

## 2018-05-17 RX ADMIN — INSULIN GLARGINE 30 UNIT(S): 100 INJECTION, SOLUTION SUBCUTANEOUS at 22:03

## 2018-05-17 RX ADMIN — HEPARIN SODIUM 5000 UNIT(S): 5000 INJECTION INTRAVENOUS; SUBCUTANEOUS at 13:15

## 2018-05-17 RX ADMIN — ATORVASTATIN CALCIUM 40 MILLIGRAM(S): 80 TABLET, FILM COATED ORAL at 22:03

## 2018-05-17 RX ADMIN — Medication 2: at 22:03

## 2018-05-17 RX ADMIN — HEPARIN SODIUM 5000 UNIT(S): 5000 INJECTION INTRAVENOUS; SUBCUTANEOUS at 22:02

## 2018-05-17 RX ADMIN — Medication 81 MILLIGRAM(S): at 12:28

## 2018-05-17 RX ADMIN — Medication 20 MILLIGRAM(S): at 05:42

## 2018-05-17 RX ADMIN — CARVEDILOL PHOSPHATE 6.25 MILLIGRAM(S): 80 CAPSULE, EXTENDED RELEASE ORAL at 17:29

## 2018-05-17 RX ADMIN — Medication 325 MILLIGRAM(S): at 17:29

## 2018-05-17 RX ADMIN — Medication 325 MILLIGRAM(S): at 05:34

## 2018-05-17 RX ADMIN — CARVEDILOL PHOSPHATE 6.25 MILLIGRAM(S): 80 CAPSULE, EXTENDED RELEASE ORAL at 05:34

## 2018-05-17 RX ADMIN — HEPARIN SODIUM 5000 UNIT(S): 5000 INJECTION INTRAVENOUS; SUBCUTANEOUS at 05:34

## 2018-05-17 NOTE — PROVIDER CONTACT NOTE (OTHER) - ACTION/TREATMENT ORDERED:
Spring Soni made aware; no new orders given at this time. Will continue to monitor.
Insulin coverage administered
Repeat .
Tele Grzegorz Aranda made aware of tachycardia and Atrial flutter heart rhythm.
administer standing 10 units of humalog plus sliding scale

## 2018-05-17 NOTE — PROGRESS NOTE ADULT - PROBLEM SELECTOR PLAN 3
cont current care: ? MRI heart ? sarcoid heart  5/15: She did have stent placed in 2016 at [facility]  5/16: cont current treatment per cardiology  5/17: cont current treamtent

## 2018-05-17 NOTE — PROGRESS NOTE ADULT - PROBLEM SELECTOR PLAN 1
Much better compensated   Given elevated Cr, will forgo CMR at present time. Discussed with EP to proceed with ICD implant.

## 2018-05-17 NOTE — PROGRESS NOTE ADULT - PROBLEM SELECTOR PLAN 2
-TSH was 6.4, she does not have specific symptoms of overt hypothyroidism.  I would check a Free T4 and repeat TSH at this time

## 2018-05-17 NOTE — PROGRESS NOTE ADULT - ASSESSMENT
52 year old woman with T2DM, uncontrolled, with steroid induced hyperglycemia, admitted with acute decompensated heart failure/sarcoidosis, now post IR drainage of pericardial effusion.

## 2018-05-17 NOTE — PROGRESS NOTE ADULT - PROBLEM SELECTOR PLAN 2
rpt ct scan chest ?  5/15: rpt ct chest without contrast today  5/16: await ct chest  5/17: doing pretty good: resolved

## 2018-05-17 NOTE — PROGRESS NOTE ADULT - SUBJECTIVE AND OBJECTIVE BOX
Patient denies shortness of breath at rest, chest pain, palpitations or dizziness. Unable to lay flat in bed. Only complains of waking up in the meddle of the night diaphoretic due to hypoglycemia ) blood glucose 52.     Vital Signs Last 24 Hrs  T(C): 36.4 (17 May 2018 08:03), Max: 36.6 (16 May 2018 11:51)  T(F): 97.5 (17 May 2018 08:03), Max: 97.9 (17 May 2018 00:27)  HR: 80 (17 May 2018 08:03) (70 - 130)  BP: 113/74 (17 May 2018 08:03) (111/70 - 135/67)  BP(mean): --  RR: 17 (17 May 2018 08:03) (17 - 18)  SpO2: 100% (17 May 2018 08:03) (98% - 100%)      EKG  Telemetry: normal sinus rhythm 70-90's.  +APC's.  No NSVT.    MEDICATIONS  (STANDING):  aspirin enteric coated 81 milliGRAM(s) Oral daily  atorvastatin 40 milliGRAM(s) Oral at bedtime  carvedilol 6.25 milliGRAM(s) Oral every 12 hours  chlorhexidine 4% Liquid 1 Application(s) Topical <User Schedule>  dextrose 5%. 1000 milliLiter(s) (50 mL/Hr) IV Continuous <Continuous>  dextrose 50% Injectable 12.5 Gram(s) IV Push once  dextrose 50% Injectable 25 Gram(s) IV Push once  dextrose 50% Injectable 25 Gram(s) IV Push once  ferrous    sulfate 325 milliGRAM(s) Oral two times a day  fluticasone propionate   220 MICROgram(s) HFA Inhaler 1 Puff(s) Inhalation two times a day  heparin  Injectable 5000 Unit(s) SubCutaneous every 8 hours  insulin glargine Injectable (LANTUS) 40 Unit(s) SubCutaneous at bedtime  insulin lispro (HumaLOG) corrective regimen sliding scale   SubCutaneous three times a day before meals  insulin lispro (HumaLOG) corrective regimen sliding scale   SubCutaneous at bedtime  insulin lispro Injectable (HumaLOG) 12 Unit(s) SubCutaneous before breakfast  insulin lispro Injectable (HumaLOG) 10 Unit(s) SubCutaneous before lunch  insulin lispro Injectable (HumaLOG) 8 Unit(s) SubCutaneous before dinner  insulin lispro Injectable (HumaLOG) 2 Unit(s) SubCutaneous <User Schedule>  polyethylene glycol 3350 17 Gram(s) Oral daily  predniSONE   Tablet 20 milliGRAM(s) Oral daily    MEDICATIONS  (PRN):  acetaminophen   Tablet. 650 milliGRAM(s) Oral every 6 hours PRN temp> 101  acetaminophen   Tablet. 650 milliGRAM(s) Oral every 6 hours PRN mild, moderate pain  benzocaine 15 mG/menthol 3.6 mG Lozenge 1 Lozenge Oral two times a day PRN Sore Throat  dextrose Gel 1 Dose(s) Oral once PRN Blood Glucose LESS THAN 70 milliGRAM(s)/deciliter  glucagon  Injectable 1 milliGRAM(s) IntraMuscular once PRN Glucose LESS THAN 70 milligrams/deciliter  levalbuterol Inhalation 0.63 milliGRAM(s) Inhalation every 6 hours PRN bronchospasm          Physical exam:   Gen-patient comfortable in NAD  Resp- clear to auscultation.  No wheezing, rales or rhonchi  CV- Normal S1 and S2. RRR. No murmurs, gallops or rubs.  +pericardial drain  ABD- soft,  nontender  +bowel sounds  EXT- 1+ pitting edema bilateral lower legs  Neuro- grossly nonfocal                            11.6   10.98 )-----------( 287      ( 16 May 2018 07:00 )             39.5       05-16    141  |  94<L>  |  152<H>  ----------------------------<  127<H>  4.2   |  37<H>  |  2.01<H>    Ca    9.7      16 May 2018 07:00  Mg     1.9     05-16            CHEST CT SCAN:  FINDINGS:    LUNGS AND LARGE AIRWAYS: There is traction bronchiectasis with associated   peribronchial thickening and linear opacities in the bilateral lower   lobes, right middle lobe, and to a lesser extent the bilateral upper   lobes. A few centrilobular nodular opacities are noted in the bilateral   upper lung lobes.    PLEURA: No pleural effusion.    VESSELS: Atherosclerotic consolidations of the aorta and coronary   arteries. An LAD coronary stent is noted.    HEART: Heart size is normal. No pericardial effusion.    MEDIASTINUM AND MANJULA: Predominantly calcified paratracheal, subcarinal,   and bilateral hilar adenopathy, consistent with sarcoidosis.    CHEST WALL AND LOWER NECK: There is asymmetric skin thickening and   subcutaneous edema of the right breast.    UPPER ABDOMEN: Within normal limits.    BONES: Degenerative changes of the spine.    IMPRESSION:    Pulmonary sarcoidosis with evidence of extensive fibrosis in the   bilateral lower lobes and right middle lobe, as well as nonfibrotic   changes in the bilateral upper lobes.    Calcified mediastinal and bilateral hilar adenopathy.    Asymmetrical skin thickening and subcutaneous edema of the right breast.   Correlate with dedicated mammographic imaging or any prior mammograms if   available. Patient denies shortness of breath at rest, chest pain, palpitations or dizziness.  Able to lay flat in bed. Only complains of waking up in the middle of the night diaphoretic due to hypoglycemia -> blood glucose 52.     Vital Signs Last 24 Hrs  T(C): 36.4 (17 May 2018 08:03), Max: 36.6 (16 May 2018 11:51)  T(F): 97.5 (17 May 2018 08:03), Max: 97.9 (17 May 2018 00:27)  HR: 80 (17 May 2018 08:03) (70 - 130)  BP: 113/74 (17 May 2018 08:03) (111/70 - 135/67)  BP(mean): --  RR: 17 (17 May 2018 08:03) (17 - 18)  SpO2: 100% (17 May 2018 08:03) (98% - 100%)      EKG  Telemetry: normal sinus rhythm 70-90's.  +APC's.  No NSVT.    MEDICATIONS  (STANDING):  aspirin enteric coated 81 milliGRAM(s) Oral daily  atorvastatin 40 milliGRAM(s) Oral at bedtime  carvedilol 6.25 milliGRAM(s) Oral every 12 hours  chlorhexidine 4% Liquid 1 Application(s) Topical <User Schedule>  dextrose 5%. 1000 milliLiter(s) (50 mL/Hr) IV Continuous <Continuous>  dextrose 50% Injectable 12.5 Gram(s) IV Push once  dextrose 50% Injectable 25 Gram(s) IV Push once  dextrose 50% Injectable 25 Gram(s) IV Push once  ferrous    sulfate 325 milliGRAM(s) Oral two times a day  fluticasone propionate   220 MICROgram(s) HFA Inhaler 1 Puff(s) Inhalation two times a day  heparin  Injectable 5000 Unit(s) SubCutaneous every 8 hours  insulin glargine Injectable (LANTUS) 40 Unit(s) SubCutaneous at bedtime  insulin lispro (HumaLOG) corrective regimen sliding scale   SubCutaneous three times a day before meals  insulin lispro (HumaLOG) corrective regimen sliding scale   SubCutaneous at bedtime  insulin lispro Injectable (HumaLOG) 12 Unit(s) SubCutaneous before breakfast  insulin lispro Injectable (HumaLOG) 10 Unit(s) SubCutaneous before lunch  insulin lispro Injectable (HumaLOG) 8 Unit(s) SubCutaneous before dinner  insulin lispro Injectable (HumaLOG) 2 Unit(s) SubCutaneous <User Schedule>  polyethylene glycol 3350 17 Gram(s) Oral daily  predniSONE   Tablet 20 milliGRAM(s) Oral daily    MEDICATIONS  (PRN):  acetaminophen   Tablet. 650 milliGRAM(s) Oral every 6 hours PRN temp> 101  acetaminophen   Tablet. 650 milliGRAM(s) Oral every 6 hours PRN mild, moderate pain  benzocaine 15 mG/menthol 3.6 mG Lozenge 1 Lozenge Oral two times a day PRN Sore Throat  dextrose Gel 1 Dose(s) Oral once PRN Blood Glucose LESS THAN 70 milliGRAM(s)/deciliter  glucagon  Injectable 1 milliGRAM(s) IntraMuscular once PRN Glucose LESS THAN 70 milligrams/deciliter  levalbuterol Inhalation 0.63 milliGRAM(s) Inhalation every 6 hours PRN bronchospasm          Physical exam:   Gen-patient comfortable in NAD  Resp- clear but decreased at bases.  No wheezing, rales or rhonchi  CV- Normal S1 and S2.  RRR. No murmurs, gallops or rubs.  +pericardial drain  ABD- soft,  nontender  +bowel sounds  EXT- 1+ pitting edema bilateral ankles/feet  Neuro- grossly nonfocal                            11.6   10.98 )-----------( 287      ( 16 May 2018 07:00 )             39.5       05-16    141  |  94<L>  |  152<H>  ----------------------------<  127<H>  4.2   |  37<H>  |  2.01<H>    Ca    9.7      16 May 2018 07:00  Mg     1.9     05-16            CHEST CT SCAN:  FINDINGS:    LUNGS AND LARGE AIRWAYS: There is traction bronchiectasis with associated   peribronchial thickening and linear opacities in the bilateral lower   lobes, right middle lobe, and to a lesser extent the bilateral upper   lobes. A few centrilobular nodular opacities are noted in the bilateral   upper lung lobes.    PLEURA: No pleural effusion.    VESSELS: Atherosclerotic consolidations of the aorta and coronary   arteries. An LAD coronary stent is noted.    HEART: Heart size is normal. No pericardial effusion.    MEDIASTINUM AND MANJULA: Predominantly calcified paratracheal, subcarinal,   and bilateral hilar adenopathy, consistent with sarcoidosis.    CHEST WALL AND LOWER NECK: There is asymmetric skin thickening and   subcutaneous edema of the right breast.    UPPER ABDOMEN: Within normal limits.    BONES: Degenerative changes of the spine.    IMPRESSION:    Pulmonary sarcoidosis with evidence of extensive fibrosis in the   bilateral lower lobes and right middle lobe, as well as nonfibrotic   changes in the bilateral upper lobes.    Calcified mediastinal and bilateral hilar adenopathy.    Asymmetrical skin thickening and subcutaneous edema of the right breast.   Correlate with dedicated mammographic imaging or any prior mammograms if   available.    ECHOCARIOGRAM;   -----------------------------------------------------------------------  PROCEDURE: Transthoracic echocardiogram with 2-D, M-Mode  and complete spectral and color flow Doppler.  INDICATION: Acute pericarditis, unspecified (I30.9)  ------------------------------------------------------------------------  OBSERVATIONS:  Mitral Valve: Normal mitral valve. Minimal mitral  regurgitation.  Aortic Root: Normal aortic root.  Aortic Valve: Aortic valve leaflet morphology not well  visualized.  Left Atrium: Normal left atrium.  Left Ventricle: Severe global left ventricular systolic  dysfunction.  Right Heart: Normal right atrium. The right ventricle is  not well visualized; grossly normal right ventricular  systolic function. Normal tricuspid valve. Minimal  tricuspid regurgitation. Normal pulmonic valve.  Pericardium/PleuraNormal pericardium with no pericardial  effusion. Left pleural effusion.  ------------------------------------------------------------------------  CONCLUSIONS:  1. Normal mitral valve. Minimal mitral regurgitation.  2. Severe global left ventricular systolic dysfunction.  3. The right ventricle is not well visualized; grossly  normal right ventricular systolic function.  4. Normal pericardium with no pericardial effusion.  5. Left pleural effusion.  *** Compared with echocardiogram of 5/5/2018, no  pericardial effusion is seen on the current study.  ------------------------------------------------------------------------  Confirmed on  5/8/2018 - 18:04:25 by West Walsh M.D.

## 2018-05-17 NOTE — PROGRESS NOTE ADULT - SUBJECTIVE AND OBJECTIVE BOX
No pain, no shortness of breath      VITAL:  T(C): , Max: 36.6 (05-16-18 @ 11:51)  T(F): , Max: 97.9 (05-17-18 @ 00:27)  HR: 80 (05-17-18 @ 08:03)  BP: 113/74 (05-17-18 @ 08:03)  RR: 17 (05-17-18 @ 08:03)  SpO2: 100% (05-17-18 @ 08:03)      PHYSICAL EXAM:  Constitutional: NAD; Alert  HEENT:  NCAT; DMM  Neck: (+) JVD; supple  Respiratory: CTA-b/l  Cardiac: RRR s1s2; (+)pericardial drain  Gastrointestinal: BS+, soft, NT/ND  Urologic: No barfield  Extremities: 1-2+ b/l LE edema  Back: No CVAT b/l      LABS:                        11.6   10.98 )-----------( 287      ( 16 May 2018 07:00 )             39.5     Na(141)/K(4.2)/Cl(94)/HCO3(37)/BUN(152)/Cr(2.01)Glu(127)/Ca(9.7)/Mg(1.9)/PO4(--)    05-16 @ 07:00  Na(134)/K(5.3)/Cl(90)/HCO3(33)/BUN(142)/Cr(2.04)Glu(334)/Ca(9.0)/Mg(--)/PO4(--)    05-15 @ 05:45    IMAGING:  < from: CT Chest No Cont (05.16.18 @ 14:37) >  Pulmonary sarcoidosis with evidence of extensive fibrosis in the   bilateral lower lobes and right middle lobe, as well as nonfibrotic   changes in the bilateral upper lobes.  Calcified mediastinal and bilateral hilar adenopathy.  Asymmetrical skin thickening and subcutaneous edema of the right breast.   Correlate with dedicated mammographic imaging or any prior mammograms if available.      IMPRESSION: 52F w/ DM2, sarcoidosis, HFrEF, LV thrombus on Xarelto, and CKD3, 5/3/18 a/w acute on chronic HFrEF/pericardial effusion requiring drainage    (1)Renal - CKD3 - ~2gm proteinuria it appears; presumed diabetic nephropathy - superimposed YANA, with significantly more rapid rise in BUN than creatinine - prerenal from diuresis in setting of pulmonary hypertension, as well as steroid associated rise in BUN. Labs today are pending.    (2)Acid-base - high/rising HCO3 - primary alkalosis? Or is it largely compensatory for respiratory acidosis associated with her intrinsic pulmonary disease? Blood gas not sent off yesterday    (3)CV - resolving/resolved acute on chronic HFrEF; now off primacor/off diuretics.      RECOMMEND:  (1)ABG please  (2) I defer to Pulmonary with regard to steroids  (3)Could place on standing oral diuretics (Lasix 40po qd?)  (4)No ACEI/ARB for now  (5)No Metformin reinitiation  (6)BMP daily        Jair Lion MD  [facility]  [phone]

## 2018-05-17 NOTE — PROGRESS NOTE ADULT - SUBJECTIVE AND OBJECTIVE BOX
Endocrine is following for diabetes control. Patient had hypoglycemia to the 50's.  She is eating well, had a turkey sandwich midnight snack, asked for a fruit cup but was not given to her.  She is asking for more food.  Due to the hypo, RN held breakfast humalog.    MEDICATIONS  (STANDING):  insulin glargine Injectable (LANTUS) 40 Unit(s) SubCutaneous at bedtime  insulin lispro (HumaLOG) corrective regimen sliding scale   SubCutaneous three times a day before meals  insulin lispro (HumaLOG) corrective regimen sliding scale   SubCutaneous at bedtime  insulin lispro Injectable (HumaLOG) 12 Unit(s) SubCutaneous before breakfast  insulin lispro Injectable (HumaLOG) 10 Unit(s) SubCutaneous before lunch  insulin lispro Injectable (HumaLOG) 8 Unit(s) SubCutaneous before dinner  insulin lispro Injectable (HumaLOG) 2 Unit(s) SubCutaneous <User Schedule>  predniSONE   Tablet 20 milliGRAM(s) Oral daily      Allergies  No Known Allergies    Review of Systems:  Constitutional: No fever  Cardiovascular: No chest pain, palpitations  Respiratory: No SOB, no cough  GI: No nausea, vomiting, abdominal pain  : No dysuria  Skin: no rash  Endocrine: no polyuria, polydipsia  ALL OTHER SYSTEMS REVIEWED AND NEGATIVE      PHYSICAL EXAM:  VITALS: T(C): 36.4 (05-17-18 @ 08:03)  T(F): 97.5 (05-17-18 @ 08:03), Max: 97.9 (05-17-18 @ 00:27)  HR: 80 (05-17-18 @ 08:03) (70 - 130)  BP: 113/74 (05-17-18 @ 08:03) (111/70 - 135/67)  RR:  (17 - 18)  SpO2:  (98% - 100%)  Wt(kg): --  GENERAL: NAD, well-groomed, well-developed  RESPIRATORY: no wheezing, respirations even and unlabored, no accessory muscle use  CARDIOVASCULAR: Regular rate and rhythm; No murmurs; no pitting peripheral edema-skin wrinkling   GI: Soft, nontender, non distended, normal bowel sounds  SKIN: Dry, intact, No rashes or lesions  MUSCULOSKELETAL: Full range of motion, normal strength  PSYCH: Alert and oriented x 3, reactive affect, normal mood    POCT Blood Glucose.: 101 mg/dL (05-17-18 @ 08:33)    POCT Blood Glucose.: 52 mg/dL (05-17-18 @ 08:06)  Humalog held  POCT Blood Glucose.: 287 mg/dL (05-16-18 @ 21:54)  Lantus 40  POCT Blood Glucose.: 284 mg/dL (05-16-18 @ 16:52)  Humalog 8+6  POCT Blood Glucose.: 219 mg/dL (05-16-18 @ 14:02)  POCT Blood Glucose.: 70 mg/dL (05-16-18 @ 11:49)  Dose HELD by RN  POCT Blood Glucose.: 155 mg/dL (05-16-18 @ 07:59)  Humalog 17+2  POCT Blood Glucose.: 135 mg/dL (05-15-18 @ 20:56)  Lantus 40  POCT Blood Glucose.: 123 mg/dL (05-15-18 @ 16:35)  POCT Blood Glucose.: 273 mg/dL (05-15-18 @ 11:31)  POCT Blood Glucose.: 367 mg/dL (05-15-18 @ 07:58)  POCT Blood Glucose.: 303 mg/dL (05-14-18 @ 22:22)  POCT Blood Glucose.: 273 mg/dL (05-14-18 @ 21:16)  POCT Blood Glucose.: 162 mg/dL (05-14-18 @ 17:20)  POCT Blood Glucose.: 196 mg/dL (05-14-18 @ 11:55)      05-16    141  |  94<L>  |  152<H>  ----------------------------<  127<H>  4.2   |  37<H>  |  2.01<H>    EGFR if : 32  EGFR if non : 28    Ca    9.7      05-16  Mg     1.9     05-16    Thyroid Function Tests:  05-03 @ 16:59 TSH 6.40 FreeT4 -- T3 -- Anti TPO -- Anti Thyroglobulin Ab -- TSI --    Hemoglobin A1C, Whole Blood: 14.3 % <H> [4.0 - 5.6] (05-04-18 @ 06:45)  Hemoglobin A1C, Whole Blood: 14.3 % <H> [4.0 - 5.6] (05-03-18 @ 16:59)

## 2018-05-17 NOTE — PROGRESS NOTE ADULT - PROBLEM SELECTOR PLAN 1
called pulmonary division: [facility]: awaiting message: Pt says she has been using prednisone for last one year at least and it has helped her:  5/15; first presented to dr mc with chf: She was dx with sarcoid with hilar and axillary LN. She had ax LN biopsy done which showed sarcoid: She was put on prednisone on 5/5/ 2015: 20 mg a day , she got much better , especially the cough improved: He was concerned about her cardiac status: The she stopped gong there again there for a year: In 2016 she was seen by cardiologist , she was found to have severe two vessel disease LAD and RCA, on cardiac cath IN may 16 2016: She also had stent placed at that time: That was the last time she was seen there: She was initially placed on steroids at 20 mg and after may 2016 , she stopped following there. So at this time, her dose of steroids is not very clear: and only pulmonologist name she gives is Dr Mc , who has not seen er for last 2 years:  5/16: pt agrees she could not follow with pulmonary in last two years as she was sick: Now she says she was taking steroids 20 mg a day, and the current dose is decreased to 20 mg a day again: Await ct chest today  5/17: ct chest reviewed: c/w sarcoidosis: no ptx or hydropneumothorax: Cotn 20 mg of prednisone

## 2018-05-17 NOTE — PROGRESS NOTE ADULT - SUBJECTIVE AND OBJECTIVE BOX
Subjective: Patient seen and examined. No new events except as noted.   feels ok   +weak   REVIEW OF SYSTEMS:    CONSTITUTIONAL: + weakness, fevers or chills  EYES/ENT: No visual changes;  No vertigo or throat pain   NECK: No pain or stiffness  RESPIRATORY: No cough, wheezing, hemoptysis; No shortness of breath  CARDIOVASCULAR: No chest pain or palpitations  GASTROINTESTINAL: No abdominal or epigastric pain. No nausea, vomiting, or hematemesis; No diarrhea or constipation. No melena or hematochezia.  GENITOURINARY: No dysuria, frequency or hematuria  NEUROLOGICAL: No numbness or weakness  SKIN: No itching, burning, rashes, or lesions   All other review of systems is negative unless indicated above.    MEDICATIONS:  MEDICATIONS  (STANDING):  aspirin enteric coated 81 milliGRAM(s) Oral daily  atorvastatin 40 milliGRAM(s) Oral at bedtime  carvedilol 6.25 milliGRAM(s) Oral every 12 hours  chlorhexidine 4% Liquid 1 Application(s) Topical <User Schedule>  dextrose 5%. 1000 milliLiter(s) (50 mL/Hr) IV Continuous <Continuous>  dextrose 50% Injectable 12.5 Gram(s) IV Push once  dextrose 50% Injectable 25 Gram(s) IV Push once  dextrose 50% Injectable 25 Gram(s) IV Push once  ferrous    sulfate 325 milliGRAM(s) Oral two times a day  fluticasone propionate   220 MICROgram(s) HFA Inhaler 1 Puff(s) Inhalation two times a day  heparin  Injectable 5000 Unit(s) SubCutaneous every 8 hours  insulin glargine Injectable (LANTUS) 30 Unit(s) SubCutaneous at bedtime  insulin lispro (HumaLOG) corrective regimen sliding scale   SubCutaneous three times a day before meals  insulin lispro (HumaLOG) corrective regimen sliding scale   SubCutaneous at bedtime  insulin lispro Injectable (HumaLOG) 8 Unit(s) SubCutaneous before breakfast  insulin lispro Injectable (HumaLOG) 8 Unit(s) SubCutaneous before lunch  insulin lispro Injectable (HumaLOG) 8 Unit(s) SubCutaneous before dinner  insulin lispro Injectable (HumaLOG) 2 Unit(s) SubCutaneous <User Schedule>  polyethylene glycol 3350 17 Gram(s) Oral daily  predniSONE   Tablet 20 milliGRAM(s) Oral daily      PHYSICAL EXAM:  T(C): 36.3 (05-17-18 @ 11:55), Max: 36.6 (05-16-18 @ 16:54)  HR: 80 (05-17-18 @ 11:55) (80 - 111)  BP: 115/77 (05-17-18 @ 11:55) (111/70 - 135/67)  RR: 18 (05-17-18 @ 11:55) (17 - 18)  SpO2: 100% (05-17-18 @ 11:55) (98% - 100%)  Wt(kg): --  I&O's Summary    16 May 2018 07:01  -  17 May 2018 07:00  --------------------------------------------------------  IN: 675 mL / OUT: 1975 mL / NET: -1300 mL          Appearance: Normal	  HEENT:   Normal oral mucosa, PERRL, EOMI	  Lymphatic: No lymphadenopathy , no edema  Cardiovascular: irregular S1 S2, No JVD, No murmurs , Peripheral pulses palpable 2+ bilaterally  Respiratory: Lungs clear to auscultation, normal effort 	  Gastrointestinal:  Soft, Non-tender, + BS	  Skin: No rashes, No ecchymoses, No cyanosis, warm to touch  Musculoskeletal: Normal range of motion, normal strength  Psychiatry:  Mood & affect appropriate  Ext: No edema      LABS:    CARDIAC MARKERS:                                11.6   10.98 )-----------( 287      ( 16 May 2018 07:00 )             39.5     05-16    141  |  94<L>  |  152<H>  ----------------------------<  127<H>  4.2   |  37<H>  |  2.01<H>    Ca    9.7      16 May 2018 07:00  Mg     1.9     05-16      proBNP:   Lipid Profile:   HgA1c:   TSH:             TELEMETRY: 	AF    ECG:  	  RADIOLOGY:  < from: CT Chest No Cont (05.16.18 @ 14:37) >    EXAM:  CT CHEST        PROCEDURE DATE:  May 16 2018         INTERPRETATION:  CLINICAL INFORMATION: Shortness of breath, history of   sarcoidosis.    PROCEDURE:   CT of the Chest was performed without intravenous contrast.   Intravenous contrast: None.    Reconstructions were performed in axial thin, axial maximum intensity   projection, sagittal and coronal planes.    COMPARISON: Chest x-ray 5/9/2018.    FINDINGS:    LUNGS AND LARGE AIRWAYS: There is traction bronchiectasis with associated   peribronchial thickening and linear opacities in the bilateral lower   lobes, right middle lobe, and to a lesser extent the bilateral upper   lobes. A few centrilobular nodular opacities are noted in the bilateral   upper lung lobes.    PLEURA: No pleural effusion.    VESSELS: Atherosclerotic consolidations of the aorta and coronary   arteries. An LAD coronary stent is noted.    HEART: Heart size is normal. No pericardial effusion.    MEDIASTINUM AND MANJULA: Predominantly calcified paratracheal, subcarinal,   and bilateral hilar adenopathy, consistent with sarcoidosis.    CHEST WALL AND LOWER NECK: There is asymmetric skin thickening and   subcutaneous edema of the right breast.    UPPER ABDOMEN: Within normal limits.    BONES: Degenerative changes of the spine.    IMPRESSION:    Pulmonary sarcoidosis with evidence of extensive fibrosis in the   bilateral lower lobes and right middle lobe, as well as nonfibrotic   changes in the bilateral upper lobes.    Calcified mediastinal and bilateral hilar adenopathy.    Asymmetrical skin thickening and subcutaneous edema of the right breast.   Correlate with dedicated mammographic imaging or any prior mammograms if   available.              ARIEL DELGADILLO M.D., RADIOLOGY RESIDENT  This document has been electronically signed.  AUNG HITCHCOCK M.D., ATTENDING RADIOLOGIST  This document has been electronically signed. May 16 2018  4:02PM                  < end of copied text >    DIAGNOSTIC TESTING:  [ ] Echocardiogram:  [ ]  Catheterization:  [ ] Stress Test:    OTHER:

## 2018-05-17 NOTE — PROGRESS NOTE ADULT - SUBJECTIVE AND OBJECTIVE BOX
Patient is a 52y old  Female who presents with a chief complaint of LE edema/pain and SOB (09 May 2018 11:57)      Any change in ROS: Doingo k : gets SOB on exertion    MEDICATIONS  (STANDING):  aspirin enteric coated 81 milliGRAM(s) Oral daily  atorvastatin 40 milliGRAM(s) Oral at bedtime  carvedilol 6.25 milliGRAM(s) Oral every 12 hours  chlorhexidine 4% Liquid 1 Application(s) Topical <User Schedule>  dextrose 5%. 1000 milliLiter(s) (50 mL/Hr) IV Continuous <Continuous>  dextrose 50% Injectable 12.5 Gram(s) IV Push once  dextrose 50% Injectable 25 Gram(s) IV Push once  dextrose 50% Injectable 25 Gram(s) IV Push once  ferrous    sulfate 325 milliGRAM(s) Oral two times a day  fluticasone propionate   220 MICROgram(s) HFA Inhaler 1 Puff(s) Inhalation two times a day  heparin  Injectable 5000 Unit(s) SubCutaneous every 8 hours  insulin glargine Injectable (LANTUS) 30 Unit(s) SubCutaneous at bedtime  insulin lispro (HumaLOG) corrective regimen sliding scale   SubCutaneous three times a day before meals  insulin lispro (HumaLOG) corrective regimen sliding scale   SubCutaneous at bedtime  insulin lispro Injectable (HumaLOG) 8 Unit(s) SubCutaneous before breakfast  insulin lispro Injectable (HumaLOG) 8 Unit(s) SubCutaneous before lunch  insulin lispro Injectable (HumaLOG) 8 Unit(s) SubCutaneous before dinner  insulin lispro Injectable (HumaLOG) 2 Unit(s) SubCutaneous <User Schedule>  polyethylene glycol 3350 17 Gram(s) Oral daily  predniSONE   Tablet 20 milliGRAM(s) Oral daily    MEDICATIONS  (PRN):  acetaminophen   Tablet. 650 milliGRAM(s) Oral every 6 hours PRN temp> 101  acetaminophen   Tablet. 650 milliGRAM(s) Oral every 6 hours PRN mild, moderate pain  benzocaine 15 mG/menthol 3.6 mG Lozenge 1 Lozenge Oral two times a day PRN Sore Throat  dextrose Gel 1 Dose(s) Oral once PRN Blood Glucose LESS THAN 70 milliGRAM(s)/deciliter  glucagon  Injectable 1 milliGRAM(s) IntraMuscular once PRN Glucose LESS THAN 70 milligrams/deciliter  levalbuterol Inhalation 0.63 milliGRAM(s) Inhalation every 6 hours PRN bronchospasm    Vital Signs Last 24 Hrs  T(C): 36.3 (17 May 2018 11:55), Max: 36.6 (16 May 2018 16:54)  T(F): 97.4 (17 May 2018 11:55), Max: 97.9 (17 May 2018 00:27)  HR: 80 (17 May 2018 11:55) (80 - 111)  BP: 115/77 (17 May 2018 11:55) (111/70 - 135/67)  BP(mean): --  RR: 18 (17 May 2018 11:55) (17 - 18)  SpO2: 100% (17 May 2018 11:55) (98% - 100%)    I&O's Summary    16 May 2018 07:01  -  17 May 2018 07:00  --------------------------------------------------------  IN: 675 mL / OUT: 1975 mL / NET: -1300 mL    17 May 2018 07:01  -  17 May 2018 12:45  --------------------------------------------------------  IN: 0 mL / OUT: 200 mL / NET: -200 mL          Physical Exam:   GENERAL: NAD, well-groomed, well-developed  HEENT: PAVEL/   Atraumatic, Normocephalic  ENMT: No tonsillar erythema, exudates, or enlargement; Moist mucous membranes, Good dentition, No lesions  NECK: Supple, No JVD, Normal thyroid  CHEST/LUNG: Crackles bilaterally+   CVS: Regular rate and rhythm; No murmurs, rubs, or gallops  GI: : Soft, Nontender, Nondistended; Bowel sounds present  NERVOUS SYSTEM:  Alert & Oriented X3, Good concentration; Motor Strength 5/5 B/L upper and lower extremities; DTRs 2+ intact and symmetric  EXTREMITIES:  2+ Peripheral Pulses, No clubbing, cyanosis, or edema  LYMPH: No lymphadenopathy noted  SKIN: No rashes or lesions  ENDOCRINOLOGY: No Thyromegaly  PSYCH: Appropriate    Labs:                              11.6   10.98 )-----------( 287      ( 16 May 2018 07:00 )             39.5                         10.8   9.80  )-----------( 309      ( 15 May 2018 05:45 )             36.2     05-16    141  |  94<L>  |  152<H>  ----------------------------<  127<H>  4.2   |  37<H>  |  2.01<H>  05-15    134<L>  |  90<L>  |  142<H>  ----------------------------<  334<H>  5.3   |  33<H>  |  2.04<H>    Ca    9.7      16 May 2018 07:00  Mg     1.9     05-16      CAPILLARY BLOOD GLUCOSE      POCT Blood Glucose.: 230 mg/dL (17 May 2018 11:53)  POCT Blood Glucose.: 101 mg/dL (17 May 2018 08:33)  POCT Blood Glucose.: 52 mg/dL (17 May 2018 08:06)  POCT Blood Glucose.: 287 mg/dL (16 May 2018 21:54)  POCT Blood Glucose.: 284 mg/dL (16 May 2018 16:52)  POCT Blood Glucose.: 219 mg/dL (16 May 2018 14:02)     (05-10 @ 09:40)          Cultures:         < from: CT Chest No Cont (05.16.18 @ 14:37) >  CHEST WALL AND LOWER NECK: There is asymmetric skin thickening and   subcutaneous edema of the right breast.    UPPER ABDOMEN: Within normal limits.    BONES: Degenerative changes of the spine.    IMPRESSION:    Pulmonary sarcoidosis with evidence of extensive fibrosis in the   bilateral lower lobes and right middle lobe, as well as nonfibrotic   changes in the bilateral upper lobes.    Calcified mediastinal and bilateral hilar adenopathy.    Asymmetrical skin thickening and subcutaneous edema of the right breast.   Correlate with dedicated mammographic imaging or any prior mammograms if   available.              ARIEL DELGADILLO M.D., RADIOLOGY RESIDENT  This document has been electronically signed.  AUNG HITCHCOCK M.D., ATTENDING RADIOLOGIST  This document has been electronically signed. May 16 2018  4:02PM    < end of copied text >                      Studies  Chest X-RAY  CT SCAN Chest   Venous Dopplers: LE:   CT Abdomen  Others      < from: CT Chest No Cont (05.16.18 @ 14:37) >  ARGE AIRWAYS: There is traction bronchiectasis with associated   peribronchial thickening and linear opacities in the bilateral lower   lobes, right middle lobe, and to a lesser extent the bilateral upper   lobes. A few centrilobular nodular opacities are noted in the bilateral   upper lung lobes.    PLEURA: No pleural effusion.    VESSELS: Atherosclerotic consolidations of the aorta and coronary   arteries. An LAD coronary stent is noted.    HEART: Heart size is normal. No pericardial effusion.    MEDIASTINUM AND MANJULA: Predominantly calcified paratracheal, subcarinal,   and bilateral hilar adenopathy, consistent with sarcoidosis.    CHEST WALL AND LOWER NECK: There is asymmetric skin thickening and   subcutaneous edema of the right breast.    UPPER ABDOMEN: Within normal limits.    BONES: Degenerative changes of the spine.    IMPRESSION:    Pulmonary sarcoidosis with evidence of extensive fibrosis in the   bilateral lower lobes and right middle lobe, as well as nonfibrotic   changes in the bilateral upper lobes.    Calcified mediastinal and bilateral hilar adenopathy.    Asymmetrical skin thickening and subcutaneous edema of the right breast.   Correlate with dedicated mammographic imaging or any prior mammograms if   available.              ARIEL DELGADILLO M.D., RADIOLOGY RESIDENT  This document has been electronically signed.  AUNG HITCHCOCK M.D., ATTENDING RADIOLOGIST  This document has been electronically signed. May 16 2018  4:02PM    < end of copied text >

## 2018-05-17 NOTE — PROGRESS NOTE ADULT - PROBLEM SELECTOR PLAN 1
-the patient has labile fingersticks due to prednisone and the fact that meal insulins are held for lows with resultant hyperglycemia.  Prednisone increase prandial glucose.  As such, recommend decreasing Lantus to 30 units and providing 8 units of humalog three times a day with meals in addition to the current sliding scales and PRN humalog with snacks.  -patient has a robust appetite  -as prednisone gets tapered further, insulin requirements will decrease. As such, it is important for the primary team to call with questions on insulin dosages as they arise

## 2018-05-17 NOTE — PROGRESS NOTE ADULT - ASSESSMENT
51 y/o female, with past medical history of  coronary artery disease s/p PCI to mLAD 4/2017,  chronic CHF stage III secondary to NICM , Sarcoidosis, LV thrombus, on Xarelto, diabetes T2 , COPD on home O2 who  presented with shortness of breath and LE edema/pain for several weeks. Echocardiogram demonstrated a large pericardial effusion with tamponade physiology requiring a pericardial drain. Also found to have LVEF 19%. She reports being compliant with medications. Yesterday's  Chest CT showed pulmonary sarcoidosis.  Pericardial effusion resolved.   Unable to have cardiac MRI due to renal compromise.    Plan:  Would repeat echocardiogram to evaluate EF without pericardial effusion.               Most likely will need ICD for severe global LV dysfunction. Have discussed the risks, benefits and alternatives to the procedure and she has agreed.              Will need pericardial drain removed prior to the ICD implantation.              NPO after midnight. 53 y/o female, with past medical history of  coronary artery disease s/p PCI to mLAD 4/2017,  chronic CHF stage III secondary to NICM , Sarcoidosis, LV thrombus, on Xarelto, diabetes T2 , COPD on home O2 who  presented with shortness of breath and LE edema/pain for several weeks. Echocardiogram demonstrated a large pericardial effusion with tamponade physiology requiring a pericardial drain. Also found to have LVEF 19%. She reports being compliant with medications. Yesterday's  Chest CT showed pulmonary sarcoidosis.  Pericardial effusion resolved.   Unable to have cardiac MRI due to renal compromise.    Plan:  Would repeat echocardiogram to evaluate EF without pericardial effusion.               Most likely will need ICD for severe global LV dysfunction. Have discussed the risks, benefits and alternatives to the procedure and she has agreed.              Will need pericardial drain removed prior to the ICD implantation. IR has been notified and will likely pull the drain the morning.              NPO after midnight.

## 2018-05-18 PROBLEM — D86.9 SARCOIDOSIS, UNSPECIFIED: Chronic | Status: ACTIVE | Noted: 2018-05-03

## 2018-05-18 PROBLEM — I50.9 HEART FAILURE, UNSPECIFIED: Chronic | Status: ACTIVE | Noted: 2018-05-03

## 2018-05-18 PROBLEM — I82.90 ACUTE EMBOLISM AND THROMBOSIS OF UNSPECIFIED VEIN: Chronic | Status: ACTIVE | Noted: 2018-05-03

## 2018-05-18 PROBLEM — E11.9 TYPE 2 DIABETES MELLITUS WITHOUT COMPLICATIONS: Chronic | Status: ACTIVE | Noted: 2018-05-03

## 2018-05-18 PROBLEM — Z00.00 ENCOUNTER FOR PREVENTIVE HEALTH EXAMINATION: Status: ACTIVE | Noted: 2018-05-18

## 2018-05-18 PROBLEM — E78.5 HYPERLIPIDEMIA, UNSPECIFIED: Chronic | Status: ACTIVE | Noted: 2018-05-03

## 2018-05-18 PROBLEM — I10 ESSENTIAL (PRIMARY) HYPERTENSION: Chronic | Status: ACTIVE | Noted: 2018-05-03

## 2018-05-18 PROBLEM — D64.9 ANEMIA, UNSPECIFIED: Chronic | Status: ACTIVE | Noted: 2018-05-03

## 2018-05-18 LAB
BUN SERPL-MCNC: 142 MG/DL — HIGH (ref 7–23)
CALCIUM SERPL-MCNC: 8.9 MG/DL — SIGNIFICANT CHANGE UP (ref 8.4–10.5)
CHLORIDE SERPL-SCNC: 94 MMOL/L — LOW (ref 98–107)
CO2 SERPL-SCNC: 34 MMOL/L — HIGH (ref 22–31)
CREAT SERPL-MCNC: 1.46 MG/DL — HIGH (ref 0.5–1.3)
GLUCOSE BLDC GLUCOMTR-MCNC: 101 MG/DL — HIGH (ref 70–99)
GLUCOSE BLDC GLUCOMTR-MCNC: 112 MG/DL — HIGH (ref 70–99)
GLUCOSE BLDC GLUCOMTR-MCNC: 141 MG/DL — HIGH (ref 70–99)
GLUCOSE BLDC GLUCOMTR-MCNC: 152 MG/DL — HIGH (ref 70–99)
GLUCOSE BLDC GLUCOMTR-MCNC: 183 MG/DL — HIGH (ref 70–99)
GLUCOSE BLDC GLUCOMTR-MCNC: 217 MG/DL — HIGH (ref 70–99)
GLUCOSE BLDC GLUCOMTR-MCNC: 56 MG/DL — LOW (ref 70–99)
GLUCOSE BLDC GLUCOMTR-MCNC: 56 MG/DL — LOW (ref 70–99)
GLUCOSE BLDC GLUCOMTR-MCNC: 71 MG/DL — SIGNIFICANT CHANGE UP (ref 70–99)
GLUCOSE BLDC GLUCOMTR-MCNC: 72 MG/DL — SIGNIFICANT CHANGE UP (ref 70–99)
GLUCOSE BLDC GLUCOMTR-MCNC: 74 MG/DL — SIGNIFICANT CHANGE UP (ref 70–99)
GLUCOSE BLDC GLUCOMTR-MCNC: 77 MG/DL — SIGNIFICANT CHANGE UP (ref 70–99)
GLUCOSE BLDC GLUCOMTR-MCNC: 93 MG/DL — SIGNIFICANT CHANGE UP (ref 70–99)
GLUCOSE SERPL-MCNC: 128 MG/DL — HIGH (ref 70–99)
HCG UR-SCNC: NEGATIVE — SIGNIFICANT CHANGE UP
HCT VFR BLD CALC: 39.3 % — SIGNIFICANT CHANGE UP (ref 34.5–45)
HGB BLD-MCNC: 11.2 G/DL — LOW (ref 11.5–15.5)
MAGNESIUM SERPL-MCNC: 2.2 MG/DL — SIGNIFICANT CHANGE UP (ref 1.6–2.6)
MCHC RBC-ENTMCNC: 20.8 PG — LOW (ref 27–34)
MCHC RBC-ENTMCNC: 28.5 % — LOW (ref 32–36)
MCV RBC AUTO: 73 FL — LOW (ref 80–100)
NRBC # FLD: 0.06 — SIGNIFICANT CHANGE UP
PLATELET # BLD AUTO: 282 K/UL — SIGNIFICANT CHANGE UP (ref 150–400)
PMV BLD: 11.5 FL — SIGNIFICANT CHANGE UP (ref 7–13)
POTASSIUM SERPL-MCNC: 4.8 MMOL/L — SIGNIFICANT CHANGE UP (ref 3.5–5.3)
POTASSIUM SERPL-SCNC: 4.8 MMOL/L — SIGNIFICANT CHANGE UP (ref 3.5–5.3)
RBC # BLD: 5.38 M/UL — HIGH (ref 3.8–5.2)
RBC # FLD: 19 % — HIGH (ref 10.3–14.5)
SODIUM SERPL-SCNC: 140 MMOL/L — SIGNIFICANT CHANGE UP (ref 135–145)
SP GR UR: 1.02 — SIGNIFICANT CHANGE UP (ref 1–1.03)
T4 FREE SERPL-MCNC: 1.31 NG/DL — SIGNIFICANT CHANGE UP (ref 0.9–1.8)
THYROPEROXIDASE AB SERPL-ACNC: 27.9 IU/ML — SIGNIFICANT CHANGE UP (ref 0–34.9)
TSH SERPL-MCNC: 9.57 UIU/ML — HIGH (ref 0.27–4.2)
WBC # BLD: 8.9 K/UL — SIGNIFICANT CHANGE UP (ref 3.8–10.5)
WBC # FLD AUTO: 8.9 K/UL — SIGNIFICANT CHANGE UP (ref 3.8–10.5)

## 2018-05-18 PROCEDURE — 99232 SBSQ HOSP IP/OBS MODERATE 35: CPT

## 2018-05-18 PROCEDURE — 93306 TTE W/DOPPLER COMPLETE: CPT | Mod: 26

## 2018-05-18 PROCEDURE — 33270 INS/REP SUBQ DEFIBRILLATOR: CPT

## 2018-05-18 PROCEDURE — 93010 ELECTROCARDIOGRAM REPORT: CPT

## 2018-05-18 PROCEDURE — 71045 X-RAY EXAM CHEST 1 VIEW: CPT | Mod: 26

## 2018-05-18 RX ORDER — DEXTROSE 10 % IN WATER 10 %
1000 INTRAVENOUS SOLUTION INTRAVENOUS
Qty: 0 | Refills: 0 | Status: DISCONTINUED | OUTPATIENT
Start: 2018-05-18 | End: 2018-05-19

## 2018-05-18 RX ORDER — SODIUM CHLORIDE 9 MG/ML
3 INJECTION INTRAMUSCULAR; INTRAVENOUS; SUBCUTANEOUS EVERY 8 HOURS
Qty: 0 | Refills: 0 | Status: DISCONTINUED | OUTPATIENT
Start: 2018-05-18 | End: 2018-05-20

## 2018-05-18 RX ORDER — DEXTROSE 50 % IN WATER 50 %
50 SYRINGE (ML) INTRAVENOUS ONCE
Qty: 0 | Refills: 0 | Status: COMPLETED | OUTPATIENT
Start: 2018-05-18 | End: 2018-05-18

## 2018-05-18 RX ORDER — INSULIN GLARGINE 100 [IU]/ML
20 INJECTION, SOLUTION SUBCUTANEOUS AT BEDTIME
Qty: 0 | Refills: 0 | Status: DISCONTINUED | OUTPATIENT
Start: 2018-05-18 | End: 2018-05-18

## 2018-05-18 RX ORDER — SODIUM CHLORIDE 9 MG/ML
500 INJECTION, SOLUTION INTRAVENOUS
Qty: 0 | Refills: 0 | Status: DISCONTINUED | OUTPATIENT
Start: 2018-05-18 | End: 2018-05-18

## 2018-05-18 RX ORDER — SODIUM CHLORIDE 9 MG/ML
1000 INJECTION, SOLUTION INTRAVENOUS
Qty: 0 | Refills: 0 | Status: DISCONTINUED | OUTPATIENT
Start: 2018-05-18 | End: 2018-05-18

## 2018-05-18 RX ORDER — DEXTROSE 50 % IN WATER 50 %
25 SYRINGE (ML) INTRAVENOUS ONCE
Qty: 0 | Refills: 0 | Status: COMPLETED | OUTPATIENT
Start: 2018-05-18 | End: 2018-05-18

## 2018-05-18 RX ORDER — VANCOMYCIN HCL 1 G
1000 VIAL (EA) INTRAVENOUS ONCE
Qty: 0 | Refills: 0 | Status: COMPLETED | OUTPATIENT
Start: 2018-05-19 | End: 2018-05-19

## 2018-05-18 RX ORDER — INSULIN GLARGINE 100 [IU]/ML
20 INJECTION, SOLUTION SUBCUTANEOUS AT BEDTIME
Qty: 0 | Refills: 0 | Status: DISCONTINUED | OUTPATIENT
Start: 2018-05-18 | End: 2018-05-19

## 2018-05-18 RX ORDER — INSULIN LISPRO 100/ML
6 VIAL (ML) SUBCUTANEOUS
Qty: 0 | Refills: 0 | Status: DISCONTINUED | OUTPATIENT
Start: 2018-05-18 | End: 2018-05-20

## 2018-05-18 RX ORDER — INSULIN LISPRO 100/ML
VIAL (ML) SUBCUTANEOUS
Qty: 0 | Refills: 0 | Status: DISCONTINUED | OUTPATIENT
Start: 2018-05-18 | End: 2018-05-20

## 2018-05-18 RX ORDER — LEVOTHYROXINE SODIUM 125 MCG
25 TABLET ORAL DAILY
Qty: 0 | Refills: 0 | Status: DISCONTINUED | OUTPATIENT
Start: 2018-05-19 | End: 2018-05-20

## 2018-05-18 RX ORDER — INSULIN LISPRO 100/ML
VIAL (ML) SUBCUTANEOUS AT BEDTIME
Qty: 0 | Refills: 0 | Status: DISCONTINUED | OUTPATIENT
Start: 2018-05-18 | End: 2018-05-20

## 2018-05-18 RX ORDER — GLUCAGON INJECTION, SOLUTION 0.5 MG/.1ML
1 INJECTION, SOLUTION SUBCUTANEOUS ONCE
Qty: 0 | Refills: 0 | Status: DISCONTINUED | OUTPATIENT
Start: 2018-05-18 | End: 2018-05-20

## 2018-05-18 RX ADMIN — Medication 1 PUFF(S): at 22:12

## 2018-05-18 RX ADMIN — CARVEDILOL PHOSPHATE 6.25 MILLIGRAM(S): 80 CAPSULE, EXTENDED RELEASE ORAL at 20:03

## 2018-05-18 RX ADMIN — Medication 25 GRAM(S): at 14:26

## 2018-05-18 RX ADMIN — INSULIN GLARGINE 20 UNIT(S): 100 INJECTION, SOLUTION SUBCUTANEOUS at 22:14

## 2018-05-18 RX ADMIN — SODIUM CHLORIDE 40 MILLILITER(S): 9 INJECTION, SOLUTION INTRAVENOUS at 09:49

## 2018-05-18 RX ADMIN — Medication 325 MILLIGRAM(S): at 05:39

## 2018-05-18 RX ADMIN — SODIUM CHLORIDE 3 MILLILITER(S): 9 INJECTION INTRAMUSCULAR; INTRAVENOUS; SUBCUTANEOUS at 11:38

## 2018-05-18 RX ADMIN — Medication 25 GRAM(S): at 08:09

## 2018-05-18 RX ADMIN — Medication 20 MILLIGRAM(S): at 05:40

## 2018-05-18 RX ADMIN — HEPARIN SODIUM 5000 UNIT(S): 5000 INJECTION INTRAVENOUS; SUBCUTANEOUS at 05:39

## 2018-05-18 RX ADMIN — Medication 325 MILLIGRAM(S): at 21:02

## 2018-05-18 RX ADMIN — SODIUM CHLORIDE 3 MILLILITER(S): 9 INJECTION INTRAMUSCULAR; INTRAVENOUS; SUBCUTANEOUS at 22:12

## 2018-05-18 RX ADMIN — CARVEDILOL PHOSPHATE 6.25 MILLIGRAM(S): 80 CAPSULE, EXTENDED RELEASE ORAL at 05:40

## 2018-05-18 RX ADMIN — Medication 1 PUFF(S): at 10:30

## 2018-05-18 RX ADMIN — ATORVASTATIN CALCIUM 40 MILLIGRAM(S): 80 TABLET, FILM COATED ORAL at 22:13

## 2018-05-18 RX ADMIN — Medication 50 MILLILITER(S): at 19:54

## 2018-05-18 RX ADMIN — Medication 50 MILLILITER(S): at 09:17

## 2018-05-18 NOTE — PROGRESS NOTE ADULT - SUBJECTIVE AND OBJECTIVE BOX
No pain, no shortness of breath      VITAL:  T(C): , Max: 36.5 (05-17-18 @ 16:28)  T(F): , Max: 97.7 (05-17-18 @ 16:28)  HR: 78 (05-18-18 @ 08:02)  BP: 113/76 (05-18-18 @ 08:02)  BP(mean): --  RR: 18 (05-18-18 @ 08:02)  SpO2: 100% (05-18-18 @ 08:02)      PHYSICAL EXAM:  Constitutional: NAD; Alert  HEENT:  NCAT; DMM  Neck: (+) JVD; supple  Respiratory: CTA-b/l  Cardiac: RRR s1s2; (+)pericardial drain  Gastrointestinal: BS+, soft, NT/ND  Urologic: No barfield  Extremities: 1-2+ b/l LE edema  Back: No CVAT b/l      LABS:                        11.2   8.90  )-----------( 282      ( 18 May 2018 05:43 )             39.3     Na(140)/K(4.8)/Cl(94)/HCO3(34)/BUN(142)/Cr(1.46)Glu(128)/Ca(8.9)/Mg(2.2)/PO4(--)    05-18 @ 05:43  Na(141)/K(4.2)/Cl(94)/HCO3(37)/BUN(152)/Cr(2.01)Glu(127)/Ca(9.7)/Mg(1.9)/PO4(--)    05-16 @ 07:00      IMPRESSION: 52F w/ DM2, sarcoidosis, HFrEF, LV thrombus on Xarelto, and CKD3, 5/3/18 a/w acute on chronic HFrEF/pericardial effusion requiring drainage    (1)Renal - CKD3 - ~2gm proteinuria it appears; presumed diabetic nephropathy - superimposed YANA, with significantly more rapid rise in BUN than creatinine - prerenal from diuresis in setting of pulmonary hypertension, as well as steroid associated rise in BUN. Now improving, off diuretics/off primacor, as well as s/p reduction in steroid dosage. At this point, GFR  has improved enough that we could proceed with Cardiac MRI with Gadolinium.    (2)Acid-base - high/rising HCO3 - primary alkalosis? Or is it largely compensatory for respiratory acidosis associated with her intrinsic pulmonary disease?     (3)CV - resolving/resolved acute on chronic HFrEF; now off primacor/off diuretics.    (4)Endocrine - labile blood sugars - Endocrine team on board    (5)EP - for ICD today      RECOMMEND:  (1)ABG versus VBG  (2)Could place on standing oral diuretics (Lasix 40po qd?)  (3)No ACEI/ARB for now  (4)No Metformin reinitiation  (5)BMP daily  (6)No objection to proceeding with MRI with Gadolinium at this point  (7)No objection to ICD placement          Jair Lion MD  [facility]  [phone] Symptomatic hypoglycemia from this a.m. noted; now improved.   Pericardial drain removed.  Awaiting ICD    VITAL:  T(C): , Max: 36.5 (05-17-18 @ 16:28)  T(F): , Max: 97.7 (05-17-18 @ 16:28)  HR: 78 (05-18-18 @ 08:02)  BP: 113/76 (05-18-18 @ 08:02)  BP(mean): --  RR: 18 (05-18-18 @ 08:02)  SpO2: 100% (05-18-18 @ 08:02)      PHYSICAL EXAM:  Constitutional: NAD; Alert  HEENT:  NCAT; DMM  Neck: (+) JVD; supple  Respiratory: CTA-b/l  Cardiac: RRR s1s2  Gastrointestinal: BS+, soft, NT/ND  Urologic: No barfield  Extremities: 1+ b/l LE edema  Back: No CVAT b/l      LABS:                        11.2   8.90  )-----------( 282      ( 18 May 2018 05:43 )             39.3     Na(140)/K(4.8)/Cl(94)/HCO3(34)/BUN(142)/Cr(1.46)Glu(128)/Ca(8.9)/Mg(2.2)/PO4(--)    05-18 @ 05:43  Na(141)/K(4.2)/Cl(94)/HCO3(37)/BUN(152)/Cr(2.01)Glu(127)/Ca(9.7)/Mg(1.9)/PO4(--)    05-16 @ 07:00      IMPRESSION: 52F w/ DM2, sarcoidosis, HFrEF, LV thrombus on Xarelto, and CKD3, 5/3/18 a/w acute on chronic HFrEF/pericardial effusion requiring drainage    (1)Renal - CKD3 - ~2gm proteinuria it appears; presumed diabetic nephropathy - superimposed YANA, with significantly more rapid rise in BUN than creatinine - prerenal from diuresis in setting of pulmonary hypertension, as well as steroid associated rise in BUN. Now improving, off diuretics/off primacor, as well as s/p reduction in steroid dosage. At this point, GFR  has improved enough that we could proceed with Cardiac MRI with Gadolinium.    (2)Acid-base - high/rising HCO3 - primary alkalosis? Or is it largely compensatory for respiratory acidosis associated with her intrinsic pulmonary disease?     (3)CV - resolving/resolved acute on chronic HFrEF; now off primacor/off diuretics.    (4)Endocrine - labile blood sugars - Endocrine team on board    (5)EP - for ICD today      RECOMMEND:  (1)ABG versus VBG  (2)Could place on standing oral diuretics (Lasix 40po qd?)  (3)No ACEI/ARB for now  (4)No Metformin reinitiation  (5)BMP daily  (6)No objection to proceeding with MRI with Gadolinium at this point  (7)No objection to ICD placement          Jair Lion MD  [facility]  [phone]

## 2018-05-18 NOTE — PROGRESS NOTE ADULT - ASSESSMENT
52 year old woman with T2DM, uncontrolled, with steroid induced hyperglycemia, admitted with acute decompensated heart failure/sarcoidosis, pericardial effusion.

## 2018-05-18 NOTE — PROGRESS NOTE ADULT - PROBLEM SELECTOR PLAN 2
-TSH was 6.4, she does not have specific symptoms of overt hypothyroidism.  I would check a Free T4 and repeat TSH at this time Repeat TSH elevated, FT4 within range   Starting Synthroid 25 mcg  Recheck both TSH and FT4 in 6 weeks

## 2018-05-18 NOTE — PROGRESS NOTE ADULT - ASSESSMENT
51 y/o female, with past medical history of  coronary artery disease s/p PCI to mLAD 4/2017,  chronic CHF stage III secondary to NICM , Sarcoidosis, LV thrombus, on Xarelto, diabetes T2 , COPD on home O2 who  presented with shortness of breath and LE edema/pain for several weeks. Echocardiogram demonstrated a large pericardial effusion with tamponade physiology requiring a pericardial drain. Also found to have LVEF 19%. She reports being compliant with medications. Yesterday's  Chest CT showed pulmonary sarcoidosis.  Pericardial effusion resolved.   Unable to have cardiac MRI due to renal compromise.    Plan:   Pericardial drain to be pulled by IR.                Follow-up echocardiogram after drain removed                Keep patient NPO for ICD this afternoon.                Will need to be started on NOAC for PAF/flutter. Since she is in NSR, would not start anticoagulation for at least 3 days post implant.

## 2018-05-18 NOTE — PROGRESS NOTE ADULT - SUBJECTIVE AND OBJECTIVE BOX
Patient without chest pain, shortness of breath, palpitations or dizziness. Blood glucose corrected and feeling significantly better. Reviewed the risks, benefits and alternatives to the ICD and she has consented.      Vital Signs Last 24 Hrs  T(C): 36.5 (18 May 2018 08:02), Max: 36.5 (17 May 2018 16:28)  T(F): 97.7 (18 May 2018 08:02), Max: 97.7 (17 May 2018 16:28)  HR: 78 (18 May 2018 08:02) (78 - 86)  BP: 113/76 (18 May 2018 08:02) (112/75 - 120/73)  BP(mean): --  RR: 18 (18 May 2018 08:02) (18 - 18)  SpO2: 100% (18 May 2018 08:02) (97% - 100%)      EKG  Telemetry  MEDICATIONS  (STANDING):  aspirin enteric coated 81 milliGRAM(s) Oral daily  atorvastatin 40 milliGRAM(s) Oral at bedtime  carvedilol 6.25 milliGRAM(s) Oral every 12 hours  chlorhexidine 4% Liquid 1 Application(s) Topical <User Schedule>  dextrose 5%. 500 milliLiter(s) (40 mL/Hr) IV Continuous <Continuous>  dextrose 5%. 1000 milliLiter(s) (50 mL/Hr) IV Continuous <Continuous>  dextrose 50% Injectable 12.5 Gram(s) IV Push once  dextrose 50% Injectable 25 Gram(s) IV Push once  dextrose 50% Injectable 25 Gram(s) IV Push once  ferrous    sulfate 325 milliGRAM(s) Oral two times a day  fluticasone propionate   220 MICROgram(s) HFA Inhaler 1 Puff(s) Inhalation two times a day  heparin  Injectable 5000 Unit(s) SubCutaneous every 8 hours  insulin glargine Injectable (LANTUS) 30 Unit(s) SubCutaneous at bedtime  insulin lispro (HumaLOG) corrective regimen sliding scale   SubCutaneous three times a day before meals  insulin lispro (HumaLOG) corrective regimen sliding scale   SubCutaneous at bedtime  insulin lispro Injectable (HumaLOG) 8 Unit(s) SubCutaneous before breakfast  insulin lispro Injectable (HumaLOG) 8 Unit(s) SubCutaneous before lunch  insulin lispro Injectable (HumaLOG) 8 Unit(s) SubCutaneous before dinner  insulin lispro Injectable (HumaLOG) 2 Unit(s) SubCutaneous <User Schedule>  polyethylene glycol 3350 17 Gram(s) Oral daily  predniSONE   Tablet 20 milliGRAM(s) Oral daily  sodium chloride 0.9% lock flush 3 milliLiter(s) IV Push every 8 hours    MEDICATIONS  (PRN):  acetaminophen   Tablet. 650 milliGRAM(s) Oral every 6 hours PRN temp> 101  acetaminophen   Tablet. 650 milliGRAM(s) Oral every 6 hours PRN mild, moderate pain  benzocaine 15 mG/menthol 3.6 mG Lozenge 1 Lozenge Oral two times a day PRN Sore Throat  dextrose Gel 1 Dose(s) Oral once PRN Blood Glucose LESS THAN 70 milliGRAM(s)/deciliter  glucagon  Injectable 1 milliGRAM(s) IntraMuscular once PRN Glucose LESS THAN 70 milligrams/deciliter  glucagon  Injectable 1 milliGRAM(s) IntraMuscular once PRN Glucose LESS THAN 70 milligrams/deciliter  levalbuterol Inhalation 0.63 milliGRAM(s) Inhalation every 6 hours PRN bronchospasm          Physical exam:   Gen- NAD.  Can lay flat in bed  Resp- decreased at bases. NO wheezing, rales or rhonchi  CV- S1 and S2 RRR  ABD- soft nontender +bowel sounds  EXT-pedal edema bilaterally  Neuro- grossly nonfocal                            11.2   8.90  )-----------( 282      ( 18 May 2018 05:43 )             39.3       05-18    140  |  94<L>  |  142<H>  ----------------------------<  128<H>  4.8   |  34<H>  |  1.46<H>    Ca    8.9      18 May 2018 05:43  Mg     2.2     05-18

## 2018-05-18 NOTE — CHART NOTE - NSCHARTNOTEFT_GEN_A_CORE
ELECTROPHYSIOLOGY      Patient s/p dual chamber ICD.  Post procedure ICD teaching done.  Written instructions and contact information provided.    Labs and CXR to be checked in AM.  Please do not start Xarelto until Monday.   She has follow-up appointment for the device clinic in 2 weeks.

## 2018-05-18 NOTE — PROGRESS NOTE ADULT - PROBLEM SELECTOR PLAN 1
Target glucose 100-180  below goal  -Decreased Lantus to 20 units QHS  -Decreased Premeal Humalog to 6 units TID before meals  -Decreased correctional scales to low  -DC PRN QHS snack insulin at this time  -as prednisone gets tapered further, insulin requirements will decrease. As such, it is important for the primary team to call with questions on insulin dosages as they arise Target glucose 100-180  below goal  Patient is NPO, IVF with dextrose ordered/running by primary team, would continue while NPO  -Decreased Lantus to 20 units QHS  -Decreased Premeal Humalog to 6 units TID before meals  -Decreased correctional scales to low  -DC PRN QHS snack insulin at this time  -as prednisone gets tapered further, insulin requirements will decrease. As such, it is important for the primary team to call with questions on insulin dosages as they arise

## 2018-05-18 NOTE — CHART NOTE - NSCHARTNOTESELECT_GEN_ALL_CORE
Event Note
Event Note/IR pre-procedure note
Event Note/Tele
Nutrition Services/Follow up
Transfer Note/ccu

## 2018-05-18 NOTE — CHART NOTE - NSCHARTNOTEFT_GEN_A_CORE
Diagnosis: Non-infarct cardiomyopathy  Procedure: Dual chamber ICD  Findings: Cephalic access was obtained. 2 leads, an RV defib lead and an RA lead were positioned in the RV septum and RAA. Pacing parameters were normal.  Complications: None  Blood loss: 10cc  Anesthesia: Conscious  : Dr. RONNELL Finch

## 2018-05-18 NOTE — CHART NOTE - NSCHARTNOTEFT_GEN_A_CORE
Patient s/p ICD implant against left anterior chest wall.  Site with no hematoma or active bleed.  Patient without complaint. Will continue to monitor.

## 2018-05-18 NOTE — PROGRESS NOTE ADULT - SUBJECTIVE AND OBJECTIVE BOX
Chief Complaint: DM 2    History: Hypoglycemia yesterday and today. Insulin decreased yesterday also. Patient reports she is eating less than previously do to distaste/disinterest in food choices.     MEDICATIONS  (STANDING):  aspirin enteric coated 81 milliGRAM(s) Oral daily  atorvastatin 40 milliGRAM(s) Oral at bedtime  carvedilol 6.25 milliGRAM(s) Oral every 12 hours  chlorhexidine 4% Liquid 1 Application(s) Topical <User Schedule>  dextrose 5%. 500 milliLiter(s) (40 mL/Hr) IV Continuous <Continuous>  dextrose 5%. 1000 milliLiter(s) (50 mL/Hr) IV Continuous <Continuous>  dextrose 50% Injectable 12.5 Gram(s) IV Push once  dextrose 50% Injectable 25 Gram(s) IV Push once  dextrose 50% Injectable 25 Gram(s) IV Push once  ferrous    sulfate 325 milliGRAM(s) Oral two times a day  fluticasone propionate   220 MICROgram(s) HFA Inhaler 1 Puff(s) Inhalation two times a day  insulin glargine Injectable (LANTUS) 20 Unit(s) SubCutaneous at bedtime  insulin lispro (HumaLOG) corrective regimen sliding scale   SubCutaneous three times a day before meals  insulin lispro (HumaLOG) corrective regimen sliding scale   SubCutaneous at bedtime  insulin lispro Injectable (HumaLOG) 6 Unit(s) SubCutaneous three times a day before meals  polyethylene glycol 3350 17 Gram(s) Oral daily  predniSONE   Tablet 20 milliGRAM(s) Oral daily  sodium chloride 0.9% lock flush 3 milliLiter(s) IV Push every 8 hours    MEDICATIONS  (PRN):  acetaminophen   Tablet. 650 milliGRAM(s) Oral every 6 hours PRN temp> 101  acetaminophen   Tablet. 650 milliGRAM(s) Oral every 6 hours PRN mild, moderate pain  benzocaine 15 mG/menthol 3.6 mG Lozenge 1 Lozenge Oral two times a day PRN Sore Throat  dextrose Gel 1 Dose(s) Oral once PRN Blood Glucose LESS THAN 70 milliGRAM(s)/deciliter  glucagon  Injectable 1 milliGRAM(s) IntraMuscular once PRN Glucose LESS THAN 70 milligrams/deciliter  glucagon  Injectable 1 milliGRAM(s) IntraMuscular once PRN Glucose LESS THAN 70 milligrams/deciliter  levalbuterol Inhalation 0.63 milliGRAM(s) Inhalation every 6 hours PRN bronchospasm          No Known Allergies          Review of Systems:  Constitutional: No fever  Cardiovascular: No chest pain, palpitations  Respiratory: No SOB, no cough  GI: Decreased appetite. No nausea, vomiting, abdominal pain      PHYSICAL EXAM:  VITALS: T(C): 36.5 (05-18-18 @ 08:02)  T(F): 97.7 (05-18-18 @ 08:02), Max: 97.7 (05-17-18 @ 16:28)  HR: 78 (05-18-18 @ 08:02) (78 - 86)  BP: 113/76 (05-18-18 @ 08:02) (112/75 - 120/73)  RR:  (18 - 18)  SpO2:  (97% - 100%)  Wt(kg): --  GENERAL: NAD  RESPIRATORY: Non labored  GI: Soft, nontender, non distended  SKIN: Dry, warm  PSYCH: Alert and oriented x 3    POCT Blood Glucose.: 77 mg/dL (05-18-18 @ 10:05)  POCT Blood Glucose.: 72 mg/dL (05-18-18 @ 09:16)  POCT Blood Glucose.: 152 mg/dL (05-18-18 @ 08:13)  POCT Blood Glucose.: 56 mg/dL (05-18-18 @ 07:53)  POCT Blood Glucose.: 295 mg/dL (05-17-18 @ 21:47)  POCT Blood Glucose.: 103 mg/dL (05-17-18 @ 16:45)  POCT Blood Glucose.: 230 mg/dL (05-17-18 @ 11:53)  POCT Blood Glucose.: 101 mg/dL (05-17-18 @ 08:33)  POCT Blood Glucose.: 52 mg/dL (05-17-18 @ 08:06)  POCT Blood Glucose.: 287 mg/dL (05-16-18 @ 21:54)  POCT Blood Glucose.: 284 mg/dL (05-16-18 @ 16:52)  POCT Blood Glucose.: 219 mg/dL (05-16-18 @ 14:02)  POCT Blood Glucose.: 70 mg/dL (05-16-18 @ 11:49)  POCT Blood Glucose.: 155 mg/dL (05-16-18 @ 07:59)  POCT Blood Glucose.: 135 mg/dL (05-15-18 @ 20:56)  POCT Blood Glucose.: 123 mg/dL (05-15-18 @ 16:35)      05-18    140  |  94<L>  |  142<H>  ----------------------------<  128<H>  4.8   |  34<H>  |  1.46<H>    EGFR if : 47  EGFR if non : 41    Ca    8.9      05-18  Mg     2.2     05-18            Thyroid Function Tests:  05-18 @ 05:43 TSH 9.57 FreeT4 1.31 T3 -- Anti TPO -- Anti Thyroglobulin Ab -- TSI --  05-03 @ 16:59 TSH 6.40 FreeT4 -- T3 -- Anti TPO -- Anti Thyroglobulin Ab -- TSI --      Hemoglobin A1C, Whole Blood: 14.3 % <H> [4.0 - 5.6] (05-04-18 @ 06:45)  Hemoglobin A1C, Whole Blood: 14.3 % <H> [4.0 - 5.6] (05-03-18 @ 16:59)

## 2018-05-18 NOTE — PROGRESS NOTE ADULT - PROBLEM SELECTOR PLAN 2
rpt ct scan chest ?  5/15: rpt ct chest without contrast today  5/16: await ct chest  5/17: doing pretty good: resolved  5/18: ptx as wel las hydropneumothorax resolved:

## 2018-05-18 NOTE — PROGRESS NOTE ADULT - PROBLEM SELECTOR PLAN 1
called pulmonary division: [facility]: awaiting message: Pt says she has been using prednisone for last one year at least and it has helped her:  5/15; first presented to dr mc with chf: She was dx with sarcoid with hilar and axillary LN. She had ax LN biopsy done which showed sarcoid: She was put on prednisone on 5/5/ 2015: 20 mg a day , she got much better , especially the cough improved: He was concerned about her cardiac status: The she stopped gong there again there for a year: In 2016 she was seen by cardiologist , she was found to have severe two vessel disease LAD and RCA, on cardiac cath IN may 16 2016: She also had stent placed at that time: That was the last time she was seen there: She was initially placed on steroids at 20 mg and after may 2016 , she stopped following there. So at this time, her dose of steroids is not very clear: and only pulmonologist name she gives is Dr Mc , who has not seen er for last 2 years:  5/16: pt agrees she could not follow with pulmonary in last two years as she was sick: Now she says she was taking steroids 20 mg a day, and the current dose is decreased to 20 mg a day again: Await ct chest today  5/17: ct chest reviewed: c/w sarcoidosis: no ptx or hydropneumothorax: Cotn 20 mg of prednisone  5/18: pt is doing ok : no SOB at rst: for now cont 20 mg of prednisone

## 2018-05-18 NOTE — PROGRESS NOTE ADULT - SUBJECTIVE AND OBJECTIVE BOX
Subjective: Patient seen and examined. No new events except as noted.   feels weak and hungry   Pericardial drain removed     REVIEW OF SYSTEMS:    CONSTITUTIONAL: + weakness, fevers or chills  EYES/ENT: No visual changes;  No vertigo or throat pain   NECK: No pain or stiffness  RESPIRATORY: No cough, wheezing, hemoptysis; No shortness of breath  CARDIOVASCULAR: No chest pain or palpitations  GASTROINTESTINAL: No abdominal or epigastric pain. No nausea, vomiting, or hematemesis; No diarrhea or constipation. No melena or hematochezia.  GENITOURINARY: No dysuria, frequency or hematuria  NEUROLOGICAL: No numbness or weakness  SKIN: No itching, burning, rashes, or lesions   All other review of systems is negative unless indicated above.    MEDICATIONS:  MEDICATIONS  (STANDING):  aspirin enteric coated 81 milliGRAM(s) Oral daily  atorvastatin 40 milliGRAM(s) Oral at bedtime  carvedilol 6.25 milliGRAM(s) Oral every 12 hours  chlorhexidine 4% Liquid 1 Application(s) Topical <User Schedule>  dextrose 5%. 500 milliLiter(s) (40 mL/Hr) IV Continuous <Continuous>  dextrose 5%. 1000 milliLiter(s) (50 mL/Hr) IV Continuous <Continuous>  dextrose 50% Injectable 12.5 Gram(s) IV Push once  dextrose 50% Injectable 25 Gram(s) IV Push once  dextrose 50% Injectable 25 Gram(s) IV Push once  dextrose 50% Injectable 25 Gram(s) IV Push once  ferrous    sulfate 325 milliGRAM(s) Oral two times a day  fluticasone propionate   220 MICROgram(s) HFA Inhaler 1 Puff(s) Inhalation two times a day  insulin glargine Injectable (LANTUS) 20 Unit(s) SubCutaneous at bedtime  insulin lispro (HumaLOG) corrective regimen sliding scale   SubCutaneous three times a day before meals  insulin lispro (HumaLOG) corrective regimen sliding scale   SubCutaneous at bedtime  insulin lispro Injectable (HumaLOG) 6 Unit(s) SubCutaneous three times a day before meals  polyethylene glycol 3350 17 Gram(s) Oral daily  predniSONE   Tablet 20 milliGRAM(s) Oral daily  sodium chloride 0.9% lock flush 3 milliLiter(s) IV Push every 8 hours      PHYSICAL EXAM:  T(C): 36.3 (05-18-18 @ 11:42), Max: 36.5 (05-17-18 @ 16:28)  HR: 77 (05-18-18 @ 11:42) (77 - 86)  BP: 119/62 (05-18-18 @ 11:42) (112/75 - 120/73)  RR: 18 (05-18-18 @ 11:42) (18 - 18)  SpO2: 100% (05-18-18 @ 11:42) (97% - 100%)  Wt(kg): --  I&O's Summary    17 May 2018 07:01  -  18 May 2018 07:00  --------------------------------------------------------  IN: 0 mL / OUT: 808 mL / NET: -808 mL    18 May 2018 07:01  -  18 May 2018 13:09  --------------------------------------------------------  IN: 0 mL / OUT: 200 mL / NET: -200 mL          Appearance: Normal	  HEENT:   Normal oral mucosa, PERRL, EOMI	  Lymphatic: No lymphadenopathy , no edema  Cardiovascular: Normal S1 S2, No JVD, No murmurs , Peripheral pulses palpable 2+ bilaterally  Respiratory: Lungs clear to auscultation, normal effort 	  Gastrointestinal:  Soft, Non-tender, + BS	  Skin: No rashes, No ecchymoses, No cyanosis, warm to touch  Musculoskeletal: Normal range of motion, normal strength  Psychiatry:  Mood & affect appropriate  Ext: No edema      LABS:    CARDIAC MARKERS:                                11.2   8.90  )-----------( 282      ( 18 May 2018 05:43 )             39.3     05-18    140  |  94<L>  |  142<H>  ----------------------------<  128<H>  4.8   |  34<H>  |  1.46<H>    Ca    8.9      18 May 2018 05:43  Mg     2.2     05-18      proBNP:   Lipid Profile:   HgA1c:   TSH: Thyroid Stimulating Hormone, Serum: 9.57 uIU/mL (05-18 @ 05:43)              TELEMETRY: 	SR    ECG:  	  RADIOLOGY:   DIAGNOSTIC TESTING:  [ ] Echocardiogram:    [ ]  Catheterization:  [ ] Stress Test:    OTHER:

## 2018-05-18 NOTE — PROGRESS NOTE ADULT - SUBJECTIVE AND OBJECTIVE BOX
Patient is a 52y old  Female who presents with a chief complaint of LE edema/pain and SOB (09 May 2018 11:57)    Pt is doing ok : had echo done today :    Any change in ROS:     MEDICATIONS  (STANDING):  aspirin enteric coated 81 milliGRAM(s) Oral daily  atorvastatin 40 milliGRAM(s) Oral at bedtime  carvedilol 6.25 milliGRAM(s) Oral every 12 hours  chlorhexidine 4% Liquid 1 Application(s) Topical <User Schedule>  dextrose 5%. 500 milliLiter(s) (40 mL/Hr) IV Continuous <Continuous>  dextrose 5%. 1000 milliLiter(s) (50 mL/Hr) IV Continuous <Continuous>  dextrose 50% Injectable 12.5 Gram(s) IV Push once  dextrose 50% Injectable 25 Gram(s) IV Push once  dextrose 50% Injectable 25 Gram(s) IV Push once  dextrose 50% Injectable 25 Gram(s) IV Push once  ferrous    sulfate 325 milliGRAM(s) Oral two times a day  fluticasone propionate   220 MICROgram(s) HFA Inhaler 1 Puff(s) Inhalation two times a day  insulin glargine Injectable (LANTUS) 20 Unit(s) SubCutaneous at bedtime  insulin lispro (HumaLOG) corrective regimen sliding scale   SubCutaneous three times a day before meals  insulin lispro (HumaLOG) corrective regimen sliding scale   SubCutaneous at bedtime  insulin lispro Injectable (HumaLOG) 6 Unit(s) SubCutaneous three times a day before meals  polyethylene glycol 3350 17 Gram(s) Oral daily  predniSONE   Tablet 20 milliGRAM(s) Oral daily  sodium chloride 0.9% lock flush 3 milliLiter(s) IV Push every 8 hours    MEDICATIONS  (PRN):  acetaminophen   Tablet. 650 milliGRAM(s) Oral every 6 hours PRN temp> 101  acetaminophen   Tablet. 650 milliGRAM(s) Oral every 6 hours PRN mild, moderate pain  benzocaine 15 mG/menthol 3.6 mG Lozenge 1 Lozenge Oral two times a day PRN Sore Throat  dextrose Gel 1 Dose(s) Oral once PRN Blood Glucose LESS THAN 70 milliGRAM(s)/deciliter  glucagon  Injectable 1 milliGRAM(s) IntraMuscular once PRN Glucose LESS THAN 70 milligrams/deciliter  glucagon  Injectable 1 milliGRAM(s) IntraMuscular once PRN Glucose LESS THAN 70 milligrams/deciliter  levalbuterol Inhalation 0.63 milliGRAM(s) Inhalation every 6 hours PRN bronchospasm    Vital Signs Last 24 Hrs  T(C): 36.3 (18 May 2018 11:42), Max: 36.5 (17 May 2018 16:28)  T(F): 97.4 (18 May 2018 11:42), Max: 97.7 (17 May 2018 16:28)  HR: 77 (18 May 2018 11:42) (77 - 86)  BP: 119/62 (18 May 2018 11:42) (112/75 - 120/73)  BP(mean): --  RR: 18 (18 May 2018 11:42) (18 - 18)  SpO2: 100% (18 May 2018 11:42) (97% - 100%)    I&O's Summary    17 May 2018 07:01  -  18 May 2018 07:00  --------------------------------------------------------  IN: 0 mL / OUT: 808 mL / NET: -808 mL    18 May 2018 07:01  -  18 May 2018 13:20  --------------------------------------------------------  IN: 0 mL / OUT: 200 mL / NET: -200 mL          Physical Exam:   GENERAL: NAD, well-groomed, well-developed  HEENT: PAVEL/   Atraumatic, Normocephalic  ENMT: No tonsillar erythema, exudates, or enlargement; Moist mucous membranes, Good dentition, No lesions  NECK: Supple, No JVD, Normal thyroid  CHEST/LUNG: Crackles bilaterally++   CVS: Regular rate and rhythm; No murmurs, rubs, or gallops  GI: : Soft, Nontender, Nondistended; Bowel sounds present  NERVOUS SYSTEM:  Alert & Oriented X3  EXTREMITIES:  2+ Peripheral Pulses, No clubbing, cyanosis, or edema  LYMPH: No lymphadenopathy noted  SKIN: No rashes or lesions  ENDOCRINOLOGY: No Thyromegaly  PSYCH: Appropriate    Labs:                              11.2   8.90  )-----------( 282      ( 18 May 2018 05:43 )             39.3                         11.6   10.98 )-----------( 287      ( 16 May 2018 07:00 )             39.5                         10.8   9.80  )-----------( 309      ( 15 May 2018 05:45 )             36.2     05-18    140  |  94<L>  |  142<H>  ----------------------------<  128<H>  4.8   |  34<H>  |  1.46<H>  05-16    141  |  94<L>  |  152<H>  ----------------------------<  127<H>  4.2   |  37<H>  |  2.01<H>  05-15    134<L>  |  90<L>  |  142<H>  ----------------------------<  334<H>  5.3   |  33<H>  |  2.04<H>    Ca    8.9      18 May 2018 05:43  Mg     2.2     05-18      CAPILLARY BLOOD GLUCOSE      POCT Blood Glucose.: 71 mg/dL (18 May 2018 12:13)  POCT Blood Glucose.: 74 mg/dL (18 May 2018 11:43)  POCT Blood Glucose.: 77 mg/dL (18 May 2018 10:05)  POCT Blood Glucose.: 72 mg/dL (18 May 2018 09:16)  POCT Blood Glucose.: 152 mg/dL (18 May 2018 08:13)  POCT Blood Glucose.: 56 mg/dL (18 May 2018 07:53)  POCT Blood Glucose.: 295 mg/dL (17 May 2018 21:47)  POCT Blood Glucose.: 103 mg/dL (17 May 2018 16:45)            Cultures:     < from: CT Chest No Cont (05.16.18 @ 14:37) >    FINDINGS:    LUNGS AND LARGE AIRWAYS: There is traction bronchiectasis with associated   peribronchial thickening and linear opacities in the bilateral lower   lobes, right middle lobe, and to a lesser extent the bilateral upper   lobes. A few centrilobular nodular opacities are noted in the bilateral   upper lung lobes.    PLEURA: No pleural effusion.    VESSELS: Atherosclerotic consolidations of the aorta and coronary   arteries. An LAD coronary stent is noted.    HEART: Heart size is normal. No pericardial effusion.    MEDIASTINUM AND MANJULA: Predominantly calcified paratracheal, subcarinal,   and bilateral hilar adenopathy, consistent with sarcoidosis.    CHEST WALL AND LOWER NECK: There is asymmetric skin thickening and   subcutaneous edema of the right breast.    UPPER ABDOMEN: Within normal limits.    BONES: Degenerative changes of the spine.    IMPRESSION:    Pulmonary sarcoidosis with evidence of extensive fibrosis in the   bilateral lower lobes and right middle lobe, as well as nonfibrotic   changes in the bilateral upper lobes.    Calcified mediastinal and bilateral hilar adenopathy.    Asymmetrical skin thickening and subcutaneous edema of the right breast.   Correlate with dedicated mammographic imaging or any prior mammograms if   available.              ARIEL DELGADILLO M.D., RADIOLOGY RESIDENT  This document has been electronically signed.  AUNG HITCHCOCK M.D., ATTENDING RADIOLOGIST  This document has been electronically signed. May 16 2018  4:02PM        < end of copied text >                          Studies  Chest X-RAY  CT SCAN Chest   Venous Dopplers: LE:   CT Abdomen  Others

## 2018-05-18 NOTE — PROGRESS NOTE ADULT - PROBLEM SELECTOR PLAN 3
cont current care: ? MRI heart ? sarcoid heart  5/15: She did have stent placed in 2016 at [facility]  5/16: cont current treatment per cardiology  5/18: cont current treatment per cardiology   5/17: cont current treatment  5/18:

## 2018-05-19 LAB
BUN SERPL-MCNC: 127 MG/DL — HIGH (ref 7–23)
CALCIUM SERPL-MCNC: 8.6 MG/DL — SIGNIFICANT CHANGE UP (ref 8.4–10.5)
CHLORIDE SERPL-SCNC: 92 MMOL/L — LOW (ref 98–107)
CO2 SERPL-SCNC: 35 MMOL/L — HIGH (ref 22–31)
CREAT SERPL-MCNC: 1.32 MG/DL — HIGH (ref 0.5–1.3)
GLUCOSE BLDC GLUCOMTR-MCNC: 105 MG/DL — HIGH (ref 70–99)
GLUCOSE BLDC GLUCOMTR-MCNC: 120 MG/DL — HIGH (ref 70–99)
GLUCOSE BLDC GLUCOMTR-MCNC: 187 MG/DL — HIGH (ref 70–99)
GLUCOSE BLDC GLUCOMTR-MCNC: 281 MG/DL — HIGH (ref 70–99)
GLUCOSE BLDC GLUCOMTR-MCNC: 306 MG/DL — HIGH (ref 70–99)
GLUCOSE SERPL-MCNC: 330 MG/DL — HIGH (ref 70–99)
HCT VFR BLD CALC: 38.1 % — SIGNIFICANT CHANGE UP (ref 34.5–45)
HGB BLD-MCNC: 11 G/DL — LOW (ref 11.5–15.5)
MAGNESIUM SERPL-MCNC: 2.5 MG/DL — SIGNIFICANT CHANGE UP (ref 1.6–2.6)
MCHC RBC-ENTMCNC: 20.9 PG — LOW (ref 27–34)
MCHC RBC-ENTMCNC: 28.9 % — LOW (ref 32–36)
MCV RBC AUTO: 72.3 FL — LOW (ref 80–100)
NRBC # FLD: 0.04 — SIGNIFICANT CHANGE UP
PHOSPHATE SERPL-MCNC: 4.3 MG/DL — SIGNIFICANT CHANGE UP (ref 2.5–4.5)
PLATELET # BLD AUTO: 241 K/UL — SIGNIFICANT CHANGE UP (ref 150–400)
PMV BLD: 11.5 FL — SIGNIFICANT CHANGE UP (ref 7–13)
POTASSIUM SERPL-MCNC: 5.3 MMOL/L — SIGNIFICANT CHANGE UP (ref 3.5–5.3)
POTASSIUM SERPL-SCNC: 5.3 MMOL/L — SIGNIFICANT CHANGE UP (ref 3.5–5.3)
RBC # BLD: 5.27 M/UL — HIGH (ref 3.8–5.2)
RBC # FLD: 19.2 % — HIGH (ref 10.3–14.5)
SODIUM SERPL-SCNC: 138 MMOL/L — SIGNIFICANT CHANGE UP (ref 135–145)
WBC # BLD: 5.62 K/UL — SIGNIFICANT CHANGE UP (ref 3.8–10.5)
WBC # FLD AUTO: 5.62 K/UL — SIGNIFICANT CHANGE UP (ref 3.8–10.5)

## 2018-05-19 PROCEDURE — 99232 SBSQ HOSP IP/OBS MODERATE 35: CPT

## 2018-05-19 PROCEDURE — 71045 X-RAY EXAM CHEST 1 VIEW: CPT | Mod: 26

## 2018-05-19 RX ORDER — FUROSEMIDE 40 MG
1 TABLET ORAL
Qty: 0 | Refills: 0 | COMMUNITY

## 2018-05-19 RX ORDER — METFORMIN HYDROCHLORIDE 850 MG/1
1 TABLET ORAL
Qty: 0 | Refills: 0 | COMMUNITY

## 2018-05-19 RX ORDER — FUROSEMIDE 40 MG
40 TABLET ORAL DAILY
Qty: 0 | Refills: 0 | Status: DISCONTINUED | OUTPATIENT
Start: 2018-05-20 | End: 2018-05-20

## 2018-05-19 RX ORDER — NYSTATIN CREAM 100000 [USP'U]/G
1 CREAM TOPICAL
Qty: 0 | Refills: 0 | Status: DISCONTINUED | OUTPATIENT
Start: 2018-05-19 | End: 2018-05-20

## 2018-05-19 RX ORDER — FUROSEMIDE 40 MG
1 TABLET ORAL
Qty: 30 | Refills: 0 | OUTPATIENT
Start: 2018-05-19 | End: 2018-06-17

## 2018-05-19 RX ORDER — ENOXAPARIN SODIUM 100 MG/ML
22 INJECTION SUBCUTANEOUS
Qty: 1 | Refills: 0 | OUTPATIENT
Start: 2018-05-19

## 2018-05-19 RX ORDER — INSULIN LISPRO 100/ML
6 VIAL (ML) SUBCUTANEOUS
Qty: 1 | Refills: 0 | OUTPATIENT
Start: 2018-05-19 | End: 2018-06-17

## 2018-05-19 RX ORDER — LEVOTHYROXINE SODIUM 125 MCG
1 TABLET ORAL
Qty: 30 | Refills: 0 | OUTPATIENT
Start: 2018-05-19 | End: 2018-06-17

## 2018-05-19 RX ORDER — CARVEDILOL PHOSPHATE 80 MG/1
1 CAPSULE, EXTENDED RELEASE ORAL
Qty: 60 | Refills: 0 | OUTPATIENT
Start: 2018-05-19 | End: 2018-06-17

## 2018-05-19 RX ORDER — ACETAMINOPHEN 500 MG
2 TABLET ORAL
Qty: 0 | Refills: 0 | COMMUNITY
Start: 2018-05-19

## 2018-05-19 RX ORDER — LOSARTAN POTASSIUM 100 MG/1
1 TABLET, FILM COATED ORAL
Qty: 0 | Refills: 0 | COMMUNITY

## 2018-05-19 RX ORDER — RIVAROXABAN 15 MG-20MG
1 KIT ORAL
Qty: 0 | Refills: 0 | COMMUNITY

## 2018-05-19 RX ORDER — INSULIN GLARGINE 100 [IU]/ML
22 INJECTION, SOLUTION SUBCUTANEOUS AT BEDTIME
Qty: 0 | Refills: 0 | Status: DISCONTINUED | OUTPATIENT
Start: 2018-05-19 | End: 2018-05-20

## 2018-05-19 RX ORDER — INSULIN GLARGINE 100 [IU]/ML
25 INJECTION, SOLUTION SUBCUTANEOUS AT BEDTIME
Qty: 0 | Refills: 0 | Status: DISCONTINUED | OUTPATIENT
Start: 2018-05-19 | End: 2018-05-19

## 2018-05-19 RX ORDER — METOPROLOL TARTRATE 50 MG
1 TABLET ORAL
Qty: 0 | Refills: 0 | COMMUNITY

## 2018-05-19 RX ADMIN — SODIUM CHLORIDE 3 MILLILITER(S): 9 INJECTION INTRAMUSCULAR; INTRAVENOUS; SUBCUTANEOUS at 22:57

## 2018-05-19 RX ADMIN — Medication 650 MILLIGRAM(S): at 06:10

## 2018-05-19 RX ADMIN — Medication 1 PUFF(S): at 22:55

## 2018-05-19 RX ADMIN — NYSTATIN CREAM 1 APPLICATION(S): 100000 CREAM TOPICAL at 17:28

## 2018-05-19 RX ADMIN — Medication 20 MILLIGRAM(S): at 05:38

## 2018-05-19 RX ADMIN — Medication 25 MICROGRAM(S): at 05:38

## 2018-05-19 RX ADMIN — Medication 6 UNIT(S): at 17:27

## 2018-05-19 RX ADMIN — Medication 4: at 08:17

## 2018-05-19 RX ADMIN — ATORVASTATIN CALCIUM 40 MILLIGRAM(S): 80 TABLET, FILM COATED ORAL at 22:55

## 2018-05-19 RX ADMIN — Medication 1: at 22:56

## 2018-05-19 RX ADMIN — Medication 1 PUFF(S): at 11:02

## 2018-05-19 RX ADMIN — Medication 6 UNIT(S): at 08:17

## 2018-05-19 RX ADMIN — CARVEDILOL PHOSPHATE 6.25 MILLIGRAM(S): 80 CAPSULE, EXTENDED RELEASE ORAL at 05:38

## 2018-05-19 RX ADMIN — CARVEDILOL PHOSPHATE 6.25 MILLIGRAM(S): 80 CAPSULE, EXTENDED RELEASE ORAL at 17:27

## 2018-05-19 RX ADMIN — INSULIN GLARGINE 22 UNIT(S): 100 INJECTION, SOLUTION SUBCUTANEOUS at 22:56

## 2018-05-19 RX ADMIN — Medication 6 UNIT(S): at 11:53

## 2018-05-19 RX ADMIN — SODIUM CHLORIDE 3 MILLILITER(S): 9 INJECTION INTRAMUSCULAR; INTRAVENOUS; SUBCUTANEOUS at 11:52

## 2018-05-19 RX ADMIN — SODIUM CHLORIDE 3 MILLILITER(S): 9 INJECTION INTRAMUSCULAR; INTRAVENOUS; SUBCUTANEOUS at 05:39

## 2018-05-19 RX ADMIN — Medication 1: at 17:27

## 2018-05-19 RX ADMIN — Medication 250 MILLIGRAM(S): at 17:28

## 2018-05-19 RX ADMIN — Medication 325 MILLIGRAM(S): at 17:27

## 2018-05-19 RX ADMIN — Medication 325 MILLIGRAM(S): at 05:38

## 2018-05-19 RX ADMIN — Medication 81 MILLIGRAM(S): at 11:53

## 2018-05-19 RX ADMIN — Medication 650 MILLIGRAM(S): at 05:37

## 2018-05-19 NOTE — PROGRESS NOTE ADULT - SUBJECTIVE AND OBJECTIVE BOX
Chief Complaint: DM 2    History: Hypoglycemia yesterday.    MEDICATIONS  (STANDING):  aspirin enteric coated 81 milliGRAM(s) Oral daily  atorvastatin 40 milliGRAM(s) Oral at bedtime  carvedilol 6.25 milliGRAM(s) Oral every 12 hours  chlorhexidine 4% Liquid 1 Application(s) Topical <User Schedule>  dextrose 5%. 500 milliLiter(s) (40 mL/Hr) IV Continuous <Continuous>  dextrose 5%. 1000 milliLiter(s) (50 mL/Hr) IV Continuous <Continuous>  dextrose 50% Injectable 12.5 Gram(s) IV Push once  dextrose 50% Injectable 25 Gram(s) IV Push once  dextrose 50% Injectable 25 Gram(s) IV Push once  ferrous    sulfate 325 milliGRAM(s) Oral two times a day  fluticasone propionate   220 MICROgram(s) HFA Inhaler 1 Puff(s) Inhalation two times a day  insulin glargine Injectable (LANTUS) 20 Unit(s) SubCutaneous at bedtime  insulin lispro (HumaLOG) corrective regimen sliding scale   SubCutaneous three times a day before meals  insulin lispro (HumaLOG) corrective regimen sliding scale   SubCutaneous at bedtime  insulin lispro Injectable (HumaLOG) 6 Unit(s) SubCutaneous three times a day before meals  polyethylene glycol 3350 17 Gram(s) Oral daily  predniSONE   Tablet 20 milliGRAM(s) Oral daily  sodium chloride 0.9% lock flush 3 milliLiter(s) IV Push every 8 hours    MEDICATIONS  (PRN):  acetaminophen   Tablet. 650 milliGRAM(s) Oral every 6 hours PRN temp> 101  acetaminophen   Tablet. 650 milliGRAM(s) Oral every 6 hours PRN mild, moderate pain  benzocaine 15 mG/menthol 3.6 mG Lozenge 1 Lozenge Oral two times a day PRN Sore Throat  dextrose Gel 1 Dose(s) Oral once PRN Blood Glucose LESS THAN 70 milliGRAM(s)/deciliter  glucagon  Injectable 1 milliGRAM(s) IntraMuscular once PRN Glucose LESS THAN 70 milligrams/deciliter  glucagon  Injectable 1 milliGRAM(s) IntraMuscular once PRN Glucose LESS THAN 70 milligrams/deciliter  levalbuterol Inhalation 0.63 milliGRAM(s) Inhalation every 6 hours PRN bronchospasm          No Known Allergies          Review of Systems:  Constitutional: No fever  Cardiovascular: No chest pain, palpitations  Respiratory: No SOB, no cough  GI: Decreased appetite. No nausea, vomiting, abdominal pain      PHYSICAL EXAM:  Vital Signs Last 24 Hrs  T(C): 36.4 (19 May 2018 07:21), Max: 36.8 (19 May 2018 05:25)  T(F): 97.5 (19 May 2018 07:21), Max: 98.2 (19 May 2018 05:25)  HR: 81 (19 May 2018 07:21) (72 - 83)  BP: 102/68 (19 May 2018 07:21) (102/68 - 119/62)  BP(mean): --  RR: 18 (19 May 2018 07:21) (18 - 18)  SpO2: 100% (19 May 2018 07:21) (96% - 100%)  GENERAL: NAD  RESPIRATORY: Non labored  GI: Soft, nontender, non distended  SKIN: Dry, warm  PSYCH: Alert and oriented x 3    CAPILLARY BLOOD GLUCOSE  POCT Blood Glucose.: 306 mg/dL (19 May 2018 07:59)  POCT Blood Glucose.: 217 mg/dL (18 May 2018 22:07)  POCT Blood Glucose.: 112 mg/dL (18 May 2018 19:40)  POCT Blood Glucose.: 141 mg/dL (18 May 2018 18:49)  POCT Blood Glucose.: 93 mg/dL (18 May 2018 17:27)  POCT Blood Glucose.: 101 mg/dL (18 May 2018 16:34)  POCT Blood Glucose.: 183 mg/dL (18 May 2018 14:46)  POCT Blood Glucose.: 56 mg/dL (18 May 2018 14:23)  POCT Blood Glucose.: 71 mg/dL (18 May 2018 12:13)  POCT Blood Glucose.: 74 mg/dL (18 May 2018 11:43)    POCT Blood Glucose.: 77 mg/dL (05-18-18 @ 10:05)  POCT Blood Glucose.: 72 mg/dL (05-18-18 @ 09:16)  POCT Blood Glucose.: 152 mg/dL (05-18-18 @ 08:13)  POCT Blood Glucose.: 56 mg/dL (05-18-18 @ 07:53)  POCT Blood Glucose.: 295 mg/dL (05-17-18 @ 21:47)  POCT Blood Glucose.: 103 mg/dL (05-17-18 @ 16:45)  POCT Blood Glucose.: 230 mg/dL (05-17-18 @ 11:53)  POCT Blood Glucose.: 101 mg/dL (05-17-18 @ 08:33)  POCT Blood Glucose.: 52 mg/dL (05-17-18 @ 08:06)  POCT Blood Glucose.: 287 mg/dL (05-16-18 @ 21:54)  POCT Blood Glucose.: 284 mg/dL (05-16-18 @ 16:52)  POCT Blood Glucose.: 219 mg/dL (05-16-18 @ 14:02)  POCT Blood Glucose.: 70 mg/dL (05-16-18 @ 11:49)  POCT Blood Glucose.: 155 mg/dL (05-16-18 @ 07:59)  POCT Blood Glucose.: 135 mg/dL (05-15-18 @ 20:56)  POCT Blood Glucose.: 123 mg/dL (05-15-18 @ 16:35)      05-19    138  |  92<L>  |  127<H>  ----------------------------<  330<H>  5.3   |  35<H>  |  1.32<H>    Ca    8.6      19 May 2018 05:30  Phos  4.3     05-19  Mg     2.5     05-19        Thyroid Function Tests:  05-18 @ 05:43 TSH 9.57 FreeT4 1.31 T3 -- Anti TPO -- Anti Thyroglobulin Ab -- TSI --  05-03 @ 16:59 TSH 6.40 FreeT4 -- T3 -- Anti TPO -- Anti Thyroglobulin Ab -- TSI --      Hemoglobin A1C, Whole Blood: 14.3 % <H> [4.0 - 5.6] (05-04-18 @ 06:45)  Hemoglobin A1C, Whole Blood: 14.3 % <H> [4.0 - 5.6] (05-03-18 @ 16:59) Chief Complaint: DM 2    History: Hypoglycemia yesterday.  Today reports feeling dizzy like she is going to pass out.  BG was repeated and it was 105.  Denies n/v, tolerates po    MEDICATIONS  (STANDING):  aspirin enteric coated 81 milliGRAM(s) Oral daily  atorvastatin 40 milliGRAM(s) Oral at bedtime  carvedilol 6.25 milliGRAM(s) Oral every 12 hours  chlorhexidine 4% Liquid 1 Application(s) Topical <User Schedule>  dextrose 5%. 500 milliLiter(s) (40 mL/Hr) IV Continuous <Continuous>  dextrose 5%. 1000 milliLiter(s) (50 mL/Hr) IV Continuous <Continuous>  dextrose 50% Injectable 12.5 Gram(s) IV Push once  dextrose 50% Injectable 25 Gram(s) IV Push once  dextrose 50% Injectable 25 Gram(s) IV Push once  ferrous    sulfate 325 milliGRAM(s) Oral two times a day  fluticasone propionate   220 MICROgram(s) HFA Inhaler 1 Puff(s) Inhalation two times a day  insulin glargine Injectable (LANTUS) 20 Unit(s) SubCutaneous at bedtime  insulin lispro (HumaLOG) corrective regimen sliding scale   SubCutaneous three times a day before meals  insulin lispro (HumaLOG) corrective regimen sliding scale   SubCutaneous at bedtime  insulin lispro Injectable (HumaLOG) 6 Unit(s) SubCutaneous three times a day before meals  polyethylene glycol 3350 17 Gram(s) Oral daily  predniSONE   Tablet 20 milliGRAM(s) Oral daily  sodium chloride 0.9% lock flush 3 milliLiter(s) IV Push every 8 hours    MEDICATIONS  (PRN):  acetaminophen   Tablet. 650 milliGRAM(s) Oral every 6 hours PRN temp> 101  acetaminophen   Tablet. 650 milliGRAM(s) Oral every 6 hours PRN mild, moderate pain  benzocaine 15 mG/menthol 3.6 mG Lozenge 1 Lozenge Oral two times a day PRN Sore Throat  dextrose Gel 1 Dose(s) Oral once PRN Blood Glucose LESS THAN 70 milliGRAM(s)/deciliter  glucagon  Injectable 1 milliGRAM(s) IntraMuscular once PRN Glucose LESS THAN 70 milligrams/deciliter  glucagon  Injectable 1 milliGRAM(s) IntraMuscular once PRN Glucose LESS THAN 70 milligrams/deciliter  levalbuterol Inhalation 0.63 milliGRAM(s) Inhalation every 6 hours PRN bronchospasm          No Known Allergies          Review of Systems:  Constitutional: No fever  Cardiovascular: No chest pain, palpitations  Respiratory: No SOB, no cough  GI: Decreased appetite. No nausea, vomiting, abdominal pain      PHYSICAL EXAM:  Vital Signs Last 24 Hrs  T(C): 36.4 (19 May 2018 07:21), Max: 36.8 (19 May 2018 05:25)  T(F): 97.5 (19 May 2018 07:21), Max: 98.2 (19 May 2018 05:25)  HR: 81 (19 May 2018 07:21) (72 - 83)  BP: 102/68 (19 May 2018 07:21) (102/68 - 119/62)  BP(mean): --  RR: 18 (19 May 2018 07:21) (18 - 18)  SpO2: 100% (19 May 2018 07:21) (96% - 100%)  GENERAL: NAD  RESPIRATORY: Non labored  GI: Soft, nontender, non distended  SKIN: Dry, warm  PSYCH: Alert and oriented x 3    CAPILLARY BLOOD GLUCOSE  POCT Blood Glucose.: 306 mg/dL (19 May 2018 07:59)  POCT Blood Glucose.: 217 mg/dL (18 May 2018 22:07)  POCT Blood Glucose.: 112 mg/dL (18 May 2018 19:40)  POCT Blood Glucose.: 141 mg/dL (18 May 2018 18:49)  POCT Blood Glucose.: 93 mg/dL (18 May 2018 17:27)  POCT Blood Glucose.: 101 mg/dL (18 May 2018 16:34)  POCT Blood Glucose.: 183 mg/dL (18 May 2018 14:46)  POCT Blood Glucose.: 56 mg/dL (18 May 2018 14:23)  POCT Blood Glucose.: 71 mg/dL (18 May 2018 12:13)  POCT Blood Glucose.: 74 mg/dL (18 May 2018 11:43)    POCT Blood Glucose.: 77 mg/dL (05-18-18 @ 10:05)  POCT Blood Glucose.: 72 mg/dL (05-18-18 @ 09:16)  POCT Blood Glucose.: 152 mg/dL (05-18-18 @ 08:13)  POCT Blood Glucose.: 56 mg/dL (05-18-18 @ 07:53)  POCT Blood Glucose.: 295 mg/dL (05-17-18 @ 21:47)  POCT Blood Glucose.: 103 mg/dL (05-17-18 @ 16:45)  POCT Blood Glucose.: 230 mg/dL (05-17-18 @ 11:53)  POCT Blood Glucose.: 101 mg/dL (05-17-18 @ 08:33)  POCT Blood Glucose.: 52 mg/dL (05-17-18 @ 08:06)  POCT Blood Glucose.: 287 mg/dL (05-16-18 @ 21:54)  POCT Blood Glucose.: 284 mg/dL (05-16-18 @ 16:52)  POCT Blood Glucose.: 219 mg/dL (05-16-18 @ 14:02)  POCT Blood Glucose.: 70 mg/dL (05-16-18 @ 11:49)  POCT Blood Glucose.: 155 mg/dL (05-16-18 @ 07:59)  POCT Blood Glucose.: 135 mg/dL (05-15-18 @ 20:56)  POCT Blood Glucose.: 123 mg/dL (05-15-18 @ 16:35)      05-19    138  |  92<L>  |  127<H>  ----------------------------<  330<H>  5.3   |  35<H>  |  1.32<H>    Ca    8.6      19 May 2018 05:30  Phos  4.3     05-19  Mg     2.5     05-19        Thyroid Function Tests:  05-18 @ 05:43 TSH 9.57 FreeT4 1.31 T3 -- Anti TPO -- Anti Thyroglobulin Ab -- TSI --  05-03 @ 16:59 TSH 6.40 FreeT4 -- T3 -- Anti TPO -- Anti Thyroglobulin Ab -- TSI --      Hemoglobin A1C, Whole Blood: 14.3 % <H> [4.0 - 5.6] (05-04-18 @ 06:45)  Hemoglobin A1C, Whole Blood: 14.3 % <H> [4.0 - 5.6] (05-03-18 @ 16:59)

## 2018-05-19 NOTE — PROGRESS NOTE ADULT - PROBLEM SELECTOR PLAN 1
Target glucose 100-180  below goal  Patient is NPO, IVF with dextrose ordered/running by primary team, would continue while NPO  -Decreased Lantus to 20 units QHS  -Decreased Premeal Humalog to 6 units TID before meals  -Decreased correctional scales to low  -DC PRN QHS snack insulin at this time  -as prednisone gets tapered further, insulin requirements will decrease. As such, it is important for the primary team to call with questions on insulin dosages as they arise Target glucose 100-180  below goal    -change  Lantus to 22 units QHS  -c/w Premeal Humalog to 6 units TID before meals  -Decreased correctional scales to low  -DC PRN QHS snack insulin at this time  -as prednisone gets tapered further, insulin requirements will decrease. As such, it is important for the primary team to call with questions on insulin dosages as they arise    Patient reported that she takes prednisone 20 mg daily at home for past year.  ---Would discharge on current doses of insulin  Lantus SOLOSTAR pen 22 units qhs  Humalog KWIKPEN 6 units sq tid ac  Other supplies needed:   Glucometer as per insurance  Test strips  Lancets  BD sol pen needles  alcohol prep pads  Follow up at [address] [place] [address] [phone]

## 2018-05-19 NOTE — PROGRESS NOTE ADULT - SUBJECTIVE AND OBJECTIVE BOX
CARDIOLOGY FOLLOW UP NOTE - DR. DOUGHERTY (for dr. bone)    Subjective:    no chest pain, sob    PHYSICAL EXAM:  T(C): 36.6 (05-19-18 @ 12:18), Max: 36.8 (05-19-18 @ 05:25)  HR: 79 (05-19-18 @ 12:18) (72 - 83)  BP: 102/67 (05-19-18 @ 12:18) (102/67 - 118/86)  RR: 19 (05-19-18 @ 12:18) (18 - 19)  SpO2: 99% (05-19-18 @ 12:18) (96% - 100%)  Wt(kg): --  I&O's Summary    18 May 2018 07:01  -  19 May 2018 07:00  --------------------------------------------------------  IN: 320 mL / OUT: 600 mL / NET: -280 mL        Appearance: Normal	  Cardiovascular: Normal S1 S2,RRR, No JVD, No murmurs  Respiratory: Lungs clear to auscultation	  Gastrointestinal:  Soft, Non-tender, + BS	  Extremities: Normal range of motion, No clubbing, cyanosis or edema      MEDICATIONS  (STANDING):  aspirin enteric coated 81 milliGRAM(s) Oral daily  atorvastatin 40 milliGRAM(s) Oral at bedtime  carvedilol 6.25 milliGRAM(s) Oral every 12 hours  chlorhexidine 4% Liquid 1 Application(s) Topical <User Schedule>  dextrose 5%. 1000 milliLiter(s) (50 mL/Hr) IV Continuous <Continuous>  dextrose 50% Injectable 12.5 Gram(s) IV Push once  dextrose 50% Injectable 25 Gram(s) IV Push once  dextrose 50% Injectable 25 Gram(s) IV Push once  ferrous    sulfate 325 milliGRAM(s) Oral two times a day  fluticasone propionate   220 MICROgram(s) HFA Inhaler 1 Puff(s) Inhalation two times a day  insulin glargine Injectable (LANTUS) 22 Unit(s) SubCutaneous at bedtime  insulin lispro (HumaLOG) corrective regimen sliding scale   SubCutaneous three times a day before meals  insulin lispro (HumaLOG) corrective regimen sliding scale   SubCutaneous at bedtime  insulin lispro Injectable (HumaLOG) 6 Unit(s) SubCutaneous three times a day before meals  levothyroxine 25 MICROGram(s) Oral daily  nystatin Powder 1 Application(s) Topical two times a day  polyethylene glycol 3350 17 Gram(s) Oral daily  predniSONE   Tablet 20 milliGRAM(s) Oral daily  sodium chloride 0.9% lock flush 3 milliLiter(s) IV Push every 8 hours  vancomycin  IVPB 1000 milliGRAM(s) IV Intermittent once      TELEMETRY: 	    ECG:  	  RADIOLOGY:   DIAGNOSTIC TESTING:  [ ] Echocardiogram:  [ ] Catheterization:  [ ] Stress Test:    OTHER: 	    LABS:	 	    CARDIAC MARKERS:                                11.0   5.62  )-----------( 241      ( 19 May 2018 05:30 )             38.1     05-19    138  |  92<L>  |  127<H>  ----------------------------<  330<H>  5.3   |  35<H>  |  1.32<H>    Ca    8.6      19 May 2018 05:30  Phos  4.3     05-19  Mg     2.5     05-19      proBNP:     Lipid Profile:   HgA1c:

## 2018-05-19 NOTE — PROGRESS NOTE ADULT - PROBLEM SELECTOR PLAN 1
called pulmonary division: [facility]: awaiting message: Pt says she has been using prednisone for last one year at least and it has helped her:  5/15; first presented to dr mc with chf: She was dx with sarcoid with hilar and axillary LN. She had ax LN biopsy done which showed sarcoid: She was put on prednisone on 5/5/ 2015: 20 mg a day , she got much better , especially the cough improved: He was concerned about her cardiac status: The she stopped gong there again there for a year: In 2016 she was seen by cardiologist , she was found to have severe two vessel disease LAD and RCA, on cardiac cath IN may 16 2016: She also had stent placed at that time: That was the last time she was seen there: She was initially placed on steroids at 20 mg and after may 2016 , she stopped following there. So at this time, her dose of steroids is not very clear: and only pulmonologist name she gives is Dr Mc , who has not seen er for last 2 years:  5/16: pt agrees she could not follow with pulmonary in last two years as she was sick: Now she says she was taking steroids 20 mg a day, and the current dose is decreased to 20 mg a day again: Await ct chest today  5/17: ct chest reviewed: c/w sarcoidosis: no ptx or hydropneumothorax: Cotn 20 mg of prednisone  5/18: pt is doing ok : no SOB at rest: for now cont 20 mg of prednisone  5/19: pt has been doing ok : on prednisone 20 mg a day, would cont the same at this time:

## 2018-05-19 NOTE — PROGRESS NOTE ADULT - PROBLEM SELECTOR PLAN 3
cont current care: ? MRI heart ? sarcoid heart  5/15: She did have stent placed in 2016 at [facility]  5/16: cont current treatment per cardiology  5/18: cont current treatment per cardiology   5/17: cont current treatment  5/19: s/p AICD doing ok

## 2018-05-19 NOTE — PROGRESS NOTE ADULT - ASSESSMENT
53 y/o female, with PMH CAD s/p PCI to mLAD 4/2017,  chronic CHF stage III secondary to NICM , Sarcoidosis, LV thrombus, on Xarelto, diabetes T2 , COPD on home O2 who  presented with  large pericardial effusion with tamponade physiology requiring a pericardial drain now improving and s/p ICD placement yesterday.     Site clean and intact. Please follow up official read on CXR post procedure   cont coreg   hold anticoagulation for now (pt remains in NSR) and plan for resuming on monday if stable     Tammie Benavides MD

## 2018-05-19 NOTE — PROGRESS NOTE ADULT - SUBJECTIVE AND OBJECTIVE BOX
Pt seen at bedside. Minimal tenderness at ICD site. Denies CP, sob.     MEDICATIONS:  aspirin enteric coated 81 milliGRAM(s) Oral daily  carvedilol 6.25 milliGRAM(s) Oral every 12 hours    vancomycin  IVPB 1000 milliGRAM(s) IV Intermittent once    fluticasone propionate   220 MICROgram(s) HFA Inhaler 1 Puff(s) Inhalation two times a day  levalbuterol Inhalation 0.63 milliGRAM(s) Inhalation every 6 hours PRN    acetaminophen   Tablet. 650 milliGRAM(s) Oral every 6 hours PRN  acetaminophen   Tablet. 650 milliGRAM(s) Oral every 6 hours PRN    polyethylene glycol 3350 17 Gram(s) Oral daily    atorvastatin 40 milliGRAM(s) Oral at bedtime  dextrose 50% Injectable 12.5 Gram(s) IV Push once  dextrose 50% Injectable 25 Gram(s) IV Push once  dextrose 50% Injectable 25 Gram(s) IV Push once  dextrose Gel 1 Dose(s) Oral once PRN  glucagon  Injectable 1 milliGRAM(s) IntraMuscular once PRN  glucagon  Injectable 1 milliGRAM(s) IntraMuscular once PRN  insulin glargine Injectable (LANTUS) 25 Unit(s) SubCutaneous at bedtime  insulin lispro (HumaLOG) corrective regimen sliding scale   SubCutaneous three times a day before meals  insulin lispro (HumaLOG) corrective regimen sliding scale   SubCutaneous at bedtime  insulin lispro Injectable (HumaLOG) 6 Unit(s) SubCutaneous three times a day before meals  levothyroxine 25 MICROGram(s) Oral daily  predniSONE   Tablet 20 milliGRAM(s) Oral daily    benzocaine 15 mG/menthol 3.6 mG Lozenge 1 Lozenge Oral two times a day PRN  chlorhexidine 4% Liquid 1 Application(s) Topical <User Schedule>  dextrose 5%. 1000 milliLiter(s) IV Continuous <Continuous>  ferrous    sulfate 325 milliGRAM(s) Oral two times a day  nystatin Powder 1 Application(s) Topical two times a day  sodium chloride 0.9% lock flush 3 milliLiter(s) IV Push every 8 hours      REVIEW OF SYSTEMS:  General: no fatigue/malaise, weight loss/gain.  Skin: no rashes.  Ophthalmologic: no blurred vision, no loss of vision. 	  ENT: no sore throat, rhinorrhea, sinus congestion.  Respiratory: no SOB, cough or wheeze.  Gastrointestinal:  no N/V/D, no melena/hematemesis/hematochezia.  Genitourinary: no dysuria/hesitancy or hematuria.  Musculoskeletal: no myalgias or arthralgias.  Neurological: no changes in vision or hearing, no lightheadedness/dizziness, no syncope/near syncope	  Psychiatric: no unusual stress/anxiety.   Hematology/Lymphatics: no unusual bleeding, bruising and no lymphadenopathy.  Endocrine: no unusual thirst.   All others negative except as stated above and in HPI.    PHYSICAL EXAM:  T(C): 36.4 (05-19-18 @ 07:21), Max: 36.8 (05-19-18 @ 05:25)  HR: 81 (05-19-18 @ 07:21) (72 - 83)  BP: 102/68 (05-19-18 @ 07:21) (102/68 - 119/62)  RR: 18 (05-19-18 @ 07:21) (18 - 18)  SpO2: 100% (05-19-18 @ 07:21) (96% - 100%)  Wt(kg): --  I&O's Summary    18 May 2018 07:01  -  19 May 2018 07:00  --------------------------------------------------------  IN: 320 mL / OUT: 600 mL / NET: -280 mL        Appearance: Normal, NAD   HEENT:   Normal oral mucosa, PERRL, EOMI	  Lymphatic: No lymphadenopathy  Cardiovascular: Normal S1 S2, No JVD, No murmurs, No edema  Respiratory: Lungs clear to auscultation	  Psychiatry: A & O x 3, Mood & affect appropriate  Gastrointestinal:  Soft, Non-tender, + BS	  Skin: No rashes, No ecchymoses, No cyanosis	  Neurologic: Non-focal  Extremities: Normal range of motion, No clubbing, cyanosis or edema  Vascular: Peripheral pulses palpable  bilaterally  ICD site clean and intact, non-tender, no hematoma         LABS:	 	    CBC Full  -  ( 19 May 2018 05:30 )  WBC Count : 5.62 K/uL  Hemoglobin : 11.0 g/dL  Hematocrit : 38.1 %  Platelet Count - Automated : 241 K/uL  Mean Cell Volume : 72.3 fL  Mean Cell Hemoglobin : 20.9 pg  Mean Cell Hemoglobin Concentration : 28.9 %  Auto Neutrophil # : x  Auto Lymphocyte # : x  Auto Monocyte # : x  Auto Eosinophil # : x  Auto Basophil # : x  Auto Neutrophil % : x  Auto Lymphocyte % : x  Auto Monocyte % : x  Auto Eosinophil % : x  Auto Basophil % : x    05-19    138  |  92<L>  |  127<H>  ----------------------------<  330<H>  5.3   |  35<H>  |  1.32<H>  05-18    140  |  94<L>  |  142<H>  ----------------------------<  128<H>  4.8   |  34<H>  |  1.46<H>    Ca    8.6      19 May 2018 05:30  Ca    8.9      18 May 2018 05:43  Phos  4.3     05-19  Mg     2.5     05-19  Mg     2.2     05-18      Tele: NSR with intermittent pacing

## 2018-05-19 NOTE — PROGRESS NOTE ADULT - SUBJECTIVE AND OBJECTIVE BOX
No pain, no shortness of breath      VITAL:  T(C): , Max: 36.8 (05-19-18 @ 05:25)  T(F): , Max: 98.2 (05-19-18 @ 05:25)  HR: 81 (05-19-18 @ 07:21)  BP: 102/68 (05-19-18 @ 07:21)  RR: 18 (05-19-18 @ 07:21)  SpO2: 100% (05-19-18 @ 07:21)      PHYSICAL EXAM:  Constitutional: NAD; Alert  HEENT:  NCAT; DMM  Neck: (+) JVD; supple  Respiratory: CTA-b/l  Cardiac: RRR s1s2  Gastrointestinal: BS+, soft, NT/ND  Urologic: No barfield  Extremities: 1+ b/l LE edema  Back: No CVAT b/l      LABS:                        11.0   5.62  )-----------( 241      ( 19 May 2018 05:30 )             38.1     Na(138)/K(5.3)/Cl(92)/HCO3(35)/BUN(127)/Cr(1.32)Glu(330)/Ca(8.6)/Mg(2.5)/PO4(4.3)    05-19 @ 05:30  Na(140)/K(4.8)/Cl(94)/HCO3(34)/BUN(142)/Cr(1.46)Glu(128)/Ca(8.9)/Mg(2.2)/PO4(--)    05-18 @ 05:43      IMPRESSION: 52F w/ DM2, sarcoidosis, HFrEF, LV thrombus on Xarelto, and CKD3, 5/3/18 a/w acute on chronic HFrEF/pericardial effusion requiring drainage    (1)Renal - CKD3 - ~2gm proteinuria it appears; presumed diabetic nephropathy - superimposed YANA, with significantly more rapid rise in BUN than creatinine - prerenal from diuresis in setting of pulmonary hypertension, as well as steroid associated rise in BUN. Now improving, off diuretics/off primacor, as well as s/p reduction in steroid dosage. At this point, GFR  has improved enough that we could proceed with Cardiac MRI with Gadolinium.    (2)Acid-base - likely primary metabolic alkalosis from contraction    (3)CV - resolving/resolved acute on chronic HFrEF; now off primacor/off diuretics.    (4)Endocrine - labile blood sugars - Endocrine team on board    (5)Hyperkalemia - mild - at least in part from hypoinsulinemia causing cellular shifts      RECOMMEND:  (1)Lasix 40mg po qd  (2)No objection to proceeding with MRI with Gadolinium at this point                Jair Lion MD  [facility]  [phone] No pain, no shortness of breath      VITAL:  T(C): , Max: 36.8 (05-19-18 @ 05:25)  T(F): , Max: 98.2 (05-19-18 @ 05:25)  HR: 81 (05-19-18 @ 07:21)  BP: 102/68 (05-19-18 @ 07:21)  RR: 18 (05-19-18 @ 07:21)  SpO2: 100% (05-19-18 @ 07:21)      PHYSICAL EXAM:  Constitutional: NAD; Alert  HEENT:  NCAT; DMM  Neck: (+) JVD; supple  Respiratory: CTA-b/l  Cardiac: RRR s1s2  Gastrointestinal: BS+, soft, NT/ND  Urologic: No barfield  Extremities: 1+ b/l LE edema  Back: No CVAT b/l  Skin: upper chest ICD incision clean-appearing/nontender      LABS:                        11.0   5.62  )-----------( 241      ( 19 May 2018 05:30 )             38.1     Na(138)/K(5.3)/Cl(92)/HCO3(35)/BUN(127)/Cr(1.32)Glu(330)/Ca(8.6)/Mg(2.5)/PO4(4.3)    05-19 @ 05:30  Na(140)/K(4.8)/Cl(94)/HCO3(34)/BUN(142)/Cr(1.46)Glu(128)/Ca(8.9)/Mg(2.2)/PO4(--)    05-18 @ 05:43      IMPRESSION: 52F w/ DM2, sarcoidosis, HFrEF, LV thrombus on Xarelto, and CKD3, 5/3/18 a/w acute on chronic HFrEF/pericardial effusion requiring drainage    (1)Renal - CKD3 - ~2gm proteinuria it appears; presumed diabetic nephropathy - superimposed YANA, with significantly more rapid rise in BUN than creatinine - prerenal from diuresis in setting of pulmonary hypertension, as well as steroid associated rise in BUN. Now improving, off diuretics/off primacor, as well as s/p reduction in steroid dosage. At this point, GFR  has improved enough that we could proceed with Cardiac MRI with Gadolinium.    (2)Acid-base - likely primary metabolic alkalosis from contraction    (3)CV - resolving/resolved acute on chronic HFrEF; now off primacor/off diuretics. S/P ICD placement 5/18.    (4)Endocrine - labile blood sugars - Endocrine team on board    (5)Hyperkalemia - mild - at least in part from hypoinsulinemia causing cellular shifts      RECOMMEND:  (1)Lasix 40mg po qd  (2)No objection to proceeding with MRI with Gadolinium at this point                Jair Lion MD  [facility]  [phone] Complains of nasal congestion. Complains of malaise. Wants to eat potato chips.      VITAL:  T(C): , Max: 36.8 (05-19-18 @ 05:25)  T(F): , Max: 98.2 (05-19-18 @ 05:25)  HR: 81 (05-19-18 @ 07:21)  BP: 102/68 (05-19-18 @ 07:21)  RR: 18 (05-19-18 @ 07:21)  SpO2: 100% (05-19-18 @ 07:21)      PHYSICAL EXAM:  Constitutional: NAD; Alert; eccentric  HEENT:  NCAT; DMM  Neck: (+) JVD; supple  Respiratory: CTA-b/l  Cardiac: RRR s1s2  Gastrointestinal: BS+, soft, NT/ND  Urologic: No barfield  Extremities: 1+ b/l LE edema  Back: No CVAT b/l  Skin: upper chest ICD incision clean-appearing/nontender      LABS:                        11.0   5.62  )-----------( 241      ( 19 May 2018 05:30 )             38.1     Na(138)/K(5.3)/Cl(92)/HCO3(35)/BUN(127)/Cr(1.32)Glu(330)/Ca(8.6)/Mg(2.5)/PO4(4.3)    05-19 @ 05:30  Na(140)/K(4.8)/Cl(94)/HCO3(34)/BUN(142)/Cr(1.46)Glu(128)/Ca(8.9)/Mg(2.2)/PO4(--)    05-18 @ 05:43      IMPRESSION: 52F w/ DM2, sarcoidosis, HFrEF, LV thrombus on Xarelto, and CKD3, 5/3/18 a/w acute on chronic HFrEF/pericardial effusion requiring drainage    (1)Renal - CKD3 - ~2gm proteinuria it appears; presumed diabetic nephropathy - superimposed YANA, with significantly more rapid rise in BUN than creatinine - prerenal from diuresis in setting of pulmonary hypertension, as well as steroid associated rise in BUN. Now improving, off diuretics/off primacor, as well as s/p reduction in steroid dosage. At this point, GFR  has improved enough that we could proceed with Cardiac MRI with Gadolinium.    (2)Acid-base - likely primary metabolic alkalosis from contraction    (3)CV - resolving/resolved acute on chronic HFrEF; now off primacor/off diuretics. S/P ICD placement 5/18.    (4)Endocrine - labile blood sugars - Endocrine team on board    (5)Hyperkalemia - mild - at least in part from hypoinsulinemia causing cellular shifts      RECOMMEND:  (1)Lasix 40mg po qd  (2)No objection to proceeding with MRI with Gadolinium at this point                Jair Lion MD  [facility]  [phone] Complains of nasal congestion. Complains of malaise. Wants to eat potato chips.      VITAL:  T(C): , Max: 36.8 (05-19-18 @ 05:25)  T(F): , Max: 98.2 (05-19-18 @ 05:25)  HR: 81 (05-19-18 @ 07:21)  BP: 102/68 (05-19-18 @ 07:21)  RR: 18 (05-19-18 @ 07:21)  SpO2: 100% (05-19-18 @ 07:21)      PHYSICAL EXAM:  Constitutional: NAD; Alert; eccentric  HEENT: NCAT; DMM  Neck: no JVD; supple  Respiratory: CTA-b/l  Cardiac: RRR s1s2  Gastrointestinal: BS+, soft, NT/ND  Urologic: No barfield  Extremities: 1+ b/l LE edema  Back: No CVAT b/l  Skin: upper chest ICD incision dressed      LABS:                        11.0   5.62  )-----------( 241      ( 19 May 2018 05:30 )             38.1     Na(138)/K(5.3)/Cl(92)/HCO3(35)/BUN(127)/Cr(1.32)Glu(330)/Ca(8.6)/Mg(2.5)/PO4(4.3)    05-19 @ 05:30  Na(140)/K(4.8)/Cl(94)/HCO3(34)/BUN(142)/Cr(1.46)Glu(128)/Ca(8.9)/Mg(2.2)/PO4(--)    05-18 @ 05:43      IMPRESSION: 52F w/ DM2, sarcoidosis, HFrEF, LV thrombus on Xarelto, and CKD3, 5/3/18 a/w acute on chronic HFrEF/pericardial effusion requiring drainage    (1)Renal - CKD3 - ~2gm proteinuria it appears; presumed diabetic nephropathy - superimposed AYNA, with significantly more rapid rise in BUN than creatinine - prerenal from diuresis in setting of pulmonary hypertension, as well as steroid associated rise in BUN. Now improving, off diuretics/off primacor, as well as s/p reduction in steroid dosage. At this point, GFR  has improved enough that we could proceed with Cardiac MRI with Gadolinium.    (2)Acid-base - likely primary metabolic alkalosis from contraction    (3)CV - resolving/resolved acute on chronic HFrEF; now off primacor/off diuretics. S/P ICD placement 5/18.    (4)Endocrine - labile blood sugars - Endocrine team on board    (5)Hyperkalemia - mild - at least in part from hypoinsulinemia causing cellular shifts      RECOMMEND:  (1)Lasix 40mg po qd  (2)No objection to proceeding with MRI with Gadolinium at this point                Jair Lion MD  [facility]  [phone]

## 2018-05-19 NOTE — PROGRESS NOTE ADULT - PROBLEM SELECTOR PLAN 4
blood glucose monitoring: Initially she was started on low dose steroids secondary to her DM  5/19: blood glucose is under control

## 2018-05-19 NOTE — PROGRESS NOTE ADULT - PROBLEM SELECTOR PLAN 2
Repeat TSH elevated, FT4 within range   Starting Synthroid 25 mcg  Recheck both TSH and FT4 in 6 weeks Repeat TSH elevated, FT4 within range   Starting Synthroid 25 mcg  Recheck both TSH and FT4 in 4 weeks

## 2018-05-20 VITALS
DIASTOLIC BLOOD PRESSURE: 67 MMHG | OXYGEN SATURATION: 97 % | HEART RATE: 81 BPM | SYSTOLIC BLOOD PRESSURE: 115 MMHG | RESPIRATION RATE: 18 BRPM | TEMPERATURE: 98 F

## 2018-05-20 LAB
GLUCOSE BLDC GLUCOMTR-MCNC: 190 MG/DL — HIGH (ref 70–99)
GLUCOSE BLDC GLUCOMTR-MCNC: 329 MG/DL — HIGH (ref 70–99)

## 2018-05-20 PROCEDURE — 99232 SBSQ HOSP IP/OBS MODERATE 35: CPT

## 2018-05-20 RX ADMIN — Medication 4: at 08:05

## 2018-05-20 RX ADMIN — Medication 6 UNIT(S): at 08:05

## 2018-05-20 RX ADMIN — SODIUM CHLORIDE 3 MILLILITER(S): 9 INJECTION INTRAMUSCULAR; INTRAVENOUS; SUBCUTANEOUS at 06:20

## 2018-05-20 RX ADMIN — Medication 81 MILLIGRAM(S): at 12:28

## 2018-05-20 RX ADMIN — Medication 6 UNIT(S): at 12:27

## 2018-05-20 RX ADMIN — Medication 1 PUFF(S): at 08:09

## 2018-05-20 RX ADMIN — Medication 25 MICROGRAM(S): at 06:20

## 2018-05-20 RX ADMIN — Medication 40 MILLIGRAM(S): at 06:20

## 2018-05-20 RX ADMIN — Medication 20 MILLIGRAM(S): at 06:20

## 2018-05-20 RX ADMIN — Medication 1: at 12:27

## 2018-05-20 RX ADMIN — Medication 325 MILLIGRAM(S): at 06:20

## 2018-05-20 RX ADMIN — SODIUM CHLORIDE 3 MILLILITER(S): 9 INJECTION INTRAMUSCULAR; INTRAVENOUS; SUBCUTANEOUS at 12:28

## 2018-05-20 RX ADMIN — NYSTATIN CREAM 1 APPLICATION(S): 100000 CREAM TOPICAL at 06:20

## 2018-05-20 RX ADMIN — CARVEDILOL PHOSPHATE 6.25 MILLIGRAM(S): 80 CAPSULE, EXTENDED RELEASE ORAL at 06:20

## 2018-05-20 NOTE — PROGRESS NOTE ADULT - PROBLEM SELECTOR PLAN 1
called pulmonary division: [facility]: awaiting message: Pt says she has been using prednisone for last one year at least and it has helped her:  5/15; first presented to dr mc with chf: She was dx with sarcoid with hilar and axillary LN. She had ax LN biopsy done which showed sarcoid: She was put on prednisone on 5/5/ 2015: 20 mg a day , she got much better , especially the cough improved: He was concerned about her cardiac status: The she stopped gong there again there for a year: In 2016 she was seen by cardiologist , she was found to have severe two vessel disease LAD and RCA, on cardiac cath IN may 16 2016: She also had stent placed at that time: That was the last time she was seen there: She was initially placed on steroids at 20 mg and after may 2016 , she stopped following there. So at this time, her dose of steroids is not very clear: and only pulmonologist name she gives is Dr Mc , who has not seen er for last 2 years:  5/16: pt agrees she could not follow with pulmonary in last two years as she was sick: Now she says she was taking steroids 20 mg a day, and the current dose is decreased to 20 mg a day again: Await ct chest today  5/17: ct chest reviewed: c/w sarcoidosis: no ptx or hydropneumothorax: Cotn 20 mg of prednisone  5/18: pt is doing ok : no SOB at rest: for now cont 20 mg of prednisone  5/19: pt has been doing ok : on prednisone 20 mg a day, would cont the same at this time:  5/20: cont current steroids

## 2018-05-20 NOTE — PROGRESS NOTE ADULT - PROBLEM SELECTOR PLAN 3
cont current care: ? MRI heart ? sarcoid heart  5/15: She did have stent placed in 2016 at [facility]  5/16: cont current treatment per cardiology  5/18: cont current treatment per cardiology   5/17: cont current treatment  5/19: s/p AICD doing ok  5/20: s/p AICD

## 2018-05-20 NOTE — PROGRESS NOTE ADULT - PROBLEM SELECTOR PLAN 1
-patient has had frequent highs and lows in the setting of steroids, irregular eating patterns and insulin dosage adjustments. As stated above, she will counteract normoglycemia by drinking a glucerna. As such, unable to make significant adjustments to her insulin.  For now, continue with Lantus 22 units (when she got 25 units she was tightly controlled in the AM and then just ate throughout the day).  Also continue the humalog 6 units TID for now. As steroids get tapered, her requirements will decrease.  -she should be discharged on basal/bolus insulin doses to be determined.

## 2018-05-20 NOTE — PROGRESS NOTE ADULT - PROBLEM SELECTOR PROBLEM 6
Per NOC RN, administer 200cc H20 flush at 1000, per MD - pt's Na WNL - don't give H20 flush   Hyperlipidemia

## 2018-05-20 NOTE — PROGRESS NOTE ADULT - PROVIDER SPECIALTY LIST ADULT
CCU
CT Surgery
Cardiology
Electrophysiology
Endocrinology
Infectious Disease
Intervent Radiology
Nephrology
Pulmonology
CCU
Electrophysiology
Infectious Disease
Pulmonology
Endocrinology
Endocrinology
Cardiology

## 2018-05-20 NOTE — PROGRESS NOTE ADULT - SUBJECTIVE AND OBJECTIVE BOX
Endocrine follow up for diabetes control.  The patient has very labile fingersticks.  The reasons she is high this morning is because after having dinner, she had an additional meal at bedtime which consisted of fish with mashed potatoes.  In addition, when her sugars get to "normal ranges" she does not feel well even though there is no value less than 70.  As such, to prevent any values in the 100's she will drink a glucerna. She states, " I don't like being in the normal ranges. I need time to get used to it cause my numbers have always been high."     MEDICATIONS  (STANDING):  insulin glargine Injectable (LANTUS) 22 Unit(s) SubCutaneous at bedtime  insulin lispro (HumaLOG) corrective regimen sliding scale   SubCutaneous three times a day before meals  insulin lispro (HumaLOG) corrective regimen sliding scale   SubCutaneous at bedtime  insulin lispro Injectable (HumaLOG) 6 Unit(s) SubCutaneous three times a day before meals  levothyroxine 25 MICROGram(s) Oral daily  predniSONE   Tablet 20 milliGRAM(s) Oral daily      MEDICATIONS  (PRN):  dextrose Gel 1 Dose(s) Oral once PRN Blood Glucose LESS THAN 70 milliGRAM(s)/deciliter  glucagon  Injectable 1 milliGRAM(s) IntraMuscular once PRN Glucose LESS THAN 70 milligrams/deciliter  glucagon  Injectable 1 milliGRAM(s) IntraMuscular once PRN Glucose LESS THAN 70 milligrams/deciliter    Allergies  No Known Allergies    Review of Systems:  Constitutional: No fever  Cardiovascular: No chest pain, palpitations  Respiratory: No SOB, no cough  GI: No nausea, vomiting, abdominal pain  ALL OTHER SYSTEMS REVIEWED AND NEGATIVE    PHYSICAL EXAM:  VITALS: T(C): 36.3 (05-20-18 @ 09:00)  T(F): 97.4 (05-20-18 @ 09:00), Max: 97.9 (05-19-18 @ 12:18)  HR: 81 (05-20-18 @ 09:00) (79 - 94)  BP: 114/60 (05-20-18 @ 09:00) (102/67 - 128/60)  RR:  (18 - 20)  SpO2:  (97% - 100%)  Wt(kg): --  GENERAL: NAD, well-groomed, well-developed  RESPIRATORY: respirations even and unlabored, no use of accessory muscles  CARDIOVASCULAR: no peripheral edema  SKIN: Dry, intact, No rashes or lesions  MUSCULOSKELETAL: Full range of motion, normal strength    POCT Blood Glucose.: 329 mg/dL (05-20-18 @ 07:44)  POCT Blood Glucose.: 281 mg/dL (05-19-18 @ 22:44)  POCT Blood Glucose.: 187 mg/dL (05-19-18 @ 16:58)  POCT Blood Glucose.: 120 mg/dL (05-19-18 @ 12:12)  POCT Blood Glucose.: 105 mg/dL (05-19-18 @ 11:13)  POCT Blood Glucose.: 306 mg/dL (05-19-18 @ 07:59)  POCT Blood Glucose.: 217 mg/dL (05-18-18 @ 22:07)  POCT Blood Glucose.: 112 mg/dL (05-18-18 @ 19:40)  POCT Blood Glucose.: 141 mg/dL (05-18-18 @ 18:49)  POCT Blood Glucose.: 93 mg/dL (05-18-18 @ 17:27)  POCT Blood Glucose.: 101 mg/dL (05-18-18 @ 16:34)  POCT Blood Glucose.: 183 mg/dL (05-18-18 @ 14:46)  POCT Blood Glucose.: 56 mg/dL (05-18-18 @ 14:23)  POCT Blood Glucose.: 71 mg/dL (05-18-18 @ 12:13)  POCT Blood Glucose.: 74 mg/dL (05-18-18 @ 11:43)  POCT Blood Glucose.: 77 mg/dL (05-18-18 @ 10:05)  POCT Blood Glucose.: 72 mg/dL (05-18-18 @ 09:16)  POCT Blood Glucose.: 152 mg/dL (05-18-18 @ 08:13)  POCT Blood Glucose.: 56 mg/dL (05-18-18 @ 07:53)  POCT Blood Glucose.: 295 mg/dL (05-17-18 @ 21:47)  POCT Blood Glucose.: 103 mg/dL (05-17-18 @ 16:45)  POCT Blood Glucose.: 230 mg/dL (05-17-18 @ 11:53)      05-19    138  |  92<L>  |  127<H>  ----------------------------<  330<H>  5.3   |  35<H>  |  1.32<H>    EGFR if : 54  EGFR if non : 46    Ca    8.6      05-19  Mg     2.5     05-19  Phos  4.3     05-19    Thyroid Function Tests:  05-18 @ 05:43 TSH 9.57 FreeT4 1.31 T3 -- Anti TPO 27.9 Anti Thyroglobulin Ab -- TSI --  05-03 @ 16:59 TSH 6.40 FreeT4 -- T3 -- Anti TPO -- Anti Thyroglobulin Ab -- TSI --      Hemoglobin A1C, Whole Blood: 14.3 % <H> [4.0 - 5.6] (05-04-18 @ 06:45)  Hemoglobin A1C, Whole Blood: 14.3 % <H> [4.0 - 5.6] (05-03-18 @ 16:59)

## 2018-05-20 NOTE — PROGRESS NOTE ADULT - PROBLEM SELECTOR PLAN 4
blood glucose monitoring: Initially she was started on low dose steroids secondary to her DM  5/19: blood glucose is under control  5/20: defer to endo

## 2018-05-20 NOTE — PROGRESS NOTE ADULT - PROBLEM SELECTOR PROBLEM 1
Uncontrolled type 2 diabetes mellitus with hyperglycemia, without long-term current use of insulin
Uncontrolled type 2 diabetes mellitus with hyperglycemia, without long-term current use of insulin
Acute on chronic heart failure, unspecified heart failure type
Pericardial effusion
Sarcoidosis
Uncontrolled type 2 diabetes mellitus with hyperglycemia, without long-term current use of insulin
Acute on chronic heart failure, unspecified heart failure type
Pericardial effusion
Acute on chronic heart failure, unspecified heart failure type
Pericardial effusion
Sarcoidosis
Acute on chronic heart failure, unspecified heart failure type

## 2018-05-20 NOTE — PROGRESS NOTE ADULT - ATTENDING COMMENTS
to obtain outside pulmonary records and more history about her sarcoidosis  tried to call Dr Youngblood: the number is not in service: will try again in AM  pt says she has sarcoidosis for atleast a year: and is being followed at [facility] and has been on steroids for last one year: Would need to obtain records from [facility]  5/14: stable for now: rpt ct chest
Sharon Yusuf MD  pager   Diabetes team: [phone] business hours  [phone] night/weekend
Sharon Yusuf MD  pager   Diabetes team: [phone] business hours  [phone] night/weekend
Sharon Yusuf MD  pager   Diabetes team: [phone] business hours  [phone] night/weekend
Sharon Yusuf MD  pager   Diabetes team: [phone] business hours  [phone] night/weekend
to obtain outside pulmonary records and more history about her sarcoidosis  tried to call Dr Youngblood: the number is not in service: will try again in AM  pt says she has sarcoidosis for atleast a year: and is being followed at [facility] and has been on steroids for last one year: Would need to obtain records from [facility]  5/14: stable for now: rpt ct chest  5/15: DW Dr quintana today : as above  5/16: now on 20 mg of prednisone:  5/17: pt has been doing ok: tolerating 20 mg of prednisone: ptx resolved  5/18: overall stable from pulmonary point of view: For now cont 20 mg of prednisone:
to obtain outside pulmonary records and more history about her sarcoidosis  tried to call Dr Youngblood: the number is not in service: will try again in AM  pt says she has sarcoidosis for atleast a year: and is being followed at [facility] and has been on steroids for last one year: Would need to obtain records from [facility]  5/14: stable for now: rpt ct chest  5/15: DW Dr quintana today : as above  5/16: now on 20 mg of prednisone:
to obtain outside pulmonary records and more history about her sarcoidosis  tried to call Dr Youngblood: the number is not in service: will try again in AM  pt says she has sarcoidosis for atleast a year: and is being followed at [facility] and has been on steroids for last one year: Would need to obtain records from [facility]  5/14: stable for now: rpt ct chest  5/15: DW Dr quintana today : as above  5/16: now on 20 mg of prednisone:  5/17: pt has been doing ok: tolerating 20 mg of prednisone: ptx resolved  5/18: overall stable from pulmonary point of view: For now cont 20 mg of prednisone:  5/19: pt is doing ok: ptx resolved: S/P AICD yesterday:
to obtain outside pulmonary records and more history about her sarcoidosis  tried to call Dr Youngblood: the number is not in service: will try again in AM  pt says she has sarcoidosis for atleast a year: and is being followed at [facility] and has been on steroids for last one year: Would need to obtain records from [facility]  5/14: stable for now: rpt ct chest  5/15: DW Dr quintana today : as above  5/16: now on 20 mg of prednisone:  5/17: pt has been doing ok: tolerating 20 mg of prednisone: ptx resolved  5/18: overall stable from pulmonary point of view: For now cont 20 mg of prednisone:  5/19: pt is doing ok: ptx resolved: S/P AICD yesterday:
to obtain outside pulmonary records and more history about her sarcoidosis  tried to call Dr Youngblood: the number is not in service: will try again in AM  pt says she has sarcoidosis for atleast a year: and is being followed at [facility] and has been on steroids for last one year: Would need to obtain records from [facility]  5/14: stable for now: rpt ct chest  5/15: DW Dr quintana today : as above  5/16: now on 20 mg of prednisone:  5/17: pt has been doing ok: tolerating 20 mg of prednisone: ptx resolved
to obtain outside pulmonary records and more history about her sarcoidosis  tried to call Dr Youngblood: the number is not in service: will try again in AM  pt says she has sarcoidosis for atleast a year: and is being followed at [facility] and has been on steroids for last one year: Would need to obtain records from [facility]  5/14: stable for now: rpt ct chest  5/15: DW Dr quintana today : as above
stable for d/c today   restart a/c marley

## 2018-05-20 NOTE — PROGRESS NOTE ADULT - PROBLEM SELECTOR PLAN 2
rpt ct scan chest ?  5/15: rpt ct chest without contrast today  5/16: await ct chest  5/17: doing pretty good: resolved  5/18: ptx as wel las hydropneumothorax resolved:  5/*20: resolved

## 2018-05-20 NOTE — PROGRESS NOTE ADULT - SUBJECTIVE AND OBJECTIVE BOX
No pain, no shortness of breath      VITAL:  T(C): , Max: 36.6 (05-19-18 @ 12:18)  T(F): , Max: 97.9 (05-19-18 @ 12:18)  HR: 81 (05-20-18 @ 06:17)  BP: 118/72 (05-20-18 @ 06:17)  RR: 18 (05-20-18 @ 06:17)  SpO2: 100% (05-20-18 @ 06:17)      PHYSICAL EXAM:  Constitutional: NAD; Alert; eccentric  HEENT: NCAT; DMM  Neck: no JVD; supple  Respiratory: CTA-b/l  Cardiac: RRR s1s2  Gastrointestinal: BS+, soft, NT/ND  Urologic: No barfield  Extremities: 1+ b/l LE edema  Back: No CVAT b/l  Skin: upper chest ICD incision dressed      LABS:                        11.0   5.62  )-----------( 241      ( 19 May 2018 05:30 )             38.1     Na(138)/K(5.3)/Cl(92)/HCO3(35)/BUN(127)/Cr(1.32)Glu(330)/Ca(8.6)/Mg(2.5)/PO4(4.3)    05-19 @ 05:30  Na(140)/K(4.8)/Cl(94)/HCO3(34)/BUN(142)/Cr(1.46)Glu(128)/Ca(8.9)/Mg(2.2)/PO4(--)    05-18 @ 05:43      IMPRESSION: 52F w/ DM2, sarcoidosis, HFrEF, LV thrombus on Xarelto, and CKD3, 5/3/18 a/w acute on chronic HFrEF/pericardial effusion requiring drainage    (1)Renal - CKD3 - diabetic nephropathy; resolving superimposed prerenally mediated YANA. Particularly high BUN of late due to steroids.    (2)Acid-base - likely primary metabolic alkalosis from contraction    (3)CV - resolved acute on chronic HFrEF; s/p ICD placement 5/18    (4)Hyperkalemia - mild - at least in part from hypoinsulinemia causing cellular shifts      RECOMMEND:  (1)Lasix 40mg po qd as ordered  (2)No ACEI/ARB  (3)Could f/u at my office 2-4 weeks after discharge            Jair Lion MD  [facility]  [phone]

## 2018-05-20 NOTE — PROGRESS NOTE ADULT - SUBJECTIVE AND OBJECTIVE BOX
Patient is a 52y old  Female who presents with a chief complaint of LE edema/pain and SOB (09 May 2018 11:57)      Any change in ROS: Pt is doing ok : SOB - at rest: does get it when she walks     MEDICATIONS  (STANDING):  aspirin enteric coated 81 milliGRAM(s) Oral daily  atorvastatin 40 milliGRAM(s) Oral at bedtime  carvedilol 6.25 milliGRAM(s) Oral every 12 hours  chlorhexidine 4% Liquid 1 Application(s) Topical <User Schedule>  dextrose 5%. 1000 milliLiter(s) (50 mL/Hr) IV Continuous <Continuous>  dextrose 50% Injectable 12.5 Gram(s) IV Push once  dextrose 50% Injectable 25 Gram(s) IV Push once  dextrose 50% Injectable 25 Gram(s) IV Push once  ferrous    sulfate 325 milliGRAM(s) Oral two times a day  fluticasone propionate   220 MICROgram(s) HFA Inhaler 1 Puff(s) Inhalation two times a day  furosemide    Tablet 40 milliGRAM(s) Oral daily  insulin glargine Injectable (LANTUS) 22 Unit(s) SubCutaneous at bedtime  insulin lispro (HumaLOG) corrective regimen sliding scale   SubCutaneous three times a day before meals  insulin lispro (HumaLOG) corrective regimen sliding scale   SubCutaneous at bedtime  insulin lispro Injectable (HumaLOG) 6 Unit(s) SubCutaneous three times a day before meals  levothyroxine 25 MICROGram(s) Oral daily  nystatin Powder 1 Application(s) Topical two times a day  polyethylene glycol 3350 17 Gram(s) Oral daily  predniSONE   Tablet 20 milliGRAM(s) Oral daily  sodium chloride 0.9% lock flush 3 milliLiter(s) IV Push every 8 hours    MEDICATIONS  (PRN):  acetaminophen   Tablet. 650 milliGRAM(s) Oral every 6 hours PRN temp> 101  acetaminophen   Tablet. 650 milliGRAM(s) Oral every 6 hours PRN mild, moderate pain  benzocaine 15 mG/menthol 3.6 mG Lozenge 1 Lozenge Oral two times a day PRN Sore Throat  dextrose Gel 1 Dose(s) Oral once PRN Blood Glucose LESS THAN 70 milliGRAM(s)/deciliter  glucagon  Injectable 1 milliGRAM(s) IntraMuscular once PRN Glucose LESS THAN 70 milligrams/deciliter  glucagon  Injectable 1 milliGRAM(s) IntraMuscular once PRN Glucose LESS THAN 70 milligrams/deciliter  levalbuterol Inhalation 0.63 milliGRAM(s) Inhalation every 6 hours PRN bronchospasm    Vital Signs Last 24 Hrs  T(C): 36.4 (20 May 2018 12:32), Max: 36.6 (20 May 2018 00:15)  T(F): 97.6 (20 May 2018 12:32), Max: 97.8 (20 May 2018 00:15)  HR: 81 (20 May 2018 12:32) (79 - 94)  BP: 115/67 (20 May 2018 12:32) (110/61 - 128/60)  BP(mean): --  RR: 18 (20 May 2018 12:32) (18 - 20)  SpO2: 97% (20 May 2018 12:32) (97% - 100%)    I&O's Summary    19 May 2018 07:01  -  20 May 2018 07:00  --------------------------------------------------------  IN: 150 mL / OUT: 1304 mL / NET: -1154 mL          Physical Exam:   GENERAL: NAD, well-groomed, well-developed  HEENT: PAVEL/   Atraumatic, Normocephalic  ENMT: No tonsillar erythema, exudates, or enlargement; Moist mucous membranes, Good dentition, No lesions  NECK: Supple, No JVD, Normal thyroid  CHEST/LUNG: bilateral fine crackles   CVS: Regular rate and rhythm; No murmurs, rubs, or gallops  GI: : Soft, Nontender, Nondistended; Bowel sounds present  NERVOUS SYSTEM:  Alert & Oriented X3,  EXTREMITIES:  mild inreaed  edema  LYMPH: No lymphadenopathy noted  SKIN: No rashes or lesions  ENDOCRINOLOGY: No Thyromegaly  PSYCH: Appropriate    Labs:                              11.0   5.62  )-----------( 241      ( 19 May 2018 05:30 )             38.1                         11.2   8.90  )-----------( 282      ( 18 May 2018 05:43 )             39.3     05-19    138  |  92<L>  |  127<H>  ----------------------------<  330<H>  5.3   |  35<H>  |  1.32<H>  05-18    140  |  94<L>  |  142<H>  ----------------------------<  128<H>  4.8   |  34<H>  |  1.46<H>    Ca    8.6      19 May 2018 05:30  Phos  4.3     05-19  Mg     2.5     05-19      CAPILLARY BLOOD GLUCOSE      POCT Blood Glucose.: 190 mg/dL (20 May 2018 11:31)  POCT Blood Glucose.: 329 mg/dL (20 May 2018 07:44)  POCT Blood Glucose.: 281 mg/dL (19 May 2018 22:44)  POCT Blood Glucose.: 187 mg/dL (19 May 2018 16:58)            Cultures:               < from: Xray Chest 1 View- PORTABLE-Routine (05.19.18 @ 09:29) >    PROCEDURE DATE:  May 19 2018         INTERPRETATION:  TIME OF EXAM: May 18, 2018 at 8:14 PM; May 19 at 8:30 AM    CLINICAL INFORMATION: Post AICD placement; CHF    TECHNIQUE:   Portable chest    INTERPRETATION:     May 18 at 8:14 PM:  Since the last exam, a left-sided AICD has been introduced. No   complications from this maneuver. Cardiomegaly with interstitial edema is   present. No pneumothorax.    May 19 at 8:30 AM:  Left-sided AICD in position. Hazy left lung base likely small effusion   and atelectasis. No pneumothorax. Stable cardiomegaly.      COMPARISON:  CT chest May 16      IMPRESSION:  Status post left AICD placement without complications.                  CEDRICK HORTA M.D.; ATTENDING RADIOLOGIST  This document has been electronically signed. May 19 2018 11:12AM        < end of copied text >                Studies  Chest X-RAY  CT SCAN Chest   Venous Dopplers: LE:   CT Abdomen  Others

## 2018-05-20 NOTE — PROGRESS NOTE ADULT - PROBLEM SELECTOR PLAN 7
blood pressure controlled  5/19: blood pressure ok: for now cont current treatment  5/20: cont current treatment

## 2018-05-20 NOTE — PROGRESS NOTE ADULT - SUBJECTIVE AND OBJECTIVE BOX
CARDIOLOGY FOLLOW UP - Dr. Hastings (for Dr. Sam)     CC no chest pain or sob       PHYSICAL EXAM:  T(C): 36.3 (05-20-18 @ 09:00), Max: 36.6 (05-19-18 @ 12:18)  HR: 81 (05-20-18 @ 09:00) (79 - 94)  BP: 114/60 (05-20-18 @ 09:00) (102/67 - 128/60)  RR: 18 (05-20-18 @ 09:00) (18 - 20)  SpO2: 98% (05-20-18 @ 09:00) (97% - 100%)  Wt(kg): --  I&O's Summary    19 May 2018 07:01  -  20 May 2018 07:00  --------------------------------------------------------  IN: 150 mL / OUT: 1304 mL / NET: -1154 mL        Appearance: Normal	  Cardiovascular: Normal S1 S2,RRR, No JVD, No murmurs  Respiratory: Lungs clear to auscultation	  Gastrointestinal:  Soft, Non-tender, + BS	  Extremities: Normal range of motion, bl LE + 1-2 edema  left ACW dressing CDI no hematoma noted       MEDICATIONS  (STANDING):  aspirin enteric coated 81 milliGRAM(s) Oral daily  atorvastatin 40 milliGRAM(s) Oral at bedtime  carvedilol 6.25 milliGRAM(s) Oral every 12 hours  chlorhexidine 4% Liquid 1 Application(s) Topical <User Schedule>  dextrose 5%. 1000 milliLiter(s) (50 mL/Hr) IV Continuous <Continuous>  dextrose 50% Injectable 12.5 Gram(s) IV Push once  dextrose 50% Injectable 25 Gram(s) IV Push once  dextrose 50% Injectable 25 Gram(s) IV Push once  ferrous    sulfate 325 milliGRAM(s) Oral two times a day  fluticasone propionate   220 MICROgram(s) HFA Inhaler 1 Puff(s) Inhalation two times a day  furosemide    Tablet 40 milliGRAM(s) Oral daily  insulin glargine Injectable (LANTUS) 22 Unit(s) SubCutaneous at bedtime  insulin lispro (HumaLOG) corrective regimen sliding scale   SubCutaneous three times a day before meals  insulin lispro (HumaLOG) corrective regimen sliding scale   SubCutaneous at bedtime  insulin lispro Injectable (HumaLOG) 6 Unit(s) SubCutaneous three times a day before meals  levothyroxine 25 MICROGram(s) Oral daily  nystatin Powder 1 Application(s) Topical two times a day  polyethylene glycol 3350 17 Gram(s) Oral daily  predniSONE   Tablet 20 milliGRAM(s) Oral daily  sodium chloride 0.9% lock flush 3 milliLiter(s) IV Push every 8 hours      TELEMETRY: NSR 	    ECG:  	  RADIOLOGY:   DIAGNOSTIC TESTING:  [ ] Echocardiogram:  [ ]  Catheterization:  [ ] Stress Test:    OTHER: 	    LABS:	 	                                11.0   5.62  )-----------( 241      ( 19 May 2018 05:30 )             38.1     05-19    138  |  92<L>  |  127<H>  ----------------------------<  330<H>  5.3   |  35<H>  |  1.32<H>    Ca    8.6      19 May 2018 05:30  Phos  4.3     05-19  Mg     2.5     05-19

## 2018-06-04 ENCOUNTER — APPOINTMENT (OUTPATIENT)
Dept: ELECTROPHYSIOLOGY | Facility: CLINIC | Age: 53
End: 2018-06-04
Payer: MEDICARE

## 2018-06-04 DIAGNOSIS — I48.91 UNSPECIFIED ATRIAL FIBRILLATION: ICD-10-CM

## 2018-06-04 DIAGNOSIS — J44.9 CHRONIC OBSTRUCTIVE PULMONARY DISEASE, UNSPECIFIED: ICD-10-CM

## 2018-06-04 DIAGNOSIS — D86.9 SARCOIDOSIS, UNSPECIFIED: ICD-10-CM

## 2018-06-04 DIAGNOSIS — I31.3 PERICARDIAL EFFUSION (NONINFLAMMATORY): ICD-10-CM

## 2018-06-04 DIAGNOSIS — E11.9 TYPE 2 DIABETES MELLITUS W/OUT COMPLICATIONS: ICD-10-CM

## 2018-06-04 DIAGNOSIS — N17.9 ACUTE KIDNEY FAILURE, UNSPECIFIED: ICD-10-CM

## 2018-06-04 DIAGNOSIS — I50.9 HEART FAILURE, UNSPECIFIED: ICD-10-CM

## 2018-06-04 DIAGNOSIS — Z87.891 PERSONAL HISTORY OF NICOTINE DEPENDENCE: ICD-10-CM

## 2018-06-04 DIAGNOSIS — J45.909 UNSPECIFIED ASTHMA, UNCOMPLICATED: ICD-10-CM

## 2018-06-04 LAB — FUNGUS SPEC QL CULT: SIGNIFICANT CHANGE UP

## 2018-06-04 PROCEDURE — 99024 POSTOP FOLLOW-UP VISIT: CPT

## 2018-06-05 PROBLEM — I31.3 PERICARDIAL EFFUSION: Status: RESOLVED | Noted: 2018-06-05 | Resolved: 2018-06-05

## 2018-06-05 PROBLEM — E11.9 DIABETES: Status: ACTIVE | Noted: 2018-06-05

## 2018-06-05 PROBLEM — D86.9 SARCOIDOSIS: Status: ACTIVE | Noted: 2018-06-05

## 2018-06-05 PROBLEM — N17.9 AKI (ACUTE KIDNEY INJURY): Status: ACTIVE | Noted: 2018-06-05

## 2018-06-05 PROBLEM — I48.91 A-FIB: Status: ACTIVE | Noted: 2018-06-05

## 2018-06-05 PROBLEM — I50.9 CHF, CHRONIC: Status: ACTIVE | Noted: 2018-06-04

## 2018-06-05 RX ORDER — INSULIN GLARGINE 100 [IU]/ML
100 INJECTION, SOLUTION SUBCUTANEOUS
Refills: 0 | Status: ACTIVE | COMMUNITY

## 2018-06-05 RX ORDER — FUROSEMIDE 40 MG/1
40 TABLET ORAL
Refills: 0 | Status: ACTIVE | COMMUNITY

## 2018-06-05 RX ORDER — INSULIN LISPRO 100 [IU]/ML
INJECTION, SOLUTION INTRAVENOUS; SUBCUTANEOUS
Refills: 0 | Status: ACTIVE | COMMUNITY

## 2018-06-05 RX ORDER — LEVOTHYROXINE SODIUM 0.03 MG/1
25 TABLET ORAL
Refills: 0 | Status: ACTIVE | COMMUNITY

## 2018-06-05 RX ORDER — FLUTICASONE PROPIONATE 220 MCG
220 AEROSOL WITH ADAPTER (GRAM) INHALATION TWICE DAILY
Refills: 0 | Status: ACTIVE | COMMUNITY

## 2018-06-05 RX ORDER — ROSUVASTATIN CALCIUM 10 MG/1
10 TABLET, FILM COATED ORAL
Refills: 0 | Status: ACTIVE | COMMUNITY

## 2018-06-05 RX ORDER — PREDNISONE 20 MG/1
20 TABLET ORAL TWICE DAILY
Refills: 0 | Status: ACTIVE | COMMUNITY

## 2018-06-05 RX ORDER — RIVAROXABAN 20 MG/1
20 TABLET, FILM COATED ORAL
Refills: 0 | Status: ACTIVE | COMMUNITY

## 2018-06-05 RX ORDER — FERROUS SULFATE 325(65) MG
325 TABLET ORAL TWICE DAILY
Refills: 0 | Status: ACTIVE | COMMUNITY

## 2018-06-05 RX ORDER — CARVEDILOL 6.25 MG/1
6.25 TABLET, FILM COATED ORAL TWICE DAILY
Refills: 0 | Status: ACTIVE | COMMUNITY

## 2019-04-16 ENCOUNTER — INPATIENT (INPATIENT)
Facility: HOSPITAL | Age: 54
LOS: 15 days | Discharge: HOME | DRG: 315 | End: 2019-05-02
Attending: INTERNAL MEDICINE | Admitting: INTERNAL MEDICINE
Payer: COMMERCIAL

## 2019-04-16 VITALS
SYSTOLIC BLOOD PRESSURE: 118 MMHG | DIASTOLIC BLOOD PRESSURE: 88 MMHG | TEMPERATURE: 98 F | HEIGHT: 67 IN | WEIGHT: 149.91 LBS | HEART RATE: 106 BPM | OXYGEN SATURATION: 97 % | RESPIRATION RATE: 18 BRPM

## 2019-04-16 DIAGNOSIS — D86.9 SARCOIDOSIS, UNSPECIFIED: ICD-10-CM

## 2019-04-16 DIAGNOSIS — Z95.810 PRESENCE OF AUTOMATIC (IMPLANTABLE) CARDIAC DEFIBRILLATOR: Chronic | ICD-10-CM

## 2019-04-16 DIAGNOSIS — E11.9 TYPE 2 DIABETES MELLITUS WITHOUT COMPLICATIONS: ICD-10-CM

## 2019-04-16 DIAGNOSIS — I25.10 ATHEROSCLEROTIC HEART DISEASE OF NATIVE CORONARY ARTERY WITHOUT ANGINA PECTORIS: ICD-10-CM

## 2019-04-16 DIAGNOSIS — N18.9 CHRONIC KIDNEY DISEASE, UNSPECIFIED: ICD-10-CM

## 2019-04-16 DIAGNOSIS — Z29.9 ENCOUNTER FOR PROPHYLACTIC MEASURES, UNSPECIFIED: ICD-10-CM

## 2019-04-16 DIAGNOSIS — S82.899A OTHER FRACTURE OF UNSPECIFIED LOWER LEG, INITIAL ENCOUNTER FOR CLOSED FRACTURE: Chronic | ICD-10-CM

## 2019-04-16 DIAGNOSIS — I31.3 PERICARDIAL EFFUSION (NONINFLAMMATORY): ICD-10-CM

## 2019-04-16 DIAGNOSIS — M21.959 UNSPECIFIED ACQUIRED DEFORMITY OF UNSPECIFIED THIGH: Chronic | ICD-10-CM

## 2019-04-16 DIAGNOSIS — I50.9 HEART FAILURE, UNSPECIFIED: ICD-10-CM

## 2019-04-16 DIAGNOSIS — I51.3 INTRACARDIAC THROMBOSIS, NOT ELSEWHERE CLASSIFIED: ICD-10-CM

## 2019-04-16 DIAGNOSIS — Z98.890 OTHER SPECIFIED POSTPROCEDURAL STATES: Chronic | ICD-10-CM

## 2019-04-16 DIAGNOSIS — R19.7 DIARRHEA, UNSPECIFIED: ICD-10-CM

## 2019-04-16 LAB
ALBUMIN SERPL ELPH-MCNC: 3 G/DL — LOW (ref 3.3–5)
ALP SERPL-CCNC: 148 U/L — HIGH (ref 40–120)
ALT FLD-CCNC: 13 U/L — SIGNIFICANT CHANGE UP (ref 10–45)
ANION GAP SERPL CALC-SCNC: 14 MMOL/L — SIGNIFICANT CHANGE UP (ref 5–17)
APTT BLD: 34.2 SEC — SIGNIFICANT CHANGE UP (ref 27.5–36.3)
AST SERPL-CCNC: 17 U/L — SIGNIFICANT CHANGE UP (ref 10–40)
BASOPHILS # BLD AUTO: 0 K/UL — SIGNIFICANT CHANGE UP (ref 0–0.2)
BASOPHILS NFR BLD AUTO: 0 % — SIGNIFICANT CHANGE UP (ref 0–2)
BILIRUB SERPL-MCNC: 0.5 MG/DL — SIGNIFICANT CHANGE UP (ref 0.2–1.2)
BUN SERPL-MCNC: 41 MG/DL — HIGH (ref 7–23)
CALCIUM SERPL-MCNC: 9.2 MG/DL — SIGNIFICANT CHANGE UP (ref 8.4–10.5)
CHLORIDE SERPL-SCNC: 96 MMOL/L — SIGNIFICANT CHANGE UP (ref 96–108)
CK MB CFR SERPL CALC: 3.5 NG/ML — SIGNIFICANT CHANGE UP (ref 0–6.7)
CK SERPL-CCNC: 77 U/L — SIGNIFICANT CHANGE UP (ref 30–200)
CO2 SERPL-SCNC: 28 MMOL/L — SIGNIFICANT CHANGE UP (ref 22–31)
CREAT SERPL-MCNC: 1.38 MG/DL — HIGH (ref 0.5–1.3)
EOSINOPHIL # BLD AUTO: 0.1 K/UL — SIGNIFICANT CHANGE UP (ref 0–0.5)
EOSINOPHIL NFR BLD AUTO: 1.8 % — SIGNIFICANT CHANGE UP (ref 0–6)
GLUCOSE SERPL-MCNC: 167 MG/DL — HIGH (ref 70–99)
HCT VFR BLD CALC: 40 % — SIGNIFICANT CHANGE UP (ref 39–50)
HGB BLD-MCNC: 12.2 G/DL — LOW (ref 13–17)
INR BLD: 1.24 RATIO — HIGH (ref 0.88–1.16)
LYMPHOCYTES # BLD AUTO: 0.9 K/UL — LOW (ref 1–3.3)
LYMPHOCYTES # BLD AUTO: 24.1 % — SIGNIFICANT CHANGE UP (ref 13–44)
MCHC RBC-ENTMCNC: 21.5 PG — LOW (ref 27–34)
MCHC RBC-ENTMCNC: 30.5 GM/DL — LOW (ref 32–36)
MCV RBC AUTO: 70.4 FL — LOW (ref 80–100)
MONOCYTES # BLD AUTO: 0.5 K/UL — SIGNIFICANT CHANGE UP (ref 0–0.9)
MONOCYTES NFR BLD AUTO: 13.7 % — SIGNIFICANT CHANGE UP (ref 2–14)
NEUTROPHILS # BLD AUTO: 2.2 K/UL — SIGNIFICANT CHANGE UP (ref 1.8–7.4)
NEUTROPHILS NFR BLD AUTO: 60.3 % — SIGNIFICANT CHANGE UP (ref 43–77)
PLATELET # BLD AUTO: 206 K/UL — SIGNIFICANT CHANGE UP (ref 150–400)
POTASSIUM SERPL-MCNC: 4.3 MMOL/L — SIGNIFICANT CHANGE UP (ref 3.5–5.3)
POTASSIUM SERPL-SCNC: 4.3 MMOL/L — SIGNIFICANT CHANGE UP (ref 3.5–5.3)
PROT SERPL-MCNC: 7 G/DL — SIGNIFICANT CHANGE UP (ref 6–8.3)
PROTHROM AB SERPL-ACNC: 14.4 SEC — HIGH (ref 10–12.9)
RBC # BLD: 5.68 M/UL — SIGNIFICANT CHANGE UP (ref 4.2–5.8)
RBC # FLD: 13.8 % — SIGNIFICANT CHANGE UP (ref 10.3–14.5)
SODIUM SERPL-SCNC: 138 MMOL/L — SIGNIFICANT CHANGE UP (ref 135–145)
TROPONIN T, HIGH SENSITIVITY RESULT: 53 NG/L — HIGH (ref 0–51)
TROPONIN T, HIGH SENSITIVITY RESULT: 65 NG/L — HIGH (ref 0–51)
WBC # BLD: 3.7 K/UL — LOW (ref 3.8–10.5)
WBC # FLD AUTO: 3.7 K/UL — LOW (ref 3.8–10.5)

## 2019-04-16 PROCEDURE — 71045 X-RAY EXAM CHEST 1 VIEW: CPT | Mod: 26

## 2019-04-16 PROCEDURE — 93010 ELECTROCARDIOGRAM REPORT: CPT

## 2019-04-16 PROCEDURE — 99285 EMERGENCY DEPT VISIT HI MDM: CPT | Mod: 25

## 2019-04-16 PROCEDURE — 99223 1ST HOSP IP/OBS HIGH 75: CPT

## 2019-04-16 RX ORDER — INSULIN GLARGINE 100 [IU]/ML
15 INJECTION, SOLUTION SUBCUTANEOUS AT BEDTIME
Qty: 0 | Refills: 0 | Status: DISCONTINUED | OUTPATIENT
Start: 2019-04-16 | End: 2019-04-18

## 2019-04-16 RX ORDER — DEXTROSE 50 % IN WATER 50 %
25 SYRINGE (ML) INTRAVENOUS ONCE
Qty: 0 | Refills: 0 | Status: DISCONTINUED | OUTPATIENT
Start: 2019-04-16 | End: 2019-05-02

## 2019-04-16 RX ORDER — CARVEDILOL PHOSPHATE 80 MG/1
6.25 CAPSULE, EXTENDED RELEASE ORAL EVERY 12 HOURS
Qty: 0 | Refills: 0 | Status: DISCONTINUED | OUTPATIENT
Start: 2019-04-16 | End: 2019-05-02

## 2019-04-16 RX ORDER — SODIUM CHLORIDE 9 MG/ML
1000 INJECTION, SOLUTION INTRAVENOUS
Qty: 0 | Refills: 0 | Status: DISCONTINUED | OUTPATIENT
Start: 2019-04-16 | End: 2019-05-02

## 2019-04-16 RX ORDER — INSULIN LISPRO 100/ML
VIAL (ML) SUBCUTANEOUS
Qty: 0 | Refills: 0 | Status: DISCONTINUED | OUTPATIENT
Start: 2019-04-16 | End: 2019-04-20

## 2019-04-16 RX ORDER — DEXTROSE 50 % IN WATER 50 %
12.5 SYRINGE (ML) INTRAVENOUS ONCE
Qty: 0 | Refills: 0 | Status: DISCONTINUED | OUTPATIENT
Start: 2019-04-16 | End: 2019-05-02

## 2019-04-16 RX ORDER — DEXTROSE 50 % IN WATER 50 %
15 SYRINGE (ML) INTRAVENOUS ONCE
Qty: 0 | Refills: 0 | Status: DISCONTINUED | OUTPATIENT
Start: 2019-04-16 | End: 2019-05-02

## 2019-04-16 RX ORDER — LEVOTHYROXINE SODIUM 125 MCG
25 TABLET ORAL DAILY
Qty: 0 | Refills: 0 | Status: DISCONTINUED | OUTPATIENT
Start: 2019-04-16 | End: 2019-05-02

## 2019-04-16 RX ORDER — GLUCAGON INJECTION, SOLUTION 0.5 MG/.1ML
1 INJECTION, SOLUTION SUBCUTANEOUS ONCE
Qty: 0 | Refills: 0 | Status: DISCONTINUED | OUTPATIENT
Start: 2019-04-16 | End: 2019-05-02

## 2019-04-16 RX ORDER — INSULIN LISPRO 100/ML
VIAL (ML) SUBCUTANEOUS AT BEDTIME
Qty: 0 | Refills: 0 | Status: DISCONTINUED | OUTPATIENT
Start: 2019-04-16 | End: 2019-04-20

## 2019-04-16 RX ORDER — ATORVASTATIN CALCIUM 80 MG/1
40 TABLET, FILM COATED ORAL AT BEDTIME
Qty: 0 | Refills: 0 | Status: DISCONTINUED | OUTPATIENT
Start: 2019-04-16 | End: 2019-05-02

## 2019-04-16 RX ADMIN — INSULIN GLARGINE 15 UNIT(S): 100 INJECTION, SOLUTION SUBCUTANEOUS at 23:11

## 2019-04-16 NOTE — H&P ADULT - NSICDXPASTMEDICALHX_GEN_ALL_CORE_FT
PAST MEDICAL HISTORY:  Asthma with COPD     CAD (coronary artery disease)     CHF, chronic     History of atrial fibrillation     Mural thrombus of cardiac apex     Sarcoidosis PAST MEDICAL HISTORY:  Asthma with COPD     CAD (coronary artery disease)     CHF, chronic     DM (diabetes mellitus)     History of atrial fibrillation     Mural thrombus of cardiac apex     Sarcoidosis

## 2019-04-16 NOTE — H&P ADULT - PROBLEM SELECTOR PLAN 7
check A1c  sliding scale  home regimen lanus 20u, humalog 4u tidac  will give lantus 15u tonight and monitor closely

## 2019-04-16 NOTE — H&P ADULT - ATTENDING COMMENTS
Patient assigned to me by night hospitalist in charge for management and care for patient for this evening only. Care to be resumed by day hospitalist (Dr. Sam) in the morning and thereafter.     Patient's care rendered on 4/16/19 at 9PM.      I was physically present for the key portions of the evaluation and management (E/M) service provided.  I agree with the above history, physical, and plan which I have reviewed and edited where appropriate.     Plan discussed with Patient, RN, Dr. Sam.

## 2019-04-16 NOTE — H&P ADULT - PROBLEM SELECTOR PLAN 2
currently patient does not have diarrhea, if recurs, will send stool work up  consider GI consult if diarrhea continues

## 2019-04-16 NOTE — H&P ADULT - NSHPLABSRESULTS_GEN_ALL_CORE
Labs personally reviewed:                          12.2   3.7   )-----------( 206      ( 16 Apr 2019 18:54 )             40.0     04-16    138  |  96  |  41<H>  ----------------------------<  167<H>  4.3   |  28  |  1.38<H>    Ca    9.2      16 Apr 2019 18:55    TPro  7.0  /  Alb  3.0<L>  /  TBili  0.5  /  DBili  x   /  AST  17  /  ALT  13  /  AlkPhos  148<H>  04-16    CARDIAC MARKERS ( 16 Apr 2019 18:55 )  x     / x     / 77 U/L / x     / 3.5 ng/mL      LIVER FUNCTIONS - ( 16 Apr 2019 18:55 )  Alb: 3.0 g/dL / Pro: 7.0 g/dL / ALK PHOS: 148 U/L / ALT: 13 U/L / AST: 17 U/L / GGT: x           PT/INR - ( 16 Apr 2019 18:54 )   PT: 14.4 sec;   INR: 1.24 ratio         PTT - ( 16 Apr 2019 18:54 )  PTT:34.2 sec    CAPILLARY BLOOD GLUCOSE      POCT Blood Glucose.: 126 mg/dL (16 Apr 2019 19:49)      Imaging:  CXR personally reviewed: Left pleural effusion.   Cardiomegaly.   Right basilar linear atelectasis.    EKG personally reviewed: sinus tachycardia, no electrical alternans    Reviewed previous records      5/5/: Severe global left ventricular systolic dysfunction. Large circumferential pericardial effusion. The effusion measures 3.2 cm lateral to the right atrium and 1.6 cm anterior to the right ventricle. The IVC measures about 2.0 cm and does not appear to collapse, although there is minimal respiratory variability. Transmitral gradients were not performed. The RV and RA are not collapsed however they do not appear to fully expand during diastole.    5/16 CT CHEST- Pulmonary sarcoidosis with evidence of extensive fibrosis in the   bilateral lower lobes and right middle lobe, as well as nonfibrotic   changes in the bilateral upper lobes. Calcified mediastinal and bilateral hilar adenopathy.  Asymmetrical skin thickening and subcutaneous edema of the right breast.     5/18-- ECHO -- Severe global left ventricular systolic dysfunction.  2. Right ventricular enlargement with decreased right ventricular systolic function. 3. Normal pericardium with no pericardial effusion.    5/18 EP: s/p dual chamber ICD

## 2019-04-16 NOTE — ED PROVIDER NOTE - PROGRESS NOTE DETAILS
D/w Dr. Sam, will admit to hospital for further evaluation and treatment. Damaris Herzog DO Attending MD Ross: Received call re merlyn, hospitalist bedside, discussed, already aware

## 2019-04-16 NOTE — ED PROVIDER NOTE - OBJECTIVE STATEMENT
54yo female PMH congestive heart failure s/p AICD, DM, presenting from Dr. Sam's office with pericardial effusion. Patient states that she is supposed to have colonoscopy and endoscopy, needed cardiac clearance. Pt had echo performed in Dr. Sam's office today which showed moderate size pericardial effusion and was sent to ED for drainage by IR. Denies chest pain or shortness of breath. No other complaints.

## 2019-04-16 NOTE — H&P ADULT - HISTORY OF PRESENT ILLNESS
52 year old F w/ HTN, DM, HLD, sarcoidosis on steroids, HFrEF , CAD s/p mLAD s/p stent placement 4/2017, cardiac thrombus on Xarelto asthma /COPD on home O2, CHF s/p AICD, presented from Cardiology office for evaluation for pericardial effusion.  Patient has a 2nd MRN ([id number]).  Patient has been having watery diarrhea since October 2018.  She denies any blood in stool or weight loss.  She was planned for EGD/Colonoscopy, pending cardiology clearance.  Patient states she went to cardiology today, had echocardiogram done, which showed pericardial effusion and therefore sent here for further evaluation and possible IR drainage.  Patient otherwise denies chest pain. At baseline she has SOB with minimal exertion, which has not changed.  She has LE edema, but not worse than usual.  Denies any fever, but has chills.      Of note, patient was admitted to [facility] in May 2018 for decompensated systolic heart failure, found to have large circumferential pericardial effusion with early tamponade physiology on TTE. CT surgery consulted for pericardial window but patient was deemed not a surgical candidate due to high risk for any surgical procedure and general anesthesia given her co-morbidities and patient refused anesthesia. Patient was transferred to CCU for close monitoring of hemodynamics given TTE findings. In CCU remained HD stable, underwent placement of pericardial drain with IR on 5/7. Procedure was complicated by hydropneumothorax on R due to initial dislodgement of R sided drain, and L sided PTX after pericardial drain placement on L. Gram stain from procedure growing gram positive cocci in pairs, ID consulted, patient started on Vancomycin and Ceftriaxone. Patient started on Lasix for ADHF.

## 2019-04-16 NOTE — ED ADULT NURSE REASSESSMENT NOTE - NS ED NURSE REASSESS COMMENT FT1
Report received from LAUREN Anglin. Patient admitted, awaiting bed assignment. Patient c/o HA, states she usually get headaches when her sugar is low. . Patient requesting to eat, will contact admitting team to find out if patient can have diet order. Report received from LAUREN Anglin. No nurses note written by previous RN. Patient admitted, awaiting bed assignment. Patient c/o HA, states she usually get headaches when her sugar is low. . Patient requesting to eat, will contact admitting team to find out if patient can have diet order. Denies CP, SOB, abd pain, N/V/D, fever, dizziness, weakness.

## 2019-04-16 NOTE — H&P ADULT - NSHPREVIEWOFSYSTEMS_GEN_ALL_CORE
CONSTITUTIONAL: No weakness, fevers; + chills  EYES/ENT: No visual changes;  No dysphagia  NECK: No pain or stiffness  RESPIRATORY: No cough, wheezing, hemoptysis; + shortness of breath  CARDIOVASCULAR: No chest pain or palpitations; No lower extremity edema  EXTREMITIES: no le edema, cyanosis, clubbing  MUSCULOSKELETAL: no joint pain, swelling  GASTROINTESTINAL: +dyspepsia; No nausea, vomiting, or hematemesis; + diarrhea alternating with constipation.    BACK: No back pain  GENITOURINARY: No dysuria, frequency or hematuria  NEUROLOGICAL: No numbness or weakness  SKIN: No itching, burning, rashes, or lesions   PSYCH: no agitation  All other review of systems is negative unless indicated above.

## 2019-04-16 NOTE — ED PROVIDER NOTE - ATTENDING CONTRIBUTION TO CARE
Attending MD Ross: I personally have seen and examined this patient.  Resident note reviewed and agree on plan of care and except where noted.  See below for details.     Seen in [facility], unaccompanied  Dr. Mena    53F PMH/PSH including COPD/asthma, DM, "lungs damaged", sarcoidosis, "kidneys punctured", L ankle fx s/p surgery, L hip s/p surgery, CHF, AICD presents to the ED for admission for IR drainage of pericardial effusion.  Reports went to see Dr Sam because needed clearance prior to a colonscopy for persistent diarrhea since October.  Reports when she saw him in the offic etoda was found to have moderate size pericardial effusion on office ECHO and was sent in for admission for drainage by IR.  Denies chest pain, shortness of breath, palpitations. Denies abdominal pain, nausea, vomiting, diarrhea, blood in stools. Denies fevers.  Denies drugs, rare EtOH, reports tobacco in teenage years.  On exam, head NCAT, FROM at neck, no tenderness to midline palpation, no stepoffs along length of spine, lungs CTAB with good inspiratory effort, +S1S2, no m/r/g, abdomen soft with +BS, NT, ND, no CVAT, moving all extremities; A/P: 53F with multiple medical comborbidities here for IR for admission for pericardial effusion drainage, will send pre op labs, EKG, CXR, admit

## 2019-04-16 NOTE — H&P ADULT - PROBLEM SELECTOR PLAN 1
Patient with finding of pericardial effusion, hemodynamically stable  no temponade on echocardiogram as per Dr. Sam  ECG shows no alternans  no pulsus peridoxus  Will get CT chest w/o contrast for further evaluation  hold xarelto and aspirin for now for possible IR drainage  IR consult in AM Patient with finding of pericardial effusion, hemodynamically stable  no temponade on echocardiogram as per Dr. Sam  ECG shows no alternans  no pulsus peridoxus  Will get CT chest w/o contrast for further evaluation  hold xarelto and aspirin for now for possible IR drainage  IR consult in AM  monitor vitals q4h for now  troponin 65-->53; no chest pain  monitor on tele Patient with finding of pericardial effusion, hemodynamically stable  no temponade on echocardiogram as per Dr. Sam  ECG shows no alternans  no pulsus peridoxus  Will get CT chest w/o contrast for further evaluation  hold xarelto and aspirin for now for possible IR drainage  IR consult in AM  monitor vitals q4h for now  troponin 65-->53; no chest pain  monitor on tele  check official echocardiogram

## 2019-04-16 NOTE — H&P ADULT - NSHPPHYSICALEXAM_GEN_ALL_CORE
PHYSICAL EXAM:  Vital Signs Last 24 Hrs  T(C): 36.8 (04-16-19 @ 20:50)  T(F): 98.2 (04-16-19 @ 20:50), Max: 98.2 (04-16-19 @ 20:50)  HR: 100 (04-16-19 @ 20:50) (100 - 106)  BP: 152/102 (04-16-19 @ 20:50)  BP(mean): --  RR: 18 (04-16-19 @ 20:50) (18 - 18)  SpO2: 98% (04-16-19 @ 20:50) (97% - 98%)  Wt(kg): --    Constitutional: NAD, awake and alert  EYES: EOMI  ENT:  Normal Hearing, no tonsillar exudates   Neck: Soft and supple, No JVD  Lungs: B/l coarse breath sounds worse on left  Heart: S1 and S2, regular rate and rhythm, no Murmurs, gallops or rubs  Abdomen: Bowel Sounds present, soft, nontender, nondistended, no guarding, no rebound  Extremities: No cyanosis or clubbing; warm to touch  Vascular: 2+ peripheral pulses lower ex  Neurological: A/O x 3, no focal deficits  Musculoskeletal: 5/5 strength b/l upper and lower extremities  Skin: No rashes  Psych: no depression or anhedonia  HEME: no bruises, no nose bleeds

## 2019-04-16 NOTE — ED PROVIDER NOTE - CLINICAL SUMMARY MEDICAL DECISION MAKING FREE TEXT BOX
54yo female with pericardial effusion, likely 2/2 congestive heart failure, no signs/symptoms of cardiac tamponade. Will obtain EKG, CXR, labs, admission to Dr. Sam. Damaris Herzog DO

## 2019-04-16 NOTE — H&P ADULT - PROBLEM SELECTOR PLAN 3
EF 17% on Echo 5/18/18  hold lasix and metolazone for now to avoid decreasing preload  monitor ins/outs and daily weights

## 2019-04-16 NOTE — H&P ADULT - ASSESSMENT
52 year old F w/ HTN, DM, HLD, sarcoidosis on steroids, HFrEF , CAD s/p mLAD s/p stent placement 4/2017, cardiac thrombus on Xarelto asthma /COPD on home O2, CHF s/p AICD, presented from Cardiology office for evaluation for pericardial effusion.

## 2019-04-17 DIAGNOSIS — Z86.79 PERSONAL HISTORY OF OTHER DISEASES OF THE CIRCULATORY SYSTEM: ICD-10-CM

## 2019-04-17 DIAGNOSIS — I25.5 ISCHEMIC CARDIOMYOPATHY: ICD-10-CM

## 2019-04-17 LAB
ALBUMIN SERPL ELPH-MCNC: 2.9 G/DL — LOW (ref 3.3–5)
ALP SERPL-CCNC: 148 U/L — HIGH (ref 40–120)
ALT FLD-CCNC: 13 U/L — SIGNIFICANT CHANGE UP (ref 10–45)
ANION GAP SERPL CALC-SCNC: 11 MMOL/L — SIGNIFICANT CHANGE UP (ref 5–17)
AST SERPL-CCNC: 15 U/L — SIGNIFICANT CHANGE UP (ref 10–40)
BASOPHILS # BLD AUTO: 0.02 K/UL — SIGNIFICANT CHANGE UP (ref 0–0.2)
BASOPHILS NFR BLD AUTO: 0.5 % — SIGNIFICANT CHANGE UP (ref 0–2)
BILIRUB SERPL-MCNC: 0.4 MG/DL — SIGNIFICANT CHANGE UP (ref 0.2–1.2)
BUN SERPL-MCNC: 44 MG/DL — HIGH (ref 7–23)
CALCIUM SERPL-MCNC: 9.1 MG/DL — SIGNIFICANT CHANGE UP (ref 8.4–10.5)
CHLORIDE SERPL-SCNC: 98 MMOL/L — SIGNIFICANT CHANGE UP (ref 96–108)
CO2 SERPL-SCNC: 26 MMOL/L — SIGNIFICANT CHANGE UP (ref 22–31)
CREAT SERPL-MCNC: 1.51 MG/DL — HIGH (ref 0.5–1.3)
EOSINOPHIL # BLD AUTO: 0.07 K/UL — SIGNIFICANT CHANGE UP (ref 0–0.5)
EOSINOPHIL NFR BLD AUTO: 1.7 % — SIGNIFICANT CHANGE UP (ref 0–6)
GLUCOSE SERPL-MCNC: 297 MG/DL — HIGH (ref 70–99)
HBA1C BLD-MCNC: 12.2 % — HIGH (ref 4–5.6)
HCT VFR BLD CALC: 38.8 % — SIGNIFICANT CHANGE UP (ref 34.5–45)
HCV AB S/CO SERPL IA: 0.06 S/CO — SIGNIFICANT CHANGE UP (ref 0–0.99)
HCV AB SERPL-IMP: SIGNIFICANT CHANGE UP
HGB BLD-MCNC: 11.5 G/DL — SIGNIFICANT CHANGE UP (ref 11.5–15.5)
IMM GRANULOCYTES NFR BLD AUTO: 0.5 % — SIGNIFICANT CHANGE UP (ref 0–1.5)
LYMPHOCYTES # BLD AUTO: 0.82 K/UL — LOW (ref 1–3.3)
LYMPHOCYTES # BLD AUTO: 20.1 % — SIGNIFICANT CHANGE UP (ref 13–44)
MAGNESIUM SERPL-MCNC: 1.6 MG/DL — SIGNIFICANT CHANGE UP (ref 1.6–2.6)
MCHC RBC-ENTMCNC: 21.1 PG — LOW (ref 27–34)
MCHC RBC-ENTMCNC: 29.6 GM/DL — LOW (ref 32–36)
MCV RBC AUTO: 71.1 FL — LOW (ref 80–100)
MONOCYTES # BLD AUTO: 0.54 K/UL — SIGNIFICANT CHANGE UP (ref 0–0.9)
MONOCYTES NFR BLD AUTO: 13.3 % — SIGNIFICANT CHANGE UP (ref 2–14)
NEUTROPHILS # BLD AUTO: 2.6 K/UL — SIGNIFICANT CHANGE UP (ref 1.8–7.4)
NEUTROPHILS NFR BLD AUTO: 63.9 % — SIGNIFICANT CHANGE UP (ref 43–77)
PHOSPHATE SERPL-MCNC: 3.2 MG/DL — SIGNIFICANT CHANGE UP (ref 2.5–4.5)
PLATELET # BLD AUTO: 225 K/UL — SIGNIFICANT CHANGE UP (ref 150–400)
POTASSIUM SERPL-MCNC: 4.4 MMOL/L — SIGNIFICANT CHANGE UP (ref 3.5–5.3)
POTASSIUM SERPL-SCNC: 4.4 MMOL/L — SIGNIFICANT CHANGE UP (ref 3.5–5.3)
PROT SERPL-MCNC: 6.6 G/DL — SIGNIFICANT CHANGE UP (ref 6–8.3)
RBC # BLD: 5.46 M/UL — HIGH (ref 3.8–5.2)
RBC # FLD: 15.1 % — HIGH (ref 10.3–14.5)
SODIUM SERPL-SCNC: 135 MMOL/L — SIGNIFICANT CHANGE UP (ref 135–145)
WBC # BLD: 4.07 K/UL — SIGNIFICANT CHANGE UP (ref 3.8–10.5)
WBC # FLD AUTO: 4.07 K/UL — SIGNIFICANT CHANGE UP (ref 3.8–10.5)

## 2019-04-17 PROCEDURE — 99222 1ST HOSP IP/OBS MODERATE 55: CPT

## 2019-04-17 PROCEDURE — 99222 1ST HOSP IP/OBS MODERATE 55: CPT | Mod: 24

## 2019-04-17 PROCEDURE — 71250 CT THORAX DX C-: CPT | Mod: 26

## 2019-04-17 RX ORDER — SIMETHICONE 80 MG/1
80 TABLET, CHEWABLE ORAL ONCE
Qty: 0 | Refills: 0 | Status: COMPLETED | OUTPATIENT
Start: 2019-04-17 | End: 2019-04-17

## 2019-04-17 RX ORDER — SIMETHICONE 80 MG/1
80 TABLET, CHEWABLE ORAL ONCE
Qty: 0 | Refills: 0 | Status: COMPLETED | OUTPATIENT
Start: 2019-04-17 | End: 2019-04-18

## 2019-04-17 RX ADMIN — Medication 25 MICROGRAM(S): at 05:11

## 2019-04-17 RX ADMIN — INSULIN GLARGINE 15 UNIT(S): 100 INJECTION, SOLUTION SUBCUTANEOUS at 22:22

## 2019-04-17 RX ADMIN — CARVEDILOL PHOSPHATE 6.25 MILLIGRAM(S): 80 CAPSULE, EXTENDED RELEASE ORAL at 17:03

## 2019-04-17 RX ADMIN — CARVEDILOL PHOSPHATE 6.25 MILLIGRAM(S): 80 CAPSULE, EXTENDED RELEASE ORAL at 05:11

## 2019-04-17 RX ADMIN — Medication 20 MILLIGRAM(S): at 17:03

## 2019-04-17 RX ADMIN — SIMETHICONE 80 MILLIGRAM(S): 80 TABLET, CHEWABLE ORAL at 06:10

## 2019-04-17 RX ADMIN — Medication 3: at 22:22

## 2019-04-17 RX ADMIN — Medication 4: at 10:06

## 2019-04-17 RX ADMIN — ATORVASTATIN CALCIUM 40 MILLIGRAM(S): 80 TABLET, FILM COATED ORAL at 20:42

## 2019-04-17 RX ADMIN — Medication 5: at 13:34

## 2019-04-17 RX ADMIN — Medication 20 MILLIGRAM(S): at 05:11

## 2019-04-17 RX ADMIN — Medication 4: at 18:47

## 2019-04-17 NOTE — CONSULT NOTE ADULT - SUBJECTIVE AND OBJECTIVE BOX
Pt is a 52 year old F sent to ER yesterday for moderate to large sized pericardial effusion seen on TTE at her cardiologist office. She has PMHx of HTN, DM, HLD, sarcoidosis on steroids, HFrEF , CAD s/p mLAD s/p stent placement 4/2017, cardiac thrombus on Xarelto asthma /COPD on home O2, CHF s/p AICD. Patient states that she has been having watery diarrhea since October 2018.  She denies any blood in stool or weight loss.  She was planned for EGD/Colonoscopy, pending cardiology clearance.  Patient states she went to cardiology today, had echocardiogram done, which showed pericardial effusion and therefore sent here for further evaluation and possible IR drainage.  Patient otherwise denies chest pain. At baseline she has SOB with minimal exertion, which has not changed.  She has LE edema, but not worse than usual.  Denies any fever, but has chills.      REVIEW OF SYSTEMS:    CONSTITUTIONAL: No weakness, fevers or chills  EYES/ENT: No visual changes;  No vertigo or throat pain   NECK: No pain or stiffness  RESPIRATORY: No cough, wheezing, hemoptysis; No shortness of breath  CARDIOVASCULAR: No chest pain or palpitations  GASTROINTESTINAL:+ diarrhea   GENITOURINARY: No dysuria, frequency or hematuria  NEUROLOGICAL: No numbness or weakness  SKIN: No itching, burning, rashes, or lesions   All other review of systems is negative unless indicated above.    MEDICATIONS  (STANDING):  atorvastatin 40 milliGRAM(s) Oral at bedtime  carvedilol 6.25 milliGRAM(s) Oral every 12 hours  dextrose 5%. 1000 milliLiter(s) (50 mL/Hr) IV Continuous <Continuous>  dextrose 50% Injectable 12.5 Gram(s) IV Push once  dextrose 50% Injectable 25 Gram(s) IV Push once  dextrose 50% Injectable 25 Gram(s) IV Push once  insulin glargine Injectable (LANTUS) 15 Unit(s) SubCutaneous at bedtime  insulin lispro (HumaLOG) corrective regimen sliding scale   SubCutaneous three times a day before meals  insulin lispro (HumaLOG) corrective regimen sliding scale   SubCutaneous at bedtime  levothyroxine 25 MICROGram(s) Oral daily  predniSONE   Tablet 20 milliGRAM(s) Oral two times a day    MEDICATIONS  (PRN):  dextrose 40% Gel 15 Gram(s) Oral once PRN Blood Glucose LESS THAN 70 milliGRAM(s)/deciliter  glucagon  Injectable 1 milliGRAM(s) IntraMuscular once PRN Glucose LESS THAN 70 milligrams/deciliter      PAST MEDICAL & SURGICAL HISTORY:  DM (diabetes mellitus)  CAD (coronary artery disease)  Sarcoidosis  Asthma with COPD  CHF, chronic  Mural thrombus of cardiac apex  History of atrial fibrillation  Thrombus: ? Thrombus in heart - on Xarelto  Hyperlipidemia  Anemia  Hypertension  Sarcoidosis  CHF (congestive heart failure): Stage III, on home O2 @ 2lpm  Diabetes  AICD (automatic cardioverter/defibrillator) present  History of repair of hip fracture  Ankle fracture  Ankle fracture: Left Ankle - with plate and screws placed  Hip deformity: left hip had a pin placed to hold bone in place after a fall      Allergies  No Known Allergies    Vital Signs Last 24 Hrs  T(C): 36.3 (17 Apr 2019 15:31), Max: 36.8 (16 Apr 2019 20:50)  T(F): 97.4 (17 Apr 2019 15:31), Max: 98.2 (16 Apr 2019 20:50)  HR: 94 (17 Apr 2019 15:31) (92 - 111)  BP: 131/89 (17 Apr 2019 15:31) (95/70 - 152/102)  BP(mean): --  RR: 18 (17 Apr 2019 15:31) (18 - 18)  SpO2: 100% (17 Apr 2019 15:31) (96% - 100%)    Appearance: Normal	  HEENT:   Normal oral mucosa, PERRL, EOMI	  Lymphatic: No lymphadenopathy , no edema  Cardiovascular: Normal S1 S2  Respiratory: Lungs clear to auscultation, normal effort 	  Gastrointestinal:  Soft, Non-tender, + BS	  Skin: No rashes, No ecchymoses, No cyanosis, warm to touch  Musculoskeletal: Normal range of motion, normal strength  Psychiatry:  Mood & affect appropriate  Ext: No edema                          11.5   4.07  )-----------( 225      ( 17 Apr 2019 09:41 )             38.8               04-17    135  |  98  |  44<H>  ----------------------------<  297<H>  4.4   |  26  |  1.51<H>    Ca    9.1      17 Apr 2019 05:11  Phos  3.2     04-17  Mg     1.6     04-17    TPro  6.6  /  Alb  2.9<L>  /  TBili  0.4  /  DBili  x   /  AST  15  /  ALT  13  /  AlkPhos  148<H>  04-17    PT/INR - ( 16 Apr 2019 18:54 )   PT: 14.4 sec;   INR: 1.24 ratio         PTT - ( 16 Apr 2019 18:54 )  PTT:34.2 sec       CARDIAC MARKERS ( 16 Apr 2019 18:55 )  x     / x     / 77 U/L / x     / 3.5 ng/mL      < from: CT Chest No Cont (04.17.19 @ 01:07) >    IMPRESSION:     Large pericardial effusion measuring simple fluid    Slightly increased pulmonary sarcoidosis with extensive fibrotic changes   in the bilateral lower lobe and right middle lobe, as well as nonfibrotic   changes in the bilateral upper lobes.

## 2019-04-17 NOTE — CONSULT NOTE ADULT - SUBJECTIVE AND OBJECTIVE BOX
Patient is a 53y Female     Patient is a 53y old  Female who presents with a chief complaint of sent here for pericardial effusion (17 Apr 2019 18:39)      HPI:  52 year old F w/ HTN, DM, HLD, sarcoidosis on steroids, HFrEF , CAD s/p mLAD s/p stent placement 4/2017, cardiac thrombus on Xarelto asthma /COPD on home O2, CHF s/p AICD, presented from Cardiology office for evaluation for pericardial effusion.  Patient has a 2nd MRN ([id number]).  Patient has been having watery diarrhea since October 2018.  She denies any blood in stool or weight loss.  She was planned for EGD/Colonoscopy, pending cardiology clearance.  Patient states she went to cardiology today, had echocardiogram done, which showed pericardial effusion and therefore sent here for further evaluation and possible IR drainage.  Patient otherwise denies chest pain. At baseline she has SOB with minimal exertion, which has not changed.  She has LE edema, but not worse than usual.  Denies any fever, but has chills.      Of note, patient was admitted to [facility] in May 2018 for decompensated systolic heart failure, found to have large circumferential pericardial effusion with early tamponade physiology on TTE. CT surgery consulted for pericardial window but patient was deemed not a surgical candidate due to high risk for any surgical procedure and general anesthesia given her co-morbidities and patient refused anesthesia. Patient was transferred to CCU for close monitoring of hemodynamics given TTE findings. In CCU remained HD stable, underwent placement of pericardial drain with IR on 5/7. Procedure was complicated by hydropneumothorax on R due to initial dislodgement of R sided drain, and L sided PTX after pericardial drain placement on L. Gram stain from procedure growing gram positive cocci in pairs, ID consulted, patient started on Vancomycin and Ceftriaxone. Patient started on Lasix for ADHF. (16 Apr 2019 22:13)    pt describes mostly liquid stools if she doesnt eat, some times constipation, uses imodium for temporary relief. No foreign travel, house bound. No new meds or significant weight loss.   PAST MEDICAL & SURGICAL HISTORY:  DM (diabetes mellitus)  CAD (coronary artery disease)  Sarcoidosis  Asthma with COPD  CHF, chronic  Mural thrombus of cardiac apex  History of atrial fibrillation  Thrombus: ? Thrombus in heart - on Xarelto  Hyperlipidemia  Anemia  Hypertension  Sarcoidosis  CHF (congestive heart failure): Stage III, on home O2 @ 2lpm  Diabetes  AICD (automatic cardioverter/defibrillator) present  History of repair of hip fracture  Ankle fracture  Ankle fracture: Left Ankle - with plate and screws placed  Hip deformity: left hip had a pin placed to hold bone in place after a fall      MEDICATIONS  (STANDING):  atorvastatin 40 milliGRAM(s) Oral at bedtime  carvedilol 6.25 milliGRAM(s) Oral every 12 hours  dextrose 5%. 1000 milliLiter(s) (50 mL/Hr) IV Continuous <Continuous>  dextrose 50% Injectable 12.5 Gram(s) IV Push once  dextrose 50% Injectable 25 Gram(s) IV Push once  dextrose 50% Injectable 25 Gram(s) IV Push once  insulin glargine Injectable (LANTUS) 15 Unit(s) SubCutaneous at bedtime  insulin lispro (HumaLOG) corrective regimen sliding scale   SubCutaneous three times a day before meals  insulin lispro (HumaLOG) corrective regimen sliding scale   SubCutaneous at bedtime  levothyroxine 25 MICROGram(s) Oral daily  predniSONE   Tablet 20 milliGRAM(s) Oral two times a day      Allergies    No Known Allergies    Intolerances        SOCIAL HISTORY:  Denies ETOh,Smoking,     FAMILY HISTORY:  FH: breast cancer: mother  Family hx of colon cancer: Mother  Family history of hypertension (Sibling): Siblings  Family history of diabetes mellitus (Father, Sibling): Father, Siblings  Family history of breast cancer in mother (Mother): Mother  Family history of colon cancer in mother (Mother): Mother      REVIEW OF SYSTEMS:    CONSTITUTIONAL: No weakness, fevers or chills  EYES/ENT: No visual changes;  No vertigo or throat pain   NECK: No pain or stiffness  RESPIRATORY: No cough, wheezing, hemoptysis; No shortness of breath  CARDIOVASCULAR: No chest pain or palpitations  GASTROINTESTINAL: No abdominal or epigastric pain. No nausea, vomiting, or hematemesis; No diarrhea or constipation. No melena or hematochezia.  GENITOURINARY: No dysuria, frequency or hematuria  NEUROLOGICAL: No numbness or weakness  SKIN: No itching, burning, rashes, or lesions   All other review of systems is negative unless indicated above.    VITAL:  T(C): , Max: 36.8 (04-16-19 @ 20:50)  T(F): , Max: 98.2 (04-16-19 @ 20:50)  HR: 96 (04-17-19 @ 16:39)  BP: 143/93 (04-17-19 @ 16:39)  BP(mean): --  RR: 17 (04-17-19 @ 16:39)  SpO2: 97% (04-17-19 @ 16:39)  Wt(kg): --    I and O's:        PHYSICAL EXAM:    Constitutional: NAD  HEENT: PERRLA,   Neck: No JVD  Respiratory: CTA B/L  Cardiovascular: S1 and S2  Gastrointestinal: BS+, soft, NT/ND  Extremities: No peripheral edema  Neurological: A/O x 3, no focal deficits  Psychiatric: Normal mood, normal affect  : No Oliva  Skin: No rashes  Access: Not applicable  Back: No CVA tenderness    LABS:                        11.5   4.07  )-----------( 225      ( 17 Apr 2019 09:41 )             38.8     04-17    135  |  98  |  44<H>  ----------------------------<  297<H>  4.4   |  26  |  1.51<H>    Ca    9.1      17 Apr 2019 05:11  Phos  3.2     04-17  Mg     1.6     04-17    TPro  6.6  /  Alb  2.9<L>  /  TBili  0.4  /  DBili  x   /  AST  15  /  ALT  13  /  AlkPhos  148<H>  04-17          RADIOLOGY & ADDITIONAL STUDIES:

## 2019-04-17 NOTE — CONSULT NOTE ADULT - SUBJECTIVE AND OBJECTIVE BOX
History of Present Illness:  HPI:  52 year old F w/ HTN, DM, HLD, sarcoidosis on steroids, HFrEF , CAD s/p mLAD s/p stent placement 4/2017, cardiac thrombus on Xarelto asthma /COPD on home O2, CHF s/p AICD, presented from Cardiology office for evaluation for pericardial effusion.  Patient has a 2nd MRN ([id number]).  Patient has been having watery diarrhea since October 2018.  She denies any blood in stool or weight loss.  She was planned for EGD/Colonoscopy, pending cardiology clearance.  Patient states she went to cardiology today, had echocardiogram done, which showed pericardial effusion and therefore sent here for further evaluation and possible IR drainage.  Patient otherwise denies chest pain. At baseline she has SOB with minimal exertion, which has not changed.  She has LE edema, but not worse than usual.  Denies any fever, but has chills.      Of note, patient was admitted to [facility] in May 2018 for decompensated systolic heart failure, found to have large circumferential pericardial effusion with early tamponade physiology on TTE. CT surgery consulted for pericardial window but patient was deemed not a surgical candidate due to high risk for any surgical procedure and general anesthesia given her co-morbidities and patient refused anesthesia. Patient was transferred to CCU for close monitoring of hemodynamics given TTE findings. In CCU remained HD stable, underwent placement of pericardial drain with IR on 5/7. Procedure was complicated by hydropneumothorax on R due to initial dislodgement of R sided drain, and L sided PTX after pericardial drain placement on L. Gram stain from procedure growing gram positive cocci in pairs, ID consulted, patient started on Vancomycin and Ceftriaxone. Patient started on Lasix for ADHF. (16 Apr 2019 22:13)       Past Medical History  DM (diabetes mellitus)  CAD (coronary artery disease)  Sarcoidosis  Asthma with COPD  CHF, chronic  Mural thrombus of cardiac apex  History of atrial fibrillation  Thrombus: ? Thrombus in heart - on Xarelto  Hyperlipidemia  Anemia  Hypertension  Sarcoidosis  CHF (congestive heart failure): Stage III, on home O2 @ 2lpm  Diabetes  No pertinent past medical history      Past Surgical History  AICD (automatic cardioverter/defibrillator) present  History of repair of hip fracture  Ankle fracture  Ankle fracture: Left Ankle - with plate and screws placed  Hip deformity: left hip had a pin placed to hold bone in place after a fall  No significant past surgical history      MEDICATIONS  (STANDING):  atorvastatin 40 milliGRAM(s) Oral at bedtime  carvedilol 6.25 milliGRAM(s) Oral every 12 hours  dextrose 5%. 1000 milliLiter(s) (50 mL/Hr) IV Continuous <Continuous>  dextrose 50% Injectable 12.5 Gram(s) IV Push once  dextrose 50% Injectable 25 Gram(s) IV Push once  dextrose 50% Injectable 25 Gram(s) IV Push once  insulin glargine Injectable (LANTUS) 15 Unit(s) SubCutaneous at bedtime  insulin lispro (HumaLOG) corrective regimen sliding scale   SubCutaneous three times a day before meals  insulin lispro (HumaLOG) corrective regimen sliding scale   SubCutaneous at bedtime  levothyroxine 25 MICROGram(s) Oral daily  predniSONE   Tablet 20 milliGRAM(s) Oral two times a day    MEDICATIONS  (PRN):  dextrose 40% Gel 15 Gram(s) Oral once PRN Blood Glucose LESS THAN 70 milliGRAM(s)/deciliter  glucagon  Injectable 1 milliGRAM(s) IntraMuscular once PRN Glucose LESS THAN 70 milligrams/deciliter      Vital Signs Last 24 Hrs  T(C): 36.4 (04-17-19 @ 16:39), Max: 36.8 (04-16-19 @ 20:50)  T(F): 97.5 (04-17-19 @ 16:39), Max: 98.2 (04-16-19 @ 20:50)  HR: 96 (04-17-19 @ 16:39) (92 - 111)  BP: 143/93 (04-17-19 @ 16:39) (95/70 - 152/102)  RR: 17 (04-17-19 @ 16:39) (17 - 18)  SpO2: 97% (04-17-19 @ 16:39) (96% - 100%)           Daily     Daily   Admit Wt: Drug Dosing Weight  Height (cm): 170.18 (16 Apr 2019 16:08)  Weight (kg): 68 (16 Apr 2019 16:08)  BMI (kg/m2): 23.5 (16 Apr 2019 16:08)  BSA (m2): 1.79 (16 Apr 2019 16:08)    Allergies: No Known Allergies      SOCIAL HISTORY:  Smoker: [ ] Yes  [X] No                 Pt denies  ETOH use: [ ] Yes  [X] No             Pt denies  Ilicit Drug use:  [ ] Yes  [X] No     Pt denies    FAMILY HISTORY:  FH: breast cancer: mother  Family hx of colon cancer: Mother  Family history of hypertension (Sibling): Siblings  Family history of diabetes mellitus (Father, Sibling): Father, Siblings  Family history of breast cancer in mother (Mother): Mother  Family history of colon cancer in mother (Mother): Mother      Review of Systems  GENERAL:  no weakness, fatigue, fevers or chills  NEURO: no dizziness, numbness, tingling or weakness  SKIN: no itching, burning, rashes, or lesions   HEENT: no visual changes;  no headache, no vertigo, no recent colds  RESPIRATORY: no shortness of breath, no cough, sputum, wheezing  CARDIOVASCULAR:  no chest pain,  or palpitations  GI: no abd pain. no N/V/D.  PERIPHERAL VASCULAR: no swelling, no tenderness, no erythema    PHYSICAL EXAM  General: Well nourished, well developed, NAD.                                              Neuro: Normal exam oriented to person/place & time with no focal motor or sensory  deficits.                    Eyes: Normal exam of conjunctiva & lids, pupils equally reactive.   ENT: Normal exam of nasal/oral mucosa with absence of cyanosis.   Neck: Normal exam of jugular veins, trachea & thyroid.   Chest: Normal lung exam with good air movement absence of wheezes, rales, or rhonchi.                                                                         CV:  Auscultation: normal S1S2, RRR   Carotids: No Bruits[X]  Abdominal Aorta: normal [X] nonpalpable[X]                                                                         GI: Normal exam of abdomen with no noted masses or tenderness. +BSx4Q                                                                                            Extremities: Normal no evidence of cyanosis or deformity, Edema: none  Lower Extremity Pulses: Right[+2DP] Left[+2DP] Varicosities[none]  SKIN : Normal exam to inspection & palpation.                                                           LABS:                        11.5   4.07  )-----------( 225      ( 17 Apr 2019 09:41 )             38.8     04-17    135  |  98  |  44<H>  ----------------------------<  297<H>  4.4   |  26  |  1.51<H>    Ca    9.1      17 Apr 2019 05:11  Phos  3.2     04-17  Mg     1.6     04-17    TPro  6.6  /  Alb  2.9<L>  /  TBili  0.4  /  DBili  x   /  AST  15  /  ALT  13  /  AlkPhos  148<H>  04-17    PT/INR - ( 16 Apr 2019 18:54 )   PT: 14.4 sec;   INR: 1.24 ratio         PTT - ( 16 Apr 2019 18:54 )  PTT:34.2 sec      Cardiac Cath:    TTE / JASIEL: History of Present Illness:  51 y/o Female w/PMHx of HTN, DM, HLD, sarcoidosis on steroids, HFrEF , CAD s/p mLAD s/p stent placement 4/2017, cardiac thrombus on Xarelto asthma /COPD on home O2, CHF s/p AICD, presented from Cardiology office for evaluation for pericardial effusion.  Patient has been having watery diarrhea since October 2018.  She denies any blood in stool or weight loss.  She was planned for EGD/Colonoscopy, pending cardiology clearance.  Patient states she went to cardiology today, had echocardiogram done, which showed pericardial effusion and therefore sent here for further evaluation and possible IR drainage.  Patient otherwise denies chest pain. At baseline she has SOB with minimal exertion, which has not changed.  She has LE edema, but not worse than usual.  Denies any fever, but has chills.      Of note, patient was admitted to [facility] in May 2018 for decompensated systolic heart failure, found to have large circumferential pericardial effusion with early tamponade physiology on TTE. CT surgery consulted for pericardial window but patient was deemed not a surgical candidate due to high risk for any surgical procedure and general anesthesia given her co-morbidities and patient refused anesthesia. Patient was transferred to CCU for close monitoring of hemodynamics given TTE findings. In CCU remained HD stable, underwent placement of pericardial drain with IR on 5/7. Procedure was complicated by hydropneumothorax on R due to initial dislodgement of R sided drain, and L sided PTX after pericardial drain placement on L. Gram stain from procedure growing gram positive cocci in pairs, ID consulted, patient started on Vancomycin and Ceftriaxone. Patient started on Lasix for ADHF. (16 Apr 2019 22:13)       Past Medical History  DM (diabetes mellitus)  CAD (coronary artery disease)  Sarcoidosis  Asthma with COPD  CHF, chronic  Mural thrombus of cardiac apex  History of atrial fibrillation  Thrombus: ? Thrombus in heart - on Xarelto  Hyperlipidemia  Anemia  Hypertension  Sarcoidosis  CHF (congestive heart failure): Stage III, on home O2 @ 2lpm  Diabetes  No pertinent past medical history      Past Surgical History  AICD (automatic cardioverter/defibrillator) present  History of repair of hip fracture  Ankle fracture  Ankle fracture: Left Ankle - with plate and screws placed  Hip deformity: left hip had a pin placed to hold bone in place after a fall  No significant past surgical history      MEDICATIONS  (STANDING):  atorvastatin 40 milliGRAM(s) Oral at bedtime  carvedilol 6.25 milliGRAM(s) Oral every 12 hours  insulin glargine Injectable (LANTUS) 15 Unit(s) SubCutaneous at bedtime  insulin lispro (HumaLOG) corrective regimen sliding scale   SubCutaneous three times a day before meals  insulin lispro (HumaLOG) corrective regimen sliding scale   SubCutaneous at bedtime  levothyroxine 25 MICROGram(s) Oral daily  predniSONE   Tablet 20 milliGRAM(s) Oral two times a day    MEDICATIONS  (PRN):  dextrose 40% Gel 15 Gram(s) Oral once PRN Blood Glucose LESS THAN 70 milliGRAM(s)/deciliter  glucagon  Injectable 1 milliGRAM(s) IntraMuscular once PRN Glucose LESS THAN 70 milligrams/deciliter      Vital Signs Last 24 Hrs  T(C): 36.4 (04-17-19 @ 16:39), Max: 36.8 (04-16-19 @ 20:50)  T(F): 97.5 (04-17-19 @ 16:39), Max: 98.2 (04-16-19 @ 20:50)  HR: 96 (04-17-19 @ 16:39) (92 - 111)  BP: 143/93 (04-17-19 @ 16:39) (95/70 - 152/102)  RR: 17 (04-17-19 @ 16:39) (17 - 18)  SpO2: 97% (04-17-19 @ 16:39) (96% - 100%)             Height (cm): 170.18   Weight (kg): 68   BMI (kg/m2): 23.5    (16 Apr 2019 16:08)    Allergies: No Known Allergies    FAMILY HISTORY:  FH: breast cancer: mother  Family hx of colon cancer: Mother  Family history of hypertension (Sibling): Siblings  Family history of diabetes mellitus (Father, Sibling): Father, Siblings  Family history of breast cancer in mother (Mother): Mother  Family history of colon cancer in mother (Mother): Mother      Review of Systems  GENERAL:  no weakness, fatigue, fevers or chills  NEURO: no dizziness, numbness, tingling or weakness  SKIN: no itching, burning, rashes, or lesions   HEENT: no visual changes;  no headache, no vertigo, no recent colds  RESPIRATORY: no shortness of breath, no cough, sputum, wheezing  CARDIOVASCULAR:  no chest pain,  or palpitations  GI: no abd pain. no N/V/D.  PERIPHERAL VASCULAR: no swelling, no tenderness, no erythema    PHYSICAL EXAM  General: Well nourished, well developed, NAD.                                              Neuro: Normal exam oriented to person/place & time with no focal motor or sensory  deficits.                    Eyes: Normal exam of conjunctiva & lids, pupils equally reactive.   ENT: Normal exam of nasal/oral mucosa with absence of cyanosis.   Neck: Normal exam of jugular veins, trachea & thyroid.   Chest: Normal lung exam with good air movement absence of wheezes, rales, or rhonchi.                                                                         CV:  Auscultation: normal S1S2, RRR   Carotids: No Bruits[X]  Abdominal Aorta: normal [X] nonpalpable[X]                                                                         GI: Normal exam of abdomen with no noted masses or tenderness. +BSx4Q                                                                                            Extremities: Normal no evidence of cyanosis or deformity, Edema: none  Lower Extremity Pulses: Right[+2DP] Left[+2DP] Varicosities[none]  SKIN : Normal exam to inspection & palpation.                                                           LABS:                        11.5   4.07  )-----------( 225      ( 17 Apr 2019 09:41 )             38.8     135  |  98  |  44<H>  ----------------------------<  297<H>  4.4   |  26  |  1.51<H>    AST  15  /  ALT  13  /  AlkPhos  148<H>  04-17    PT/INR/PTT - ( 16 Apr 2019 18:54 )   PT: 14.4 sec;   INR: 1.24 ratio    PTT:34.2 sec      < from: CT Chest No Cont (04.17.19 @ 01:07) >  IMPRESSION:   Large pericardial effusion measuring simple fluid.  Slightly increased pulmonary sarcoidosis with extensive fibrotic changes   in the bilateral lower lobe and right middle lobe, as well as nonfibrotic   changes in the bilateral upper lobes.

## 2019-04-17 NOTE — CONSULT NOTE ADULT - ATTENDING COMMENTS
Pt seen and examined.  Large recurrent pericardial effusion.  Agree with IR drainage.
Differential diagnosis and plan of care discussed with patient after the evaluation.   Advanced care planning options discussed.   Pain assessed and judicious use of narcotics when appropriate was discussed.  Importance of Fall prevention discussed.  Counseling on Smoking and Alcohol cessation was offered when appropriate.  Counseling on Diet, exercise, and medication compliance was done.

## 2019-04-17 NOTE — CONSULT NOTE ADULT - SUBJECTIVE AND OBJECTIVE BOX
CHIEF COMPLAINT:  Pericardial Effusion     HISTORY OF PRESENT ILLNESS:  52 year old F well knoen to me from office, sent to ER yesterday for moderate to large sized pericardial effusion seen on TTE in my office. She has HTN, DM, HLD, sarcoidosis on steroids, HFrEF , CAD s/p mLAD s/p stent placement 4/2017, cardiac thrombus on Xarelto asthma /COPD on home O2, CHF s/p AICD.     Patient has been having watery diarrhea since October 2018.  She denies any blood in stool or weight loss.  She was planned for EGD/Colonoscopy, pending cardiology clearance.  Patient states she went to cardiology today, had echocardiogram done, which showed pericardial effusion and therefore sent here for further evaluation and possible IR drainage.  Patient otherwise denies chest pain. At baseline she has SOB with minimal exertion, which has not changed.  She has LE edema, but not worse than usual.  Denies any fever, but has chills.          PAST MEDICAL & SURGICAL HISTORY:  DM (diabetes mellitus)  CAD (coronary artery disease)  Sarcoidosis  Asthma with COPD  CHF, chronic  Mural thrombus of cardiac apex  History of atrial fibrillation  Thrombus: ? Thrombus in heart - on Xarelto  Hyperlipidemia  Anemia  Hypertension  Sarcoidosis  CHF (congestive heart failure): Stage III, on home O2 @ 2lpm  Diabetes  AICD (automatic cardioverter/defibrillator) present  History of repair of hip fracture  Ankle fracture  Ankle fracture: Left Ankle - with plate and screws placed  Hip deformity: left hip had a pin placed to hold bone in place after a fall          MEDICATIONS:  carvedilol 6.25 milliGRAM(s) Oral every 12 hours            atorvastatin 40 milliGRAM(s) Oral at bedtime  dextrose 40% Gel 15 Gram(s) Oral once PRN  dextrose 50% Injectable 12.5 Gram(s) IV Push once  dextrose 50% Injectable 25 Gram(s) IV Push once  dextrose 50% Injectable 25 Gram(s) IV Push once  glucagon  Injectable 1 milliGRAM(s) IntraMuscular once PRN  insulin glargine Injectable (LANTUS) 15 Unit(s) SubCutaneous at bedtime  insulin lispro (HumaLOG) corrective regimen sliding scale   SubCutaneous three times a day before meals  insulin lispro (HumaLOG) corrective regimen sliding scale   SubCutaneous at bedtime  levothyroxine 25 MICROGram(s) Oral daily  predniSONE   Tablet 20 milliGRAM(s) Oral two times a day    dextrose 5%. 1000 milliLiter(s) IV Continuous <Continuous>      FAMILY HISTORY:  FH: breast cancer: mother  Family hx of colon cancer: Mother  Family history of hypertension (Sibling): Siblings  Family history of diabetes mellitus (Father, Sibling): Father, Siblings  Family history of breast cancer in mother (Mother): Mother  Family history of colon cancer in mother (Mother): Mother      SOCIAL HISTORY:    [ ] Non-smoker  [ ] Smoker  [ ] Alcohol    Allergies    No Known Allergies    Intolerances    	    REVIEW OF SYSTEMS:  CONSTITUTIONAL: No fever, weight loss, + fatigue  EYES: No eye pain, visual disturbances, or discharge  ENMT:  No difficulty hearing, tinnitus, vertigo; No sinus or throat pain  NECK: No pain or stiffness  RESPIRATORY: No cough, wheezing, chills or hemoptysis; No Shortness of Breath  CARDIOVASCULAR: No chest pain, palpitations, passing out, dizziness, or leg swelling  GASTROINTESTINAL: No abdominal or epigastric pain. No nausea, vomiting, or hematemesis;+ diarrhea or constipation. No melena or hematochezia.  GENITOURINARY: No dysuria, frequency, hematuria, or incontinence  NEUROLOGICAL: No headaches, memory loss, loss of strength, numbness, or tremors  SKIN: No itching, burning, rashes, or lesions   LYMPH Nodes: No enlarged glands  ENDOCRINE: No heat or cold intolerance; No hair loss  MUSCULOSKELETAL: No joint pain or swelling; No muscle, back, or extremity pain  PSYCHIATRIC: No depression, anxiety, mood swings, or difficulty sleeping  HEME/LYMPH: No easy bruising, or bleeding gums  ALLERY AND IMMUNOLOGIC: No hives or eczema	    [ ] All others negative	  [ ] Unable to obtain    PHYSICAL EXAM:  T(C): 36.7 (04-17-19 @ 09:45), Max: 36.8 (04-16-19 @ 20:50)  HR: 92 (04-17-19 @ 09:45) (92 - 111)  BP: 95/70 (04-17-19 @ 09:45) (95/70 - 152/102)  RR: 18 (04-17-19 @ 09:45) (18 - 18)  SpO2: 97% (04-17-19 @ 09:45) (97% - 98%)  Wt(kg): --  I&O's Summary      Appearance: NAD	  HEENT:   Normal oral mucosa, PERRL, EOMI	  Lymphatic: No lymphadenopathy  Cardiovascular: Normal S1 S2, No JVD, No murmurs, No edema  Respiratory: decreased bs   Psychiatry: A & O x 3, Mood & affect appropriate  Gastrointestinal:  Soft, Non-tender, + BS	  Skin: No rashes, No ecchymoses, No cyanosis	  Neurologic: Non-focal  Extremities: Normal range of motion, No clubbing, cyanosis or edema  Vascular: Peripheral pulses palpable 2+ bilaterally    TELEMETRY: 	SR    ECG:  	Sinus tach  RADIOLOGY:  < from: CT Chest No Cont (04.17.19 @ 01:07) >    EXAM:  CT CHEST                            PROCEDURE DATE:  04/17/2019            INTERPRETATION:  CLINICAL INFORMATION: Cough and shortness of breath.   History of pericardial effusion.    COMPARISON: Chest CT from 5/16/2018.    PROCEDURE:   CT ofthe Chest was performed without intravenous contrast.  Sagittal and coronal reformats were performed.  MIP reformats were performed.    FINDINGS:    LUNGS AND LARGE AIRWAYS: Patent central airways.     Peribronchovascular and perilymphatic nodularities, scarring and   tractional bronchiectasis predominantly within the mid to lower lungs,   likely sequela of sarcoidosis are slightly increased    Stable nodular opacities near bilateral upper lobes. A reference 0.7 cm   nodule (2:32) is stable from the prior study.  PLEURA: No pleural effusion.  VESSELS: Coronary stents. Thoracic aorta and pulmonary artery normal in   diameter.  HEART: Tips of the leads of the right ICD within the right atrium and   right ventricle. Heart remains enlarged. Largepericardial effusion   measuring simple fluid.  MEDIASTINUM AND MANJULA: Extensive partially calcified mediastinal and   bilateral hilar lymph nodes, consistent with history of sarcoid.  CHEST WALL AND LOWER NECK: An AICD device overlies the right hemithorax,   with leads terminating in the right atrium and right ventricle.  VISUALIZED UPPER ABDOMEN: Within normal limits.  BONES: The bones are dense    IMPRESSION:     Large pericardial effusion measuring simple fluid    Slightly increased pulmonary sarcoidosis with extensive fibrotic changes   in the bilateral lower lobe and right middle lobe, as well as nonfibrotic   changes in the bilateral upper lobes.                DANIKA BRAVO M.D., RADIOLOGY RESIDENT  This document has been electronically signed.  BINA SANDOVAL M.D., ATTENDING RADIOLOGIST  This document has been electronically signed. Apr 17 2019 10:25AM                < end of copied text >    OTHER: 	  	  LABS:	 	    CARDIAC MARKERS:                                  11.5   4.07  )-----------( 225      ( 17 Apr 2019 09:41 )             38.8     04-17    135  |  98  |  44<H>  ----------------------------<  297<H>  4.4   |  26  |  1.51<H>    Ca    9.1      17 Apr 2019 05:11  Phos  3.2     04-17  Mg     1.6     04-17    TPro  6.6  /  Alb  2.9<L>  /  TBili  0.4  /  DBili  x   /  AST  15  /  ALT  13  /  AlkPhos  148<H>  04-17    proBNP:   Lipid Profile:   HgA1c: Hemoglobin A1C, Whole Blood: 12.2 % (04-17 @ 09:41)    TSH:

## 2019-04-17 NOTE — CONSULT NOTE ADULT - PROBLEM SELECTOR RECOMMENDATION 2
chronic for months   GI connie;  send stool studies   in view of Hx of sarcoidosis, GI autoimmune diseases need to be ruled out   diet control  monitor electrolytes

## 2019-04-18 ENCOUNTER — RESULT REVIEW (OUTPATIENT)
Age: 54
End: 2019-04-18

## 2019-04-18 LAB
ALBUMIN FLD-MCNC: 2.1 G/DL — SIGNIFICANT CHANGE UP
ANION GAP SERPL CALC-SCNC: 10 MMOL/L — SIGNIFICANT CHANGE UP (ref 5–17)
B PERT IGG+IGM PNL SER: CLEAR — SIGNIFICANT CHANGE UP
BUN SERPL-MCNC: 50 MG/DL — HIGH (ref 7–23)
CALCIUM SERPL-MCNC: 8.6 MG/DL — SIGNIFICANT CHANGE UP (ref 8.4–10.5)
CHLORIDE SERPL-SCNC: 95 MMOL/L — LOW (ref 96–108)
CO2 SERPL-SCNC: 26 MMOL/L — SIGNIFICANT CHANGE UP (ref 22–31)
COLOR FLD: YELLOW — SIGNIFICANT CHANGE UP
CREAT SERPL-MCNC: 1.33 MG/DL — HIGH (ref 0.5–1.3)
FERRITIN SERPL-MCNC: 155 NG/ML — HIGH (ref 15–150)
FLUID INTAKE SUBSTANCE CLASS: SIGNIFICANT CHANGE UP
FLUID SEGMENTED GRANULOCYTES: 1 % — SIGNIFICANT CHANGE UP
FOLATE SERPL-MCNC: 17.4 NG/ML — SIGNIFICANT CHANGE UP
GLUCOSE FLD-MCNC: 347 MG/DL — SIGNIFICANT CHANGE UP
GLUCOSE SERPL-MCNC: 510 MG/DL — CRITICAL HIGH (ref 70–99)
HCT VFR BLD CALC: 34.5 % — SIGNIFICANT CHANGE UP (ref 34.5–45)
HGB BLD-MCNC: 10.7 G/DL — LOW (ref 11.5–15.5)
IRON SATN MFR SERPL: 19 % — SIGNIFICANT CHANGE UP (ref 14–50)
IRON SATN MFR SERPL: 53 UG/DL — SIGNIFICANT CHANGE UP (ref 30–160)
LDH SERPL L TO P-CCNC: 173 U/L — SIGNIFICANT CHANGE UP
LYMPHOCYTES # FLD: 21 % — SIGNIFICANT CHANGE UP
MCHC RBC-ENTMCNC: 22 PG — LOW (ref 27–34)
MCHC RBC-ENTMCNC: 31 GM/DL — LOW (ref 32–36)
MCV RBC AUTO: 71.2 FL — LOW (ref 80–100)
MESOTHL CELL # FLD: 6 % — SIGNIFICANT CHANGE UP
MONOS+MACROS # FLD: 72 % — SIGNIFICANT CHANGE UP
PLATELET # BLD AUTO: 172 K/UL — SIGNIFICANT CHANGE UP (ref 150–400)
POTASSIUM SERPL-MCNC: 4.4 MMOL/L — SIGNIFICANT CHANGE UP (ref 3.5–5.3)
POTASSIUM SERPL-SCNC: 4.4 MMOL/L — SIGNIFICANT CHANGE UP (ref 3.5–5.3)
PROT FLD-MCNC: 3.8 G/DL — SIGNIFICANT CHANGE UP
RBC # BLD: 4.85 M/UL — SIGNIFICANT CHANGE UP (ref 3.8–5.2)
RBC # FLD: 14.1 % — SIGNIFICANT CHANGE UP (ref 10.3–14.5)
RCV VOL RI: 69 /UL — HIGH (ref 0–5)
SODIUM SERPL-SCNC: 131 MMOL/L — LOW (ref 135–145)
SPECIMEN SOURCE FLD: SIGNIFICANT CHANGE UP
TIBC SERPL-MCNC: 273 UG/DL — SIGNIFICANT CHANGE UP (ref 220–430)
TOTAL NUCLEATED CELL COUNT, BODY FLUID: 44 /UL — HIGH (ref 0–5)
TUBE TYPE: SIGNIFICANT CHANGE UP
UIBC SERPL-MCNC: 220 UG/DL — SIGNIFICANT CHANGE UP (ref 110–370)
VIT B12 SERPL-MCNC: 1166 PG/ML — SIGNIFICANT CHANGE UP (ref 232–1245)
WBC # BLD: 3.1 K/UL — LOW (ref 3.8–10.5)
WBC # FLD AUTO: 3.1 K/UL — LOW (ref 3.8–10.5)

## 2019-04-18 PROCEDURE — 33015: CPT

## 2019-04-18 PROCEDURE — 88112 CYTOPATH CELL ENHANCE TECH: CPT | Mod: 26

## 2019-04-18 PROCEDURE — 88305 TISSUE EXAM BY PATHOLOGIST: CPT | Mod: 26

## 2019-04-18 PROCEDURE — 99232 SBSQ HOSP IP/OBS MODERATE 35: CPT | Mod: 24

## 2019-04-18 PROCEDURE — 93289 INTERROG DEVICE EVAL HEART: CPT | Mod: 26

## 2019-04-18 PROCEDURE — 75989 ABSCESS DRAINAGE UNDER X-RAY: CPT | Mod: 26

## 2019-04-18 RX ORDER — INSULIN GLARGINE 100 [IU]/ML
20 INJECTION, SOLUTION SUBCUTANEOUS AT BEDTIME
Qty: 0 | Refills: 0 | Status: DISCONTINUED | OUTPATIENT
Start: 2019-04-18 | End: 2019-04-21

## 2019-04-18 RX ORDER — INSULIN LISPRO 100/ML
6 VIAL (ML) SUBCUTANEOUS
Qty: 0 | Refills: 0 | Status: DISCONTINUED | OUTPATIENT
Start: 2019-04-18 | End: 2019-04-21

## 2019-04-18 RX ORDER — INSULIN LISPRO 100/ML
3 VIAL (ML) SUBCUTANEOUS ONCE
Qty: 0 | Refills: 0 | Status: COMPLETED | OUTPATIENT
Start: 2019-04-18 | End: 2019-04-18

## 2019-04-18 RX ORDER — INSULIN LISPRO 100/ML
5 VIAL (ML) SUBCUTANEOUS ONCE
Qty: 0 | Refills: 0 | Status: DISCONTINUED | OUTPATIENT
Start: 2019-04-18 | End: 2019-04-18

## 2019-04-18 RX ORDER — ONDANSETRON 8 MG/1
4 TABLET, FILM COATED ORAL EVERY 4 HOURS
Qty: 0 | Refills: 0 | Status: DISCONTINUED | OUTPATIENT
Start: 2019-04-18 | End: 2019-04-18

## 2019-04-18 RX ORDER — FENTANYL CITRATE 50 UG/ML
25 INJECTION INTRAVENOUS
Qty: 0 | Refills: 0 | Status: DISCONTINUED | OUTPATIENT
Start: 2019-04-18 | End: 2019-04-18

## 2019-04-18 RX ADMIN — ATORVASTATIN CALCIUM 40 MILLIGRAM(S): 80 TABLET, FILM COATED ORAL at 21:23

## 2019-04-18 RX ADMIN — Medication 20 MILLIGRAM(S): at 05:01

## 2019-04-18 RX ADMIN — Medication 30 MILLILITER(S): at 02:29

## 2019-04-18 RX ADMIN — Medication 6: at 09:21

## 2019-04-18 RX ADMIN — Medication 20 MILLIGRAM(S): at 21:23

## 2019-04-18 RX ADMIN — Medication 25 MICROGRAM(S): at 05:01

## 2019-04-18 RX ADMIN — INSULIN GLARGINE 20 UNIT(S): 100 INJECTION, SOLUTION SUBCUTANEOUS at 21:53

## 2019-04-18 RX ADMIN — SIMETHICONE 80 MILLIGRAM(S): 80 TABLET, CHEWABLE ORAL at 00:03

## 2019-04-18 RX ADMIN — CARVEDILOL PHOSPHATE 6.25 MILLIGRAM(S): 80 CAPSULE, EXTENDED RELEASE ORAL at 17:47

## 2019-04-18 RX ADMIN — Medication 3 UNIT(S): at 09:23

## 2019-04-18 RX ADMIN — CARVEDILOL PHOSPHATE 6.25 MILLIGRAM(S): 80 CAPSULE, EXTENDED RELEASE ORAL at 05:01

## 2019-04-18 NOTE — PROGRESS NOTE ADULT - ASSESSMENT
Pt is a 52 yo female with ICM s/p ICD, sarcoid admitted with moderate to lare sized circumferential pericardial effusion. Also has been having persistent diarrhea.     Problem/Recommendation - 1:  Problem: Pericardial effusion. Recommendation: seen on Echo at outpatient office  card eval appreciated  IR pericardiac effusion drainage   patient is hemodynamically stable   CT chest noted.  BP monitoring     Problem/Recommendation - 2:  ·  Problem: Diarrhea.  Recommendation: chronic for months   GI eval appreciated   send stool studies   in view of Hx of sarcoidosis, GI autoimmune diseases need to be ruled out   diet control  monitor electrolytes.     Problem/Recommendation - 3:  ·  Problem: Sarcoidosis.  Recommendation: on prednisone.     Problem/Recommendation - 4:  ·  Problem: History of atrial fibrillation.  Recommendation: rate control  Hold AC for now  card F/U.     Problem/Recommendation - 5:  ·  Problem: CAD (coronary artery disease).  Recommendation: S/P PCI  Statin and AC.     Problem/Recommendation - 6:  Problem: Diabetes mellitus. Recommendation: sliding scale   elevate A1c   DM diet  Endo eval for BS management.    Problem/Recommendation - 7:  Problem: Prophylactic measure. Recommendation: DVT and Gi PPX.

## 2019-04-18 NOTE — PROGRESS NOTE ADULT - SUBJECTIVE AND OBJECTIVE BOX
Interventional Radiology Pre-Procedure Note    This is a 53y Female with PMH of HTN, DM, HLD, sarcoidosis on steroids, HFrEF , CAD s/p mLAD s/p stent placement 4/2017, cardiac thrombus on Xarelto (last dose on 4/15),  asthma /COPD on home O2, CHF s/p AICD who was admitted with large pericardial effusion. IR requested for pericardial drainage.     NPO since midnight  Anticoagulation: Xarelto held, last dose 4/15    PAST MEDICAL & SURGICAL HISTORY:  DM (diabetes mellitus)  CAD (coronary artery disease)  Sarcoidosis  Asthma with COPD  CHF, chronic  Mural thrombus of cardiac apex  History of atrial fibrillation  Thrombus: ? Thrombus in heart - on Xarelto  Hyperlipidemia  Anemia  Hypertension  Sarcoidosis  CHF (congestive heart failure): Stage III, on home O2 @ 2lpm  Diabetes  AICD (automatic cardioverter/defibrillator) present  History of repair of hip fracture  Ankle fracture  Ankle fracture: Left Ankle - with plate and screws placed  Hip deformity: left hip had a pin placed to hold bone in place after a fall     Vital Signs Last 24 Hrs  T(C): 36.3 (18 Apr 2019 04:15), Max: 36.4 (17 Apr 2019 16:39)  T(F): 97.4 (18 Apr 2019 04:15), Max: 97.5 (17 Apr 2019 16:39)  HR: 97 (18 Apr 2019 04:15) (90 - 97)  BP: 142/92 (18 Apr 2019 04:15) (131/89 - 143/93)  RR: 18 (18 Apr 2019 04:15) (17 - 18)  SpO2: 97% (18 Apr 2019 04:15) (95% - 100%)    Allergies: No Known Allergies    Physical Exam: Gen: NAD, A&Ox3    Labs:                         10.7   3.1   )-----------( 172      ( 18 Apr 2019 07:00 )             34.5     04-18    131<L>  |  95<L>  |  50<H>  ----------------------------<  510<HH>  4.4   |  26  |  1.33<H>    Ca    8.6      18 Apr 2019 07:03  Phos  3.2     04-17  Mg     1.6     04-17    TPro  6.6  /  Alb  2.9<L>  /  TBili  0.4  /  DBili  x   /  AST  15  /  ALT  13  /  AlkPhos  148<H>  04-17    PT/INR - ( 16 Apr 2019 18:54 )   PT: 14.4 sec;   INR: 1.24 ratio         PTT - ( 16 Apr 2019 18:54 )  PTT:34.2 sec    Plan is for pericardial drainage. Informed consent obtained. All questions and concerns have been addressed at this time.

## 2019-04-18 NOTE — CHART NOTE - NSCHARTNOTEFT_GEN_A_CORE
Vascular & Interventional Radiology Post-Procedure Note    Pre-Procedure Diagnosis: Pericardial effusion  Post-Procedure Diagnosis: Same as pre.  Indications for Procedure: Pericardial effusion    : Tessy  Assistant(s): n/a    Procedure Details/Findings: Large effusoin s/p 8F pigtail. 500cc aspirated  Access (if applicable): n/a    Complications: none  Estimated Blood Loss: Minimal  Specimen: sent for labs  Contrast: none  Sedation: Anesthesia  Patient Condition/Disposition: Stable, recovery 1 hour, then floor if stable.     Plan: tube management per cardiology service.

## 2019-04-18 NOTE — CHART NOTE - NSCHARTNOTEFT_GEN_A_CORE
Vascular & Interventional Radiology Pre-Procedure Note    Procedure Name: Pericardial drain    HPI: 53y Female with large effusion of unknown etiology.    Allergies:   Medications (Abx/Cardiac/Anticoagulation/Blood Products)  carvedilol: 6.25 milliGRAM(s) Oral (04-18 @ 05:01)    Data:  170.18  68  T(C): 36.3  HR: 97  BP: 142/92  RR: 18  SpO2: 97%    Exam  General: NAD  Chest: non labored breathing  Abdomen: n/a  Extremities: n/a    -WBC 3.1 / HgB 10.7 / Hct 34.5 / Plt 172  -Na 131 / Cl 95 / BUN 50 / Glucose 510  -K 4.4 / CO2 26 / Cr 1.33  -ALT -- / Alk Phos -- / T.Bili --  -INR1.24    Imaging: large effusion    Plan:   -53y Female presents for large pericardial effusion.  -Risks/Benefits/alternatives explained with the patient and/or healthcare proxy and witnessed informed consent obtained.

## 2019-04-18 NOTE — PROGRESS NOTE ADULT - PROBLEM SELECTOR PLAN 1
s/p 4/18 pericardial drain in IR, 500ml out  F/u pericardial fluid cultures/cytology  Monitor drainage, I & Os  Activity as tolerated  Care as per primary team

## 2019-04-18 NOTE — PROGRESS NOTE ADULT - SUBJECTIVE AND OBJECTIVE BOX
PEYMAN WATTS:[id number],   53yFemale followed for:  No Known Allergies    PAST MEDICAL & SURGICAL HISTORY:  DM (diabetes mellitus)  CAD (coronary artery disease)  Sarcoidosis  Asthma with COPD  CHF, chronic  Mural thrombus of cardiac apex  History of atrial fibrillation  Thrombus: ? Thrombus in heart - on Xarelto  Hyperlipidemia  Anemia  Hypertension  Sarcoidosis  CHF (congestive heart failure): Stage III, on home O2 @ 2lpm  Diabetes  AICD (automatic cardioverter/defibrillator) present  History of repair of hip fracture  Ankle fracture  Ankle fracture: Left Ankle - with plate and screws placed  Hip deformity: left hip had a pin placed to hold bone in place after a fall    FAMILY HISTORY:  FH: breast cancer: mother  Family hx of colon cancer: Mother  Family history of hypertension (Sibling): Siblings  Family history of diabetes mellitus (Father, Sibling): Father, Siblings  Family history of breast cancer in mother (Mother): Mother  Family history of colon cancer in mother (Mother): Mother    MEDICATIONS  (STANDING):  atorvastatin 40 milliGRAM(s) Oral at bedtime  carvedilol 6.25 milliGRAM(s) Oral every 12 hours  dextrose 5%. 1000 milliLiter(s) (50 mL/Hr) IV Continuous <Continuous>  dextrose 50% Injectable 12.5 Gram(s) IV Push once  dextrose 50% Injectable 25 Gram(s) IV Push once  dextrose 50% Injectable 25 Gram(s) IV Push once  insulin glargine Injectable (LANTUS) 20 Unit(s) SubCutaneous at bedtime  insulin lispro (HumaLOG) corrective regimen sliding scale   SubCutaneous three times a day before meals  insulin lispro (HumaLOG) corrective regimen sliding scale   SubCutaneous at bedtime  insulin lispro Injectable (HumaLOG) 6 Unit(s) SubCutaneous three times a day before meals  levothyroxine 25 MICROGram(s) Oral daily  predniSONE   Tablet 20 milliGRAM(s) Oral two times a day    MEDICATIONS  (PRN):  dextrose 40% Gel 15 Gram(s) Oral once PRN Blood Glucose LESS THAN 70 milliGRAM(s)/deciliter  glucagon  Injectable 1 milliGRAM(s) IntraMuscular once PRN Glucose LESS THAN 70 milligrams/deciliter      Vital Signs Last 24 Hrs  T(C): 36.3 (18 Apr 2019 04:15), Max: 36.4 (17 Apr 2019 16:39)  T(F): 97.4 (18 Apr 2019 04:15), Max: 97.5 (17 Apr 2019 16:39)  HR: 97 (18 Apr 2019 04:15) (90 - 97)  BP: 142/92 (18 Apr 2019 04:15) (109/75 - 143/93)  BP(mean): --  RR: 18 (18 Apr 2019 04:15) (17 - 18)  SpO2: 97% (18 Apr 2019 04:15) (95% - 100%)  nc/at  s1s2  cta  soft, nt, nd no guarding or rebound  no c/c/e    CBC Full  -  ( 18 Apr 2019 07:00 )  WBC Count : 3.1 K/uL  RBC Count : 4.85 M/uL  Hemoglobin : 10.7 g/dL  Hematocrit : 34.5 %  Platelet Count - Automated : 172 K/uL  Mean Cell Volume : 71.2 fl  Mean Cell Hemoglobin : 22.0 pg  Mean Cell Hemoglobin Concentration : 31.0 gm/dL  Auto Neutrophil # : x  Auto Lymphocyte # : x  Auto Monocyte # : x  Auto Eosinophil # : x  Auto Basophil # : x  Auto Neutrophil % : x  Auto Lymphocyte % : x  Auto Monocyte % : x  Auto Eosinophil % : x  Auto Basophil % : x    04-18    131<L>  |  95<L>  |  50<H>  ----------------------------<  510<HH>  4.4   |  26  |  1.33<H>    Ca    8.6      18 Apr 2019 07:03  Phos  3.2     04-17  Mg     1.6     04-17    TPro  6.6  /  Alb  2.9<L>  /  TBili  0.4  /  DBili  x   /  AST  15  /  ALT  13  /  AlkPhos  148<H>  04-17    PT/INR - ( 16 Apr 2019 18:54 )   PT: 14.4 sec;   INR: 1.24 ratio         PTT - ( 16 Apr 2019 18:54 )  PTT:34.2 sec

## 2019-04-18 NOTE — PROGRESS NOTE ADULT - SUBJECTIVE AND OBJECTIVE BOX
Patient is a 53y old  Female who presents with a chief complaint of sent here for pericardial effusion (18 Apr 2019 13:29)      Vital Signs Last 24 Hrs  T(C): 36.3 (04-18-19 @ 04:15), Max: 36.4 (04-17-19 @ 16:39)  T(F): 97.4 (04-18-19 @ 04:15), Max: 97.5 (04-17-19 @ 16:39)  HR: 97 (04-18-19 @ 04:15) (90 - 97)  BP: 142/92 (04-18-19 @ 04:15) (132/88 - 143/93)  RR: 18 (04-18-19 @ 04:15) (17 - 18)  SpO2: 97% (04-18-19 @ 04:15) (95% - 97%)            04-17-19 @ 07:01  -  04-18-19 @ 07:00  --------------------------------------------------------  IN: 720 mL / OUT: 200 mL / NET: 520 mL                            10.7   3.1   )-----------( 172      ( 18 Apr 2019 07:00 )             34.5     131<L>  |  95<L>  |  50<H>  ----------------------------<  510<HH>  4.4   |  26  |  1.33<H>          PHYSICAL EXAM  Neurology: A&Ox3, NAD, no gross deficits  CV : RRR+S1S2  Lungs: Respirations non-labored, B/L BS CTA  Abdomen: Soft, NT/ND, +BSx4Q  Extremities: B/L LE no edema    MEDICATIONS  atorvastatin 40 milliGRAM(s) Oral at bedtime  carvedilol 6.25 milliGRAM(s) Oral every 12 hours  dextrose 40% Gel 15 Gram(s) Oral once PRN  dextrose 5%. 1000 milliLiter(s) IV Continuous <Continuous>  dextrose 50% Injectable 12.5 Gram(s) IV Push once  dextrose 50% Injectable 25 Gram(s) IV Push once  dextrose 50% Injectable 25 Gram(s) IV Push once  fentaNYL    Injectable 25 MICROGram(s) IV Push every 5 minutes PRN  glucagon  Injectable 1 milliGRAM(s) IntraMuscular once PRN  insulin glargine Injectable (LANTUS) 20 Unit(s) SubCutaneous at bedtime  insulin lispro (HumaLOG) corrective regimen sliding scale   SubCutaneous three times a day before meals  insulin lispro (HumaLOG) corrective regimen sliding scale   SubCutaneous at bedtime  insulin lispro Injectable (HumaLOG) 6 Unit(s) SubCutaneous three times a day before meals  levothyroxine 25 MICROGram(s) Oral daily  ondansetron Injectable 4 milliGRAM(s) IV Push every 4 hours PRN  predniSONE   Tablet 20 milliGRAM(s) Oral two times a day

## 2019-04-18 NOTE — PROCEDURE NOTE - ADDITIONAL PROCEDURE DETAILS
1. ICD checked on 4/16/2019 but report not available.  2. Battery longevity: 10.5 years  3. Patient is not pacemaker dependent  4. No episodes since last check from 4/16/19

## 2019-04-18 NOTE — PROGRESS NOTE ADULT - ASSESSMENT
52 y/o Female with PMHx of HTN, DM, HLD, sarcoidosis on steroids, HFrEF , CAD s/p mLAD s/p stent placement 4/2017, cardiac thrombus on Xarelto asthma /COPD on home O2, CHF s/p AICD sent to ER from her cardiologist's office for recurrent moderate to large sized pericardial effusion seen on TTE. CT Chest with large pericardial effusion and Thoracic surgery consulted for further management. No clinical signs of tamponade. Pt was evaluated by Dr. Benito Franklin in May 2018 for pericardial effusion and deemed very high risk for any operative procedure at that time given her co-morbidities.

## 2019-04-18 NOTE — PROGRESS NOTE ADULT - ASSESSMENT
54 yo female with ICM s/p ICD, sarcoid admitted with moderate to lare sized circumferential pericardial effusion. Also has been having persistent diarrhea.

## 2019-04-18 NOTE — PROGRESS NOTE ADULT - SUBJECTIVE AND OBJECTIVE BOX
Subjective: Patient seen and examined. No new events except as noted.   no cp or sob     REVIEW OF SYSTEMS:    CONSTITUTIONAL: + weakness, fevers or chills  EYES/ENT: No visual changes;  No vertigo or throat pain   NECK: No pain or stiffness  RESPIRATORY: No cough, wheezing, hemoptysis; No shortness of breath  CARDIOVASCULAR: No chest pain or palpitations  GASTROINTESTINAL: No abdominal or epigastric pain. No nausea, vomiting, or hematemesis; No diarrhea or constipation. No melena or hematochezia.  GENITOURINARY: No dysuria, frequency or hematuria  NEUROLOGICAL: No numbness or weakness  SKIN: No itching, burning, rashes, or lesions   All other review of systems is negative unless indicated above.    MEDICATIONS:  MEDICATIONS  (STANDING):  atorvastatin 40 milliGRAM(s) Oral at bedtime  carvedilol 6.25 milliGRAM(s) Oral every 12 hours  dextrose 5%. 1000 milliLiter(s) (50 mL/Hr) IV Continuous <Continuous>  dextrose 50% Injectable 12.5 Gram(s) IV Push once  dextrose 50% Injectable 25 Gram(s) IV Push once  dextrose 50% Injectable 25 Gram(s) IV Push once  insulin glargine Injectable (LANTUS) 20 Unit(s) SubCutaneous at bedtime  insulin lispro (HumaLOG) corrective regimen sliding scale   SubCutaneous three times a day before meals  insulin lispro (HumaLOG) corrective regimen sliding scale   SubCutaneous at bedtime  insulin lispro Injectable (HumaLOG) 6 Unit(s) SubCutaneous three times a day before meals  levothyroxine 25 MICROGram(s) Oral daily  predniSONE   Tablet 20 milliGRAM(s) Oral two times a day      PHYSICAL EXAM:  T(C): 36.3 (04-18-19 @ 04:15), Max: 36.4 (04-17-19 @ 16:39)  HR: 97 (04-18-19 @ 04:15) (90 - 97)  BP: 142/92 (04-18-19 @ 04:15) (109/75 - 143/93)  RR: 18 (04-18-19 @ 04:15) (17 - 18)  SpO2: 97% (04-18-19 @ 04:15) (95% - 100%)  Wt(kg): --  I&O's Summary    17 Apr 2019 07:01  -  18 Apr 2019 07:00  --------------------------------------------------------  IN: 720 mL / OUT: 200 mL / NET: 520 mL          Appearance: NAD  HEENT:   Normal oral mucosa, PERRL, EOMI	  Lymphatic: No lymphadenopathy , no edema  Cardiovascular: Normal S1 S2, No JVD, No murmurs distant heart sounds   Respiratory: Lungs clear to auscultation, normal effort 	  Gastrointestinal:  Soft, Non-tender, + BS	  Skin: No rashes, No ecchymoses, No cyanosis, warm to touch  Musculoskeletal: Normal range of motion, normal strength  Psychiatry:  Mood & affect appropriate  Ext: No edema      LABS:    CARDIAC MARKERS:  CARDIAC MARKERS ( 16 Apr 2019 18:55 )  x     / x     / 77 U/L / x     / 3.5 ng/mL                                10.7   3.1   )-----------( 172      ( 18 Apr 2019 07:00 )             34.5     04-18    131<L>  |  95<L>  |  50<H>  ----------------------------<  510<HH>  4.4   |  26  |  1.33<H>    Ca    8.6      18 Apr 2019 07:03  Phos  3.2     04-17  Mg     1.6     04-17    TPro  6.6  /  Alb  2.9<L>  /  TBili  0.4  /  DBili  x   /  AST  15  /  ALT  13  /  AlkPhos  148<H>  04-17    proBNP:   Lipid Profile:   HgA1c:   TSH:             TELEMETRY: 	  SR/ST  ECG:  	  RADIOLOGY:   DIAGNOSTIC TESTING:  [ ] Echocardiogram:  [ ]  Catheterization:  [ ] Stress Test:    OTHER:

## 2019-04-18 NOTE — PROGRESS NOTE ADULT - SUBJECTIVE AND OBJECTIVE BOX
Subjective: Patient seen and examined. No new events except as noted.   Cont to have diarrhea   IR pericardiac effusion drainage today     REVIEW OF SYSTEMS:    CONSTITUTIONAL: No weakness, fevers or chills  EYES/ENT: No visual changes;  No vertigo or throat pain   NECK: No pain or stiffness  RESPIRATORY: No cough, wheezing, hemoptysis; No shortness of breath  CARDIOVASCULAR: No chest pain or palpitations  GASTROINTESTINAL: + diarrhea   GENITOURINARY: No dysuria, frequency or hematuria  NEUROLOGICAL: No numbness or weakness  SKIN: No itching, burning, rashes, or lesions   All other review of systems is negative unless indicated above.    MEDICATIONS:  MEDICATIONS  (STANDING):  atorvastatin 40 milliGRAM(s) Oral at bedtime  carvedilol 6.25 milliGRAM(s) Oral every 12 hours  dextrose 5%. 1000 milliLiter(s) (50 mL/Hr) IV Continuous <Continuous>  dextrose 50% Injectable 12.5 Gram(s) IV Push once  dextrose 50% Injectable 25 Gram(s) IV Push once  dextrose 50% Injectable 25 Gram(s) IV Push once  insulin glargine Injectable (LANTUS) 20 Unit(s) SubCutaneous at bedtime  insulin lispro (HumaLOG) corrective regimen sliding scale   SubCutaneous three times a day before meals  insulin lispro (HumaLOG) corrective regimen sliding scale   SubCutaneous at bedtime  insulin lispro Injectable (HumaLOG) 6 Unit(s) SubCutaneous three times a day before meals  levothyroxine 25 MICROGram(s) Oral daily  predniSONE   Tablet 20 milliGRAM(s) Oral two times a day      PHYSICAL EXAM:  T(C): 36.4 (04-18-19 @ 20:57), Max: 36.4 (04-18-19 @ 00:35)  HR: 107 (04-18-19 @ 20:57) (92 - 107)  BP: 109/73 (04-18-19 @ 20:57) (109/73 - 144/95)  RR: 17 (04-18-19 @ 20:57) (17 - 18)  SpO2: 100% (04-18-19 @ 20:57) (96% - 100%)  Wt(kg): --  I&O's Summary    17 Apr 2019 07:01  -  18 Apr 2019 07:00  --------------------------------------------------------  IN: 720 mL / OUT: 200 mL / NET: 520 mL    18 Apr 2019 07:01  -  18 Apr 2019 21:06  --------------------------------------------------------  IN: 0 mL / OUT: 63 mL / NET: -63 mL      Height (cm): 170.18 (04-18 @ 13:34)  Weight (kg): 68 (04-18 @ 13:34)  BMI (kg/m2): 23.5 (04-18 @ 13:34)  BSA (m2): 1.79 (04-18 @ 13:34)    Appearance: Normal	  HEENT:   Normal oral mucosa, PERRL, EOMI	  Lymphatic: No lymphadenopathy , no edema  Cardiovascular: Normal S1 S2, No JVD, No murmurs , Peripheral pulses palpable 2+ bilaterally  Respiratory: Lungs clear to auscultation, normal effort 	  Gastrointestinal:  Soft, Non-tender, + BS	  Skin: No rashes, No ecchymoses, No cyanosis, warm to touch  Musculoskeletal: Normal range of motion, normal strength  Psychiatry:  Mood & affect appropriate  Ext: No edema      All labs, Imaging and EKGs personally reviewed                             10.7   3.1   )-----------( 172      ( 18 Apr 2019 07:00 )             34.5               04-18    131<L>  |  95<L>  |  50<H>  ----------------------------<  510<HH>  4.4   |  26  |  1.33<H>    Ca    8.6      18 Apr 2019 07:03  Phos  3.2     04-17  Mg     1.6     04-17    TPro  6.6  /  Alb  2.9<L>  /  TBili  0.4  /  DBili  x   /  AST  15  /  ALT  13  /  AlkPhos  148<H>  04-17         < from: CT Chest No Cont (04.17.19 @ 01:07) >  IMPRESSION:     Large pericardial effusion measuring simple fluid    Slightly increased pulmonary sarcoidosis with extensive fibrotic changes   in the bilateral lower lobe and right middle lobe, as well as nonfibrotic   changes in the bilateral upper lobes.

## 2019-04-19 LAB
ANION GAP SERPL CALC-SCNC: 11 MMOL/L — SIGNIFICANT CHANGE UP (ref 5–17)
BUN SERPL-MCNC: 57 MG/DL — HIGH (ref 7–23)
CALCIUM SERPL-MCNC: 8.7 MG/DL — SIGNIFICANT CHANGE UP (ref 8.4–10.5)
CHLORIDE SERPL-SCNC: 99 MMOL/L — SIGNIFICANT CHANGE UP (ref 96–108)
CO2 SERPL-SCNC: 23 MMOL/L — SIGNIFICANT CHANGE UP (ref 22–31)
CREAT SERPL-MCNC: 1.53 MG/DL — HIGH (ref 0.5–1.3)
GLUCOSE SERPL-MCNC: 347 MG/DL — HIGH (ref 70–99)
GRAM STN FLD: SIGNIFICANT CHANGE UP
HCT VFR BLD CALC: 38.6 % — SIGNIFICANT CHANGE UP (ref 34.5–45)
HGB BLD-MCNC: 11.4 G/DL — LOW (ref 11.5–15.5)
MCHC RBC-ENTMCNC: 20.8 PG — LOW (ref 27–34)
MCHC RBC-ENTMCNC: 29.5 GM/DL — LOW (ref 32–36)
MCV RBC AUTO: 70.3 FL — LOW (ref 80–100)
PLATELET # BLD AUTO: 225 K/UL — SIGNIFICANT CHANGE UP (ref 150–400)
POTASSIUM SERPL-MCNC: 5.1 MMOL/L — SIGNIFICANT CHANGE UP (ref 3.5–5.3)
POTASSIUM SERPL-SCNC: 5.1 MMOL/L — SIGNIFICANT CHANGE UP (ref 3.5–5.3)
RBC # BLD: 5.49 M/UL — HIGH (ref 3.8–5.2)
RBC # FLD: 15.2 % — HIGH (ref 10.3–14.5)
SODIUM SERPL-SCNC: 133 MMOL/L — LOW (ref 135–145)
SPECIMEN SOURCE: SIGNIFICANT CHANGE UP
WBC # BLD: 9.62 K/UL — SIGNIFICANT CHANGE UP (ref 3.8–10.5)
WBC # FLD AUTO: 9.62 K/UL — SIGNIFICANT CHANGE UP (ref 3.8–10.5)

## 2019-04-19 PROCEDURE — 93308 TTE F-UP OR LMTD: CPT | Mod: 26

## 2019-04-19 PROCEDURE — 93321 DOPPLER ECHO F-UP/LMTD STD: CPT | Mod: 26

## 2019-04-19 RX ORDER — CARVEDILOL PHOSPHATE 80 MG/1
1 CAPSULE, EXTENDED RELEASE ORAL
Qty: 0 | Refills: 0 | COMMUNITY

## 2019-04-19 RX ORDER — PHENYLEPHRINE-SHARK LIVER OIL-MINERAL OIL-PETROLATUM RECTAL OINTMENT
1 OINTMENT (GRAM) RECTAL
Qty: 0 | Refills: 0 | Status: DISCONTINUED | OUTPATIENT
Start: 2019-04-19 | End: 2019-05-02

## 2019-04-19 RX ORDER — INSULIN LISPRO 100/ML
4 VIAL (ML) SUBCUTANEOUS
Qty: 0 | Refills: 0 | COMMUNITY

## 2019-04-19 RX ORDER — INSULIN GLARGINE 100 [IU]/ML
20 INJECTION, SOLUTION SUBCUTANEOUS
Qty: 0 | Refills: 0 | COMMUNITY

## 2019-04-19 RX ORDER — ROSUVASTATIN CALCIUM 5 MG/1
1 TABLET ORAL
Qty: 0 | Refills: 0 | COMMUNITY

## 2019-04-19 RX ORDER — LEVOTHYROXINE SODIUM 125 MCG
1 TABLET ORAL
Qty: 0 | Refills: 0 | COMMUNITY

## 2019-04-19 RX ORDER — RIVAROXABAN 15 MG-20MG
1 KIT ORAL
Qty: 0 | Refills: 0 | COMMUNITY

## 2019-04-19 RX ORDER — ALBUTEROL 90 UG/1
2 AEROSOL, METERED ORAL EVERY 6 HOURS
Qty: 0 | Refills: 0 | Status: DISCONTINUED | OUTPATIENT
Start: 2019-04-19 | End: 2019-05-02

## 2019-04-19 RX ORDER — FLUTICASONE PROPIONATE 50 MCG
1 SPRAY, SUSPENSION NASAL
Qty: 0 | Refills: 0 | Status: DISCONTINUED | OUTPATIENT
Start: 2019-04-19 | End: 2019-05-02

## 2019-04-19 RX ORDER — ASPIRIN/CALCIUM CARB/MAGNESIUM 324 MG
1 TABLET ORAL
Qty: 0 | Refills: 0 | COMMUNITY

## 2019-04-19 RX ORDER — MOMETASONE FUROATE 220 UG/1
1 INHALANT RESPIRATORY (INHALATION)
Qty: 0 | Refills: 0 | Status: DISCONTINUED | OUTPATIENT
Start: 2019-04-19 | End: 2019-05-02

## 2019-04-19 RX ORDER — FERROUS SULFATE 325(65) MG
1 TABLET ORAL
Qty: 0 | Refills: 0 | COMMUNITY

## 2019-04-19 RX ADMIN — Medication 20 MILLIGRAM(S): at 11:29

## 2019-04-19 RX ADMIN — Medication 6 UNIT(S): at 12:42

## 2019-04-19 RX ADMIN — Medication 3: at 08:00

## 2019-04-19 RX ADMIN — MOMETASONE FUROATE 1 PUFF(S): 220 INHALANT RESPIRATORY (INHALATION) at 22:14

## 2019-04-19 RX ADMIN — Medication 1: at 12:43

## 2019-04-19 RX ADMIN — Medication 25 MICROGRAM(S): at 04:50

## 2019-04-19 RX ADMIN — Medication 6 UNIT(S): at 18:20

## 2019-04-19 RX ADMIN — ATORVASTATIN CALCIUM 40 MILLIGRAM(S): 80 TABLET, FILM COATED ORAL at 22:15

## 2019-04-19 RX ADMIN — Medication 1: at 18:20

## 2019-04-19 RX ADMIN — CARVEDILOL PHOSPHATE 6.25 MILLIGRAM(S): 80 CAPSULE, EXTENDED RELEASE ORAL at 18:20

## 2019-04-19 RX ADMIN — CARVEDILOL PHOSPHATE 6.25 MILLIGRAM(S): 80 CAPSULE, EXTENDED RELEASE ORAL at 04:50

## 2019-04-19 RX ADMIN — Medication 20 MILLIGRAM(S): at 22:15

## 2019-04-19 RX ADMIN — Medication 6 UNIT(S): at 08:00

## 2019-04-19 RX ADMIN — INSULIN GLARGINE 20 UNIT(S): 100 INJECTION, SOLUTION SUBCUTANEOUS at 22:15

## 2019-04-19 NOTE — PROGRESS NOTE ADULT - ASSESSMENT
Pt is a 54 yo female with ICM s/p ICD, sarcoid admitted with moderate to lare sized circumferential pericardial effusion. Also has been having persistent diarrhea.     Problem/Recommendation - 1:  Problem: Pericardial effusion. Recommendation: seen on Echo at outpatient office  card eval appreciated  IR pericardiac effusion drainage, S/P pericardiac drain   patient is hemodynamically stable   CT chest noted.  BP monitoring     Problem/Recommendation - 2:  ·  Problem: Diarrhea.  Recommendation: chronic for months   GI eval appreciated  send stool studies   in view of Hx of sarcoidosis, GI autoimmune diseases need to be ruled out   diet control  monitor electrolytes.     Problem/Recommendation - 3:  ·  Problem: Sarcoidosis.  Recommendation: on prednisone.     Problem/Recommendation - 4:  ·  Problem: History of atrial fibrillation.  Recommendation: rate control  Hold AC for now  card F/U.     Problem/Recommendation - 5:  ·  Problem: CAD (coronary artery disease).  Recommendation: S/P PCI  Statin and AC.     Problem/Recommendation - 6:  Problem: Diabetes mellitus. Recommendation: sliding scale   elevate A1c   DM diet  Endo eval for BS management.    Problem/Recommendation - 7:  Problem: Prophylactic measure. Recommendation: DVT and Gi PPX.

## 2019-04-19 NOTE — PROGRESS NOTE ADULT - ASSESSMENT
54 y/o Female with PMHx of HTN, DM, HLD, sarcoidosis on steroids, HFrEF , CAD s/p mLAD s/p stent placement 4/2017, cardiac thrombus on Xarelto asthma /COPD on home O2, CHF s/p AICD sent to ER from her cardiologist's office for recurrent moderate to large sized pericardial effusion seen on TTE. CT Chest with large pericardial effusion and Thoracic surgery consulted for further management. No clinical signs of tamponade. Pt was evaluated by Dr. Benito Franklin in May 2018 for pericardial effusion and deemed very high risk for any operative procedure at that time given her co-morbidities. 54 y/o Female with PMHx of HTN, DM, HLD, sarcoidosis on steroids, HFrEF , CAD s/p mLAD s/p stent placement 4/2017, cardiac thrombus on Xarelto asthma /COPD on home O2, CHF s/p AICD sent to ER from her cardiologist's office for recurrent moderate to large sized pericardial effusion seen on TTE. CT Chest with large pericardial effusion and Thoracic surgery consulted for further management. No clinical signs of tamponade. Pt was evaluated by Dr. Benito Franklin in May 2018 for pericardial effusion and deemed very high risk for any operative procedure at that time given her co-morbidities.    4/17 CT Chest, Large pericardial effusion, no tamponade physiology  4/18 IR Drain 500 ml off, effusion culture no growth.   4/19 VSS, pericardial tube draining, hemodynamically stable. 52 y/o Female with PMHx of HTN, DM, HLD, sarcoidosis on steroids, HFrEF , CAD s/p mLAD s/p stent placement 4/2017, cardiac thrombus on Xarelto asthma /COPD on home O2, CHF s/p AICD sent to ER from her cardiologist's office for recurrent moderate to large sized pericardial effusion seen on TTE. CT Chest with large pericardial effusion and Thoracic surgery consulted for further management. No clinical signs of tamponade. Pt was evaluated by Dr. Benito Franklin in May 2018 for pericardial effusion and deemed very high risk for any operative procedure at that time given her co-morbidities.    4/17 CT Chest, Large pericardial effusion, no tamponade physiology  4/18 IR Drain 500 ml off, effusion culture no growth.   4/19 VSS, pericardial tube draining, 200 ml serous drainage, hemodynamically stable. Check TTE in AM

## 2019-04-19 NOTE — PHARMACOTHERAPY INTERVENTION NOTE - COMMENTS
Confirmed home medications with patient and pharmacy, updated in Outpatient Medication Review. Per pharmacy patient has not filled many meds in some time, patient confirmed has not been taking all of her medications due to inability to get to pharmacy and problems refilling prescriptions. Would like to receive medications from [facility] at discharge this admission, and plans to change pharmacies to one that can deliver to her house - advised that hospital prescriptions do not have refills and will need new prescriptions from PMD after discharge, patient exhibited understanding.    Medication reconciliation communicated with NP - at home patient on Flovent 220 mcg BID (nonformulary, can use mometasone inhaler inpatient), Ventolin inhaler PRN, azelastine nasal spray (can use formulary Flonase); prescribed aspirin 81 mg but has not been taking; on furosemide 80 mg BID and metolazone 5 mg at night.    Mara Morales, PharmD   [phone]

## 2019-04-19 NOTE — PROGRESS NOTE ADULT - ASSESSMENT
52 yo female with ICM s/p ICD, sarcoid admitted with moderate to lare sized circumferential pericardial effusion. Also has been having persistent diarrhea.

## 2019-04-19 NOTE — PROGRESS NOTE ADULT - SUBJECTIVE AND OBJECTIVE BOX
Patient is a 53y old  Female who presents with a chief complaint of sent here for pericardial effusion (19 Apr 2019 15:07)      SUBJECTIVE: "I feel ok "    Vital Signs Last 24 Hrs  T(C): 36.3 (04-19-19 @ 12:17), Max: 37.8 (04-19-19 @ 04:28)  T(F): 97.4 (04-19-19 @ 12:17), Max: 100 (04-19-19 @ 04:28)  HR: 105 (04-19-19 @ 12:17) (105 - 116)  BP: 96/54 (04-19-19 @ 12:17) (96/54 - 114/74)  RR: 18 (04-19-19 @ 12:17) (16 - 18)  SpO2: 96% (04-19-19 @ 12:17) (96% - 100%)                04-18-19 @ 07:01  -  04-19-19 @ 07:00  --------------------------------------------------------  IN: 300 mL / OUT: 398 mL / NET: -98 mL    04-19-19 @ 07:01  -  04-19-19 @ 19:36  --------------------------------------------------------  IN: 240 mL / OUT: 0 mL / NET: 240 mL        Daily     Daily                           11.4   9.62  )-----------( 225      ( 19 Apr 2019 09:40 )             38.6     04-19    133<L>  |  99  |  57<H>  ----------------------------<  347<H>  5.1   |  23  |  1.53<H>    Ca    8.7      19 Apr 2019 05:35            PHYSICAL EXAM  Neurology: A&Ox3, NAD, no gross deficits  CV : RRR+S1S2  Lungs: Respirations non-labored, B/L BS  Abdomen: Soft, NT/ND, +BSx4Q  Extremities: B/L LE edema, negative calf tenderness, +PP           CHEST TUBES:  pericardial drain to water seal               MEDICATIONS  ALBUTerol    90 MICROgram(s) HFA Inhaler 2 Puff(s) Inhalation every 6 hours PRN  atorvastatin 40 milliGRAM(s) Oral at bedtime  carvedilol 6.25 milliGRAM(s) Oral every 12 hours  dextrose 40% Gel 15 Gram(s) Oral once PRN  dextrose 5%. 1000 milliLiter(s) IV Continuous <Continuous>  dextrose 50% Injectable 12.5 Gram(s) IV Push once  dextrose 50% Injectable 25 Gram(s) IV Push once  dextrose 50% Injectable 25 Gram(s) IV Push once  fluticasone propionate 50 MICROgram(s)/spray Nasal Spray 1 Spray(s) Both Nostrils two times a day  glucagon  Injectable 1 milliGRAM(s) IntraMuscular once PRN  insulin glargine Injectable (LANTUS) 20 Unit(s) SubCutaneous at bedtime  insulin lispro (HumaLOG) corrective regimen sliding scale   SubCutaneous three times a day before meals  insulin lispro (HumaLOG) corrective regimen sliding scale   SubCutaneous at bedtime  insulin lispro Injectable (HumaLOG) 6 Unit(s) SubCutaneous three times a day before meals  levothyroxine 25 MICROGram(s) Oral daily  mometasone 220 MICROgram(s) Inhaler 1 Puff(s) Inhalation two times a day  predniSONE   Tablet 20 milliGRAM(s) Oral two times a day

## 2019-04-19 NOTE — PROGRESS NOTE ADULT - ASSESSMENT
s/p drianage pericardial effusion, no diarrhea so no stool studies available  stool studies as ordered if has diarrhea  follow clinical course, plan per medical team

## 2019-04-19 NOTE — PROGRESS NOTE ADULT - SUBJECTIVE AND OBJECTIVE BOX
Subjective: Patient seen and examined. No new events except as noted.   Cont to have diarrhea   pericardiac drain intact and draining well     REVIEW OF SYSTEMS:    CONSTITUTIONAL: No weakness, fevers or chills  EYES/ENT: No visual changes;  No vertigo or throat pain   NECK: No pain or stiffness  RESPIRATORY: No cough, wheezing, hemoptysis; No shortness of breath  CARDIOVASCULAR: No chest pain or palpitations  GASTROINTESTINAL: Diarrhea   GENITOURINARY: No dysuria, frequency or hematuria  NEUROLOGICAL: No numbness or weakness  SKIN: No itching, burning, rashes, or lesions   All other review of systems is negative unless indicated above.    MEDICATIONS:  MEDICATIONS  (STANDING):  atorvastatin 40 milliGRAM(s) Oral at bedtime  carvedilol 6.25 milliGRAM(s) Oral every 12 hours  dextrose 5%. 1000 milliLiter(s) (50 mL/Hr) IV Continuous <Continuous>  dextrose 50% Injectable 12.5 Gram(s) IV Push once  dextrose 50% Injectable 25 Gram(s) IV Push once  dextrose 50% Injectable 25 Gram(s) IV Push once  fluticasone propionate 50 MICROgram(s)/spray Nasal Spray 1 Spray(s) Both Nostrils two times a day  insulin glargine Injectable (LANTUS) 20 Unit(s) SubCutaneous at bedtime  insulin lispro (HumaLOG) corrective regimen sliding scale   SubCutaneous three times a day before meals  insulin lispro (HumaLOG) corrective regimen sliding scale   SubCutaneous at bedtime  insulin lispro Injectable (HumaLOG) 6 Unit(s) SubCutaneous three times a day before meals  levothyroxine 25 MICROGram(s) Oral daily  mometasone 220 MICROgram(s) Inhaler 1 Puff(s) Inhalation two times a day  predniSONE   Tablet 20 milliGRAM(s) Oral two times a day      PHYSICAL EXAM:  T(C): 36.3 (04-19-19 @ 12:17), Max: 37.8 (04-19-19 @ 04:28)  HR: 105 (04-19-19 @ 12:17) (102 - 116)  BP: 96/54 (04-19-19 @ 12:17) (96/54 - 144/95)  RR: 18 (04-19-19 @ 12:17) (16 - 18)  SpO2: 96% (04-19-19 @ 12:17) (96% - 100%)  Wt(kg): --  I&O's Summary    18 Apr 2019 07:01  -  19 Apr 2019 07:00  --------------------------------------------------------  IN: 300 mL / OUT: 398 mL / NET: -98 mL    19 Apr 2019 07:01  -  19 Apr 2019 15:07  --------------------------------------------------------  IN: 240 mL / OUT: 0 mL / NET: 240 mL          Appearance: Normal	  HEENT:   Normal oral mucosa  Lymphatic: No lymphadenopathy , no edema  Cardiovascular: Normal S1 S2, No JVD, No murmurs , Peripheral pulses palpable 2+ bilaterally  Respiratory: Lungs clear to auscultation, normal effort, percicardiac drain 	  Gastrointestinal:  Soft, Non-tender, + BS	  Skin: No rashes, No ecchymoses, No cyanosis, warm to touch  Musculoskeletal: Normal range of motion, normal strength  Psychiatry:  Mood & affect appropriate  Ext: No edema      All labs, Imaging and EKGs personally reviewed                           11.4   9.62  )-----------( 225      ( 19 Apr 2019 09:40 )             38.6               04-19    133<L>  |  99  |  57<H>  ----------------------------<  347<H>  5.1   |  23  |  1.53<H>    Ca    8.7      19 Apr 2019 05:35

## 2019-04-19 NOTE — PROGRESS NOTE ADULT - SUBJECTIVE AND OBJECTIVE BOX
Subjective: Patient seen and examined. No new events except as noted.   s/p pericardiac drain   feels weak     REVIEW OF SYSTEMS:    CONSTITUTIONAL: + weakness, fevers or chills  EYES/ENT: No visual changes;  No vertigo or throat pain   NECK: No pain or stiffness  RESPIRATORY: No cough, wheezing, hemoptysis; No shortness of breath  CARDIOVASCULAR: No chest pain or palpitations  GASTROINTESTINAL: No abdominal or epigastric pain. No nausea, vomiting, or hematemesis; No diarrhea or constipation. No melena or hematochezia.  GENITOURINARY: No dysuria, frequency or hematuria  NEUROLOGICAL: No numbness or weakness  SKIN: No itching, burning, rashes, or lesions   All other review of systems is negative unless indicated above.    MEDICATIONS:  MEDICATIONS  (STANDING):  atorvastatin 40 milliGRAM(s) Oral at bedtime  carvedilol 6.25 milliGRAM(s) Oral every 12 hours  dextrose 5%. 1000 milliLiter(s) (50 mL/Hr) IV Continuous <Continuous>  dextrose 50% Injectable 12.5 Gram(s) IV Push once  dextrose 50% Injectable 25 Gram(s) IV Push once  dextrose 50% Injectable 25 Gram(s) IV Push once  insulin glargine Injectable (LANTUS) 20 Unit(s) SubCutaneous at bedtime  insulin lispro (HumaLOG) corrective regimen sliding scale   SubCutaneous three times a day before meals  insulin lispro (HumaLOG) corrective regimen sliding scale   SubCutaneous at bedtime  insulin lispro Injectable (HumaLOG) 6 Unit(s) SubCutaneous three times a day before meals  levothyroxine 25 MICROGram(s) Oral daily  predniSONE   Tablet 20 milliGRAM(s) Oral two times a day      PHYSICAL EXAM:  T(C): 37.8 (04-19-19 @ 04:28), Max: 37.8 (04-19-19 @ 04:28)  HR: 116 (04-19-19 @ 04:28) (102 - 116)  BP: 114/74 (04-19-19 @ 04:28) (109/73 - 144/95)  RR: 16 (04-19-19 @ 04:28) (16 - 18)  SpO2: 100% (04-19-19 @ 04:28) (100% - 100%)  Wt(kg): --  I&O's Summary    18 Apr 2019 07:01  -  19 Apr 2019 07:00  --------------------------------------------------------  IN: 300 mL / OUT: 398 mL / NET: -98 mL      Height (cm): 170.18 (04-18 @ 13:34)  Weight (kg): 68 (04-18 @ 13:34)  BMI (kg/m2): 23.5 (04-18 @ 13:34)  BSA (m2): 1.79 (04-18 @ 13:34)    Appearance: NAD  HEENT:   Normal oral mucosa, PERRL, EOMI	  Lymphatic: No lymphadenopathy , + pericardiall drain   Cardiovascular: Normal S1 S2, No JVD, No murmurs , Peripheral pulses palpable 2+ bilaterally  Respiratory: Lungs clear to auscultation, normal effort 	  Gastrointestinal:  Soft, Non-tender, + BS	  Skin: No rashes, No ecchymoses, No cyanosis, warm to touch  Musculoskeletal: Normal range of motion, normal strength  Psychiatry:  Mood & affect appropriate  Ext: No edema      LABS:    CARDIAC MARKERS:  CARDIAC MARKERS ( 16 Apr 2019 18:55 )  x     / x     / 77 U/L / x     / 3.5 ng/mL                                10.7   3.1   )-----------( 172      ( 18 Apr 2019 07:00 )             34.5     04-19    133<L>  |  99  |  57<H>  ----------------------------<  347<H>  5.1   |  23  |  1.53<H>    Ca    8.7      19 Apr 2019 05:35      proBNP:   Lipid Profile:   HgA1c:   TSH:             TELEMETRY: 	SR    ECG:  	  RADIOLOGY:   < from: Xray Chest 1 View AP/PA (04.16.19 @ 19:16) >    EXAM:  XR CHEST AP OR PA 1V                            PROCEDURE DATE:  04/16/2019            INTERPRETATION:  CLINICAL INFORMATION: Shortness of breath.    TECHNIQUE: Single frontal view of the chest.    COMPARISON: None.    FINDINGS:   Left-sidedAICD.  Left pleural effusion and right basilar linear atelectasis.  The heart size is enlarged.   The visualized osseous structures are unremarkable.    IMPRESSION:     Left pleural effusion.                VIRAL TORO M.D., RADIOLOGY RESIDENT  This document has been electronically signed.  ROBYN DEL CID M.D., ATTENDING RADIOLOGIST  This document has been electronically signed. Apr 17 2019 10:05AM                < end of copied text >    DIAGNOSTIC TESTING:  [ ] Echocardiogram:  [ ]  Catheterization:  [ ] Stress Test:    OTHER:

## 2019-04-19 NOTE — PROGRESS NOTE ADULT - PROBLEM SELECTOR PLAN 1
s/p 4/18 pericardial drain in IR, 500ml out  F/u pericardial fluid cultures/cytology  Monitor drainage, I & Os  Activity as tolerated  Care as per primary team s/p 4/18 pericardial drain in IR, 500ml out  F/u pericardial fluid cultures/cytology  Monitor drainage, I & Os  no signs of tamponade physiology  Activity as tolerated  Care as per primary team s/p 4/18 pericardial drain in IR, 500ml out  F/u pericardial fluid cultures/cytology  Monitor drainage, I & Os  no signs of tamponade physiology  Activity as tolerated  Care as per primary team  Check TTE in AM

## 2019-04-19 NOTE — PROGRESS NOTE ADULT - SUBJECTIVE AND OBJECTIVE BOX
INTERVAL HPI/OVERNIGHT EVENTS:    MEDICATIONS  (STANDING):  atorvastatin 40 milliGRAM(s) Oral at bedtime  carvedilol 6.25 milliGRAM(s) Oral every 12 hours  dextrose 5%. 1000 milliLiter(s) (50 mL/Hr) IV Continuous <Continuous>  dextrose 50% Injectable 12.5 Gram(s) IV Push once  dextrose 50% Injectable 25 Gram(s) IV Push once  dextrose 50% Injectable 25 Gram(s) IV Push once  insulin glargine Injectable (LANTUS) 20 Unit(s) SubCutaneous at bedtime  insulin lispro (HumaLOG) corrective regimen sliding scale   SubCutaneous three times a day before meals  insulin lispro (HumaLOG) corrective regimen sliding scale   SubCutaneous at bedtime  insulin lispro Injectable (HumaLOG) 6 Unit(s) SubCutaneous three times a day before meals  levothyroxine 25 MICROGram(s) Oral daily  predniSONE   Tablet 20 milliGRAM(s) Oral two times a day    MEDICATIONS  (PRN):  dextrose 40% Gel 15 Gram(s) Oral once PRN Blood Glucose LESS THAN 70 milliGRAM(s)/deciliter  glucagon  Injectable 1 milliGRAM(s) IntraMuscular once PRN Glucose LESS THAN 70 milligrams/deciliter      Allergies    No Known Allergies    Intolerances            PHYSICAL EXAM:   Vital Signs:  Vital Signs Last 24 Hrs  T(C): 37.8 (19 Apr 2019 04:28), Max: 37.8 (19 Apr 2019 04:28)  T(F): 100 (19 Apr 2019 04:28), Max: 100 (19 Apr 2019 04:28)  HR: 116 (19 Apr 2019 04:28) (102 - 116)  BP: 114/74 (19 Apr 2019 04:28) (109/73 - 144/95)  BP(mean): --  RR: 16 (19 Apr 2019 04:28) (16 - 18)  SpO2: 100% (19 Apr 2019 04:28) (100% - 100%)  Daily Height in cm: 170.18 (18 Apr 2019 13:34)    Daily     GENERAL:  no distress  HEENT:  NC/AT,  anicteric  CHEST:   no increased effort, breath sounds clear  HEART:  Regular rhythm  ABDOMEN:  Soft, non-tender, non-distended, normoactive bowel sounds,  no masses ,no hepato-splenomegaly, no signs of chronic liver disease  EXTEREMITIES:  no cyanosis      LABS:                        10.7   3.1   )-----------( 172      ( 18 Apr 2019 07:00 )             34.5     04-19    133<L>  |  99  |  57<H>  ----------------------------<  347<H>  5.1   |  23  |  1.53<H>    Ca    8.7      19 Apr 2019 05:35            RADIOLOGY & ADDITIONAL TESTS:

## 2019-04-20 LAB
AMYLASE P1 CFR SERPL: 49 U/L — SIGNIFICANT CHANGE UP (ref 25–125)
C DIFF GDH STL QL: NEGATIVE — SIGNIFICANT CHANGE UP
C DIFF GDH STL QL: SIGNIFICANT CHANGE UP
LIDOCAIN IGE QN: 58 U/L — SIGNIFICANT CHANGE UP (ref 7–60)

## 2019-04-20 PROCEDURE — 99232 SBSQ HOSP IP/OBS MODERATE 35: CPT | Mod: 24

## 2019-04-20 RX ORDER — INSULIN LISPRO 100/ML
VIAL (ML) SUBCUTANEOUS AT BEDTIME
Qty: 0 | Refills: 0 | Status: DISCONTINUED | OUTPATIENT
Start: 2019-04-20 | End: 2019-05-02

## 2019-04-20 RX ORDER — INSULIN LISPRO 100/ML
VIAL (ML) SUBCUTANEOUS
Qty: 0 | Refills: 0 | Status: DISCONTINUED | OUTPATIENT
Start: 2019-04-20 | End: 2019-05-02

## 2019-04-20 RX ADMIN — Medication 6 UNIT(S): at 11:46

## 2019-04-20 RX ADMIN — ATORVASTATIN CALCIUM 40 MILLIGRAM(S): 80 TABLET, FILM COATED ORAL at 22:18

## 2019-04-20 RX ADMIN — Medication 20 MILLIGRAM(S): at 10:20

## 2019-04-20 RX ADMIN — Medication 6 UNIT(S): at 17:50

## 2019-04-20 RX ADMIN — CARVEDILOL PHOSPHATE 6.25 MILLIGRAM(S): 80 CAPSULE, EXTENDED RELEASE ORAL at 17:50

## 2019-04-20 RX ADMIN — INSULIN GLARGINE 20 UNIT(S): 100 INJECTION, SOLUTION SUBCUTANEOUS at 22:19

## 2019-04-20 RX ADMIN — CARVEDILOL PHOSPHATE 6.25 MILLIGRAM(S): 80 CAPSULE, EXTENDED RELEASE ORAL at 05:56

## 2019-04-20 RX ADMIN — MOMETASONE FUROATE 1 PUFF(S): 220 INHALANT RESPIRATORY (INHALATION) at 05:56

## 2019-04-20 RX ADMIN — Medication 6: at 22:18

## 2019-04-20 RX ADMIN — Medication 6 UNIT(S): at 07:49

## 2019-04-20 RX ADMIN — MOMETASONE FUROATE 1 PUFF(S): 220 INHALANT RESPIRATORY (INHALATION) at 17:50

## 2019-04-20 RX ADMIN — Medication 6: at 17:50

## 2019-04-20 RX ADMIN — Medication 20 MILLIGRAM(S): at 22:18

## 2019-04-20 RX ADMIN — Medication 25 MICROGRAM(S): at 05:56

## 2019-04-20 RX ADMIN — Medication 10: at 11:45

## 2019-04-20 NOTE — PROGRESS NOTE ADULT - SUBJECTIVE AND OBJECTIVE BOX
Cardiovascular Disease Progress Note  (Covering for Dr. Sam)    Overnight events: No acute events overnight. Ms. Nava denies chest pain or SOB.   Otherwise review of systems negative    Objective Findings:  T(C): 36.3 (04-20-19 @ 05:41), Max: 36.5 (04-19-19 @ 21:12)  HR: 88 (04-20-19 @ 05:41) (88 - 105)  BP: 121/80 (04-20-19 @ 05:41) (96/54 - 121/80)  RR: 17 (04-20-19 @ 05:41) (17 - 18)  SpO2: 98% (04-20-19 @ 05:41) (94% - 98%)  Wt(kg): --  Daily     Daily       Physical Exam:  Gen: NAD  HEENT: EOMI  CV: RRR, normal S1 + S2, no m/r/g  Lungs: CTAB  Abd: soft, non-tender  Ext: No edema    Telemetry: Sinus; A-pacing    Laboratory Data:                        11.4   9.62  )-----------( 225      ( 19 Apr 2019 09:40 )             38.6     04-19    133<L>  |  99  |  57<H>  ----------------------------<  347<H>  5.1   |  23  |  1.53<H>    Ca    8.7      19 Apr 2019 05:35                Inpatient Medications:  MEDICATIONS  (STANDING):  atorvastatin 40 milliGRAM(s) Oral at bedtime  carvedilol 6.25 milliGRAM(s) Oral every 12 hours  dextrose 5%. 1000 milliLiter(s) (50 mL/Hr) IV Continuous <Continuous>  dextrose 50% Injectable 12.5 Gram(s) IV Push once  dextrose 50% Injectable 25 Gram(s) IV Push once  dextrose 50% Injectable 25 Gram(s) IV Push once  fluticasone propionate 50 MICROgram(s)/spray Nasal Spray 1 Spray(s) Both Nostrils two times a day  insulin glargine Injectable (LANTUS) 20 Unit(s) SubCutaneous at bedtime  insulin lispro (HumaLOG) corrective regimen sliding scale   SubCutaneous three times a day before meals  insulin lispro (HumaLOG) corrective regimen sliding scale   SubCutaneous at bedtime  insulin lispro Injectable (HumaLOG) 6 Unit(s) SubCutaneous three times a day before meals  levothyroxine 25 MICROGram(s) Oral daily  mometasone 220 MICROgram(s) Inhaler 1 Puff(s) Inhalation two times a day  predniSONE   Tablet 20 milliGRAM(s) Oral two times a day      Assessment: 54 yo female with ICM s/p ICD, HTN and sarcoidosis admitted with moderate to lare sized circumferential pericardial effusion.      Problem/Plan - 1:  ·  Problem: Pericardial effusion.    Plan: s/p drain by IR on 4/18.  Patient continues to have output overnight.  Holding Eliquis.   Cytopathology pending     Problem/Plan - 2:  ·  Problem: History of atrial fibrillation.  Plan: maintaining sinus   Off eliquis for now     Problem/Plan - 3:  ·  Problem: Ischemic cardiomyopathy.  Plan: s/p ICD   Interrogated last week. Has 10.5 years left in battery life.         Over 25 minutes spent on total encounter; more than 50% of the visit was spent counseling and/or coordinating care by the attending physician.      Star Panchal MD [facility]  Cardiovascular Disease  [phone]

## 2019-04-20 NOTE — PROGRESS NOTE ADULT - ASSESSMENT
52 y/o Female with PMHx of HTN, DM, HLD, sarcoidosis on steroids, HFrEF , CAD s/p mLAD s/p stent placement 4/2017, cardiac thrombus on Xarelto asthma /COPD on home O2, CHF s/p AICD sent to ER from her cardiologist's office for recurrent moderate to large sized pericardial effusion seen on TTE. CT Chest with large pericardial effusion and Thoracic surgery consulted for further management. No clinical signs of tamponade. Pt was evaluated by Dr. Benito Franklin in May 2018 for pericardial effusion and deemed very high risk for any operative procedure at that time given her co-morbidities.    4/17 CT Chest, Large pericardial effusion, no tamponade physiology  4/18 IR Drain 500 ml off, effusion culture no growth.   4/19 VSS, pericardial tube draining, 200 ml serous drainage, hemodynamically stable. Check TTE in AM   4/20 VSS, +diarrhea, 250ml in pericardial drain. Limited TTE no pericardial effusion.

## 2019-04-20 NOTE — PHYSICAL THERAPY INITIAL EVALUATION ADULT - DISCHARGE DISPOSITION, PT EVAL
home w/ home PT/home w/ assist/home with home PT for improved strength balance & endurance for activity; home safety assessment, return to baseline. Pt owns cane & RW, assist from family as needed/home

## 2019-04-20 NOTE — PROGRESS NOTE ADULT - SUBJECTIVE AND OBJECTIVE BOX
PEYMAN WATTS:[id number],   53yFemale followed for:  No Known Allergies    PAST MEDICAL & SURGICAL HISTORY:  DM (diabetes mellitus)  CAD (coronary artery disease)  Sarcoidosis  Asthma with COPD  CHF, chronic  Mural thrombus of cardiac apex  History of atrial fibrillation  Thrombus: ? Thrombus in heart - on Xarelto  Hyperlipidemia  Anemia  Hypertension  Sarcoidosis  CHF (congestive heart failure): Stage III, on home O2 @ 2lpm  Diabetes  AICD (automatic cardioverter/defibrillator) present  History of repair of hip fracture  Ankle fracture  Ankle fracture: Left Ankle - with plate and screws placed  Hip deformity: left hip had a pin placed to hold bone in place after a fall    FAMILY HISTORY:  FH: breast cancer: mother  Family hx of colon cancer: Mother  Family history of hypertension (Sibling): Siblings  Family history of diabetes mellitus (Father, Sibling): Father, Siblings  Family history of breast cancer in mother (Mother): Mother  Family history of colon cancer in mother (Mother): Mother    MEDICATIONS  (STANDING):  atorvastatin 40 milliGRAM(s) Oral at bedtime  carvedilol 6.25 milliGRAM(s) Oral every 12 hours  dextrose 5%. 1000 milliLiter(s) (50 mL/Hr) IV Continuous <Continuous>  dextrose 50% Injectable 12.5 Gram(s) IV Push once  dextrose 50% Injectable 25 Gram(s) IV Push once  dextrose 50% Injectable 25 Gram(s) IV Push once  fluticasone propionate 50 MICROgram(s)/spray Nasal Spray 1 Spray(s) Both Nostrils two times a day  insulin glargine Injectable (LANTUS) 20 Unit(s) SubCutaneous at bedtime  insulin lispro (HumaLOG) corrective regimen sliding scale   SubCutaneous three times a day before meals  insulin lispro (HumaLOG) corrective regimen sliding scale   SubCutaneous at bedtime  insulin lispro Injectable (HumaLOG) 6 Unit(s) SubCutaneous three times a day before meals  levothyroxine 25 MICROGram(s) Oral daily  mometasone 220 MICROgram(s) Inhaler 1 Puff(s) Inhalation two times a day  predniSONE   Tablet 20 milliGRAM(s) Oral two times a day    MEDICATIONS  (PRN):  ALBUTerol    90 MICROgram(s) HFA Inhaler 2 Puff(s) Inhalation every 6 hours PRN Shortness of Breath and/or Wheezing  dextrose 40% Gel 15 Gram(s) Oral once PRN Blood Glucose LESS THAN 70 milliGRAM(s)/deciliter  glucagon  Injectable 1 milliGRAM(s) IntraMuscular once PRN Glucose LESS THAN 70 milligrams/deciliter  hemorrhoidal Ointment 1 Application(s) Rectal two times a day PRN Hemorrhoids      Vital Signs Last 24 Hrs  T(C): 36.3 (20 Apr 2019 05:41), Max: 36.5 (19 Apr 2019 21:12)  T(F): 97.3 (20 Apr 2019 05:41), Max: 97.7 (19 Apr 2019 21:12)  HR: 88 (20 Apr 2019 05:41) (88 - 105)  BP: 121/80 (20 Apr 2019 05:41) (96/54 - 121/80)  BP(mean): --  RR: 17 (20 Apr 2019 05:41) (17 - 18)  SpO2: 98% (20 Apr 2019 05:41) (94% - 98%)  nc/at  s1s2  cta  soft, nt, nd no guarding or rebound  no c/c/e    CBC Full  -  ( 19 Apr 2019 09:40 )  WBC Count : 9.62 K/uL  RBC Count : 5.49 M/uL  Hemoglobin : 11.4 g/dL  Hematocrit : 38.6 %  Platelet Count - Automated : 225 K/uL  Mean Cell Volume : 70.3 fl  Mean Cell Hemoglobin : 20.8 pg  Mean Cell Hemoglobin Concentration : 29.5 gm/dL  Auto Neutrophil # : x  Auto Lymphocyte # : x  Auto Monocyte # : x  Auto Eosinophil # : x  Auto Basophil # : x  Auto Neutrophil % : x  Auto Lymphocyte % : x  Auto Monocyte % : x  Auto Eosinophil % : x  Auto Basophil % : x    04-19    133<L>  |  99  |  57<H>  ----------------------------<  347<H>  5.1   |  23  |  1.53<H>    Ca    8.7      19 Apr 2019 05:35

## 2019-04-20 NOTE — PHYSICAL THERAPY INITIAL EVALUATION ADULT - PRECAUTIONS/LIMITATIONS, REHAB EVAL
CT CHEST: Large pericardial effusion measuring simple fluid. Slightly increased pulmonary sarcoidosis with extensive fibrotic changes in the bilateral lower lobe and right middle lobe, as well as nonfibrotic changes in the bilateral upper lobes. oxygen therapy device and L/min/CT CHEST: Large pericardial effusion measuring simple fluid. Slightly increased pulmonary sarcoidosis with extensive fibrotic changes in the bilateral lower lobe and right middle lobe, as well as nonfibrotic changes in the bilateral upper lobes.

## 2019-04-20 NOTE — PHYSICAL THERAPY INITIAL EVALUATION ADULT - ADDITIONAL COMMENTS
Pt resides in apt with 2 children (24 & 17), elevator to enter. PTA independent with mobility and ADL's, ambulatory with Cane, also owns RW, pt on 2L NC home 02 prn.

## 2019-04-20 NOTE — PHYSICAL THERAPY INITIAL EVALUATION ADULT - GENERAL OBSERVATIONS, REHAB EVAL
received sitting EOB, A&OX4, following all commands, willing to participate, +supplemental 02, +chest tube, contact- R/O CDIFF. BS reviewed.

## 2019-04-20 NOTE — PROGRESS NOTE ADULT - SUBJECTIVE AND OBJECTIVE BOX
Subjective: Patient seen and examined. No new events except as noted.   cont to have diarrhea     REVIEW OF SYSTEMS:    CONSTITUTIONAL: No weakness, fevers or chills  EYES/ENT: No visual changes;  No vertigo or throat pain   NECK: No pain or stiffness  RESPIRATORY: No cough, wheezing, hemoptysis; No shortness of breath  CARDIOVASCULAR: No chest pain or palpitations  GASTROINTESTINAL: diarrhea  GENITOURINARY: No dysuria, frequency or hematuria  NEUROLOGICAL: No numbness or weakness  SKIN: No itching, burning, rashes, or lesions   All other review of systems is negative unless indicated above.    MEDICATIONS:  MEDICATIONS  (STANDING):  atorvastatin 40 milliGRAM(s) Oral at bedtime  carvedilol 6.25 milliGRAM(s) Oral every 12 hours  dextrose 5%. 1000 milliLiter(s) (50 mL/Hr) IV Continuous <Continuous>  dextrose 50% Injectable 12.5 Gram(s) IV Push once  dextrose 50% Injectable 25 Gram(s) IV Push once  dextrose 50% Injectable 25 Gram(s) IV Push once  fluticasone propionate 50 MICROgram(s)/spray Nasal Spray 1 Spray(s) Both Nostrils two times a day  insulin glargine Injectable (LANTUS) 20 Unit(s) SubCutaneous at bedtime  insulin lispro (HumaLOG) corrective regimen sliding scale   SubCutaneous three times a day before meals  insulin lispro (HumaLOG) corrective regimen sliding scale   SubCutaneous at bedtime  insulin lispro Injectable (HumaLOG) 6 Unit(s) SubCutaneous three times a day before meals  levothyroxine 25 MICROGram(s) Oral daily  mometasone 220 MICROgram(s) Inhaler 1 Puff(s) Inhalation two times a day  predniSONE   Tablet 20 milliGRAM(s) Oral two times a day      PHYSICAL EXAM:  T(C): 36.7 (04-20-19 @ 13:46), Max: 36.7 (04-20-19 @ 13:46)  HR: 95 (04-20-19 @ 17:54) (88 - 104)  BP: 123/83 (04-20-19 @ 17:54) (106/75 - 123/83)  RR: 18 (04-20-19 @ 13:46) (17 - 18)  SpO2: 100% (04-20-19 @ 15:09) (94% - 100%)  Wt(kg): --  I&O's Summary    19 Apr 2019 07:01  -  20 Apr 2019 07:00  --------------------------------------------------------  IN: 720 mL / OUT: 250 mL / NET: 470 mL    20 Apr 2019 07:01  -  20 Apr 2019 19:14  --------------------------------------------------------  IN: 660 mL / OUT: 40 mL / NET: 620 mL          Appearance: Normal	  HEENT:   Normal oral mucosa, PERRL, EOMI	  Lymphatic: No lymphadenopathy , no edema  Cardiovascular: Normal S1 S2, No JVD, No murmurs , Peripheral pulses palpable 2+ bilaterally  Respiratory: Lungs clear to auscultation, normal effort 	  Gastrointestinal:  Soft, Non-tender, + BS	  Skin: No rashes, No ecchymoses, No cyanosis, warm to touch  Musculoskeletal: Normal range of motion, normal strength  Psychiatry:  Mood & affect appropriate  Ext: No edema      All labs, Imaging and EKGs personally reviewed                           11.4   9.62  )-----------( 225      ( 19 Apr 2019 09:40 )             38.6               04-19    133<L>  |  99  |  57<H>  ----------------------------<  347<H>  5.1   |  23  |  1.53<H>    Ca    8.7      19 Apr 2019 05:35    < from: Limited Transthoracic Echo (04.19.19 @ 18:10) >  Conclusions:  Limited study to evaluate for pericardial effusion.  1. Endocardium not well visualized; grossly severe  left  ventricular systolic dysfunction.  2. The right ventricle is not well visualized.A device wire  is noted in the right heart.  3. Normal pericardium with no pericardial effusion.

## 2019-04-20 NOTE — PROGRESS NOTE ADULT - ASSESSMENT
Pt is a 52 yo female with ICM s/p ICD, sarcoid admitted with moderate to lare sized circumferential pericardial effusion. Also has been having persistent diarrhea.     Problem/Recommendation - 1:  Problem: Pericardial effusion. Recommendation: seen on Echo at outpatient office  card eval appreciated  IR pericardiac effusion drainage, S/P pericardiac drain, + drainage   patient is hemodynamically stable   CT chest noted.  BP monitoring     Problem/Recommendation - 2:  ·  Problem: Diarrhea.  Recommendation: chronic for months   GI eval appreciated  send stool studies   C diff negative   in view of Hx of sarcoidosis, GI autoimmune diseases need to be ruled out   diet control  monitor electrolytes.     Problem/Recommendation - 3:  ·  Problem: Sarcoidosis.  Recommendation: on prednisone.     Problem/Recommendation - 4:  ·  Problem: History of atrial fibrillation.  Recommendation: rate control  Hold AC for now  card F/U.     Problem/Recommendation - 5:  ·  Problem: CAD (coronary artery disease).  Recommendation: S/P PCI  Statin and AC.     Problem/Recommendation - 6:  Problem: Diabetes mellitus. Recommendation: sliding scale   elevate A1c   DM diet  Endo eval for BS management.    Problem/Recommendation - 7:  Problem: Prophylactic measure. Recommendation: DVT and Gi PPX.

## 2019-04-20 NOTE — PHYSICAL THERAPY INITIAL EVALUATION ADULT - PERTINENT HX OF CURRENT PROBLEM, REHAB EVAL
Pt is 52F admitted 4/16/19 PMHx HTN, DM, HLD, sarcoidosis on steroids, HFrEF , CAD s/p mLAD s/p stent placement 4/2017, cardiac thrombus on Xarelto asthma /COPD on home O2, CHF s/p AICD, presented from Cardiology office for evaluation for pericardial effusion.

## 2019-04-20 NOTE — PROGRESS NOTE ADULT - SUBJECTIVE AND OBJECTIVE BOX
Patient is a 53y old  Female who presents with a chief complaint of sent here for pericardial effusion (20 Apr 2019 09:54)      Vital Signs Last 24 Hrs  T(C): 36.7 (04-20-19 @ 13:46), Max: 36.7 (04-20-19 @ 13:46)  T(F): 98 (04-20-19 @ 13:46), Max: 98 (04-20-19 @ 13:46)  HR: 97 (04-20-19 @ 13:46) (88 - 104)  BP: 111/73 (04-20-19 @ 13:46) (111/73 - 121/80)  RR: 18 (04-20-19 @ 13:46) (17 - 18)  SpO2: 100% (04-20-19 @ 13:46) (94% - 100%)            04-20-19 @ 07:01  -  04-20-19 @ 14:25  --------------------------------------------------------  IN: 300 mL / OUT: 0 mL / NET: 300 mL                          11.4   9.62  )-----------( 225      ( 19 Apr 2019 09:40 )             38.6     133<L>  |  99  |  57<H>  ----------------------------<  347<H>  5.1   |  23  |  1.53<H>          PHYSICAL EXAM  Neurology: A&Ox3, NAD, no gross deficits  CV : RRR+S1S2  Lungs: Respirations non-labored, B/L BS  Abdomen: Soft, NT/ND, +BSx4Q  Extremities: B/L LE edema, negative calf tenderness, +PP           MEDICATIONS  ALBUTerol    90 MICROgram(s) HFA Inhaler 2 Puff(s) Inhalation every 6 hours PRN  atorvastatin 40 milliGRAM(s) Oral at bedtime  carvedilol 6.25 milliGRAM(s) Oral every 12 hours  fluticasone propionate 50 MICROgram(s)/spray Nasal Spray 1 Spray(s) Both Nostrils two times a day  glucagon  Injectable 1 milliGRAM(s) IntraMuscular once PRN  hemorrhoidal Ointment 1 Application(s) Rectal two times a day PRN  insulin glargine Injectable (LANTUS) 20 Unit(s) SubCutaneous at bedtime  insulin lispro (HumaLOG) corrective regimen sliding scale   SubCutaneous three times a day before meals  insulin lispro (HumaLOG) corrective regimen sliding scale   SubCutaneous at bedtime  insulin lispro Injectable (HumaLOG) 6 Unit(s) SubCutaneous three times a day before meals  levothyroxine 25 MICROGram(s) Oral daily  mometasone 220 MICROgram(s) Inhaler 1 Puff(s) Inhalation two times a day  predniSONE   Tablet 20 milliGRAM(s) Oral two times a day Patient is a 53y old  Female who presents with a chief complaint of sent here for pericardial effusion (20 Apr 2019 09:54)      Vital Signs Last 24 Hrs  T(C): 36.7 (04-20-19 @ 13:46), Max: 36.7 (04-20-19 @ 13:46)  T(F): 98 (04-20-19 @ 13:46), Max: 98 (04-20-19 @ 13:46)  HR: 97 (04-20-19 @ 13:46) (88 - 104)  BP: 111/73 (04-20-19 @ 13:46) (111/73 - 121/80)  RR: 18 (04-20-19 @ 13:46) (17 - 18)  SpO2: 100% (04-20-19 @ 13:46) (94% - 100%)            04-20-19 @ 07:01  -  04-20-19 @ 14:25  --------------------------------------------------------  IN: 300 mL / OUT: 0 mL / NET: 300 mL                          11.4   9.62  )-----------( 225      ( 19 Apr 2019 09:40 )             38.6     133<L>  |  99  |  57<H>  ----------------------------<  347<H>  5.1   |  23  |  1.53<H>          PHYSICAL EXAM  PHYSICAL EXAM  Neurology: A&Ox3, NAD, no gross deficits  CV : RRR+S1S2  +pericardial drain to water seal with serosanguinous drainage  Lungs: Respirations non-labored, B/L BS CTA  Abdomen: Soft, NT/ND, +BSx4Q  Extremities: B/L LE no edema    MEDICATIONS  ALBUTerol    90 MICROgram(s) HFA Inhaler 2 Puff(s) Inhalation every 6 hours PRN  atorvastatin 40 milliGRAM(s) Oral at bedtime  carvedilol 6.25 milliGRAM(s) Oral every 12 hours  fluticasone propionate 50 MICROgram(s)/spray Nasal Spray 1 Spray(s) Both Nostrils two times a day  glucagon  Injectable 1 milliGRAM(s) IntraMuscular once PRN  hemorrhoidal Ointment 1 Application(s) Rectal two times a day PRN  insulin glargine Injectable (LANTUS) 20 Unit(s) SubCutaneous at bedtime  insulin lispro (HumaLOG) corrective regimen sliding scale   SubCutaneous three times a day before meals  insulin lispro (HumaLOG) corrective regimen sliding scale   SubCutaneous at bedtime  insulin lispro Injectable (HumaLOG) 6 Unit(s) SubCutaneous three times a day before meals  levothyroxine 25 MICROGram(s) Oral daily  mometasone 220 MICROgram(s) Inhaler 1 Puff(s) Inhalation two times a day  predniSONE   Tablet 20 milliGRAM(s) Oral two times a day

## 2019-04-20 NOTE — PHYSICAL THERAPY INITIAL EVALUATION ADULT - GAIT DEVIATIONS NOTED, PT EVAL
increased time in double stance/decreased stride length/decreased weight-shifting ability/decreased step length/decreased margie/decreased velocity of limb motion

## 2019-04-20 NOTE — PHYSICAL THERAPY INITIAL EVALUATION ADULT - PLANNED THERAPY INTERVENTIONS, PT EVAL
bed mobility training/transfer training/GOALS: Pt will negotiate 6 steps with unilateral handrail & step to pattern with independence in 4wks/gait training

## 2019-04-20 NOTE — PROGRESS NOTE ADULT - PROBLEM SELECTOR PLAN 1
s/p 4/18 pericardial drain in IR  F/u pericardial fluid cultures/cytology  Monitor drainage, I & Os  Plan to d/c drain when output minimal  Activity as tolerated  Care as per primary team

## 2019-04-21 LAB
ANION GAP SERPL CALC-SCNC: 12 MMOL/L — SIGNIFICANT CHANGE UP (ref 5–17)
BUN SERPL-MCNC: 80 MG/DL — HIGH (ref 7–23)
CALCIUM SERPL-MCNC: 9.2 MG/DL — SIGNIFICANT CHANGE UP (ref 8.4–10.5)
CHLORIDE SERPL-SCNC: 98 MMOL/L — SIGNIFICANT CHANGE UP (ref 96–108)
CO2 SERPL-SCNC: 21 MMOL/L — LOW (ref 22–31)
CREAT SERPL-MCNC: 1.67 MG/DL — HIGH (ref 0.5–1.3)
CULTURE RESULTS: SIGNIFICANT CHANGE UP
GLUCOSE SERPL-MCNC: 426 MG/DL — HIGH (ref 70–99)
HCT VFR BLD CALC: 43.5 % — SIGNIFICANT CHANGE UP (ref 34.5–45)
HGB BLD-MCNC: 12.6 G/DL — SIGNIFICANT CHANGE UP (ref 11.5–15.5)
MCHC RBC-ENTMCNC: 20.8 PG — LOW (ref 27–34)
MCHC RBC-ENTMCNC: 29 GM/DL — LOW (ref 32–36)
MCV RBC AUTO: 71.9 FL — LOW (ref 80–100)
OB PNL STL IA: NEGATIVE — SIGNIFICANT CHANGE UP
PLATELET # BLD AUTO: 249 K/UL — SIGNIFICANT CHANGE UP (ref 150–400)
POTASSIUM SERPL-MCNC: 4.9 MMOL/L — SIGNIFICANT CHANGE UP (ref 3.5–5.3)
POTASSIUM SERPL-SCNC: 4.9 MMOL/L — SIGNIFICANT CHANGE UP (ref 3.5–5.3)
RBC # BLD: 6.05 M/UL — HIGH (ref 3.8–5.2)
RBC # FLD: 15.5 % — HIGH (ref 10.3–14.5)
SODIUM SERPL-SCNC: 131 MMOL/L — LOW (ref 135–145)
SPECIMEN SOURCE: SIGNIFICANT CHANGE UP
WBC # BLD: 10.07 K/UL — SIGNIFICANT CHANGE UP (ref 3.8–10.5)
WBC # FLD AUTO: 10.07 K/UL — SIGNIFICANT CHANGE UP (ref 3.8–10.5)

## 2019-04-21 PROCEDURE — 99232 SBSQ HOSP IP/OBS MODERATE 35: CPT | Mod: 24

## 2019-04-21 RX ORDER — INSULIN LISPRO 100/ML
8 VIAL (ML) SUBCUTANEOUS
Qty: 0 | Refills: 0 | Status: DISCONTINUED | OUTPATIENT
Start: 2019-04-21 | End: 2019-04-23

## 2019-04-21 RX ORDER — INSULIN GLARGINE 100 [IU]/ML
24 INJECTION, SOLUTION SUBCUTANEOUS AT BEDTIME
Qty: 0 | Refills: 0 | Status: DISCONTINUED | OUTPATIENT
Start: 2019-04-21 | End: 2019-04-23

## 2019-04-21 RX ORDER — SIMETHICONE 80 MG/1
80 TABLET, CHEWABLE ORAL
Qty: 0 | Refills: 0 | Status: DISCONTINUED | OUTPATIENT
Start: 2019-04-21 | End: 2019-05-01

## 2019-04-21 RX ADMIN — INSULIN GLARGINE 24 UNIT(S): 100 INJECTION, SOLUTION SUBCUTANEOUS at 22:05

## 2019-04-21 RX ADMIN — CARVEDILOL PHOSPHATE 6.25 MILLIGRAM(S): 80 CAPSULE, EXTENDED RELEASE ORAL at 17:38

## 2019-04-21 RX ADMIN — SIMETHICONE 80 MILLIGRAM(S): 80 TABLET, CHEWABLE ORAL at 00:46

## 2019-04-21 RX ADMIN — Medication 6 UNIT(S): at 07:39

## 2019-04-21 RX ADMIN — Medication 25 MICROGRAM(S): at 06:00

## 2019-04-21 RX ADMIN — CARVEDILOL PHOSPHATE 6.25 MILLIGRAM(S): 80 CAPSULE, EXTENDED RELEASE ORAL at 06:00

## 2019-04-21 RX ADMIN — SIMETHICONE 80 MILLIGRAM(S): 80 TABLET, CHEWABLE ORAL at 06:00

## 2019-04-21 RX ADMIN — Medication 8 UNIT(S): at 17:39

## 2019-04-21 RX ADMIN — MOMETASONE FUROATE 1 PUFF(S): 220 INHALANT RESPIRATORY (INHALATION) at 17:39

## 2019-04-21 RX ADMIN — PHENYLEPHRINE-SHARK LIVER OIL-MINERAL OIL-PETROLATUM RECTAL OINTMENT 1 APPLICATION(S): at 21:45

## 2019-04-21 RX ADMIN — ATORVASTATIN CALCIUM 40 MILLIGRAM(S): 80 TABLET, FILM COATED ORAL at 21:43

## 2019-04-21 RX ADMIN — Medication 20 MILLIGRAM(S): at 21:44

## 2019-04-21 RX ADMIN — Medication 12: at 07:39

## 2019-04-21 RX ADMIN — Medication 8: at 11:53

## 2019-04-21 RX ADMIN — Medication 6: at 17:38

## 2019-04-21 RX ADMIN — MOMETASONE FUROATE 1 PUFF(S): 220 INHALANT RESPIRATORY (INHALATION) at 06:00

## 2019-04-21 RX ADMIN — Medication 20 MILLIGRAM(S): at 10:01

## 2019-04-21 NOTE — PROGRESS NOTE ADULT - SUBJECTIVE AND OBJECTIVE BOX
PEYMAN WATTS:[id number],   53yFemale followed for:  No Known Allergies    PAST MEDICAL & SURGICAL HISTORY:  DM (diabetes mellitus)  CAD (coronary artery disease)  Sarcoidosis  Asthma with COPD  CHF, chronic  Mural thrombus of cardiac apex  History of atrial fibrillation  Thrombus: ? Thrombus in heart - on Xarelto  Hyperlipidemia  Anemia  Hypertension  Sarcoidosis  CHF (congestive heart failure): Stage III, on home O2 @ 2lpm  Diabetes  AICD (automatic cardioverter/defibrillator) present  History of repair of hip fracture  Ankle fracture  Ankle fracture: Left Ankle - with plate and screws placed  Hip deformity: left hip had a pin placed to hold bone in place after a fall    FAMILY HISTORY:  FH: breast cancer: mother  Family hx of colon cancer: Mother  Family history of hypertension (Sibling): Siblings  Family history of diabetes mellitus (Father, Sibling): Father, Siblings  Family history of breast cancer in mother (Mother): Mother  Family history of colon cancer in mother (Mother): Mother    MEDICATIONS  (STANDING):  atorvastatin 40 milliGRAM(s) Oral at bedtime  carvedilol 6.25 milliGRAM(s) Oral every 12 hours  dextrose 5%. 1000 milliLiter(s) (50 mL/Hr) IV Continuous <Continuous>  dextrose 50% Injectable 12.5 Gram(s) IV Push once  dextrose 50% Injectable 25 Gram(s) IV Push once  dextrose 50% Injectable 25 Gram(s) IV Push once  fluticasone propionate 50 MICROgram(s)/spray Nasal Spray 1 Spray(s) Both Nostrils two times a day  insulin glargine Injectable (LANTUS) 20 Unit(s) SubCutaneous at bedtime  insulin lispro (HumaLOG) corrective regimen sliding scale   SubCutaneous three times a day before meals  insulin lispro (HumaLOG) corrective regimen sliding scale   SubCutaneous at bedtime  insulin lispro Injectable (HumaLOG) 6 Unit(s) SubCutaneous three times a day before meals  levothyroxine 25 MICROGram(s) Oral daily  mometasone 220 MICROgram(s) Inhaler 1 Puff(s) Inhalation two times a day  predniSONE   Tablet 20 milliGRAM(s) Oral two times a day    MEDICATIONS  (PRN):  ALBUTerol    90 MICROgram(s) HFA Inhaler 2 Puff(s) Inhalation every 6 hours PRN Shortness of Breath and/or Wheezing  dextrose 40% Gel 15 Gram(s) Oral once PRN Blood Glucose LESS THAN 70 milliGRAM(s)/deciliter  glucagon  Injectable 1 milliGRAM(s) IntraMuscular once PRN Glucose LESS THAN 70 milligrams/deciliter  hemorrhoidal Ointment 1 Application(s) Rectal two times a day PRN Hemorrhoids  simethicone 80 milliGRAM(s) Chew two times a day PRN Gas      Vital Signs Last 24 Hrs  T(C): 36.3 (21 Apr 2019 05:20), Max: 36.7 (20 Apr 2019 13:46)  T(F): 97.4 (21 Apr 2019 05:20), Max: 98 (20 Apr 2019 13:46)  HR: 93 (21 Apr 2019 05:20) (92 - 97)  BP: 130/89 (21 Apr 2019 05:20) (106/75 - 130/89)  BP(mean): --  RR: 18 (21 Apr 2019 05:20) (18 - 18)  SpO2: 96% (21 Apr 2019 05:20) (96% - 100%)  nc/at  s1s2  cta  soft, nt, nd no guarding or rebound  no c/c/e      04-21    131<L>  |  98  |  80<H>  ----------------------------<  426<H>  4.9   |  21<L>  |  1.67<H>    Ca    9.2      21 Apr 2019 09:17

## 2019-04-21 NOTE — PROGRESS NOTE ADULT - ASSESSMENT
Pt is a 54 yo female with ICM s/p ICD, sarcoid admitted with moderate to lare sized circumferential pericardial effusion. Also has been having persistent diarrhea.     Problem/Recommendation - 1:  Problem: Pericardial effusion. Recommendation: seen on Echo at outpatient office  card eval appreciated  IR pericardiac effusion drainage, S/P pericardiac drain, + drainage   patient is hemodynamically stable   CT chest noted.  BP monitoring     Problem/Recommendation - 2:  ·  Problem: Diarrhea.  Recommendation: chronic for months   GI eval appreciated  send stool studies   C diff negative   in view of Hx of sarcoidosis, GI autoimmune diseases need to be ruled out   diet control  monitor electrolytes.     Problem/Recommendation - 3:  ·  Problem: Sarcoidosis.  Recommendation: on prednisone.   monitor BUN/Cr  avoid nephrotoxic medications     Problem/Recommendation - 4:  ·  Problem: History of atrial fibrillation.  Recommendation: rate control  Hold AC for now  card F/U.     Problem/Recommendation - 5:  ·  Problem: CAD (coronary artery disease).  Recommendation: S/P PCI  Statin and AC.     Problem/Recommendation - 6:  Problem: Diabetes mellitus. Recommendation: sliding scale   increase lantus and premeal humalog for better BS control   elevate A1c   DM diet  Endo eval for BS management.    Problem/Recommendation - 7:  Problem: Prophylactic measure. Recommendation: DVT and Gi PPX.

## 2019-04-21 NOTE — CHART NOTE - NSCHARTNOTEFT_GEN_A_CORE
FS in 400s reported by RN. FS reviewed, currently on Moderate humalog sliding scale, Lantus 20 units at hs, Humalog 6 units premeal. On prednisone 20 mg BID for Sarcoidosis. Appetite good, tolerating current diet well( Consistent carbohydrate diet ).  Will increase Lantus to 24 units at HS and Premeal Humalog to 8 units. Nutrition consult for education. Continue to monitor FSs. Discussed with Dr. Small, agree with plan.    Rosa Chandler NP-C  #[id number]

## 2019-04-21 NOTE — PROGRESS NOTE ADULT - SUBJECTIVE AND OBJECTIVE BOX
Subjective: Patient seen and examined. No new events except as noted.   cont to drain     REVIEW OF SYSTEMS:    CONSTITUTIONAL: No weakness, fevers or chills  EYES/ENT: No visual changes;  No vertigo or throat pain   NECK: No pain or stiffness  RESPIRATORY: No cough, wheezing, hemoptysis; No shortness of breath  CARDIOVASCULAR: No chest pain or palpitations  GASTROINTESTINAL: + diarrhea   GENITOURINARY: No dysuria, frequency or hematuria  NEUROLOGICAL: No numbness or weakness  SKIN: No itching, burning, rashes, or lesions   All other review of systems is negative unless indicated above.    MEDICATIONS:  MEDICATIONS  (STANDING):  atorvastatin 40 milliGRAM(s) Oral at bedtime  carvedilol 6.25 milliGRAM(s) Oral every 12 hours  dextrose 5%. 1000 milliLiter(s) (50 mL/Hr) IV Continuous <Continuous>  dextrose 50% Injectable 12.5 Gram(s) IV Push once  dextrose 50% Injectable 25 Gram(s) IV Push once  dextrose 50% Injectable 25 Gram(s) IV Push once  fluticasone propionate 50 MICROgram(s)/spray Nasal Spray 1 Spray(s) Both Nostrils two times a day  insulin glargine Injectable (LANTUS) 24 Unit(s) SubCutaneous at bedtime  insulin lispro (HumaLOG) corrective regimen sliding scale   SubCutaneous three times a day before meals  insulin lispro (HumaLOG) corrective regimen sliding scale   SubCutaneous at bedtime  insulin lispro Injectable (HumaLOG) 8 Unit(s) SubCutaneous three times a day before meals  levothyroxine 25 MICROGram(s) Oral daily  mometasone 220 MICROgram(s) Inhaler 1 Puff(s) Inhalation two times a day  predniSONE   Tablet 20 milliGRAM(s) Oral two times a day      PHYSICAL EXAM:  T(C): 36.3 (04-21-19 @ 05:20), Max: 36.7 (04-20-19 @ 13:46)  HR: 93 (04-21-19 @ 05:20) (92 - 97)  BP: 130/89 (04-21-19 @ 05:20) (106/75 - 130/89)  RR: 18 (04-21-19 @ 05:20) (18 - 18)  SpO2: 96% (04-21-19 @ 05:20) (96% - 100%)  Wt(kg): --  I&O's Summary    20 Apr 2019 07:01  -  21 Apr 2019 07:00  --------------------------------------------------------  IN: 660 mL / OUT: 100 mL / NET: 560 mL          Appearance: Normal	  HEENT:   Normal oral mucosa, PERRL, EOMI	  Lymphatic: No lymphadenopathy , no edema  Cardiovascular: Normal S1 S2, No JVD, No murmurs , Peripheral pulses palpable 2+ bilaterally  Respiratory: Lungs clear to auscultation, normal effort 	  Gastrointestinal:  Soft, Non-tender, + BS	  Skin: No rashes, No ecchymoses, No cyanosis, warm to touch  Musculoskeletal: Normal range of motion, normal strength  Psychiatry:  Mood & affect appropriate  Ext: No edema      All labs, Imaging and EKGs personally reviewed                           12.6   10.07 )-----------( 249      ( 21 Apr 2019 11:52 )             43.5               04-21    131<L>  |  98  |  80<H>  ----------------------------<  426<H>  4.9   |  21<L>  |  1.67<H>    Ca    9.2      21 Apr 2019 09:17

## 2019-04-21 NOTE — PROGRESS NOTE ADULT - PROBLEM SELECTOR PLAN 1
s/p 4/18 pericardial drain in IR  Pericardial fluid cultures no growth, F/u cytology  Monitor drainage, I & Os  Possible d/c drain Mon/Tues by IR if output remains minimal  Activity as tolerated  Care as per primary team

## 2019-04-21 NOTE — PROGRESS NOTE ADULT - SUBJECTIVE AND OBJECTIVE BOX
Patient is a 53y old  Female who presents with a chief complaint of sent here for pericardial effusion (2019 10:21)      Vital Signs Last 24 Hrs  T(C): 36.3 (19 @ 05:20), Max: 36.7 (19 @ 13:46)  T(F): 97.4 (19 @ 05:20), Max: 98 (19 @ 13:46)  HR: 93 (19 @ 05:20) (92 - 97)  BP: 130/89 (19 @ 05:20) (106/75 - 130/89)  RR: 18 (19 @ 05:20) (18 - 18)  SpO2: 96% (19 @ 05:20) (96% - 100%)            19 @ 07:01  -  19 @ 07:00  --------------------------------------------------------  IN: 660 mL / OUT: 100 mL / NET: 560 mL        Daily Weight in k.4 (2019 09:44)                          12.6   10.07 )-----------( 249      ( 2019 11:52 )             43.5     131<L>  |  98  |  80<H>  ----------------------------<  426<H>  4.9   |  21<L>  |  1.67<H>            PHYSICAL EXAM  Neurology: A&Ox3, NAD, no gross deficits  CV : RRR+S1S2  +pericardial drain to water seal with serous drainage  Lungs: Respirations non-labored, B/L BS CTA  Abdomen: Soft, NT/ND, +BSx4Q  Extremities: B/L LE no edema           MEDICATIONS  ALBUTerol    90 MICROgram(s) HFA Inhaler 2 Puff(s) Inhalation every 6 hours PRN  atorvastatin 40 milliGRAM(s) Oral at bedtime  carvedilol 6.25 milliGRAM(s) Oral every 12 hours  fluticasone propionate 50 MICROgram(s)/spray Nasal Spray 1 Spray(s) Both Nostrils two times a day  glucagon  Injectable 1 milliGRAM(s) IntraMuscular once PRN  hemorrhoidal Ointment 1 Application(s) Rectal two times a day PRN  insulin glargine Injectable (LANTUS) 24 Unit(s) SubCutaneous at bedtime  insulin lispro (HumaLOG) corrective regimen sliding scale   SubCutaneous three times a day before meals  insulin lispro (HumaLOG) corrective regimen sliding scale   SubCutaneous at bedtime  insulin lispro Injectable (HumaLOG) 8 Unit(s) SubCutaneous three times a day before meals  levothyroxine 25 MICROGram(s) Oral daily  mometasone 220 MICROgram(s) Inhaler 1 Puff(s) Inhalation two times a day  predniSONE   Tablet 20 milliGRAM(s) Oral two times a day  simethicone 80 milliGRAM(s) Chew two times a day PRN

## 2019-04-21 NOTE — PROGRESS NOTE ADULT - ASSESSMENT
52 y/o Female with PMHx of HTN, DM, HLD, sarcoidosis on steroids, HFrEF , CAD s/p mLAD s/p stent placement 4/2017, cardiac thrombus on Xarelto asthma /COPD on home O2, CHF s/p AICD sent to ER from her cardiologist's office for recurrent moderate to large sized pericardial effusion seen on TTE. CT Chest with large pericardial effusion and Thoracic surgery consulted for further management. No clinical signs of tamponade. Pt was evaluated by Dr. Benito Franklin in May 2018 for pericardial effusion and deemed very high risk for any operative procedure at that time given her co-morbidities.    4/17 CT Chest, Large pericardial effusion, no tamponade physiology  4/18 IR Drain 500 ml off, effusion culture no growth.   4/19 VSS, pericardial tube draining, 200 ml serous drainage, hemodynamically stable. Check TTE in AM   4/20 VSS, +diarrhea, 250ml in pericardial drain. Limited TTE no pericardial effusion.  4/21 100ml/24h output in pericardial drain.

## 2019-04-21 NOTE — PROGRESS NOTE ADULT - SUBJECTIVE AND OBJECTIVE BOX
Cardiovascular Disease Progress Note  (Covering for Dr. Sam)    Overnight events: No acute events overnight. Ms. Nava denies chest pain or SOB.    Otherwise review of systems negative    Objective Findings:  T(C): 36.3 (04-21-19 @ 05:20), Max: 36.7 (04-20-19 @ 13:46)  HR: 93 (04-21-19 @ 05:20) (92 - 97)  BP: 130/89 (04-21-19 @ 05:20) (106/75 - 130/89)  RR: 18 (04-21-19 @ 05:20) (18 - 18)  SpO2: 96% (04-21-19 @ 05:20) (96% - 100%)  Wt(kg): --  Daily     Daily       Physical Exam:  Gen: NAD  HEENT: EOMI  CV: RRR, normal S1 + S2, no m/r/g  Lungs: CTAB  Abd: soft, non-tender  Ext: No edema    Telemetry: Sinus    Laboratory Data:                        11.4   9.62  )-----------( 225      ( 19 Apr 2019 09:40 )             38.6                     Inpatient Medications:  MEDICATIONS  (STANDING):  atorvastatin 40 milliGRAM(s) Oral at bedtime  carvedilol 6.25 milliGRAM(s) Oral every 12 hours  dextrose 5%. 1000 milliLiter(s) (50 mL/Hr) IV Continuous <Continuous>  dextrose 50% Injectable 12.5 Gram(s) IV Push once  dextrose 50% Injectable 25 Gram(s) IV Push once  dextrose 50% Injectable 25 Gram(s) IV Push once  fluticasone propionate 50 MICROgram(s)/spray Nasal Spray 1 Spray(s) Both Nostrils two times a day  insulin glargine Injectable (LANTUS) 20 Unit(s) SubCutaneous at bedtime  insulin lispro (HumaLOG) corrective regimen sliding scale   SubCutaneous three times a day before meals  insulin lispro (HumaLOG) corrective regimen sliding scale   SubCutaneous at bedtime  insulin lispro Injectable (HumaLOG) 6 Unit(s) SubCutaneous three times a day before meals  levothyroxine 25 MICROGram(s) Oral daily  mometasone 220 MICROgram(s) Inhaler 1 Puff(s) Inhalation two times a day  predniSONE   Tablet 20 milliGRAM(s) Oral two times a day      Assessment: 54 yo female with ICM s/p ICD, HTN and sarcoidosis admitted with moderate to lare sized circumferential pericardial effusion.      Problem/Plan - 1:  ·  Problem: Pericardial effusion.    Plan: s/p drain by IR on 4/18.  Patient continues to have output overnight.  Holding Eliquis.   Cytopathology pending     Problem/Plan - 2:  ·  Problem: History of atrial fibrillation.  Plan: maintaining sinus   Off eliquis for now     Problem/Plan - 3:  ·  Problem: Ischemic cardiomyopathy.  Plan: s/p ICD   Interrogated last week. Has 10.5 years left in battery life.       Over 25 minutes spent on total encounter; more than 50% of the visit was spent counseling and/or coordinating care by the attending physician.      Star Panchal MD [facility]  Cardiovascular Disease  [phone]

## 2019-04-22 LAB
ANION GAP SERPL CALC-SCNC: 8 MMOL/L — SIGNIFICANT CHANGE UP (ref 5–17)
BUN SERPL-MCNC: 83 MG/DL — HIGH (ref 7–23)
CALCIUM SERPL-MCNC: 9.1 MG/DL — SIGNIFICANT CHANGE UP (ref 8.4–10.5)
CHLORIDE SERPL-SCNC: 103 MMOL/L — SIGNIFICANT CHANGE UP (ref 96–108)
CO2 SERPL-SCNC: 26 MMOL/L — SIGNIFICANT CHANGE UP (ref 22–31)
CREAT SERPL-MCNC: 1.46 MG/DL — HIGH (ref 0.5–1.3)
CULTURE RESULTS: SIGNIFICANT CHANGE UP
CULTURE RESULTS: SIGNIFICANT CHANGE UP
GLUCOSE SERPL-MCNC: 210 MG/DL — HIGH (ref 70–99)
MAGNESIUM SERPL-MCNC: 2.2 MG/DL — SIGNIFICANT CHANGE UP (ref 1.6–2.6)
POTASSIUM SERPL-MCNC: 5.3 MMOL/L — SIGNIFICANT CHANGE UP (ref 3.5–5.3)
POTASSIUM SERPL-SCNC: 5.3 MMOL/L — SIGNIFICANT CHANGE UP (ref 3.5–5.3)
SODIUM SERPL-SCNC: 137 MMOL/L — SIGNIFICANT CHANGE UP (ref 135–145)
SPECIMEN SOURCE: SIGNIFICANT CHANGE UP
SPECIMEN SOURCE: SIGNIFICANT CHANGE UP

## 2019-04-22 PROCEDURE — 99232 SBSQ HOSP IP/OBS MODERATE 35: CPT | Mod: 24

## 2019-04-22 RX ORDER — FUROSEMIDE 40 MG
80 TABLET ORAL EVERY 12 HOURS
Qty: 0 | Refills: 0 | Status: DISCONTINUED | OUTPATIENT
Start: 2019-04-22 | End: 2019-04-27

## 2019-04-22 RX ORDER — AER TRAVELER 0.5 G/1
1 SOLUTION RECTAL; TOPICAL ONCE
Qty: 0 | Refills: 0 | Status: COMPLETED | OUTPATIENT
Start: 2019-04-22 | End: 2019-04-22

## 2019-04-22 RX ADMIN — Medication 4: at 22:29

## 2019-04-22 RX ADMIN — ATORVASTATIN CALCIUM 40 MILLIGRAM(S): 80 TABLET, FILM COATED ORAL at 21:07

## 2019-04-22 RX ADMIN — Medication 8 UNIT(S): at 07:33

## 2019-04-22 RX ADMIN — AER TRAVELER 1 APPLICATION(S): 0.5 SOLUTION RECTAL; TOPICAL at 17:19

## 2019-04-22 RX ADMIN — SIMETHICONE 80 MILLIGRAM(S): 80 TABLET, CHEWABLE ORAL at 00:05

## 2019-04-22 RX ADMIN — Medication 20 MILLIGRAM(S): at 22:39

## 2019-04-22 RX ADMIN — Medication 2: at 07:34

## 2019-04-22 RX ADMIN — Medication 25 MICROGRAM(S): at 06:19

## 2019-04-22 RX ADMIN — MOMETASONE FUROATE 1 PUFF(S): 220 INHALANT RESPIRATORY (INHALATION) at 17:19

## 2019-04-22 RX ADMIN — CARVEDILOL PHOSPHATE 6.25 MILLIGRAM(S): 80 CAPSULE, EXTENDED RELEASE ORAL at 06:19

## 2019-04-22 RX ADMIN — INSULIN GLARGINE 24 UNIT(S): 100 INJECTION, SOLUTION SUBCUTANEOUS at 22:29

## 2019-04-22 RX ADMIN — CARVEDILOL PHOSPHATE 6.25 MILLIGRAM(S): 80 CAPSULE, EXTENDED RELEASE ORAL at 17:17

## 2019-04-22 RX ADMIN — Medication 8 UNIT(S): at 11:44

## 2019-04-22 RX ADMIN — Medication 6: at 11:45

## 2019-04-22 RX ADMIN — SIMETHICONE 80 MILLIGRAM(S): 80 TABLET, CHEWABLE ORAL at 22:42

## 2019-04-22 RX ADMIN — Medication 20 MILLIGRAM(S): at 11:14

## 2019-04-22 RX ADMIN — MOMETASONE FUROATE 1 PUFF(S): 220 INHALANT RESPIRATORY (INHALATION) at 06:19

## 2019-04-22 RX ADMIN — Medication 8 UNIT(S): at 17:22

## 2019-04-22 RX ADMIN — Medication 2: at 17:22

## 2019-04-22 RX ADMIN — Medication 1 SPRAY(S): at 06:22

## 2019-04-22 RX ADMIN — Medication 80 MILLIGRAM(S): at 17:17

## 2019-04-22 NOTE — PROGRESS NOTE ADULT - ASSESSMENT
54 y/o Female with PMHx of HTN, DM, HLD, sarcoidosis on steroids, HFrEF , CAD s/p mLAD s/p stent placement 4/2017, cardiac thrombus on Xarelto asthma /COPD on home O2, CHF s/p AICD sent to ER from her cardiologist's office for recurrent moderate to large sized pericardial effusion seen on TTE. CT Chest with large pericardial effusion and Thoracic surgery consulted for further management. No clinical signs of tamponade. Pt was evaluated by Dr. Benito Franklin in May 2018 for pericardial effusion and deemed very high risk for any operative procedure at that time given her co-morbidities.    4/17 CT Chest, Large pericardial effusion, no tamponade physiology  4/18 IR Drain 500 ml off, effusion culture no growth.   4/19 VSS, pericardial tube draining, 200 ml serous drainage, hemodynamically stable. Check TTE in AM   4/20 VSS, +diarrhea, 250ml in pericardial drain. Limited TTE no pericardial effusion.  4/21 100ml/24h output in pericardial drain.    4/22: 10/24h in pericardial drain. Primary team to arrange with IR for removing drain. Pericardial tube aggressively milked today tube remains patent, echo confirms no pericardial effusion.

## 2019-04-22 NOTE — PROGRESS NOTE ADULT - SUBJECTIVE AND OBJECTIVE BOX
Subjective: Pt states" " Denies any CP, SOB, palpitations. No acute events overnight.    Vital Signs:  Vital Signs Last 24 Hrs  T(C): 36.8 (04-21-19 @ 20:36), Max: 36.8 (04-21-19 @ 20:36)  T(F): 98.2 (04-21-19 @ 20:36), Max: 98.2 (04-21-19 @ 20:36)  HR: 99 (04-21-19 @ 20:36) (87 - 99)  BP: 125/82 (04-21-19 @ 20:36) (120/80 - 131/82)  RR: 18 (04-21-19 @ 20:36) (18 - 18)  SpO2: 94% (04-21-19 @ 20:36) (94% - 95%) on (O2)        Relevant labs, radiology and Medications reviewed                        12.6   10.07 )-----------( 249      ( 21 Apr 2019 11:52 )             43.5     04-21    131<L>  |  98  |  80<H>  ----------------------------<  426<H>  4.9   |  21<L>  |  1.67<H>    Ca    9.2      21 Apr 2019 09:17        MEDICATIONS  (STANDING):  atorvastatin 40 milliGRAM(s) Oral at bedtime  carvedilol 6.25 milliGRAM(s) Oral every 12 hours  dextrose 5%. 1000 milliLiter(s) (50 mL/Hr) IV Continuous <Continuous>  dextrose 50% Injectable 12.5 Gram(s) IV Push once  dextrose 50% Injectable 25 Gram(s) IV Push once  dextrose 50% Injectable 25 Gram(s) IV Push once  fluticasone propionate 50 MICROgram(s)/spray Nasal Spray 1 Spray(s) Both Nostrils two times a day  insulin glargine Injectable (LANTUS) 24 Unit(s) SubCutaneous at bedtime  insulin lispro (HumaLOG) corrective regimen sliding scale   SubCutaneous three times a day before meals  insulin lispro (HumaLOG) corrective regimen sliding scale   SubCutaneous at bedtime  insulin lispro Injectable (HumaLOG) 8 Unit(s) SubCutaneous three times a day before meals  levothyroxine 25 MICROGram(s) Oral daily  mometasone 220 MICROgram(s) Inhaler 1 Puff(s) Inhalation two times a day  predniSONE   Tablet 20 milliGRAM(s) Oral two times a day  witch hazel Pads 1 Application(s) Topical once    MEDICATIONS  (PRN):  ALBUTerol    90 MICROgram(s) HFA Inhaler 2 Puff(s) Inhalation every 6 hours PRN Shortness of Breath and/or Wheezing  dextrose 40% Gel 15 Gram(s) Oral once PRN Blood Glucose LESS THAN 70 milliGRAM(s)/deciliter  glucagon  Injectable 1 milliGRAM(s) IntraMuscular once PRN Glucose LESS THAN 70 milligrams/deciliter  hemorrhoidal Ointment 1 Application(s) Rectal two times a day PRN Hemorrhoids  simethicone 80 milliGRAM(s) Chew two times a day PRN Gas      I&O's Summary    21 Apr 2019 07:01  -  22 Apr 2019 07:00  --------------------------------------------------------  IN: 420 mL / OUT: 410 mL / NET: 10 mL        IMAGING    CXR:    CT Chest:    PAST MEDICAL & SURGICAL HISTORY:  DM (diabetes mellitus)  CAD (coronary artery disease)  Sarcoidosis  Asthma with COPD  CHF, chronic  Mural thrombus of cardiac apex  History of atrial fibrillation  Thrombus: ? Thrombus in heart - on Xarelto  Hyperlipidemia  Anemia  Hypertension  Sarcoidosis  CHF (congestive heart failure): Stage III, on home O2 @ 2lpm  Diabetes  AICD (automatic cardioverter/defibrillator) present  History of repair of hip fracture  Ankle fracture  Ankle fracture: Left Ankle - with plate and screws placed  Hip deformity: left hip had a pin placed to hold bone in place after a fall       Physical Exam:  Neurology: A&Ox3, nonfocal, AJ x 4, NAD  Respiratory: B/L BS CTA, diminished at bases, No wheezing, rales, rhonchi  CV: RRR, S1S2

## 2019-04-22 NOTE — PROGRESS NOTE ADULT - PROBLEM SELECTOR PLAN 1
s/p 4/18 pericardial drain in IR  Pericardial fluid cultures no growth, F/u cytology  Monitor drainage, I & Os  Possible d/c drain Mon/Tues by IR if output remains minimal- Primary team to arrange  Activity as tolerated  Care as per primary team

## 2019-04-22 NOTE — PROGRESS NOTE ADULT - ASSESSMENT
pt reported chronic diarrhea.  history of sarcoid.  can not have endosocpic eval until all other issues improved. consider ct abdomen and pevlis if clear.  will check fecal calprotectin/ fecal fat.  Sarcoid can cause inlammation in bowel with crohns overlap, however, uncommon.  hard stool over weekend observed? by me.

## 2019-04-22 NOTE — PROGRESS NOTE ADULT - SUBJECTIVE AND OBJECTIVE BOX
Subjective: Patient seen and examined. No new events except as noted.   feels ok     REVIEW OF SYSTEMS:    CONSTITUTIONAL: + weakness, fevers or chills  EYES/ENT: No visual changes;  No vertigo or throat pain   NECK: No pain or stiffness  RESPIRATORY: No cough, wheezing, hemoptysis; No shortness of breath  CARDIOVASCULAR: No chest pain or palpitations  GASTROINTESTINAL: No abdominal or epigastric pain. No nausea, vomiting, or hematemesis; No diarrhea or constipation. No melena or hematochezia.  GENITOURINARY: No dysuria, frequency or hematuria  NEUROLOGICAL: No numbness or weakness  SKIN: No itching, burning, rashes, or lesions   All other review of systems is negative unless indicated above.    MEDICATIONS:  MEDICATIONS  (STANDING):  atorvastatin 40 milliGRAM(s) Oral at bedtime  carvedilol 6.25 milliGRAM(s) Oral every 12 hours  dextrose 5%. 1000 milliLiter(s) (50 mL/Hr) IV Continuous <Continuous>  dextrose 50% Injectable 12.5 Gram(s) IV Push once  dextrose 50% Injectable 25 Gram(s) IV Push once  dextrose 50% Injectable 25 Gram(s) IV Push once  fluticasone propionate 50 MICROgram(s)/spray Nasal Spray 1 Spray(s) Both Nostrils two times a day  insulin glargine Injectable (LANTUS) 24 Unit(s) SubCutaneous at bedtime  insulin lispro (HumaLOG) corrective regimen sliding scale   SubCutaneous three times a day before meals  insulin lispro (HumaLOG) corrective regimen sliding scale   SubCutaneous at bedtime  insulin lispro Injectable (HumaLOG) 8 Unit(s) SubCutaneous three times a day before meals  levothyroxine 25 MICROGram(s) Oral daily  mometasone 220 MICROgram(s) Inhaler 1 Puff(s) Inhalation two times a day  predniSONE   Tablet 20 milliGRAM(s) Oral two times a day  witch hazel Pads 1 Application(s) Topical once      PHYSICAL EXAM:  T(C): 36.8 (04-21-19 @ 20:36), Max: 36.8 (04-21-19 @ 20:36)  HR: 99 (04-21-19 @ 20:36) (87 - 99)  BP: 125/82 (04-21-19 @ 20:36) (120/80 - 131/82)  RR: 18 (04-21-19 @ 20:36) (18 - 18)  SpO2: 94% (04-21-19 @ 20:36) (94% - 95%)  Wt(kg): --  I&O's Summary    21 Apr 2019 07:01  -  22 Apr 2019 07:00  --------------------------------------------------------  IN: 420 mL / OUT: 410 mL / NET: 10 mL          Appearance: NAD  HEENT:   Normal oral mucosa, PERRL, EOMI	  Lymphatic: No lymphadenopathy   Cardiovascular: Normal S1 S2, No JVD, No murmurs , + pericardial drain   Respiratory: Lungs clear to auscultation, normal effort 	  Gastrointestinal:  Soft, Non-tender, + BS	  Skin: No rashes, No ecchymoses, No cyanosis, warm to touch  Musculoskeletal: Normal range of motion, normal strength  Psychiatry:  Mood & affect appropriate  Ext: No edema      LABS:    CARDIAC MARKERS:                                12.6   10.07 )-----------( 249      ( 21 Apr 2019 11:52 )             43.5     04-21    131<L>  |  98  |  80<H>  ----------------------------<  426<H>  4.9   |  21<L>  |  1.67<H>    Ca    9.2      21 Apr 2019 09:17      proBNP:   Lipid Profile:   HgA1c:   TSH:             TELEMETRY: SR/ST 	    ECG:  	  RADIOLOGY:   DIAGNOSTIC TESTING:  [ ] Echocardiogram:  [ ]  Catheterization:  [ ] Stress Test:    OTHER:

## 2019-04-22 NOTE — PROGRESS NOTE ADULT - SUBJECTIVE AND OBJECTIVE BOX
Subjective: Patient seen and examined. No new events except as noted.       REVIEW OF SYSTEMS:    CONSTITUTIONAL: No weakness, fevers or chills  EYES/ENT: No visual changes;  No vertigo or throat pain   NECK: No pain or stiffness  RESPIRATORY: No cough, wheezing, hemoptysis; No shortness of breath  CARDIOVASCULAR: No chest pain or palpitations  GASTROINTESTINAL: No abdominal or epigastric pain. No nausea, vomiting, or hematemesis; No diarrhea or constipation. No melena or hematochezia.  GENITOURINARY: No dysuria, frequency or hematuria  NEUROLOGICAL: No numbness or weakness  SKIN: No itching, burning, rashes, or lesions   All other review of systems is negative unless indicated above.    MEDICATIONS:  MEDICATIONS  (STANDING):  atorvastatin 40 milliGRAM(s) Oral at bedtime  carvedilol 6.25 milliGRAM(s) Oral every 12 hours  dextrose 5%. 1000 milliLiter(s) (50 mL/Hr) IV Continuous <Continuous>  dextrose 50% Injectable 12.5 Gram(s) IV Push once  dextrose 50% Injectable 25 Gram(s) IV Push once  dextrose 50% Injectable 25 Gram(s) IV Push once  fluticasone propionate 50 MICROgram(s)/spray Nasal Spray 1 Spray(s) Both Nostrils two times a day  insulin glargine Injectable (LANTUS) 24 Unit(s) SubCutaneous at bedtime  insulin lispro (HumaLOG) corrective regimen sliding scale   SubCutaneous three times a day before meals  insulin lispro (HumaLOG) corrective regimen sliding scale   SubCutaneous at bedtime  insulin lispro Injectable (HumaLOG) 8 Unit(s) SubCutaneous three times a day before meals  levothyroxine 25 MICROGram(s) Oral daily  mometasone 220 MICROgram(s) Inhaler 1 Puff(s) Inhalation two times a day  predniSONE   Tablet 20 milliGRAM(s) Oral two times a day  witch hazel Pads 1 Application(s) Topical once      PHYSICAL EXAM:  T(C): 36.8 (04-21-19 @ 20:36), Max: 36.8 (04-21-19 @ 20:36)  HR: 99 (04-21-19 @ 20:36) (87 - 99)  BP: 125/82 (04-21-19 @ 20:36) (120/80 - 131/82)  RR: 18 (04-21-19 @ 20:36) (18 - 18)  SpO2: 94% (04-21-19 @ 20:36) (94% - 95%)  Wt(kg): --  I&O's Summary    21 Apr 2019 07:01  -  22 Apr 2019 07:00  --------------------------------------------------------  IN: 420 mL / OUT: 410 mL / NET: 10 mL          Appearance: Normal	  HEENT:   Normal oral mucosa	  Lymphatic: No lymphadenopathy , no edema  Cardiovascular: Normal S1 S2, drain  Respiratory: Lungs clear to auscultation, normal effort 	  Gastrointestinal:  Soft, Non-tender, + BS	  Skin: No rashes, No ecchymoses, No cyanosis, warm to touch  Musculoskeletal: Normal range of motion, normal strength  Psychiatry:  Mood & affect appropriate  Ext: No edema      All labs, Imaging and EKGs personally reviewed                           12.6   10.07 )-----------( 249      ( 21 Apr 2019 11:52 )             43.5               04-21    131<L>  |  98  |  80<H>  ----------------------------<  426<H>  4.9   |  21<L>  |  1.67<H>    Ca    9.2      21 Apr 2019 09:17

## 2019-04-22 NOTE — PROGRESS NOTE ADULT - SUBJECTIVE AND OBJECTIVE BOX
PEYMAN WATTS:[id number],   53yFemale followed for:  No Known Allergies    PAST MEDICAL & SURGICAL HISTORY:  DM (diabetes mellitus)  CAD (coronary artery disease)  Sarcoidosis  Asthma with COPD  CHF, chronic  Mural thrombus of cardiac apex  History of atrial fibrillation  Thrombus: ? Thrombus in heart - on Xarelto  Hyperlipidemia  Anemia  Hypertension  Sarcoidosis  CHF (congestive heart failure): Stage III, on home O2 @ 2lpm  Diabetes  AICD (automatic cardioverter/defibrillator) present  History of repair of hip fracture  Ankle fracture  Ankle fracture: Left Ankle - with plate and screws placed  Hip deformity: left hip had a pin placed to hold bone in place after a fall    FAMILY HISTORY:  FH: breast cancer: mother  Family hx of colon cancer: Mother  Family history of hypertension (Sibling): Siblings  Family history of diabetes mellitus (Father, Sibling): Father, Siblings  Family history of breast cancer in mother (Mother): Mother  Family history of colon cancer in mother (Mother): Mother    MEDICATIONS  (STANDING):  atorvastatin 40 milliGRAM(s) Oral at bedtime  carvedilol 6.25 milliGRAM(s) Oral every 12 hours  dextrose 5%. 1000 milliLiter(s) (50 mL/Hr) IV Continuous <Continuous>  dextrose 50% Injectable 12.5 Gram(s) IV Push once  dextrose 50% Injectable 25 Gram(s) IV Push once  dextrose 50% Injectable 25 Gram(s) IV Push once  fluticasone propionate 50 MICROgram(s)/spray Nasal Spray 1 Spray(s) Both Nostrils two times a day  insulin glargine Injectable (LANTUS) 24 Unit(s) SubCutaneous at bedtime  insulin lispro (HumaLOG) corrective regimen sliding scale   SubCutaneous three times a day before meals  insulin lispro (HumaLOG) corrective regimen sliding scale   SubCutaneous at bedtime  insulin lispro Injectable (HumaLOG) 8 Unit(s) SubCutaneous three times a day before meals  levothyroxine 25 MICROGram(s) Oral daily  mometasone 220 MICROgram(s) Inhaler 1 Puff(s) Inhalation two times a day  predniSONE   Tablet 20 milliGRAM(s) Oral two times a day  witch hazel Pads 1 Application(s) Topical once    MEDICATIONS  (PRN):  ALBUTerol    90 MICROgram(s) HFA Inhaler 2 Puff(s) Inhalation every 6 hours PRN Shortness of Breath and/or Wheezing  dextrose 40% Gel 15 Gram(s) Oral once PRN Blood Glucose LESS THAN 70 milliGRAM(s)/deciliter  glucagon  Injectable 1 milliGRAM(s) IntraMuscular once PRN Glucose LESS THAN 70 milligrams/deciliter  hemorrhoidal Ointment 1 Application(s) Rectal two times a day PRN Hemorrhoids  simethicone 80 milliGRAM(s) Chew two times a day PRN Gas      Vital Signs Last 24 Hrs  T(C): 36.8 (21 Apr 2019 20:36), Max: 36.8 (21 Apr 2019 20:36)  T(F): 98.2 (21 Apr 2019 20:36), Max: 98.2 (21 Apr 2019 20:36)  HR: 99 (21 Apr 2019 20:36) (87 - 99)  BP: 125/82 (21 Apr 2019 20:36) (120/80 - 131/82)  BP(mean): --  RR: 18 (21 Apr 2019 20:36) (18 - 18)  SpO2: 94% (21 Apr 2019 20:36) (94% - 95%)  nc/at  s1s2  cta  soft, nt, nd no guarding or rebound  no c/c/e    CBC Full  -  ( 21 Apr 2019 11:52 )  WBC Count : 10.07 K/uL  RBC Count : 6.05 M/uL  Hemoglobin : 12.6 g/dL  Hematocrit : 43.5 %  Platelet Count - Automated : 249 K/uL  Mean Cell Volume : 71.9 fl  Mean Cell Hemoglobin : 20.8 pg  Mean Cell Hemoglobin Concentration : 29.0 gm/dL  Auto Neutrophil # : x  Auto Lymphocyte # : x  Auto Monocyte # : x  Auto Eosinophil # : x  Auto Basophil # : x  Auto Neutrophil % : x  Auto Lymphocyte % : x  Auto Monocyte % : x  Auto Eosinophil % : x  Auto Basophil % : x    04-21    131<L>  |  98  |  80<H>  ----------------------------<  426<H>  4.9   |  21<L>  |  1.67<H>    Ca    9.2      21 Apr 2019 09:17

## 2019-04-23 DIAGNOSIS — I10 ESSENTIAL (PRIMARY) HYPERTENSION: ICD-10-CM

## 2019-04-23 LAB
CULTURE RESULTS: SIGNIFICANT CHANGE UP
CULTURE RESULTS: SIGNIFICANT CHANGE UP
SPECIMEN SOURCE: SIGNIFICANT CHANGE UP
SPECIMEN SOURCE: SIGNIFICANT CHANGE UP

## 2019-04-23 PROCEDURE — 93308 TTE F-UP OR LMTD: CPT | Mod: 26

## 2019-04-23 PROCEDURE — 99024 POSTOP FOLLOW-UP VISIT: CPT

## 2019-04-23 PROCEDURE — 93321 DOPPLER ECHO F-UP/LMTD STD: CPT | Mod: 26

## 2019-04-23 PROCEDURE — 99231 SBSQ HOSP IP/OBS SF/LOW 25: CPT

## 2019-04-23 RX ORDER — INSULIN GLARGINE 100 [IU]/ML
22 INJECTION, SOLUTION SUBCUTANEOUS AT BEDTIME
Qty: 0 | Refills: 0 | Status: DISCONTINUED | OUTPATIENT
Start: 2019-04-23 | End: 2019-04-24

## 2019-04-23 RX ORDER — INSULIN LISPRO 100/ML
10 VIAL (ML) SUBCUTANEOUS
Qty: 0 | Refills: 0 | Status: DISCONTINUED | OUTPATIENT
Start: 2019-04-23 | End: 2019-04-23

## 2019-04-23 RX ORDER — INSULIN LISPRO 100/ML
8 VIAL (ML) SUBCUTANEOUS
Qty: 0 | Refills: 0 | Status: DISCONTINUED | OUTPATIENT
Start: 2019-04-23 | End: 2019-04-24

## 2019-04-23 RX ORDER — INSULIN GLARGINE 100 [IU]/ML
28 INJECTION, SOLUTION SUBCUTANEOUS AT BEDTIME
Qty: 0 | Refills: 0 | Status: DISCONTINUED | OUTPATIENT
Start: 2019-04-23 | End: 2019-04-23

## 2019-04-23 RX ADMIN — Medication 25 MICROGRAM(S): at 06:07

## 2019-04-23 RX ADMIN — ATORVASTATIN CALCIUM 40 MILLIGRAM(S): 80 TABLET, FILM COATED ORAL at 21:02

## 2019-04-23 RX ADMIN — Medication 8 UNIT(S): at 11:53

## 2019-04-23 RX ADMIN — CARVEDILOL PHOSPHATE 6.25 MILLIGRAM(S): 80 CAPSULE, EXTENDED RELEASE ORAL at 17:06

## 2019-04-23 RX ADMIN — Medication 20 MILLIGRAM(S): at 21:02

## 2019-04-23 RX ADMIN — Medication 8 UNIT(S): at 08:15

## 2019-04-23 RX ADMIN — Medication 20 MILLIGRAM(S): at 09:34

## 2019-04-23 RX ADMIN — CARVEDILOL PHOSPHATE 6.25 MILLIGRAM(S): 80 CAPSULE, EXTENDED RELEASE ORAL at 06:07

## 2019-04-23 RX ADMIN — SIMETHICONE 80 MILLIGRAM(S): 80 TABLET, CHEWABLE ORAL at 06:07

## 2019-04-23 RX ADMIN — MOMETASONE FUROATE 1 PUFF(S): 220 INHALANT RESPIRATORY (INHALATION) at 17:07

## 2019-04-23 RX ADMIN — Medication 80 MILLIGRAM(S): at 06:07

## 2019-04-23 RX ADMIN — Medication 12: at 08:15

## 2019-04-23 RX ADMIN — Medication 80 MILLIGRAM(S): at 17:57

## 2019-04-23 RX ADMIN — INSULIN GLARGINE 22 UNIT(S): 100 INJECTION, SOLUTION SUBCUTANEOUS at 22:40

## 2019-04-23 RX ADMIN — Medication 2: at 17:21

## 2019-04-23 RX ADMIN — Medication 2: at 11:53

## 2019-04-23 RX ADMIN — MOMETASONE FUROATE 1 PUFF(S): 220 INHALANT RESPIRATORY (INHALATION) at 06:08

## 2019-04-23 NOTE — PROGRESS NOTE ADULT - SUBJECTIVE AND OBJECTIVE BOX
VITAL SIGNS      Vital Signs Last 24 Hrs  T(C): 36.2 (19 @ 13:37), Max: 36.3 (19 @ 20:28)  T(F): 97.2 (19 @ 13:37), Max: 97.4 (19 @ 20:28)  HR: 95 (19 @ 13:37) (89 - 100)  BP: 128/85 (19 @ 13:37) (128/85 - 151/83)  RR: 17 (19 @ 13:37) (16 - 17)  SpO2: 100% (19 @ 13:37) (96% - 100%)                   Daily     Daily Weight in k.7 (2019 11:39)        CAPILLARY BLOOD GLUCOSE      POCT Blood Glucose.: 185 mg/dL (2019 11:37)  POCT Blood Glucose.: 418 mg/dL (2019 08:10)  POCT Blood Glucose.: 361 mg/dL (2019 07:39)  POCT Blood Glucose.: 347 mg/dL (2019 21:29)  POCT Blood Glucose.: 177 mg/dL (2019 16:58)          Drains:   out                     PHYSICAL EXAM  S   No chest pain,  no chest pressure  or palpitations"  Neurology: alert and oriented x 3, moves all extremities with no defecits  CV :  RRR    Lungs:   CTA B/L  Abdomen: soft, nontender, nondistended, positive bowel sounds  Extremities:     no edema   no calf tenderness

## 2019-04-23 NOTE — DIETITIAN INITIAL EVALUATION ADULT. - NS AS NUTRI INTERV ED CONTENT3
RD provided extensive diet education including T2DM - sources of carbohydrate vs protein, label reading, consistent carbohydrate intake, plate method, pairing carbohydrate and protein (recommended including protein at each meal), d/c intake of juices/soda - also reviewed low sodium diet, relationship between Na and fluid, label reading for sodium, avoiding processed and fast foods, fluid intake.  Pt provided with written materials and made aware RD remains available PRN.

## 2019-04-23 NOTE — PROGRESS NOTE ADULT - ASSESSMENT
52 y/o Female with PMHx of HTN, DM, HLD, sarcoidosis on steroids, HFrEF , CAD s/p mLAD s/p stent placement 4/2017, cardiac thrombus on Xarelto asthma /COPD on home O2, CHF s/p AICD sent to ER from her cardiologist's office for recurrent moderate to large sized pericardial effusion seen on TTE. CT Chest with large pericardial effusion and Thoracic surgery consulted for further management. No clinical signs of tamponade. Pt was evaluated by Dr. Benito Franklin in May 2018 for pericardial effusion and deemed very high risk for any operative procedure at that time given her co-morbidities.    4/17 CT Chest, Large pericardial effusion, no tamponade physiology  4/18 IR Drain 500 ml off, effusion culture no growth.   4/19 VSS, pericardial tube draining, 200 ml serous drainage, hemodynamically stable. Check TTE in AM   4/20 VSS, +diarrhea, 250ml in pericardial drain. Limited TTE no pericardial effusion.  4/21 100ml/24h output in pericardial drain.    4/22: 10/24h in pericardial drain. Primary team to arrange with IR for removing drain. Pericardial tube aggressively milked today tube remains patent, echo confirms no pericardial effusion.  4/23    VSS   minimal drainage from francis tube,   IR took tube out today

## 2019-04-23 NOTE — CHART NOTE - NSCHARTNOTEFT_GEN_A_CORE
Interventional Radiology    Patient with h/o pericardial effusion s/p drainage on 4/18. Pt with no output x24hrs, echo from 4/19 shows no pericardial effusion. IR requested for removal. Case d/w ashwin Harrison to remove drain.     Site prepped and draped in usual sterile fashion, Pericardial drain removed w/o difficulty. Sterile occlusive dressing applied.  Pt tolerated well. Denies chest pain/ SOB.     -Check site with vitals q15min x 1hr then q30in x 1hr  -Continue global care per primary team  -please contact IR with any questions/ concerns at [phone]

## 2019-04-23 NOTE — PROGRESS NOTE ADULT - ASSESSMENT
diarrhea/constipation, stools negative so far  diet as tolerates  await remainder of stool studies  endoscopic evaluation pending cardiac status and final stool results

## 2019-04-23 NOTE — PROGRESS NOTE ADULT - SUBJECTIVE AND OBJECTIVE BOX
Subjective: Patient seen and examined. No new events except as noted.   doing well  drain removed by IR     REVIEW OF SYSTEMS:    CONSTITUTIONAL: No weakness, fevers or chills  EYES/ENT: No visual changes;  No vertigo or throat pain   NECK: No pain or stiffness  RESPIRATORY: No cough, wheezing, hemoptysis; No shortness of breath  CARDIOVASCULAR: No chest pain or palpitations  GASTROINTESTINAL: No abdominal or epigastric pain. No nausea, vomiting, or hematemesis; No diarrhea or constipation. No melena or hematochezia.  GENITOURINARY: No dysuria, frequency or hematuria  NEUROLOGICAL: No numbness or weakness  SKIN: No itching, burning, rashes, or lesions   All other review of systems is negative unless indicated above.    MEDICATIONS:  MEDICATIONS  (STANDING):  atorvastatin 40 milliGRAM(s) Oral at bedtime  carvedilol 6.25 milliGRAM(s) Oral every 12 hours  dextrose 5%. 1000 milliLiter(s) (50 mL/Hr) IV Continuous <Continuous>  dextrose 50% Injectable 12.5 Gram(s) IV Push once  dextrose 50% Injectable 25 Gram(s) IV Push once  dextrose 50% Injectable 25 Gram(s) IV Push once  fluticasone propionate 50 MICROgram(s)/spray Nasal Spray 1 Spray(s) Both Nostrils two times a day  furosemide    Tablet 80 milliGRAM(s) Oral every 12 hours  insulin glargine Injectable (LANTUS) 28 Unit(s) SubCutaneous at bedtime  insulin lispro (HumaLOG) corrective regimen sliding scale   SubCutaneous three times a day before meals  insulin lispro (HumaLOG) corrective regimen sliding scale   SubCutaneous at bedtime  insulin lispro Injectable (HumaLOG) 10 Unit(s) SubCutaneous three times a day before meals  levothyroxine 25 MICROGram(s) Oral daily  metolazone 5 milliGRAM(s) Oral <User Schedule>  mometasone 220 MICROgram(s) Inhaler 1 Puff(s) Inhalation two times a day  predniSONE   Tablet 20 milliGRAM(s) Oral two times a day      PHYSICAL EXAM:  T(C): 36.2 (04-23-19 @ 13:37), Max: 36.3 (04-22-19 @ 20:28)  HR: 95 (04-23-19 @ 13:37) (89 - 100)  BP: 128/85 (04-23-19 @ 13:37) (128/85 - 151/83)  RR: 17 (04-23-19 @ 13:37) (16 - 17)  SpO2: 100% (04-23-19 @ 13:37) (96% - 100%)  Wt(kg): --  I&O's Summary    22 Apr 2019 07:01  -  23 Apr 2019 07:00  --------------------------------------------------------  IN: 720 mL / OUT: 100 mL / NET: 620 mL    23 Apr 2019 07:01  -  23 Apr 2019 14:43  --------------------------------------------------------  IN: 840 mL / OUT: 300 mL / NET: 540 mL          Appearance: Normal	  HEENT:   Normal oral mucosa, PERRL, EOMI	  Lymphatic: No lymphadenopathy , no edema  Cardiovascular: Normal S1 S2, No JVD, No murmurs , Peripheral pulses palpable 2+ bilaterally  Respiratory: Lungs clear to auscultation, normal effort 	  Gastrointestinal:  Soft, Non-tender, + BS	  Skin: No rashes, No ecchymoses, No cyanosis, warm to touch  Musculoskeletal: Normal range of motion, normal strength  Psychiatry:  Mood & affect appropriate  Ext: No edema      All labs, Imaging and EKGs personally reviewed                   04-22    137  |  103  |  83<H>  ----------------------------<  210<H>  5.3   |  26  |  1.46<H>    Ca    9.1      22 Apr 2019 15:24  Mg     2.2     04-22

## 2019-04-23 NOTE — PROGRESS NOTE ADULT - ASSESSMENT
Pt is a 52 yo female with ICM s/p ICD, sarcoid admitted with moderate to lare sized circumferential pericardial effusion. Also has been having persistent diarrhea.     Problem/Recommendation - 1:  Problem: Pericardial effusion. Recommendation: seen on Echo at outpatient office  card eval appreciated  IR pericardiac effusion drainage, S/P pericardiac drain, + drainage, drain removed by IR   patient is hemodynamically stable   CT chest noted.  BP monitoring     Problem/Recommendation - 2:  ·  Problem: Diarrhea.  Recommendation: chronic for months   GI eval appreciated  send stool studies   C diff negative   in view of Hx of sarcoidosis, GI autoimmune diseases need to be ruled out   diet control  monitor electrolytes.   plan for scope by GI when clear     Problem/Recommendation - 3:  ·  Problem: Sarcoidosis.  Recommendation: on prednisone.   monitor BUN/Cr  avoid nephrotoxic medications     Problem/Recommendation - 4:  ·  Problem: History of atrial fibrillation.  Recommendation: rate control  Hold AC for now  card F/U.     Problem/Recommendation - 5:  ·  Problem: CAD (coronary artery disease).  Recommendation: S/P PCI  Statin and AC.     Problem/Recommendation - 6:  Problem: Diabetes mellitus. Recommendation: sliding scale   increase lantus and premeal humalog for better BS control   elevate A1c   DM diet  Endo eval for BS management.    Problem/Recommendation - 7:  Problem: Prophylactic measure. Recommendation: DVT and Gi PPX.

## 2019-04-23 NOTE — DIETITIAN INITIAL EVALUATION ADULT. - PHYSICAL APPEARANCE
Unclear weight history - Pt reports usual body weight of 114 lbs for about 1 year.  States weight was previously 165 lbs ("1-2 years ago").  Per May 2018 RD note, Pt weighed 163 lbs with 4+ L/R foot and ankle edema.  Pt also states she had "40 lbs of fluid on her legs."  ?163 lb weight not a dry weight.  Current standing weight 122.1 lbs (4/21/19) with 1+ L/R ankle edema.  On visual exam - mild-to-moderate temporal wasting, mild depletion at clavicles.

## 2019-04-23 NOTE — PROGRESS NOTE ADULT - PROBLEM SELECTOR PLAN 1
s/p 4/18 pericardial drain in IR  Pericardial fluid cultures no growth     will sign off for now   please call  with any further needs  Care as per primary team

## 2019-04-23 NOTE — DIETITIAN INITIAL EVALUATION ADULT. - ENERGY NEEDS
ht: 67 inches, dosing weight: 149.6 lbs, BMI: 23.5 kg/m2, IBW: 135 lbs (+/- 10%), %IBW: 111% -- current weight: 122.1 lbs (making BMI 19.2 kg/m2)  Edema: 1+ L/R ankle  Skin per nursing documentation: no pressure injuries noted  GI: last BM 4/22/19 x2 per flowsheets  Per chart: 52 year old F sent to ER yesterday for moderate to large sized pericardial effusion seen on TTE at her cardiologist office. She has PMHx of HTN, DM, HLD, sarcoidosis on steroids, HFrEF , CAD s/p mLAD s/p stent placement 4/2017, cardiac thrombus on Xarelto asthma /COPD on home O2, CHF s/p AICD. O2, CHF s/p AICD. Patient states that she has been having watery diarrhea since October 2018.  She denies any blood in stool or weight loss

## 2019-04-23 NOTE — DIETITIAN INITIAL EVALUATION ADULT. - PROBLEM SELECTOR PLAN 1
Patient with finding of pericardial effusion, hemodynamically stable  no temponade on echocardiogram as per Dr. Sam  ECG shows no alternans  no pulsus peridoxus  Will get CT chest w/o contrast for further evaluation  hold xarelto and aspirin for now for possible IR drainage  IR consult in AM  monitor vitals q4h for now  troponin 65-->53; no chest pain  monitor on tele  check official echocardiogram

## 2019-04-23 NOTE — PROGRESS NOTE ADULT - SUBJECTIVE AND OBJECTIVE BOX
Subjective: Patient seen and examined. No new events except as noted.   no cp or sob     REVIEW OF SYSTEMS:    CONSTITUTIONAL: + weakness, fevers or chills  EYES/ENT: No visual changes;  No vertigo or throat pain   NECK: No pain or stiffness  RESPIRATORY: No cough, wheezing, hemoptysis; No shortness of breath  CARDIOVASCULAR: No chest pain or palpitations  GASTROINTESTINAL: No abdominal or epigastric pain. No nausea, vomiting, or hematemesis; No diarrhea or constipation. No melena or hematochezia.  GENITOURINARY: No dysuria, frequency or hematuria  NEUROLOGICAL: No numbness or weakness  SKIN: No itching, burning, rashes, or lesions   All other review of systems is negative unless indicated above.    MEDICATIONS:  MEDICATIONS  (STANDING):  atorvastatin 40 milliGRAM(s) Oral at bedtime  carvedilol 6.25 milliGRAM(s) Oral every 12 hours  dextrose 5%. 1000 milliLiter(s) (50 mL/Hr) IV Continuous <Continuous>  dextrose 50% Injectable 12.5 Gram(s) IV Push once  dextrose 50% Injectable 25 Gram(s) IV Push once  dextrose 50% Injectable 25 Gram(s) IV Push once  fluticasone propionate 50 MICROgram(s)/spray Nasal Spray 1 Spray(s) Both Nostrils two times a day  furosemide    Tablet 80 milliGRAM(s) Oral every 12 hours  insulin glargine Injectable (LANTUS) 24 Unit(s) SubCutaneous at bedtime  insulin lispro (HumaLOG) corrective regimen sliding scale   SubCutaneous three times a day before meals  insulin lispro (HumaLOG) corrective regimen sliding scale   SubCutaneous at bedtime  insulin lispro Injectable (HumaLOG) 8 Unit(s) SubCutaneous three times a day before meals  levothyroxine 25 MICROGram(s) Oral daily  metolazone 5 milliGRAM(s) Oral <User Schedule>  mometasone 220 MICROgram(s) Inhaler 1 Puff(s) Inhalation two times a day  predniSONE   Tablet 20 milliGRAM(s) Oral two times a day      PHYSICAL EXAM:  T(C): 36.3 (04-23-19 @ 05:06), Max: 36.6 (04-22-19 @ 13:51)  HR: 96 (04-23-19 @ 05:06) (89 - 100)  BP: 138/90 (04-23-19 @ 05:06) (133/90 - 151/83)  RR: 16 (04-23-19 @ 05:06) (16 - 18)  SpO2: 96% (04-23-19 @ 05:06) (96% - 100%)  Wt(kg): --  I&O's Summary    22 Apr 2019 07:01  -  23 Apr 2019 07:00  --------------------------------------------------------  IN: 720 mL / OUT: 100 mL / NET: 620 mL          Appearance: Normal	  HEENT:   Normal oral mucosa, PERRL, EOMI	  Lymphatic: No lymphadenopathy , no edema  Cardiovascular: Normal S1 S2, No JVD, No murmurs ,+ pericardial drain   Respiratory: Lungs clear to auscultation, normal effort 	  Gastrointestinal:  Soft, Non-tender, + BS	  Skin: No rashes, No ecchymoses, No cyanosis, warm to touch  Musculoskeletal: Normal range of motion, normal strength  Psychiatry:  Mood & affect appropriate  Ext: No edema      LABS:    CARDIAC MARKERS:                                12.6   10.07 )-----------( 249      ( 21 Apr 2019 11:52 )             43.5     04-22    137  |  103  |  83<H>  ----------------------------<  210<H>  5.3   |  26  |  1.46<H>    Ca    9.1      22 Apr 2019 15:24  Mg     2.2     04-22      proBNP:   Lipid Profile:   HgA1c:   TSH:             TELEMETRY: 	SR    ECG:  	  RADIOLOGY:   DIAGNOSTIC TESTING:  [ ] Echocardiogram:  [ ]  Catheterization:  [ ] Stress Test:    OTHER:

## 2019-04-23 NOTE — DIETITIAN INITIAL EVALUATION ADULT. - DIET TYPE
DASH/TLC (sodium and cholesterol restricted diet)/consistent carbohydrate (evening snack)/lactose restricted/1500ml

## 2019-04-23 NOTE — CONSULT NOTE ADULT - ASSESSMENT
52 yo female with ICM s/p ICD, sarcoid admitted with moderate to lare sized circumferential pericardial effusion. Also has been having persistent diarrhea.
Assessment  DMT2: 53y Female with DM T2 with hyperglycemia, was on oral meds and on insulin at home, on oral steroids, on basal bolus insulin, blood sugars trending down, no hypoglycemic episode, eating meals, non compliant with low carb diet.  CAD/Pericardialeffusion: on medications, no chest pain, stable, monitored.  HTN: Controlled,  on antihypertensive medications.  CKD: Monitor labs/BMP,   Sarcoidosis: On treatment, stable.          Marlena Brown MD  Cell: [phone]  Office: [phone]
Chronic diarrhea with broad differential diagnosis. Doesn't sound secretory in etiology. Consider infectious, inflammatory, infiltrative including amyloid, SB involvement with sarcoid is rare, malabsoprtion/maldigestion such as celiac and steatorrhea, SIBO, dysmotility, etc. Poor intestinal perfusion and bowel edema due to cardiac diease can also contribute.      Given her large pericardial effusion I would hold off on endoscopic evaluation at this time  will check stool studies and electrolytes, fecal fat, calprotectin, celiac serologies  CT abd/pelvis with contrast if renal function allows.   lactose free diet
Pt is a 52 yo female with ICM s/p ICD, sarcoid admitted with moderate to lare sized circumferential pericardial effusion. Also has been having persistent diarrhea.
52 y/o Female with PMHx of HTN, DM, HLD, sarcoidosis on steroids, HFrEF , CAD s/p mLAD s/p stent placement 4/2017, cardiac thrombus on Xarelto asthma /COPD on home O2, CHF s/p AICD sent to ER from her cardiologist's office for recurrent moderate to large sized pericardial effusion seen on TTE. CT Chest with large pericardial effusion and Thoracic surgery consulted for further management. No clinical signs of tamponade. Pt was evaluated by Dr. Benito Franklin in May 2018 for pericardial effusion and deemed very high risk for any operative procedure at that time given her co-morbidities.

## 2019-04-23 NOTE — CONSULT NOTE ADULT - PROBLEM SELECTOR RECOMMENDATION 9
IR consult for drainage   CTS consult   No clinical or echocardiographic evidence of tamponade physiology
Will continue current insulin regimen for now. Will continue monitoring FS, log, will Follow up.  Patient counseled for compliance with consistent low carb diet.
seen on Echo at outpatient office  card eval appreciated  Plan for IR drainage  patient is hemodynamically stable   CT chest noted
Recommend IR drainage of pericardial effusion  Please send fluid for culture and pathocytology  Hold Xarelto  Thoracic surgery will follow  Continue care as per primary team

## 2019-04-23 NOTE — CHART NOTE - NSCHARTNOTEFT_GEN_A_CORE
Received a phone jennifer from aki Cuevas, who asked to put following orders:   Lantus 22U at bedtime and Humalog 8 U tib before each meal.  Allergies revived with pt.   ordered placed.

## 2019-04-23 NOTE — CONSULT NOTE ADULT - SUBJECTIVE AND OBJECTIVE BOX
HPI:  52 year old F w/ HTN, DM, HLD, sarcoidosis on steroids, HFrEF , CAD s/p mLAD s/p stent placement 4/2017, cardiac thrombus on Xarelto asthma /COPD on home O2, CHF s/p AICD, presented from Cardiology office for evaluation for pericardial effusion.  Patient has a 2nd MRN ([id number]).  Patient has been having watery diarrhea since October 2018.  She denies any blood in stool or weight loss.  She was planned for EGD/Colonoscopy, pending cardiology clearance.  Patient states she went to cardiology today, had echocardiogram done, which showed pericardial effusion and therefore sent here for further evaluation and possible IR drainage.  Patient otherwise denies chest pain. At baseline she has SOB with minimal exertion, which has not changed.  She has LE edema, but not worse than usual.  Denies any fever, but has chills.      Of note, patient was admitted to [facility] in May 2018 for decompensated systolic heart failure, found to have large circumferential pericardial effusion with early tamponade physiology on TTE. CT surgery consulted for pericardial window but patient was deemed not a surgical candidate due to high risk for any surgical procedure and general anesthesia given her co-morbidities and patient refused anesthesia. Patient was transferred to CCU for close monitoring of hemodynamics given TTE findings. In CCU remained HD stable, underwent placement of pericardial drain with IR on 5/7. Procedure was complicated by hydropneumothorax on R due to initial dislodgement of R sided drain, and L sided PTX after pericardial drain placement on L. Gram stain from procedure growing gram positive cocci in pairs, ID consulted, patient started on Vancomycin and Ceftriaxone. Patient started on Lasix for ADHF. (16 Apr 2019 22:13)  Patient has history of diabetes, on oral meds and on insulin at home, no recent hypoglycemic episodes, no polyuria polydipsia. Patient follows up with PCP for diabetes management.    PAST MEDICAL & SURGICAL HISTORY:  DM (diabetes mellitus)  CAD (coronary artery disease)  Sarcoidosis  Asthma with COPD  CHF, chronic  Mural thrombus of cardiac apex  History of atrial fibrillation  Thrombus: ? Thrombus in heart - on Xarelto  Hyperlipidemia  Anemia  Hypertension  Sarcoidosis  CHF (congestive heart failure): Stage III, on home O2 @ 2lpm  Diabetes  AICD (automatic cardioverter/defibrillator) present  History of repair of hip fracture  Ankle fracture  Ankle fracture: Left Ankle - with plate and screws placed  Hip deformity: left hip had a pin placed to hold bone in place after a fall      FAMILY HISTORY:  FH: breast cancer: mother  Family hx of colon cancer: Mother  Family history of hypertension (Sibling): Siblings  Family history of diabetes mellitus (Father, Sibling): Father, Siblings  Family history of breast cancer in mother (Mother): Mother  Family history of colon cancer in mother (Mother): Mother      Social History:    Outpatient Medications:    MEDICATIONS  (STANDING):  atorvastatin 40 milliGRAM(s) Oral at bedtime  carvedilol 6.25 milliGRAM(s) Oral every 12 hours  dextrose 5%. 1000 milliLiter(s) (50 mL/Hr) IV Continuous <Continuous>  dextrose 50% Injectable 12.5 Gram(s) IV Push once  dextrose 50% Injectable 25 Gram(s) IV Push once  dextrose 50% Injectable 25 Gram(s) IV Push once  fluticasone propionate 50 MICROgram(s)/spray Nasal Spray 1 Spray(s) Both Nostrils two times a day  furosemide    Tablet 80 milliGRAM(s) Oral every 12 hours  insulin lispro (HumaLOG) corrective regimen sliding scale   SubCutaneous three times a day before meals  insulin lispro (HumaLOG) corrective regimen sliding scale   SubCutaneous at bedtime  levothyroxine 25 MICROGram(s) Oral daily  metolazone 5 milliGRAM(s) Oral <User Schedule>  mometasone 220 MICROgram(s) Inhaler 1 Puff(s) Inhalation two times a day  predniSONE   Tablet 20 milliGRAM(s) Oral two times a day    MEDICATIONS  (PRN):  ALBUTerol    90 MICROgram(s) HFA Inhaler 2 Puff(s) Inhalation every 6 hours PRN Shortness of Breath and/or Wheezing  dextrose 40% Gel 15 Gram(s) Oral once PRN Blood Glucose LESS THAN 70 milliGRAM(s)/deciliter  glucagon  Injectable 1 milliGRAM(s) IntraMuscular once PRN Glucose LESS THAN 70 milligrams/deciliter  hemorrhoidal Ointment 1 Application(s) Rectal two times a day PRN Hemorrhoids  simethicone 80 milliGRAM(s) Chew two times a day PRN Gas      Allergies    avocado (Unknown)  Drug Allergies Not Recorded    Intolerances      Review of Systems:  Constitutional: No fever, no chills  Eyes: No blurry vision  Neuro: No tremors  HEENT: No pain, no neck swelling  Cardiovascular: No chest pain, no palpitations  Respiratory: Has SOB, no cough  GI: No nausea, vomiting, abdominal pain  : No dysuria  Skin: no rash  MSK: Has leg swelling.  Psych: no depression  Endocrine: no polyuria, polydipsia    ALL OTHER SYSTEMS REVIEWED AND NEGATIVE    UNABLE TO OBTAIN    PHYSICAL EXAM:  VITALS: T(C): 36.2 (04-23-19 @ 13:37)  T(F): 97.2 (04-23-19 @ 13:37), Max: 97.4 (04-22-19 @ 20:28)  HR: 95 (04-23-19 @ 13:37) (89 - 100)  BP: 128/85 (04-23-19 @ 13:37) (128/85 - 151/83)  RR:  (16 - 17)  SpO2:  (96% - 100%)  Wt(kg): --  GENERAL: NAD, well-groomed, well-developed  EYES: No proptosis, no lid lag  HEENT:  Atraumatic, Normocephalic  THYROID: Normal size, no palpable nodules  RESPIRATORY: Clear to auscultation bilaterally; No rales, rhonchi, wheezing  CARDIOVASCULAR: Si S2, No murmurs;  GI: Soft, non distended, normal bowel sounds  SKIN: Dry, intact, No rashes or lesions  MUSCULOSKELETAL: Has BL lower extremity edema.  NEURO:  no tremor, sensation decreased in feet BL,    POCT Blood Glucose.: 185 mg/dL (04-23-19 @ 11:37)  POCT Blood Glucose.: 418 mg/dL (04-23-19 @ 08:10)  POCT Blood Glucose.: 361 mg/dL (04-23-19 @ 07:39)  POCT Blood Glucose.: 347 mg/dL (04-22-19 @ 21:29)  POCT Blood Glucose.: 177 mg/dL (04-22-19 @ 16:58)  POCT Blood Glucose.: 274 mg/dL (04-22-19 @ 11:32)  POCT Blood Glucose.: 196 mg/dL (04-22-19 @ 07:19)  POCT Blood Glucose.: 220 mg/dL (04-21-19 @ 22:00)  POCT Blood Glucose.: 298 mg/dL (04-21-19 @ 17:25)  POCT Blood Glucose.: 342 mg/dL (04-21-19 @ 11:42)  POCT Blood Glucose.: 403 mg/dL (04-21-19 @ 07:07)  POCT Blood Glucose.: 452 mg/dL (04-21-19 @ 07:05)  POCT Blood Glucose.: 353 mg/dL (04-20-19 @ 21:45)  POCT Blood Glucose.: 272 mg/dL (04-20-19 @ 17:06)                            12.6   10.07 )-----------( 249      ( 21 Apr 2019 11:52 )             43.5       04-22    137  |  103  |  83<H>  ----------------------------<  210<H>  5.3   |  26  |  1.46<H>    EGFR if : 47<L>  EGFR if non : 41<L>    Ca    9.1      04-22  Mg     2.2     04-22        Thyroid Function Tests:      Hemoglobin A1C, Whole Blood: 12.2 % <H> [4.0 - 5.6] (04-17-19 @ 09:41)          Radiology:

## 2019-04-23 NOTE — DIETITIAN INITIAL EVALUATION ADULT. - ADHERENCE
HgbA1c 12.2% April 2019.  Pt does not read labels, states she does not understand what carbs are; drinks juice "adds water to it," dislikes diet drinks, reports intake of regular ginger ale when feeling gassy (1-2x/week).  Pt checks FS 3x/day (200s, 300s) - states that she tries to stay lower, but has headaches "when 120s, it's too low."  Pt notes that she only uses salt occasionally, sometimes eats frozen meals when she is unable to cook, endorses intake of grilled items from fast food restaurants.  Notes she drinks <1.5L fluid per day per MD recommendation.

## 2019-04-23 NOTE — PROGRESS NOTE ADULT - SUBJECTIVE AND OBJECTIVE BOX
INTERVAL HPI/OVERNIGHT EVENTS: c/o diarrhea and constipation, diarrhea related to "not getting enough food", stool testing so far negative, some studies still pending    MEDICATIONS  (STANDING):  atorvastatin 40 milliGRAM(s) Oral at bedtime  carvedilol 6.25 milliGRAM(s) Oral every 12 hours  dextrose 5%. 1000 milliLiter(s) (50 mL/Hr) IV Continuous <Continuous>  dextrose 50% Injectable 12.5 Gram(s) IV Push once  dextrose 50% Injectable 25 Gram(s) IV Push once  dextrose 50% Injectable 25 Gram(s) IV Push once  fluticasone propionate 50 MICROgram(s)/spray Nasal Spray 1 Spray(s) Both Nostrils two times a day  furosemide    Tablet 80 milliGRAM(s) Oral every 12 hours  insulin glargine Injectable (LANTUS) 24 Unit(s) SubCutaneous at bedtime  insulin lispro (HumaLOG) corrective regimen sliding scale   SubCutaneous three times a day before meals  insulin lispro (HumaLOG) corrective regimen sliding scale   SubCutaneous at bedtime  insulin lispro Injectable (HumaLOG) 8 Unit(s) SubCutaneous three times a day before meals  levothyroxine 25 MICROGram(s) Oral daily  metolazone 5 milliGRAM(s) Oral <User Schedule>  mometasone 220 MICROgram(s) Inhaler 1 Puff(s) Inhalation two times a day  predniSONE   Tablet 20 milliGRAM(s) Oral two times a day    MEDICATIONS  (PRN):  ALBUTerol    90 MICROgram(s) HFA Inhaler 2 Puff(s) Inhalation every 6 hours PRN Shortness of Breath and/or Wheezing  dextrose 40% Gel 15 Gram(s) Oral once PRN Blood Glucose LESS THAN 70 milliGRAM(s)/deciliter  glucagon  Injectable 1 milliGRAM(s) IntraMuscular once PRN Glucose LESS THAN 70 milligrams/deciliter  hemorrhoidal Ointment 1 Application(s) Rectal two times a day PRN Hemorrhoids  simethicone 80 milliGRAM(s) Chew two times a day PRN Gas      Allergies    No Known Allergies    Intolerances            PHYSICAL EXAM:   Vital Signs:  Vital Signs Last 24 Hrs  T(C): 36.3 (23 Apr 2019 05:06), Max: 36.6 (22 Apr 2019 13:51)  T(F): 97.4 (23 Apr 2019 05:06), Max: 97.9 (22 Apr 2019 13:51)  HR: 96 (23 Apr 2019 05:06) (89 - 100)  BP: 138/90 (23 Apr 2019 05:06) (133/90 - 151/83)  BP(mean): --  RR: 16 (23 Apr 2019 05:06) (16 - 18)  SpO2: 96% (23 Apr 2019 05:06) (96% - 100%)  Daily     Daily     GENERAL:  no distress  HEENT:  NC/AT,  anicteric  CHEST:   no increased effort, breath sounds clear  HEART:  Regular rhythm  ABDOMEN:  Soft, non-tender, non-distended, normoactive bowel sounds,  no masses ,no hepato-splenomegaly, no signs of chronic liver disease  EXTEREMITIES:  no cyanosis      LABS:                        12.6   10.07 )-----------( 249      ( 21 Apr 2019 11:52 )             43.5     04-22    137  |  103  |  83<H>  ----------------------------<  210<H>  5.3   |  26  |  1.46<H>    Ca    9.1      22 Apr 2019 15:24  Mg     2.2     04-22            RADIOLOGY & ADDITIONAL TESTS:

## 2019-04-23 NOTE — DIETITIAN INITIAL EVALUATION ADULT. - FACTORS AFF FOOD INTAKE
n/a - intake 100% per flowsheets (0% x1, 50% x1).  Pt notes diarrhea, however, does not appear to be affecting appetite/intake at this time.

## 2019-04-23 NOTE — DIETITIAN INITIAL EVALUATION ADULT. - ETIOLOGY
chronic catabolic illness (HFrEF, COPD), ?chronic losses due to diarrhea lack of prior exposure to nutrition education

## 2019-04-23 NOTE — DIETITIAN INITIAL EVALUATION ADULT. - ORAL INTAKE PTA
Pt reports no issues with intake/appetite PTA - breakfast: toast, egg or hot tea; ackee and saltfish; Lunch and Dinner: tilapia, rice, veggies.  Pt reports allergy to avocado (tongue itching) and "dark purple lettuce."  No micronutrient supplementation PTA "I was supposed to - with the red top." (Pt can't remember the name)

## 2019-04-24 LAB
ANION GAP SERPL CALC-SCNC: 11 MMOL/L — SIGNIFICANT CHANGE UP (ref 5–17)
BUN SERPL-MCNC: 104 MG/DL — HIGH (ref 7–23)
CALCIUM SERPL-MCNC: 9.4 MG/DL — SIGNIFICANT CHANGE UP (ref 8.4–10.5)
CHLORIDE SERPL-SCNC: 98 MMOL/L — SIGNIFICANT CHANGE UP (ref 96–108)
CO2 SERPL-SCNC: 23 MMOL/L — SIGNIFICANT CHANGE UP (ref 22–31)
CREAT SERPL-MCNC: 1.91 MG/DL — HIGH (ref 0.5–1.3)
GLUCOSE SERPL-MCNC: 306 MG/DL — HIGH (ref 70–99)
HCT VFR BLD CALC: 45.4 % — HIGH (ref 34.5–45)
HGB BLD-MCNC: 13.8 G/DL — SIGNIFICANT CHANGE UP (ref 11.5–15.5)
MCHC RBC-ENTMCNC: 21 PG — LOW (ref 27–34)
MCHC RBC-ENTMCNC: 30.4 GM/DL — LOW (ref 32–36)
MCV RBC AUTO: 69.1 FL — LOW (ref 80–100)
PLATELET # BLD AUTO: 280 K/UL — SIGNIFICANT CHANGE UP (ref 150–400)
POTASSIUM SERPL-MCNC: 5.3 MMOL/L — SIGNIFICANT CHANGE UP (ref 3.5–5.3)
POTASSIUM SERPL-SCNC: 5.3 MMOL/L — SIGNIFICANT CHANGE UP (ref 3.5–5.3)
RBC # BLD: 6.57 M/UL — HIGH (ref 3.8–5.2)
RBC # FLD: 16.5 % — HIGH (ref 10.3–14.5)
SODIUM SERPL-SCNC: 132 MMOL/L — LOW (ref 135–145)
WBC # BLD: 10.34 K/UL — SIGNIFICANT CHANGE UP (ref 3.8–10.5)
WBC # FLD AUTO: 10.34 K/UL — SIGNIFICANT CHANGE UP (ref 3.8–10.5)

## 2019-04-24 RX ORDER — INSULIN GLARGINE 100 [IU]/ML
24 INJECTION, SOLUTION SUBCUTANEOUS AT BEDTIME
Qty: 0 | Refills: 0 | Status: DISCONTINUED | OUTPATIENT
Start: 2019-04-24 | End: 2019-04-28

## 2019-04-24 RX ORDER — LIDOCAINE 4 G/100G
1 CREAM TOPICAL
Qty: 0 | Refills: 0 | Status: DISCONTINUED | OUTPATIENT
Start: 2019-04-24 | End: 2019-05-02

## 2019-04-24 RX ORDER — INSULIN LISPRO 100/ML
10 VIAL (ML) SUBCUTANEOUS
Qty: 0 | Refills: 0 | Status: DISCONTINUED | OUTPATIENT
Start: 2019-04-24 | End: 2019-04-27

## 2019-04-24 RX ADMIN — CARVEDILOL PHOSPHATE 6.25 MILLIGRAM(S): 80 CAPSULE, EXTENDED RELEASE ORAL at 05:06

## 2019-04-24 RX ADMIN — Medication 20 MILLIGRAM(S): at 22:36

## 2019-04-24 RX ADMIN — ATORVASTATIN CALCIUM 40 MILLIGRAM(S): 80 TABLET, FILM COATED ORAL at 22:36

## 2019-04-24 RX ADMIN — Medication 6: at 17:51

## 2019-04-24 RX ADMIN — INSULIN GLARGINE 24 UNIT(S): 100 INJECTION, SOLUTION SUBCUTANEOUS at 22:36

## 2019-04-24 RX ADMIN — SIMETHICONE 80 MILLIGRAM(S): 80 TABLET, CHEWABLE ORAL at 17:11

## 2019-04-24 RX ADMIN — Medication 80 MILLIGRAM(S): at 17:09

## 2019-04-24 RX ADMIN — Medication 10 UNIT(S): at 17:51

## 2019-04-24 RX ADMIN — Medication 4: at 07:53

## 2019-04-24 RX ADMIN — CARVEDILOL PHOSPHATE 6.25 MILLIGRAM(S): 80 CAPSULE, EXTENDED RELEASE ORAL at 17:11

## 2019-04-24 RX ADMIN — Medication 25 MICROGRAM(S): at 05:03

## 2019-04-24 RX ADMIN — Medication 20 MILLIGRAM(S): at 09:15

## 2019-04-24 RX ADMIN — MOMETASONE FUROATE 1 PUFF(S): 220 INHALANT RESPIRATORY (INHALATION) at 05:06

## 2019-04-24 RX ADMIN — Medication 8 UNIT(S): at 07:53

## 2019-04-24 RX ADMIN — Medication 8 UNIT(S): at 12:12

## 2019-04-24 RX ADMIN — Medication 8: at 12:12

## 2019-04-24 RX ADMIN — Medication 80 MILLIGRAM(S): at 05:06

## 2019-04-24 NOTE — PROGRESS NOTE ADULT - SUBJECTIVE AND OBJECTIVE BOX
Subjective: Patient seen and examined. No new events except as noted.   Cont ot have diarrhea     REVIEW OF SYSTEMS:    CONSTITUTIONAL: No weakness, fevers or chills  EYES/ENT: No visual changes;  No vertigo or throat pain   NECK: No pain or stiffness  RESPIRATORY: No cough, wheezing, hemoptysis; No shortness of breath  CARDIOVASCULAR: No chest pain or palpitations  GASTROINTESTINAL:  Diarrhea   GENITOURINARY: No dysuria, frequency or hematuria  NEUROLOGICAL: No numbness or weakness  SKIN: No itching, burning, rashes, or lesions   All other review of systems is negative unless indicated above.    MEDICATIONS:  MEDICATIONS  (STANDING):  atorvastatin 40 milliGRAM(s) Oral at bedtime  carvedilol 6.25 milliGRAM(s) Oral every 12 hours  dextrose 5%. 1000 milliLiter(s) (50 mL/Hr) IV Continuous <Continuous>  dextrose 50% Injectable 12.5 Gram(s) IV Push once  dextrose 50% Injectable 25 Gram(s) IV Push once  dextrose 50% Injectable 25 Gram(s) IV Push once  fluticasone propionate 50 MICROgram(s)/spray Nasal Spray 1 Spray(s) Both Nostrils two times a day  furosemide    Tablet 80 milliGRAM(s) Oral every 12 hours  insulin glargine Injectable (LANTUS) 24 Unit(s) SubCutaneous at bedtime  insulin lispro (HumaLOG) corrective regimen sliding scale   SubCutaneous three times a day before meals  insulin lispro (HumaLOG) corrective regimen sliding scale   SubCutaneous at bedtime  insulin lispro Injectable (HumaLOG) 10 Unit(s) SubCutaneous three times a day before meals  levothyroxine 25 MICROGram(s) Oral daily  metolazone 5 milliGRAM(s) Oral <User Schedule>  mometasone 220 MICROgram(s) Inhaler 1 Puff(s) Inhalation two times a day  predniSONE   Tablet 20 milliGRAM(s) Oral two times a day      PHYSICAL EXAM:  T(C): 36.4 (04-24-19 @ 14:20), Max: 36.9 (04-24-19 @ 00:04)  HR: 99 (04-24-19 @ 14:20) (88 - 99)  BP: 115/78 (04-24-19 @ 14:20) (108/77 - 151/100)  RR: 17 (04-24-19 @ 14:20) (16 - 18)  SpO2: 97% (04-24-19 @ 14:20) (97% - 100%)  Wt(kg): --  I&O's Summary    23 Apr 2019 07:01  -  24 Apr 2019 07:00  --------------------------------------------------------  IN: 960 mL / OUT: 500 mL / NET: 460 mL    24 Apr 2019 07:01  -  24 Apr 2019 17:09  --------------------------------------------------------  IN: 660 mL / OUT: 200 mL / NET: 460 mL          Appearance: Normal	  HEENT:   Normal oral mucosa	  Lymphatic: No lymphadenopathy , no edema  Cardiovascular: Normal S1 S2  Respiratory: Lungs clear to auscultation, normal effort 	  Gastrointestinal:  Soft, Non-tender, + BS	  Skin: No rashes, No ecchymoses, No cyanosis, warm to touch  Musculoskeletal: Normal range of motion, normal strength  Psychiatry:  Mood & affect appropriate  Ext: No edema      All labs, Imaging and EKGs personally reviewed                           13.8   10.34 )-----------( 280      ( 24 Apr 2019 10:34 )             45.4               04-24    132<L>  |  98  |  104<H>  ----------------------------<  306<H>  5.3   |  23  |  1.91<H>    Ca    9.4      24 Apr 2019 08:25

## 2019-04-24 NOTE — PROGRESS NOTE ADULT - SUBJECTIVE AND OBJECTIVE BOX
PEYMAN WATTS:[id number],   53yFemale followed for:  avocado (Unknown)  No Known Drug Allergies    PAST MEDICAL & SURGICAL HISTORY:  DM (diabetes mellitus)  CAD (coronary artery disease)  Sarcoidosis  Asthma with COPD  CHF, chronic  Mural thrombus of cardiac apex  History of atrial fibrillation  Thrombus: ? Thrombus in heart - on Xarelto  Hyperlipidemia  Anemia  Hypertension  Sarcoidosis  CHF (congestive heart failure): Stage III, on home O2 @ 2lpm  Diabetes  AICD (automatic cardioverter/defibrillator) present  History of repair of hip fracture  Ankle fracture  Ankle fracture: Left Ankle - with plate and screws placed  Hip deformity: left hip had a pin placed to hold bone in place after a fall    FAMILY HISTORY:  FH: breast cancer: mother  Family hx of colon cancer: Mother  Family history of hypertension (Sibling): Siblings  Family history of diabetes mellitus (Father, Sibling): Father, Siblings  Family history of breast cancer in mother (Mother): Mother  Family history of colon cancer in mother (Mother): Mother    MEDICATIONS  (STANDING):  atorvastatin 40 milliGRAM(s) Oral at bedtime  carvedilol 6.25 milliGRAM(s) Oral every 12 hours  dextrose 5%. 1000 milliLiter(s) (50 mL/Hr) IV Continuous <Continuous>  dextrose 50% Injectable 12.5 Gram(s) IV Push once  dextrose 50% Injectable 25 Gram(s) IV Push once  dextrose 50% Injectable 25 Gram(s) IV Push once  fluticasone propionate 50 MICROgram(s)/spray Nasal Spray 1 Spray(s) Both Nostrils two times a day  furosemide    Tablet 80 milliGRAM(s) Oral every 12 hours  insulin glargine Injectable (LANTUS) 22 Unit(s) SubCutaneous at bedtime  insulin lispro (HumaLOG) corrective regimen sliding scale   SubCutaneous three times a day before meals  insulin lispro (HumaLOG) corrective regimen sliding scale   SubCutaneous at bedtime  insulin lispro Injectable (HumaLOG) 8 Unit(s) SubCutaneous three times a day before meals  levothyroxine 25 MICROGram(s) Oral daily  metolazone 5 milliGRAM(s) Oral <User Schedule>  mometasone 220 MICROgram(s) Inhaler 1 Puff(s) Inhalation two times a day  predniSONE   Tablet 20 milliGRAM(s) Oral two times a day    MEDICATIONS  (PRN):  ALBUTerol    90 MICROgram(s) HFA Inhaler 2 Puff(s) Inhalation every 6 hours PRN Shortness of Breath and/or Wheezing  dextrose 40% Gel 15 Gram(s) Oral once PRN Blood Glucose LESS THAN 70 milliGRAM(s)/deciliter  glucagon  Injectable 1 milliGRAM(s) IntraMuscular once PRN Glucose LESS THAN 70 milligrams/deciliter  hemorrhoidal Ointment 1 Application(s) Rectal two times a day PRN Hemorrhoids  simethicone 80 milliGRAM(s) Chew two times a day PRN Gas      Vital Signs Last 24 Hrs  T(C): 36.9 (24 Apr 2019 00:04), Max: 36.9 (24 Apr 2019 00:04)  T(F): 98.4 (24 Apr 2019 00:04), Max: 98.4 (24 Apr 2019 00:04)  HR: 96 (24 Apr 2019 05:08) (88 - 96)  BP: 151/100 (24 Apr 2019 05:08) (108/77 - 151/100)  BP(mean): --  RR: 18 (24 Apr 2019 05:08) (16 - 18)  SpO2: 99% (24 Apr 2019 05:08) (98% - 100%)  nc/at  s1s2  cta  soft, nt, nd no guarding or rebound  no c/c/e      04-22    137  |  103  |  83<H>  ----------------------------<  210<H>  5.3   |  26  |  1.46<H>    Ca    9.1      22 Apr 2019 15:24  Mg     2.2     04-22

## 2019-04-24 NOTE — PROGRESS NOTE ADULT - ASSESSMENT
Assessment  DMT2: 53y Female with DM T2 with hyperglycemia, was on oral meds and on insulin at home, now on steroids, started on basal bolus insulin, blood sugars still running high, no hypoglycemic episode, eating meals, non compliant with low carb diet.  CAD/Pericardialeffusion: on medications, no chest pain, stable, monitored.  HTN: Controlled,  on antihypertensive medications.  CKD: Monitor labs/BMP,   Sarcoidosis: On treatment, stable.          Marlena Brown MD  Cell: [phone]  Office: [phone]

## 2019-04-24 NOTE — PROGRESS NOTE ADULT - SUBJECTIVE AND OBJECTIVE BOX
Chief complaint  Patient is a 53y old  Female who presents with a chief complaint of sent here for pericardial effusion (24 Apr 2019 17:08)   Review of systems  Patient in bed, looks comfortable, no fever, no hypoglycemia.    Labs and Fingersticks  CAPILLARY BLOOD GLUCOSE      POCT Blood Glucose.: 267 mg/dL (24 Apr 2019 17:17)  POCT Blood Glucose.: 315 mg/dL (24 Apr 2019 11:21)  POCT Blood Glucose.: 246 mg/dL (24 Apr 2019 07:39)  POCT Blood Glucose.: 215 mg/dL (23 Apr 2019 21:34)      Anion Gap, Serum: 11 (04-24 @ 08:25)      Calcium, Total Serum: 9.4 (04-24 @ 08:25)          04-24    132<L>  |  98  |  104<H>  ----------------------------<  306<H>  5.3   |  23  |  1.91<H>    Ca    9.4      24 Apr 2019 08:25                          13.8   10.34 )-----------( 280      ( 24 Apr 2019 10:34 )             45.4     Medications  MEDICATIONS  (STANDING):  atorvastatin 40 milliGRAM(s) Oral at bedtime  carvedilol 6.25 milliGRAM(s) Oral every 12 hours  dextrose 5%. 1000 milliLiter(s) (50 mL/Hr) IV Continuous <Continuous>  dextrose 50% Injectable 12.5 Gram(s) IV Push once  dextrose 50% Injectable 25 Gram(s) IV Push once  dextrose 50% Injectable 25 Gram(s) IV Push once  fluticasone propionate 50 MICROgram(s)/spray Nasal Spray 1 Spray(s) Both Nostrils two times a day  furosemide    Tablet 80 milliGRAM(s) Oral every 12 hours  insulin glargine Injectable (LANTUS) 24 Unit(s) SubCutaneous at bedtime  insulin lispro (HumaLOG) corrective regimen sliding scale   SubCutaneous three times a day before meals  insulin lispro (HumaLOG) corrective regimen sliding scale   SubCutaneous at bedtime  insulin lispro Injectable (HumaLOG) 10 Unit(s) SubCutaneous three times a day before meals  levothyroxine 25 MICROGram(s) Oral daily  metolazone 5 milliGRAM(s) Oral <User Schedule>  mometasone 220 MICROgram(s) Inhaler 1 Puff(s) Inhalation two times a day  predniSONE   Tablet 20 milliGRAM(s) Oral two times a day      Physical Exam  General: Patient comfortable in bed  Vital Signs Last 12 Hrs  T(F): 97.6 (04-24-19 @ 14:20), Max: 97.6 (04-24-19 @ 14:20)  HR: 99 (04-24-19 @ 17:11) (92 - 99)  BP: 137/88 (04-24-19 @ 17:11) (115/78 - 137/88)  BP(mean): --  RR: 17 (04-24-19 @ 14:20) (17 - 17)  SpO2: 97% (04-24-19 @ 14:20) (97% - 98%)  Neck: No palpable thyroid nodules.  CVS: S1S2, No murmurs  Respiratory: No wheezing, no crepitations  GI: Abdomen soft, bowel sounds positive  Musculoskeletal:  edema lower extremities.   Skin: No skin rashes, no ecchymosis    Diagnostics

## 2019-04-24 NOTE — PROGRESS NOTE ADULT - SUBJECTIVE AND OBJECTIVE BOX
Subjective: Patient seen and examined. No new events except as noted.   feeling better  Still with diarrhea   Pericardial drain removed yesterday       REVIEW OF SYSTEMS:    CONSTITUTIONAL: + weakness, fevers or chills  EYES/ENT: No visual changes;  No vertigo or throat pain   NECK: No pain or stiffness  RESPIRATORY: No cough, wheezing, hemoptysis; No shortness of breath  CARDIOVASCULAR: No chest pain or palpitations  GASTROINTESTINAL: No abdominal or epigastric pain. No nausea, vomiting, or hematemesis; No diarrhea or constipation. No melena or hematochezia.  GENITOURINARY: No dysuria, frequency or hematuria  NEUROLOGICAL: No numbness or weakness  SKIN: No itching, burning, rashes, or lesions   All other review of systems is negative unless indicated above.    MEDICATIONS:  MEDICATIONS  (STANDING):  atorvastatin 40 milliGRAM(s) Oral at bedtime  carvedilol 6.25 milliGRAM(s) Oral every 12 hours  dextrose 5%. 1000 milliLiter(s) (50 mL/Hr) IV Continuous <Continuous>  dextrose 50% Injectable 12.5 Gram(s) IV Push once  dextrose 50% Injectable 25 Gram(s) IV Push once  dextrose 50% Injectable 25 Gram(s) IV Push once  fluticasone propionate 50 MICROgram(s)/spray Nasal Spray 1 Spray(s) Both Nostrils two times a day  furosemide    Tablet 80 milliGRAM(s) Oral every 12 hours  insulin glargine Injectable (LANTUS) 22 Unit(s) SubCutaneous at bedtime  insulin lispro (HumaLOG) corrective regimen sliding scale   SubCutaneous three times a day before meals  insulin lispro (HumaLOG) corrective regimen sliding scale   SubCutaneous at bedtime  insulin lispro Injectable (HumaLOG) 8 Unit(s) SubCutaneous three times a day before meals  levothyroxine 25 MICROGram(s) Oral daily  metolazone 5 milliGRAM(s) Oral <User Schedule>  mometasone 220 MICROgram(s) Inhaler 1 Puff(s) Inhalation two times a day  predniSONE   Tablet 20 milliGRAM(s) Oral two times a day      PHYSICAL EXAM:  T(C): 36.9 (04-24-19 @ 00:04), Max: 36.9 (04-24-19 @ 00:04)  HR: 96 (04-24-19 @ 05:08) (88 - 96)  BP: 151/100 (04-24-19 @ 05:08) (108/77 - 151/100)  RR: 18 (04-24-19 @ 05:08) (16 - 18)  SpO2: 99% (04-24-19 @ 05:08) (98% - 100%)  Wt(kg): --  I&O's Summary    23 Apr 2019 07:01  -  24 Apr 2019 07:00  --------------------------------------------------------  IN: 960 mL / OUT: 500 mL / NET: 460 mL    24 Apr 2019 07:01  -  24 Apr 2019 09:44  --------------------------------------------------------  IN: 300 mL / OUT: 200 mL / NET: 100 mL          Appearance: Normal	  HEENT:   Normal oral mucosa, PERRL, EOMI	  Lymphatic: No lymphadenopathy , no edema  Cardiovascular: Normal S1 S2, No JVD, No murmurs , Peripheral pulses palpable 2+ bilaterally  Respiratory: Lungs clear to auscultation, normal effort 	  Gastrointestinal:  Soft, Non-tender, + BS	  Skin: No rashes, No ecchymoses, No cyanosis, warm to touch  Musculoskeletal: Normal range of motion, normal strength  Psychiatry:  Mood & affect appropriate  Ext: No edema      LABS:    CARDIAC MARKERS:            04-24    132<L>  |  98  |  104<H>  ----------------------------<  306<H>  5.3   |  23  |  1.91<H>    Ca    9.4      24 Apr 2019 08:25  Mg     2.2     04-22      proBNP:   Lipid Profile:   HgA1c:   TSH:             TELEMETRY: 	SR    ECG:  	  RADIOLOGY:   DIAGNOSTIC TESTING:  [ ] Echocardiogram:    [ ]  Catheterization:  [ ] Stress Test:    OTHER:

## 2019-04-24 NOTE — PROGRESS NOTE ADULT - ASSESSMENT
Pt is a 52 yo female with ICM s/p ICD, sarcoid admitted with moderate to lare sized circumferential pericardial effusion. Also has been having persistent diarrhea.     Problem/Recommendation:  Problem: Pericardial effusion. Recommendation: seen on Echo at outpatient office  card eval appreciated  IR pericardiac effusion drainage, S/P pericardiac drain, + drainage, drain removed by IR   patient is hemodynamically stable   CT chest noted.  BP monitoring     Problem/Recommendation:  YANA,   monitor BUN/Cr  avoid nephrotoxic medications   renal eval if BUN/Cr worsening   I and Os     Problem/Recommendation :  ·  Problem: Diarrhea.  Recommendation: chronic for months   GI eval appreciated  send stool studies   C diff negative   in view of Hx of sarcoidosis, GI autoimmune diseases need to be ruled out   diet control  monitor electrolytes.   plan for scope by GI when clear     Problem/Recommendation -:  ·  Problem: Sarcoidosis.  Recommendation: on prednisone.   monitor BUN/Cr  avoid nephrotoxic medications     Problem/Recommendation :  ·  Problem: History of atrial fibrillation.  Recommendation: rate control  restart AC   card F/U.     Problem/Recommendation :  ·  Problem: CAD (coronary artery disease).  Recommendation: S/P PCI  Statin and AC.     Problem/Recommendation :  Problem: Diabetes mellitus. Recommendation: sliding scale   increase lantus and premeal humalog for better BS control   elevate A1c   DM diet  Endo eval for BS management.    Problem/Recommendation:  Problem: Prophylactic measure. Recommendation: DVT and Gi PPX.

## 2019-04-24 NOTE — PROGRESS NOTE ADULT - PROBLEM SELECTOR PLAN 1
Will increase Lantus to 24 units at bed time.  Will increase Humalog to 10 units before each meal in addition to Humalog correction scale coverage.  Patient counseled for compliance with consistent low carb diet.

## 2019-04-24 NOTE — PROGRESS NOTE ADULT - PROBLEM SELECTOR PLAN 2
On medications,  no chest pain, stable, monitored and followed up by primary cardiothoracic team/cardiology team

## 2019-04-25 LAB
ANION GAP SERPL CALC-SCNC: 13 MMOL/L — SIGNIFICANT CHANGE UP (ref 5–17)
BUN SERPL-MCNC: 114 MG/DL — HIGH (ref 7–23)
CALCIUM SERPL-MCNC: 9 MG/DL — SIGNIFICANT CHANGE UP (ref 8.4–10.5)
CELIAC DISEASE INTERPRETATION: SIGNIFICANT CHANGE UP
CELIAC GENE PAIRS PRESENT: NO — SIGNIFICANT CHANGE UP
CHLORIDE SERPL-SCNC: 98 MMOL/L — SIGNIFICANT CHANGE UP (ref 96–108)
CO2 SERPL-SCNC: 25 MMOL/L — SIGNIFICANT CHANGE UP (ref 22–31)
CREAT SERPL-MCNC: 1.85 MG/DL — HIGH (ref 0.5–1.3)
DQ ALPHA 1: SIGNIFICANT CHANGE UP
DQ BETA 1: SIGNIFICANT CHANGE UP
GLUCOSE SERPL-MCNC: 178 MG/DL — HIGH (ref 70–99)
HCT VFR BLD CALC: 44.9 % — SIGNIFICANT CHANGE UP (ref 34.5–45)
HGB BLD-MCNC: 13.6 G/DL — SIGNIFICANT CHANGE UP (ref 11.5–15.5)
IMMUNOGLOBULIN A (IGA), S: 304 MG/DL — SIGNIFICANT CHANGE UP (ref 61–356)
MCHC RBC-ENTMCNC: 21 PG — LOW (ref 27–34)
MCHC RBC-ENTMCNC: 30.3 GM/DL — LOW (ref 32–36)
MCV RBC AUTO: 69.2 FL — LOW (ref 80–100)
PLATELET # BLD AUTO: 284 K/UL — SIGNIFICANT CHANGE UP (ref 150–400)
POTASSIUM SERPL-MCNC: 4.9 MMOL/L — SIGNIFICANT CHANGE UP (ref 3.5–5.3)
POTASSIUM SERPL-SCNC: 4.9 MMOL/L — SIGNIFICANT CHANGE UP (ref 3.5–5.3)
RBC # BLD: 6.49 M/UL — HIGH (ref 3.8–5.2)
RBC # FLD: 16.6 % — HIGH (ref 10.3–14.5)
SODIUM SERPL-SCNC: 136 MMOL/L — SIGNIFICANT CHANGE UP (ref 135–145)
WBC # BLD: 9.91 K/UL — SIGNIFICANT CHANGE UP (ref 3.8–10.5)
WBC # FLD AUTO: 9.91 K/UL — SIGNIFICANT CHANGE UP (ref 3.8–10.5)

## 2019-04-25 RX ORDER — SOD SULF/SODIUM/NAHCO3/KCL/PEG
4000 SOLUTION, RECONSTITUTED, ORAL ORAL ONCE
Qty: 0 | Refills: 0 | Status: COMPLETED | OUTPATIENT
Start: 2019-04-25 | End: 2019-04-25

## 2019-04-25 RX ORDER — INSULIN GLARGINE 100 [IU]/ML
12 INJECTION, SOLUTION SUBCUTANEOUS ONCE
Qty: 0 | Refills: 0 | Status: COMPLETED | OUTPATIENT
Start: 2019-04-25 | End: 2019-04-25

## 2019-04-25 RX ADMIN — Medication 4: at 23:16

## 2019-04-25 RX ADMIN — Medication 1 SPRAY(S): at 17:50

## 2019-04-25 RX ADMIN — MOMETASONE FUROATE 1 PUFF(S): 220 INHALANT RESPIRATORY (INHALATION) at 17:50

## 2019-04-25 RX ADMIN — Medication 10 UNIT(S): at 11:55

## 2019-04-25 RX ADMIN — Medication 1 SPRAY(S): at 05:15

## 2019-04-25 RX ADMIN — Medication 10 UNIT(S): at 08:03

## 2019-04-25 RX ADMIN — LIDOCAINE 1 APPLICATION(S): 4 CREAM TOPICAL at 01:04

## 2019-04-25 RX ADMIN — CARVEDILOL PHOSPHATE 6.25 MILLIGRAM(S): 80 CAPSULE, EXTENDED RELEASE ORAL at 05:15

## 2019-04-25 RX ADMIN — SIMETHICONE 80 MILLIGRAM(S): 80 TABLET, CHEWABLE ORAL at 01:03

## 2019-04-25 RX ADMIN — Medication 2: at 08:03

## 2019-04-25 RX ADMIN — ATORVASTATIN CALCIUM 40 MILLIGRAM(S): 80 TABLET, FILM COATED ORAL at 21:22

## 2019-04-25 RX ADMIN — Medication 20 MILLIGRAM(S): at 21:21

## 2019-04-25 RX ADMIN — Medication 80 MILLIGRAM(S): at 05:15

## 2019-04-25 RX ADMIN — Medication 4000 MILLILITER(S): at 11:56

## 2019-04-25 RX ADMIN — CARVEDILOL PHOSPHATE 6.25 MILLIGRAM(S): 80 CAPSULE, EXTENDED RELEASE ORAL at 17:50

## 2019-04-25 RX ADMIN — Medication 20 MILLIGRAM(S): at 11:55

## 2019-04-25 RX ADMIN — MOMETASONE FUROATE 1 PUFF(S): 220 INHALANT RESPIRATORY (INHALATION) at 05:16

## 2019-04-25 RX ADMIN — Medication 25 MICROGRAM(S): at 05:15

## 2019-04-25 RX ADMIN — Medication 80 MILLIGRAM(S): at 17:50

## 2019-04-25 RX ADMIN — INSULIN GLARGINE 12 UNIT(S): 100 INJECTION, SOLUTION SUBCUTANEOUS at 23:16

## 2019-04-25 NOTE — PROGRESS NOTE ADULT - PROBLEM SELECTOR PLAN 4
GI consulted  for colonoscopy. Acceptable cardiac risk to proceed.   Cardiomyopathy is chronic s/p ICD. She has no anginal symptoms. No ectopy on tele.

## 2019-04-25 NOTE — PROGRESS NOTE ADULT - SUBJECTIVE AND OBJECTIVE BOX
Subjective: Patient seen and examined. No new events except as noted.   =diarrhea   no cp or sob     REVIEW OF SYSTEMS:    CONSTITUTIONAL: + weakness, fevers or chills  EYES/ENT: No visual changes;  No vertigo or throat pain   NECK: No pain or stiffness  RESPIRATORY: No cough, wheezing, hemoptysis; No shortness of breath  CARDIOVASCULAR: No chest pain or palpitations  GASTROINTESTINAL: No abdominal or epigastric pain. No nausea, vomiting, or hematemesis; + diarrhea or constipation. No melena or hematochezia.  GENITOURINARY: No dysuria, frequency or hematuria  NEUROLOGICAL: No numbness or weakness  SKIN: No itching, burning, rashes, or lesions   All other review of systems is negative unless indicated above.    MEDICATIONS:  MEDICATIONS  (STANDING):  atorvastatin 40 milliGRAM(s) Oral at bedtime  carvedilol 6.25 milliGRAM(s) Oral every 12 hours  dextrose 5%. 1000 milliLiter(s) (50 mL/Hr) IV Continuous <Continuous>  dextrose 50% Injectable 12.5 Gram(s) IV Push once  dextrose 50% Injectable 25 Gram(s) IV Push once  dextrose 50% Injectable 25 Gram(s) IV Push once  fluticasone propionate 50 MICROgram(s)/spray Nasal Spray 1 Spray(s) Both Nostrils two times a day  furosemide    Tablet 80 milliGRAM(s) Oral every 12 hours  insulin glargine Injectable (LANTUS) 24 Unit(s) SubCutaneous at bedtime  insulin lispro (HumaLOG) corrective regimen sliding scale   SubCutaneous three times a day before meals  insulin lispro (HumaLOG) corrective regimen sliding scale   SubCutaneous at bedtime  insulin lispro Injectable (HumaLOG) 10 Unit(s) SubCutaneous three times a day before meals  levothyroxine 25 MICROGram(s) Oral daily  metolazone 5 milliGRAM(s) Oral <User Schedule>  mometasone 220 MICROgram(s) Inhaler 1 Puff(s) Inhalation two times a day  polyethylene glycol/electrolyte Solution. 4000 milliLiter(s) Oral once  predniSONE   Tablet 20 milliGRAM(s) Oral two times a day      PHYSICAL EXAM:  T(C): 36.4 (04-25-19 @ 04:53), Max: 36.4 (04-24-19 @ 14:20)  HR: 87 (04-25-19 @ 04:53) (87 - 99)  BP: 129/97 (04-25-19 @ 04:53) (115/78 - 137/95)  RR: 18 (04-25-19 @ 04:53) (16 - 18)  SpO2: 98% (04-25-19 @ 04:53) (97% - 99%)  Wt(kg): --  I&O's Summary    24 Apr 2019 07:01  -  25 Apr 2019 07:00  --------------------------------------------------------  IN: 1080 mL / OUT: 200 mL / NET: 880 mL    25 Apr 2019 07:01  -  25 Apr 2019 10:33  --------------------------------------------------------  IN: 460 mL / OUT: 0 mL / NET: 460 mL          Appearance: Normal	  HEENT:   Normal oral mucosa, PERRL, EOMI	  Lymphatic: No lymphadenopathy , no edema  Cardiovascular: Normal S1 S2, No JVD, No murmurs , Peripheral pulses palpable 2+ bilaterally  Respiratory: Lungs clear to auscultation, normal effort 	  Gastrointestinal:  Soft, Non-tender, + BS	  Skin: No rashes, No ecchymoses, No cyanosis, warm to touch  Musculoskeletal: Normal range of motion, normal strength  Psychiatry:  Mood & affect appropriate  Ext: No edema      LABS:    CARDIAC MARKERS:                                13.8   10.34 )-----------( 280      ( 24 Apr 2019 10:34 )             45.4     04-25    136  |  98  |  114<H>  ----------------------------<  178<H>  4.9   |  25  |  1.85<H>    Ca    9.0      25 Apr 2019 09:41      proBNP:   Lipid Profile:   HgA1c:   TSH:             TELEMETRY: 	SR    ECG:  	  RADIOLOGY:   DIAGNOSTIC TESTING:  [ ] Echocardiogram:  < from: Limited Transthoracic Echo (04.23.19 @ 15:53) >    Patient name: PEYMAN WATTS  YOB: 1965   Age: 53 (F)   MR#: [id number]  Study Date: 4/23/2019  [id number]  Study quality: Limited  Referring Physician: David Sam MD  Blood Pressure: 138/90 mmHg  Height: 170 cm  Weight: 68 kg  BSA: 1.8 m2  ------------------------------------------------------------------------  PROCEDURE: Limited transthoracic echocardiogram with 2-D.  M-Mode and spectral and color flow Doppler.  INDICATION: Pericardial effusion (noninflammatory) (I31.3)  ------------------------------------------------------------------------  Dimensions:    Normal Values:  LA:            2.0 - 4.0 cm  Ao:            2.0 - 3.8 cm  SEPTUM:        0.6 - 1.2 cm  PWT: 0.6 - 1.1 cm  LVIDd:         3.0 - 5.6 cm  LVIDs:         1.8 - 4.0 cm  EF (Visual Estimate): 10-15 %  ------------------------------------------------------------------------  Observations:  Mitral Valve: Mitral valve not well visualized.  Aortic Valve/Aorta: Aortic valve not well visualized.  Normal aortic root, aortic arch and descending thoracic  aorta.  Left Atrium: Normal left atrium.  Left Ventricle: Left ventricular ejection fraction, by  visual estimation, is 10-15%.  Severe global hypokinesia.  Right Heart: Right atrium not well visualized, probably  normal. The right ventricle is not well visualized.  Grossly, right ventricle is normal size with severely  reduced systolic function. A device wire is noted in the  right heart. Tricuspid valve not well visualized. Pulmonic  valve not well visualized.  Pericardium/Pleura: Normal pericardium with no pericardial  effusion.  ------------------------------------------------------------------------  Conclusions:  Limited study.  1. Normal left atrium.  2. Left ventricular ejection fraction, by visual  estimation, is 10-15%.  Severe global hypokinesia.  3. Right atrium not well visualized, probably normal.  4. The right ventricle is not well visualized. Grossly,  right ventricle is normal size with severely reduced  systolic function. A device wire is noted in the right  heart.  5. Valves not well visualized.  6. Normal pericardium with no pericardial effusion.  ------------------------------------------------------------------------  Confirmed on  4/24/2019 - 15:38:30 by Lance Walsh M.D.  ------------------------------------------------------------------------    < end of copied text >    [ ]  Catheterization:  [ ] Stress Test:    OTHER:

## 2019-04-25 NOTE — PROGRESS NOTE ADULT - SUBJECTIVE AND OBJECTIVE BOX
Subjective: Patient seen and examined. No new events except as noted.   cont to have diarrhea     REVIEW OF SYSTEMS:    CONSTITUTIONAL: No weakness, fevers or chills  EYES/ENT: No visual changes;  No vertigo or throat pain   NECK: No pain or stiffness  RESPIRATORY: No cough, wheezing, hemoptysis; No shortness of breath  CARDIOVASCULAR: No chest pain or palpitations  GASTROINTESTINAL: No abdominal or epigastric pain. No nausea, vomiting, or hematemesis; No diarrhea or constipation. No melena or hematochezia.  GENITOURINARY: No dysuria, frequency or hematuria  NEUROLOGICAL: No numbness or weakness  SKIN: No itching, burning, rashes, or lesions   All other review of systems is negative unless indicated above.    MEDICATIONS:  MEDICATIONS  (STANDING):  atorvastatin 40 milliGRAM(s) Oral at bedtime  carvedilol 6.25 milliGRAM(s) Oral every 12 hours  dextrose 5%. 1000 milliLiter(s) (50 mL/Hr) IV Continuous <Continuous>  dextrose 50% Injectable 12.5 Gram(s) IV Push once  dextrose 50% Injectable 25 Gram(s) IV Push once  dextrose 50% Injectable 25 Gram(s) IV Push once  fluticasone propionate 50 MICROgram(s)/spray Nasal Spray 1 Spray(s) Both Nostrils two times a day  furosemide    Tablet 80 milliGRAM(s) Oral every 12 hours  insulin glargine Injectable (LANTUS) 24 Unit(s) SubCutaneous at bedtime  insulin lispro (HumaLOG) corrective regimen sliding scale   SubCutaneous three times a day before meals  insulin lispro (HumaLOG) corrective regimen sliding scale   SubCutaneous at bedtime  insulin lispro Injectable (HumaLOG) 10 Unit(s) SubCutaneous three times a day before meals  levothyroxine 25 MICROGram(s) Oral daily  metolazone 5 milliGRAM(s) Oral <User Schedule>  mometasone 220 MICROgram(s) Inhaler 1 Puff(s) Inhalation two times a day  predniSONE   Tablet 20 milliGRAM(s) Oral two times a day      PHYSICAL EXAM:  T(C): 36 (04-25-19 @ 14:47), Max: 36.4 (04-25-19 @ 04:53)  HR: 90 (04-25-19 @ 14:47) (87 - 95)  BP: 138/91 (04-25-19 @ 14:47) (115/85 - 138/91)  RR: 19 (04-25-19 @ 14:47) (16 - 19)  SpO2: 99% (04-25-19 @ 14:47) (97% - 99%)  Wt(kg): --  I&O's Summary    24 Apr 2019 07:01  -  25 Apr 2019 07:00  --------------------------------------------------------  IN: 1080 mL / OUT: 200 mL / NET: 880 mL    25 Apr 2019 07:01  -  25 Apr 2019 17:23  --------------------------------------------------------  IN: 1040 mL / OUT: 0 mL / NET: 1040 mL          Appearance: Normal	  HEENT:   Normal oral mucosa, PERRL, EOMI	  Lymphatic: No lymphadenopathy , no edema  Cardiovascular: Normal S1 S2, No JVD, No murmurs , Peripheral pulses palpable 2+ bilaterally  Respiratory: Lungs clear to auscultation, normal effort 	  Gastrointestinal:  Soft, Non-tender, + BS	  Skin: No rashes, No ecchymoses, No cyanosis, warm to touch  Musculoskeletal: Normal range of motion, normal strength  Psychiatry:  Mood & affect appropriate  Ext: No edema      All labs, Imaging and EKGs personally reviewed                           13.6   9.91  )-----------( 284      ( 25 Apr 2019 13:04 )             44.9               04-25    136  |  98  |  114<H>  ----------------------------<  178<H>  4.9   |  25  |  1.85<H>    Ca    9.0      25 Apr 2019 09:41

## 2019-04-25 NOTE — PROGRESS NOTE ADULT - SUBJECTIVE AND OBJECTIVE BOX
Chief complaint  Patient is a 53y old  Female who presents with a chief complaint of sent here for pericardial effusion (25 Apr 2019 10:33)   Review of systems  Patient in bed, looks comfortable, no fever, no hypoglycemia.    Labs and Fingersticks  CAPILLARY BLOOD GLUCOSE      POCT Blood Glucose.: 143 mg/dL (25 Apr 2019 10:49)  POCT Blood Glucose.: 168 mg/dL (25 Apr 2019 07:39)  POCT Blood Glucose.: 175 mg/dL (24 Apr 2019 21:39)  POCT Blood Glucose.: 267 mg/dL (24 Apr 2019 17:17)      Anion Gap, Serum: 13 (04-25 @ 09:41)  Anion Gap, Serum: 11 (04-24 @ 08:25)      Calcium, Total Serum: 9.0 (04-25 @ 09:41)  Calcium, Total Serum: 9.4 (04-24 @ 08:25)          04-25    136  |  98  |  114<H>  ----------------------------<  178<H>  4.9   |  25  |  1.85<H>    Ca    9.0      25 Apr 2019 09:41                          13.8   10.34 )-----------( 280      ( 24 Apr 2019 10:34 )             45.4     Medications  MEDICATIONS  (STANDING):  atorvastatin 40 milliGRAM(s) Oral at bedtime  carvedilol 6.25 milliGRAM(s) Oral every 12 hours  dextrose 5%. 1000 milliLiter(s) (50 mL/Hr) IV Continuous <Continuous>  dextrose 50% Injectable 12.5 Gram(s) IV Push once  dextrose 50% Injectable 25 Gram(s) IV Push once  dextrose 50% Injectable 25 Gram(s) IV Push once  fluticasone propionate 50 MICROgram(s)/spray Nasal Spray 1 Spray(s) Both Nostrils two times a day  furosemide    Tablet 80 milliGRAM(s) Oral every 12 hours  insulin glargine Injectable (LANTUS) 24 Unit(s) SubCutaneous at bedtime  insulin lispro (HumaLOG) corrective regimen sliding scale   SubCutaneous three times a day before meals  insulin lispro (HumaLOG) corrective regimen sliding scale   SubCutaneous at bedtime  insulin lispro Injectable (HumaLOG) 10 Unit(s) SubCutaneous three times a day before meals  levothyroxine 25 MICROGram(s) Oral daily  metolazone 5 milliGRAM(s) Oral <User Schedule>  mometasone 220 MICROgram(s) Inhaler 1 Puff(s) Inhalation two times a day  predniSONE   Tablet 20 milliGRAM(s) Oral two times a day      Physical Exam  General: Patient comfortable in bed  Vital Signs Last 12 Hrs  T(F): 97.6 (04-25-19 @ 04:53), Max: 97.6 (04-25-19 @ 04:53)  HR: 87 (04-25-19 @ 04:53) (87 - 87)  BP: 129/97 (04-25-19 @ 04:53) (129/97 - 129/97)  BP(mean): --  RR: 18 (04-25-19 @ 04:53) (18 - 18)  SpO2: 98% (04-25-19 @ 04:53) (98% - 98%)  Neck: No palpable thyroid nodules.  CVS: S1S2, No murmurs  Respiratory: No wheezing, no crepitations  GI: Abdomen soft, bowel sounds positive  Musculoskeletal:  edema lower extremities.   Skin: No skin rashes, no ecchymosis    Diagnostics

## 2019-04-25 NOTE — PROGRESS NOTE ADULT - ASSESSMENT
still with report of diarrhea.  cardiology/ medicine requesting colonsoocpy, spoke with cardiology, no contraindication. r/b/a d/w pt will prep

## 2019-04-25 NOTE — PROGRESS NOTE ADULT - SUBJECTIVE AND OBJECTIVE BOX
PEYMAN WATTS:[id number],   53yFemale followed for:  avocado (Unknown)  No Known Drug Allergies    PAST MEDICAL & SURGICAL HISTORY:  DM (diabetes mellitus)  CAD (coronary artery disease)  Sarcoidosis  Asthma with COPD  CHF, chronic  Mural thrombus of cardiac apex  History of atrial fibrillation  Thrombus: ? Thrombus in heart - on Xarelto  Hyperlipidemia  Anemia  Hypertension  Sarcoidosis  CHF (congestive heart failure): Stage III, on home O2 @ 2lpm  Diabetes  AICD (automatic cardioverter/defibrillator) present  History of repair of hip fracture  Ankle fracture  Ankle fracture: Left Ankle - with plate and screws placed  Hip deformity: left hip had a pin placed to hold bone in place after a fall    FAMILY HISTORY:  FH: breast cancer: mother  Family hx of colon cancer: Mother  Family history of hypertension (Sibling): Siblings  Family history of diabetes mellitus (Father, Sibling): Father, Siblings  Family history of breast cancer in mother (Mother): Mother  Family history of colon cancer in mother (Mother): Mother    MEDICATIONS  (STANDING):  atorvastatin 40 milliGRAM(s) Oral at bedtime  carvedilol 6.25 milliGRAM(s) Oral every 12 hours  dextrose 5%. 1000 milliLiter(s) (50 mL/Hr) IV Continuous <Continuous>  dextrose 50% Injectable 12.5 Gram(s) IV Push once  dextrose 50% Injectable 25 Gram(s) IV Push once  dextrose 50% Injectable 25 Gram(s) IV Push once  fluticasone propionate 50 MICROgram(s)/spray Nasal Spray 1 Spray(s) Both Nostrils two times a day  furosemide    Tablet 80 milliGRAM(s) Oral every 12 hours  insulin glargine Injectable (LANTUS) 24 Unit(s) SubCutaneous at bedtime  insulin lispro (HumaLOG) corrective regimen sliding scale   SubCutaneous three times a day before meals  insulin lispro (HumaLOG) corrective regimen sliding scale   SubCutaneous at bedtime  insulin lispro Injectable (HumaLOG) 10 Unit(s) SubCutaneous three times a day before meals  levothyroxine 25 MICROGram(s) Oral daily  metolazone 5 milliGRAM(s) Oral <User Schedule>  mometasone 220 MICROgram(s) Inhaler 1 Puff(s) Inhalation two times a day  predniSONE   Tablet 20 milliGRAM(s) Oral two times a day    MEDICATIONS  (PRN):  ALBUTerol    90 MICROgram(s) HFA Inhaler 2 Puff(s) Inhalation every 6 hours PRN Shortness of Breath and/or Wheezing  dextrose 40% Gel 15 Gram(s) Oral once PRN Blood Glucose LESS THAN 70 milliGRAM(s)/deciliter  glucagon  Injectable 1 milliGRAM(s) IntraMuscular once PRN Glucose LESS THAN 70 milligrams/deciliter  hemorrhoidal Ointment 1 Application(s) Rectal two times a day PRN Hemorrhoids  lidocaine 2% Gel 1 Application(s) Topical two times a day PRN perianally for pain  simethicone 80 milliGRAM(s) Chew two times a day PRN Gas      Vital Signs Last 24 Hrs  T(C): 36.4 (25 Apr 2019 04:53), Max: 36.4 (24 Apr 2019 14:20)  T(F): 97.6 (25 Apr 2019 04:53), Max: 97.6 (24 Apr 2019 14:20)  HR: 87 (25 Apr 2019 04:53) (87 - 99)  BP: 129/97 (25 Apr 2019 04:53) (115/78 - 137/95)  BP(mean): --  RR: 18 (25 Apr 2019 04:53) (16 - 18)  SpO2: 98% (25 Apr 2019 04:53) (97% - 99%)  nc/at  s1s2  cta  soft, nt, nd no guarding or rebound  no c/c/e    CBC Full  -  ( 24 Apr 2019 10:34 )  WBC Count : 10.34 K/uL  RBC Count : 6.57 M/uL  Hemoglobin : 13.8 g/dL  Hematocrit : 45.4 %  Platelet Count - Automated : 280 K/uL  Mean Cell Volume : 69.1 fl  Mean Cell Hemoglobin : 21.0 pg  Mean Cell Hemoglobin Concentration : 30.4 gm/dL  Auto Neutrophil # : x  Auto Lymphocyte # : x  Auto Monocyte # : x  Auto Eosinophil # : x  Auto Basophil # : x  Auto Neutrophil % : x  Auto Lymphocyte % : x  Auto Monocyte % : x  Auto Eosinophil % : x  Auto Basophil % : x    04-24    132<L>  |  98  |  104<H>  ----------------------------<  306<H>  5.3   |  23  |  1.91<H>    Ca    9.4      24 Apr 2019 08:25

## 2019-04-25 NOTE — PROGRESS NOTE ADULT - PROBLEM SELECTOR PLAN 1
s/p drain, s/p removal yesterday   pericardial effusion resolved. TTE reviewed   Holding [facility] for colonoscopy

## 2019-04-25 NOTE — PROGRESS NOTE ADULT - ASSESSMENT
Assessment  DMT2: 53y Female with DM T2 with hyperglycemia, was on oral meds and on insulin at home, now on steroids, on basal bolus insulin, dose was increased yesterday, blood sugars trending down, no hypoglycemic episode, eating meals, non compliant with low carb diet.  CAD/Pericardialeffusion: on medications, no chest pain, stable, monitored.  HTN: Controlled,  on antihypertensive medications.  CKD: Monitor labs/BMP,   Sarcoidosis: On treatment, stable.          Marlena Brown MD  Cell: [phone]  Office: [phone]

## 2019-04-25 NOTE — PROGRESS NOTE ADULT - ASSESSMENT
Pt is a 52 yo female with ICM s/p ICD, sarcoid admitted with moderate to lare sized circumferential pericardial effusion. Also has been having persistent diarrhea.     Problem/Recommendation:  Problem: Pericardial effusion. Recommendation: seen on Echo at outpatient office  card eval appreciated  IR pericardiac effusion drainage, S/P pericardiac drain, + drainage, drain removed by IR   patient is hemodynamically stable   CT chest noted.  BP monitoring     Problem/Recommendation:  YANA,   monitor BUN/Cr  avoid nephrotoxic medications   renal eval if BUN/Cr worsening   I and Os     Problem/Recommendation :  ·  Problem: Diarrhea.  Recommendation: chronic for months   GI eval appreciated  send stool studies   C diff negative   in view of Hx of sarcoidosis, GI autoimmune diseases need to be ruled out   diet control  monitor electrolytes.   plan for scope by GI when clear     Problem/Recommendation -:  ·  Problem: Sarcoidosis.  Recommendation: on prednisone.   monitor BUN/Cr  avoid nephrotoxic medications     Problem/Recommendation :  ·  Problem: History of atrial fibrillation.  Recommendation: rate control  hold ACfor colonoscopy   card F/U.     Problem/Recommendation :  ·  Problem: CAD (coronary artery disease).  Recommendation: S/P PCI  Statin and AC.     Problem/Recommendation :  Problem: Diabetes mellitus. Recommendation: sliding scale   endo eval appreciated   increase lantus and premeal humalog for better BS control   elevate A1c   DM diet  Endo eval for BS management.    Problem/Recommendation:  Problem: Prophylactic measure. Recommendation: DVT and Gi PPX.

## 2019-04-26 ENCOUNTER — RESULT REVIEW (OUTPATIENT)
Age: 54
End: 2019-04-26

## 2019-04-26 LAB
ANION GAP SERPL CALC-SCNC: 11 MMOL/L — SIGNIFICANT CHANGE UP (ref 5–17)
BUN SERPL-MCNC: 104 MG/DL — HIGH (ref 7–23)
CALCIUM SERPL-MCNC: 8.9 MG/DL — SIGNIFICANT CHANGE UP (ref 8.4–10.5)
CHLORIDE SERPL-SCNC: 98 MMOL/L — SIGNIFICANT CHANGE UP (ref 96–108)
CO2 SERPL-SCNC: 29 MMOL/L — SIGNIFICANT CHANGE UP (ref 22–31)
CREAT SERPL-MCNC: 1.69 MG/DL — HIGH (ref 0.5–1.3)
GLUCOSE SERPL-MCNC: 141 MG/DL — HIGH (ref 70–99)
HCT VFR BLD CALC: 43.9 % — SIGNIFICANT CHANGE UP (ref 34.5–45)
HGB BLD-MCNC: 13.4 G/DL — SIGNIFICANT CHANGE UP (ref 11.5–15.5)
MAGNESIUM SERPL-MCNC: 2 MG/DL — SIGNIFICANT CHANGE UP (ref 1.6–2.6)
MCHC RBC-ENTMCNC: 20.9 PG — LOW (ref 27–34)
MCHC RBC-ENTMCNC: 30.5 GM/DL — LOW (ref 32–36)
MCV RBC AUTO: 68.6 FL — LOW (ref 80–100)
PHOSPHATE SERPL-MCNC: 3.7 MG/DL — SIGNIFICANT CHANGE UP (ref 2.5–4.5)
PLATELET # BLD AUTO: 255 K/UL — SIGNIFICANT CHANGE UP (ref 150–400)
POTASSIUM SERPL-MCNC: 4.4 MMOL/L — SIGNIFICANT CHANGE UP (ref 3.5–5.3)
POTASSIUM SERPL-SCNC: 4.4 MMOL/L — SIGNIFICANT CHANGE UP (ref 3.5–5.3)
RBC # BLD: 6.4 M/UL — HIGH (ref 3.8–5.2)
RBC # FLD: 17.3 % — HIGH (ref 10.3–14.5)
SODIUM SERPL-SCNC: 138 MMOL/L — SIGNIFICANT CHANGE UP (ref 135–145)
WBC # BLD: 7.18 K/UL — SIGNIFICANT CHANGE UP (ref 3.8–10.5)
WBC # FLD AUTO: 7.18 K/UL — SIGNIFICANT CHANGE UP (ref 3.8–10.5)

## 2019-04-26 PROCEDURE — 88305 TISSUE EXAM BY PATHOLOGIST: CPT | Mod: 26

## 2019-04-26 RX ORDER — NYSTATIN CREAM 100000 [USP'U]/G
1 CREAM TOPICAL
Qty: 0 | Refills: 0 | Status: DISCONTINUED | OUTPATIENT
Start: 2019-04-26 | End: 2019-04-29

## 2019-04-26 RX ADMIN — Medication 1 SPRAY(S): at 06:34

## 2019-04-26 RX ADMIN — INSULIN GLARGINE 24 UNIT(S): 100 INJECTION, SOLUTION SUBCUTANEOUS at 23:51

## 2019-04-26 RX ADMIN — Medication 20 MILLIGRAM(S): at 23:51

## 2019-04-26 RX ADMIN — Medication 80 MILLIGRAM(S): at 06:26

## 2019-04-26 RX ADMIN — Medication 20 MILLIGRAM(S): at 08:37

## 2019-04-26 RX ADMIN — ATORVASTATIN CALCIUM 40 MILLIGRAM(S): 80 TABLET, FILM COATED ORAL at 23:51

## 2019-04-26 RX ADMIN — Medication 25 MICROGRAM(S): at 06:26

## 2019-04-26 RX ADMIN — CARVEDILOL PHOSPHATE 6.25 MILLIGRAM(S): 80 CAPSULE, EXTENDED RELEASE ORAL at 06:26

## 2019-04-26 RX ADMIN — Medication 2: at 08:12

## 2019-04-26 RX ADMIN — MOMETASONE FUROATE 1 PUFF(S): 220 INHALANT RESPIRATORY (INHALATION) at 06:26

## 2019-04-26 NOTE — PROVIDER CONTACT NOTE (OTHER) - ACTION/TREATMENT ORDERED:
PA aware-give 12 units of lantus instead, OK to give 4 units as per sliding scale PA aware-give 12 units of lantus instead, OK to give 4 units as per sliding scale, no more FS needed this shift

## 2019-04-26 NOTE — PROGRESS NOTE ADULT - ASSESSMENT
Assessment  DMT2: 53y Female with DM T2 with hyperglycemia, on steroids, on basal bolus insulin, has missed insulin dose, was NPO for endoscopy, blood sugars fluctuating, no hypoglycemic episode, NPO.  CAD/Pericardial effusion: on medications, no chest pain, stable, monitored.  HTN: Controlled,  on antihypertensive medications.  CKD: Monitor labs/BMP,   Sarcoidosis: On treatment, stable.          Marlena Brown MD  Cell: [phone]  Office: [phone]

## 2019-04-26 NOTE — PROGRESS NOTE ADULT - SUBJECTIVE AND OBJECTIVE BOX
Cardiovascular Disease Progress Note  (Covering for Dr. Sam)    Overnight events: No acute events overnight. Ms. Nava denies chest pain or SOB.   Otherwise review of systems negative    Objective Findings:  T(C): 36.4 (19 @ 04:52), Max: 36.4 (19 @ 04:52)  HR: 84 (19 @ 04:52) (84 - 90)  BP: 134/92 (19 @ 04:52) (127/89 - 138/91)  RR: 18 (19 @ 04:52) (18 - 19)  SpO2: 99% (19 @ 04:52) (98% - 99%)  Wt(kg): --  Daily     Daily Weight in k.5 (2019 07:15)      Physical Exam:  Gen: NAD  HEENT: EOMI  CV: RRR, normal S1 + S2, no m/r/g  Lungs: CTAB  Abd: soft, non-tender  Ext: No edema    Telemetry: Sinus    Laboratory Data:                        13.6   9.91  )-----------( 284      ( 2019 13:04 )             44.9     04-25    136  |  98  |  114<H>  ----------------------------<  178<H>  4.9   |  25  |  1.85<H>    Ca    9.0      2019 09:41                Inpatient Medications:  MEDICATIONS  (STANDING):  atorvastatin 40 milliGRAM(s) Oral at bedtime  carvedilol 6.25 milliGRAM(s) Oral every 12 hours  dextrose 5%. 1000 milliLiter(s) (50 mL/Hr) IV Continuous <Continuous>  dextrose 50% Injectable 12.5 Gram(s) IV Push once  dextrose 50% Injectable 25 Gram(s) IV Push once  dextrose 50% Injectable 25 Gram(s) IV Push once  fluticasone propionate 50 MICROgram(s)/spray Nasal Spray 1 Spray(s) Both Nostrils two times a day  furosemide    Tablet 80 milliGRAM(s) Oral every 12 hours  insulin glargine Injectable (LANTUS) 24 Unit(s) SubCutaneous at bedtime  insulin lispro (HumaLOG) corrective regimen sliding scale   SubCutaneous three times a day before meals  insulin lispro (HumaLOG) corrective regimen sliding scale   SubCutaneous at bedtime  insulin lispro Injectable (HumaLOG) 10 Unit(s) SubCutaneous three times a day before meals  levothyroxine 25 MICROGram(s) Oral daily  metolazone 5 milliGRAM(s) Oral <User Schedule>  mometasone 220 MICROgram(s) Inhaler 1 Puff(s) Inhalation two times a day  predniSONE   Tablet 20 milliGRAM(s) Oral two times a day      Assessment: 54 yo female with ICM s/p ICD, sarcoid admitted with moderate to large sized circumferential pericardial effusion. Also has been having persistent diarrhea.      Problem/Plan - 1:  ·  Problem: Pericardial effusion.   Plan: s/p drain  Repeat TTE with resolution of effusion.   Holding Eliquis for colonoscopy.      Problem/Plan - 2:  ·  Problem: History of atrial fibrillation.    Plan: maintaining sinus   Eliquis on hold as above.      Problem/Plan - 3:  ·  Problem: Ischemic cardiomyopathy.  Plan: s/p ICD  Cont with  meds.      Problem/Plan - 4:  ·  Problem: Diarrhea.  Plan: GI consulted  Plan for colonoscopy.   Patient's systolic CHF is compensated.  No cardiac objection to proceeding with low risk procedure.    Over 25 minutes spent on total encounter; more than 50% of the visit was spent counseling and/or coordinating care by the attending physician.      Star Panchal MD [facility]  Cardiovascular Disease  [phone]

## 2019-04-26 NOTE — PROGRESS NOTE ADULT - SUBJECTIVE AND OBJECTIVE BOX
PEYMAN WATTS:[id number],   53yFemale followed for:  avocado (Unknown)  No Known Drug Allergies    PAST MEDICAL & SURGICAL HISTORY:  DM (diabetes mellitus)  CAD (coronary artery disease)  Sarcoidosis  Asthma with COPD  CHF, chronic  Mural thrombus of cardiac apex  History of atrial fibrillation  Thrombus: ? Thrombus in heart - on Xarelto  Hyperlipidemia  Anemia  Hypertension  Sarcoidosis  CHF (congestive heart failure): Stage III, on home O2 @ 2lpm  Diabetes  AICD (automatic cardioverter/defibrillator) present  History of repair of hip fracture  Ankle fracture  Ankle fracture: Left Ankle - with plate and screws placed  Hip deformity: left hip had a pin placed to hold bone in place after a fall    FAMILY HISTORY:  FH: breast cancer: mother  Family hx of colon cancer: Mother  Family history of hypertension (Sibling): Siblings  Family history of diabetes mellitus (Father, Sibling): Father, Siblings  Family history of breast cancer in mother (Mother): Mother  Family history of colon cancer in mother (Mother): Mother    MEDICATIONS  (STANDING):  atorvastatin 40 milliGRAM(s) Oral at bedtime  carvedilol 6.25 milliGRAM(s) Oral every 12 hours  dextrose 5%. 1000 milliLiter(s) (50 mL/Hr) IV Continuous <Continuous>  dextrose 50% Injectable 12.5 Gram(s) IV Push once  dextrose 50% Injectable 25 Gram(s) IV Push once  dextrose 50% Injectable 25 Gram(s) IV Push once  fluticasone propionate 50 MICROgram(s)/spray Nasal Spray 1 Spray(s) Both Nostrils two times a day  furosemide    Tablet 80 milliGRAM(s) Oral every 12 hours  insulin glargine Injectable (LANTUS) 24 Unit(s) SubCutaneous at bedtime  insulin lispro (HumaLOG) corrective regimen sliding scale   SubCutaneous three times a day before meals  insulin lispro (HumaLOG) corrective regimen sliding scale   SubCutaneous at bedtime  insulin lispro Injectable (HumaLOG) 10 Unit(s) SubCutaneous three times a day before meals  levothyroxine 25 MICROGram(s) Oral daily  metolazone 5 milliGRAM(s) Oral <User Schedule>  mometasone 220 MICROgram(s) Inhaler 1 Puff(s) Inhalation two times a day  predniSONE   Tablet 20 milliGRAM(s) Oral two times a day    MEDICATIONS  (PRN):  ALBUTerol    90 MICROgram(s) HFA Inhaler 2 Puff(s) Inhalation every 6 hours PRN Shortness of Breath and/or Wheezing  dextrose 40% Gel 15 Gram(s) Oral once PRN Blood Glucose LESS THAN 70 milliGRAM(s)/deciliter  glucagon  Injectable 1 milliGRAM(s) IntraMuscular once PRN Glucose LESS THAN 70 milligrams/deciliter  hemorrhoidal Ointment 1 Application(s) Rectal two times a day PRN Hemorrhoids  lidocaine 2% Gel 1 Application(s) Topical two times a day PRN perianally for pain  simethicone 80 milliGRAM(s) Chew two times a day PRN Gas      Vital Signs Last 24 Hrs  T(C): 36.3 (26 Apr 2019 12:43), Max: 36.4 (26 Apr 2019 04:52)  T(F): 97.4 (26 Apr 2019 12:43), Max: 97.6 (26 Apr 2019 04:52)  HR: 91 (26 Apr 2019 12:43) (84 - 91)  BP: 126/98 (26 Apr 2019 12:43) (126/98 - 134/92)  BP(mean): --  RR: 17 (26 Apr 2019 12:43) (17 - 18)  SpO2: 98% (26 Apr 2019 12:43) (98% - 99%)  nc/at  s1s2  cta  soft, nt, nd no guarding or rebound  no c/c/e    CBC Full  -  ( 26 Apr 2019 16:33 )  WBC Count : 7.18 K/uL  RBC Count : 6.40 M/uL  Hemoglobin : 13.4 g/dL  Hematocrit : 43.9 %  Platelet Count - Automated : 255 K/uL  Mean Cell Volume : 68.6 fl  Mean Cell Hemoglobin : 20.9 pg  Mean Cell Hemoglobin Concentration : 30.5 gm/dL  Auto Neutrophil # : x  Auto Lymphocyte # : x  Auto Monocyte # : x  Auto Eosinophil # : x  Auto Basophil # : x  Auto Neutrophil % : x  Auto Lymphocyte % : x  Auto Monocyte % : x  Auto Eosinophil % : x  Auto Basophil % : x    04-26    138  |  98  |  104<H>  ----------------------------<  141<H>  4.4   |  29  |  1.69<H>    Ca    8.9      26 Apr 2019 11:36  Phos  3.7     04-26  Mg     2.0     04-26

## 2019-04-26 NOTE — PROGRESS NOTE ADULT - SUBJECTIVE AND OBJECTIVE BOX
Chief complaint  Patient is a 53y old  Female who presents with a chief complaint of sent here for pericardial effusion (26 Apr 2019 11:15)   Review of systems  Patient in bed, looks comfortable, no fever, no hypoglycemia.    Labs and Fingersticks  CAPILLARY BLOOD GLUCOSE      POCT Blood Glucose.: 131 mg/dL (26 Apr 2019 11:46)  POCT Blood Glucose.: 192 mg/dL (26 Apr 2019 07:40)  POCT Blood Glucose.: 340 mg/dL (25 Apr 2019 23:11)  POCT Blood Glucose.: 347 mg/dL (25 Apr 2019 21:57)  POCT Blood Glucose.: 117 mg/dL (25 Apr 2019 17:17)  POCT Blood Glucose.: 105 mg/dL (25 Apr 2019 15:46)      Anion Gap, Serum: 11 (04-26 @ 11:36)  Anion Gap, Serum: 13 (04-25 @ 09:41)      Calcium, Total Serum: 8.9 (04-26 @ 11:36)  Calcium, Total Serum: 9.0 (04-25 @ 09:41)          04-26    138  |  98  |  104<H>  ----------------------------<  141<H>  4.4   |  29  |  1.69<H>    Ca    8.9      26 Apr 2019 11:36  Phos  3.7     04-26  Mg     2.0     04-26                          13.6   9.91  )-----------( 284      ( 25 Apr 2019 13:04 )             44.9     Medications  MEDICATIONS  (STANDING):  atorvastatin 40 milliGRAM(s) Oral at bedtime  carvedilol 6.25 milliGRAM(s) Oral every 12 hours  dextrose 5%. 1000 milliLiter(s) (50 mL/Hr) IV Continuous <Continuous>  dextrose 50% Injectable 12.5 Gram(s) IV Push once  dextrose 50% Injectable 25 Gram(s) IV Push once  dextrose 50% Injectable 25 Gram(s) IV Push once  fluticasone propionate 50 MICROgram(s)/spray Nasal Spray 1 Spray(s) Both Nostrils two times a day  furosemide    Tablet 80 milliGRAM(s) Oral every 12 hours  insulin glargine Injectable (LANTUS) 24 Unit(s) SubCutaneous at bedtime  insulin lispro (HumaLOG) corrective regimen sliding scale   SubCutaneous three times a day before meals  insulin lispro (HumaLOG) corrective regimen sliding scale   SubCutaneous at bedtime  insulin lispro Injectable (HumaLOG) 10 Unit(s) SubCutaneous three times a day before meals  levothyroxine 25 MICROGram(s) Oral daily  metolazone 5 milliGRAM(s) Oral <User Schedule>  mometasone 220 MICROgram(s) Inhaler 1 Puff(s) Inhalation two times a day  predniSONE   Tablet 20 milliGRAM(s) Oral two times a day      Physical Exam  General: Patient comfortable in bed  Vital Signs Last 12 Hrs  T(F): 97.4 (04-26-19 @ 12:43), Max: 97.6 (04-26-19 @ 04:52)  HR: 91 (04-26-19 @ 12:43) (84 - 91)  BP: 126/98 (04-26-19 @ 12:43) (126/98 - 134/92)  BP(mean): --  RR: 17 (04-26-19 @ 12:43) (17 - 18)  SpO2: 98% (04-26-19 @ 12:43) (98% - 99%)  Neck: No palpable thyroid nodules.  CVS: S1S2, No murmurs  Respiratory: No wheezing, no crepitations  GI: Abdomen soft, bowel sounds positive  Musculoskeletal:  edema lower extremities.   Skin: No skin rashes, no ecchymosis    Diagnostics

## 2019-04-26 NOTE — PROGRESS NOTE ADULT - SUBJECTIVE AND OBJECTIVE BOX
Subjective: Patient seen and examined. No new events except as noted.   plan for colonoscopy     REVIEW OF SYSTEMS:    CONSTITUTIONAL: No weakness, fevers or chills  EYES/ENT: No visual changes;  No vertigo or throat pain   NECK: No pain or stiffness  RESPIRATORY: No cough, wheezing, hemoptysis; No shortness of breath  CARDIOVASCULAR: No chest pain or palpitations  GASTROINTESTINAL: No abdominal or epigastric pain. No nausea, vomiting, or hematemesis; No diarrhea or constipation. No melena or hematochezia.  GENITOURINARY: No dysuria, frequency or hematuria  NEUROLOGICAL: No numbness or weakness  SKIN: No itching, burning, rashes, or lesions   All other review of systems is negative unless indicated above.    MEDICATIONS:  MEDICATIONS  (STANDING):  atorvastatin 40 milliGRAM(s) Oral at bedtime  carvedilol 6.25 milliGRAM(s) Oral every 12 hours  dextrose 5%. 1000 milliLiter(s) (50 mL/Hr) IV Continuous <Continuous>  dextrose 50% Injectable 12.5 Gram(s) IV Push once  dextrose 50% Injectable 25 Gram(s) IV Push once  dextrose 50% Injectable 25 Gram(s) IV Push once  fluticasone propionate 50 MICROgram(s)/spray Nasal Spray 1 Spray(s) Both Nostrils two times a day  furosemide    Tablet 80 milliGRAM(s) Oral every 12 hours  insulin glargine Injectable (LANTUS) 24 Unit(s) SubCutaneous at bedtime  insulin lispro (HumaLOG) corrective regimen sliding scale   SubCutaneous three times a day before meals  insulin lispro (HumaLOG) corrective regimen sliding scale   SubCutaneous at bedtime  insulin lispro Injectable (HumaLOG) 10 Unit(s) SubCutaneous three times a day before meals  levothyroxine 25 MICROGram(s) Oral daily  metolazone 5 milliGRAM(s) Oral <User Schedule>  mometasone 220 MICROgram(s) Inhaler 1 Puff(s) Inhalation two times a day  predniSONE   Tablet 20 milliGRAM(s) Oral two times a day      PHYSICAL EXAM:  T(C): 36.4 (04-26-19 @ 04:52), Max: 36.4 (04-26-19 @ 04:52)  HR: 84 (04-26-19 @ 04:52) (84 - 90)  BP: 134/92 (04-26-19 @ 04:52) (127/89 - 138/91)  RR: 18 (04-26-19 @ 04:52) (18 - 19)  SpO2: 99% (04-26-19 @ 04:52) (98% - 99%)  Wt(kg): --  I&O's Summary    25 Apr 2019 07:01  -  26 Apr 2019 07:00  --------------------------------------------------------  IN: 1520 mL / OUT: 0 mL / NET: 1520 mL        Weight (kg): 54.1 (04-26 @ 04:10)    Appearance: Normal	  HEENT:   Normal oral mucosa, PERRL, EOMI	  Lymphatic: No lymphadenopathy , no edema  Cardiovascular: Normal S1 S2, No JVD, No murmurs , Peripheral pulses palpable 2+ bilaterally  Respiratory: Lungs clear to auscultation, normal effort 	  Gastrointestinal:  Soft, Non-tender, + BS	  Skin: No rashes, No ecchymoses, No cyanosis, warm to touch  Musculoskeletal: Normal range of motion, normal strength  Psychiatry:  Mood & affect appropriate  Ext: No edema      All labs, Imaging and EKGs personally reviewed                           13.6   9.91  )-----------( 284      ( 25 Apr 2019 13:04 )             44.9               04-25    136  |  98  |  114<H>  ----------------------------<  178<H>  4.9   |  25  |  1.85<H>    Ca    9.0      25 Apr 2019 09:41

## 2019-04-27 LAB
ANION GAP SERPL CALC-SCNC: 14 MMOL/L — SIGNIFICANT CHANGE UP (ref 5–17)
ANION GAP SERPL CALC-SCNC: 15 MMOL/L — SIGNIFICANT CHANGE UP (ref 5–17)
APPEARANCE UR: CLEAR — SIGNIFICANT CHANGE UP
BACTERIA # UR AUTO: NEGATIVE — SIGNIFICANT CHANGE UP
BILIRUB UR-MCNC: NEGATIVE — SIGNIFICANT CHANGE UP
BUN SERPL-MCNC: 112 MG/DL — HIGH (ref 7–23)
BUN SERPL-MCNC: 117 MG/DL — HIGH (ref 7–23)
CALCIUM SERPL-MCNC: 8.5 MG/DL — SIGNIFICANT CHANGE UP (ref 8.4–10.5)
CALCIUM SERPL-MCNC: 8.7 MG/DL — SIGNIFICANT CHANGE UP (ref 8.4–10.5)
CHLORIDE SERPL-SCNC: 93 MMOL/L — LOW (ref 96–108)
CHLORIDE SERPL-SCNC: 97 MMOL/L — SIGNIFICANT CHANGE UP (ref 96–108)
CHLORIDE UR-SCNC: 37 MMOL/L — SIGNIFICANT CHANGE UP
CO2 SERPL-SCNC: 27 MMOL/L — SIGNIFICANT CHANGE UP (ref 22–31)
CO2 SERPL-SCNC: 29 MMOL/L — SIGNIFICANT CHANGE UP (ref 22–31)
COLOR SPEC: COLORLESS — SIGNIFICANT CHANGE UP
CREAT ?TM UR-MCNC: 58 MG/DL — SIGNIFICANT CHANGE UP
CREAT SERPL-MCNC: 2.28 MG/DL — HIGH (ref 0.5–1.3)
CREAT SERPL-MCNC: 2.47 MG/DL — HIGH (ref 0.5–1.3)
DIFF PNL FLD: ABNORMAL
EPI CELLS # UR: 0 /HPF — SIGNIFICANT CHANGE UP
GLUCOSE SERPL-MCNC: 168 MG/DL — HIGH (ref 70–99)
GLUCOSE SERPL-MCNC: 497 MG/DL — CRITICAL HIGH (ref 70–99)
GLUCOSE UR QL: NEGATIVE — SIGNIFICANT CHANGE UP
HCT VFR BLD CALC: 44.8 % — SIGNIFICANT CHANGE UP (ref 34.5–45)
HGB BLD-MCNC: 13.4 G/DL — SIGNIFICANT CHANGE UP (ref 11.5–15.5)
HYALINE CASTS # UR AUTO: 0 /LPF — SIGNIFICANT CHANGE UP (ref 0–2)
KETONES UR-MCNC: NEGATIVE — SIGNIFICANT CHANGE UP
LEUKOCYTE ESTERASE UR-ACNC: NEGATIVE — SIGNIFICANT CHANGE UP
MCHC RBC-ENTMCNC: 20.9 PG — LOW (ref 27–34)
MCHC RBC-ENTMCNC: 29.9 GM/DL — LOW (ref 32–36)
MCV RBC AUTO: 69.9 FL — LOW (ref 80–100)
NITRITE UR-MCNC: NEGATIVE — SIGNIFICANT CHANGE UP
PH UR: 6 — SIGNIFICANT CHANGE UP (ref 5–8)
PLATELET # BLD AUTO: 252 K/UL — SIGNIFICANT CHANGE UP (ref 150–400)
POTASSIUM SERPL-MCNC: 5.1 MMOL/L — SIGNIFICANT CHANGE UP (ref 3.5–5.3)
POTASSIUM SERPL-MCNC: 5.6 MMOL/L — HIGH (ref 3.5–5.3)
POTASSIUM SERPL-SCNC: 5.1 MMOL/L — SIGNIFICANT CHANGE UP (ref 3.5–5.3)
POTASSIUM SERPL-SCNC: 5.6 MMOL/L — HIGH (ref 3.5–5.3)
POTASSIUM UR-SCNC: 30 MMOL/L — SIGNIFICANT CHANGE UP
PROT UR-MCNC: 100 — SIGNIFICANT CHANGE UP
RBC # BLD: 6.41 M/UL — HIGH (ref 3.8–5.2)
RBC # FLD: 17.1 % — HIGH (ref 10.3–14.5)
RBC CASTS # UR COMP ASSIST: 2 /HPF — SIGNIFICANT CHANGE UP (ref 0–4)
SODIUM SERPL-SCNC: 134 MMOL/L — LOW (ref 135–145)
SODIUM SERPL-SCNC: 141 MMOL/L — SIGNIFICANT CHANGE UP (ref 135–145)
SODIUM UR-SCNC: 67 MMOL/L — SIGNIFICANT CHANGE UP
SP GR SPEC: 1.02 — SIGNIFICANT CHANGE UP (ref 1.01–1.02)
UROBILINOGEN FLD QL: NEGATIVE — SIGNIFICANT CHANGE UP
WBC # BLD: 8.08 K/UL — SIGNIFICANT CHANGE UP (ref 3.8–10.5)
WBC # FLD AUTO: 8.08 K/UL — SIGNIFICANT CHANGE UP (ref 3.8–10.5)
WBC UR QL: 1 /HPF — SIGNIFICANT CHANGE UP (ref 0–5)

## 2019-04-27 RX ORDER — INSULIN LISPRO 100/ML
8 VIAL (ML) SUBCUTANEOUS
Qty: 0 | Refills: 0 | Status: DISCONTINUED | OUTPATIENT
Start: 2019-04-27 | End: 2019-04-28

## 2019-04-27 RX ORDER — SALICYLIC ACID 0.5 %
1 CLEANSER (GRAM) TOPICAL
Qty: 0 | Refills: 0 | Status: DISCONTINUED | OUTPATIENT
Start: 2019-04-27 | End: 2019-05-01

## 2019-04-27 RX ORDER — FUROSEMIDE 40 MG
80 TABLET ORAL
Qty: 0 | Refills: 0 | Status: DISCONTINUED | OUTPATIENT
Start: 2019-04-27 | End: 2019-05-01

## 2019-04-27 RX ADMIN — Medication 8 UNIT(S): at 08:15

## 2019-04-27 RX ADMIN — MOMETASONE FUROATE 1 PUFF(S): 220 INHALANT RESPIRATORY (INHALATION) at 05:17

## 2019-04-27 RX ADMIN — Medication 4: at 22:51

## 2019-04-27 RX ADMIN — Medication 25 MICROGRAM(S): at 05:16

## 2019-04-27 RX ADMIN — CARVEDILOL PHOSPHATE 6.25 MILLIGRAM(S): 80 CAPSULE, EXTENDED RELEASE ORAL at 17:18

## 2019-04-27 RX ADMIN — Medication 20 MILLIGRAM(S): at 10:17

## 2019-04-27 RX ADMIN — Medication 80 MILLIGRAM(S): at 05:16

## 2019-04-27 RX ADMIN — Medication 80 MILLIGRAM(S): at 17:18

## 2019-04-27 RX ADMIN — ATORVASTATIN CALCIUM 40 MILLIGRAM(S): 80 TABLET, FILM COATED ORAL at 22:49

## 2019-04-27 RX ADMIN — SIMETHICONE 80 MILLIGRAM(S): 80 TABLET, CHEWABLE ORAL at 17:18

## 2019-04-27 RX ADMIN — Medication 8 UNIT(S): at 11:53

## 2019-04-27 RX ADMIN — Medication 1 SPRAY(S): at 05:17

## 2019-04-27 RX ADMIN — NYSTATIN CREAM 1 APPLICATION(S): 100000 CREAM TOPICAL at 05:18

## 2019-04-27 RX ADMIN — Medication 8: at 08:15

## 2019-04-27 RX ADMIN — INSULIN GLARGINE 24 UNIT(S): 100 INJECTION, SOLUTION SUBCUTANEOUS at 22:49

## 2019-04-27 RX ADMIN — Medication 20 MILLIGRAM(S): at 22:49

## 2019-04-27 RX ADMIN — Medication 8 UNIT(S): at 17:19

## 2019-04-27 RX ADMIN — MOMETASONE FUROATE 1 PUFF(S): 220 INHALANT RESPIRATORY (INHALATION) at 17:19

## 2019-04-27 RX ADMIN — CARVEDILOL PHOSPHATE 6.25 MILLIGRAM(S): 80 CAPSULE, EXTENDED RELEASE ORAL at 05:16

## 2019-04-27 RX ADMIN — Medication 4: at 11:53

## 2019-04-27 NOTE — PROGRESS NOTE ADULT - SUBJECTIVE AND OBJECTIVE BOX
Cardiovascular Disease Progress Note  (Covering for Dr. Sam)    Overnight events: No acute events overnight. Ms. Nava denies chest pain or SOB.   Otherwise review of systems negative    Objective Findings:  T(C): 36.6 (04-27-19 @ 04:34), Max: 36.6 (04-27-19 @ 04:34)  HR: 102 (04-27-19 @ 04:34) (91 - 102)  BP: 130/83 (04-27-19 @ 04:34) (102/70 - 130/83)  RR: 18 (04-27-19 @ 04:34) (17 - 18)  SpO2: 96% (04-27-19 @ 04:34) (95% - 98%)  Wt(kg): --  Daily     Daily       Physical Exam:  Gen: NAD  HEENT: EOMI  CV: RRR, normal S1 + S2, no m/r/g  Lungs: CTAB  Abd: soft, non-tender  Ext: No edema    Telemetry: Sinus    Laboratory Data:                        13.4   7.18  )-----------( 255      ( 26 Apr 2019 16:33 )             43.9     04-26    138  |  98  |  104<H>  ----------------------------<  141<H>  4.4   |  29  |  1.69<H>    Ca    8.9      26 Apr 2019 11:36  Phos  3.7     04-26  Mg     2.0     04-26                Inpatient Medications:  MEDICATIONS  (STANDING):  atorvastatin 40 milliGRAM(s) Oral at bedtime  carvedilol 6.25 milliGRAM(s) Oral every 12 hours  dextrose 5%. 1000 milliLiter(s) (50 mL/Hr) IV Continuous <Continuous>  dextrose 50% Injectable 12.5 Gram(s) IV Push once  dextrose 50% Injectable 25 Gram(s) IV Push once  dextrose 50% Injectable 25 Gram(s) IV Push once  fluticasone propionate 50 MICROgram(s)/spray Nasal Spray 1 Spray(s) Both Nostrils two times a day  furosemide    Tablet 80 milliGRAM(s) Oral every 12 hours  insulin glargine Injectable (LANTUS) 24 Unit(s) SubCutaneous at bedtime  insulin lispro (HumaLOG) corrective regimen sliding scale   SubCutaneous three times a day before meals  insulin lispro (HumaLOG) corrective regimen sliding scale   SubCutaneous at bedtime  insulin lispro Injectable (HumaLOG) 8 Unit(s) SubCutaneous three times a day before meals  levothyroxine 25 MICROGram(s) Oral daily  metolazone 5 milliGRAM(s) Oral <User Schedule>  mometasone 220 MICROgram(s) Inhaler 1 Puff(s) Inhalation two times a day  nystatin Powder 1 Application(s) Topical two times a day  predniSONE   Tablet 20 milliGRAM(s) Oral two times a day      Assessment: 52 yo female with ICM s/p ICD, sarcoid admitted with moderate to large sized circumferential pericardial effusion. Also has been having persistent diarrhea.      Problem/Plan - 1:  ·  Problem: Pericardial effusion.   Plan: s/p drain  Repeat TTE reveals resolution of effusion.      Problem/Plan - 2:  ·  Problem: History of atrial fibrillation.    Plan: maintaining sinus   Resume Eliquis on 4/28 (s/p colonic polypectomy on 4/26)     Problem/Plan - 3:  ·  Problem: Ischemic cardiomyopathy.  Plan: s/p ICD  Cont with  meds.      Problem/Plan - 4:  ·  Problem: Diarrhea.    Plan:   s/p Colonoscopy  GI input appreciated.       Over 25 minutes spent on total encounter; more than 50% of the visit was spent counseling and/or coordinating care by the attending physician.      Star Panchal MD [facility]  Cardiovascular Disease  [phone]

## 2019-04-27 NOTE — PROGRESS NOTE ADULT - SUBJECTIVE AND OBJECTIVE BOX
Chief complaint  Patient is a 53y old  Female who presents with a chief complaint of sent here for pericardial effusion (27 Apr 2019 10:28)   Review of systems  Patient in bed, looks comfortable, no fever, no hypoglycemia.    Labs and Fingersticks  CAPILLARY BLOOD GLUCOSE      POCT Blood Glucose.: 310 mg/dL (27 Apr 2019 07:25)  POCT Blood Glucose.: 186 mg/dL (26 Apr 2019 23:50)  POCT Blood Glucose.: 164 mg/dL (26 Apr 2019 22:00)  POCT Blood Glucose.: 120 mg/dL (26 Apr 2019 17:22)  POCT Blood Glucose.: 131 mg/dL (26 Apr 2019 11:46)      Anion Gap, Serum: 14 (04-27 @ 09:30)  Anion Gap, Serum: 11 (04-26 @ 11:36)      Calcium, Total Serum: 8.5 (04-27 @ 09:30)  Calcium, Total Serum: 8.9 (04-26 @ 11:36)          04-27    134<L>  |  93<L>  |  112<H>  ----------------------------<  497<HH>  5.6<H>   |  27  |  2.28<H>    Ca    8.5      27 Apr 2019 09:30  Phos  3.7     04-26  Mg     2.0     04-26                          13.4   7.18  )-----------( 255      ( 26 Apr 2019 16:33 )             43.9     Medications  MEDICATIONS  (STANDING):  atorvastatin 40 milliGRAM(s) Oral at bedtime  carvedilol 6.25 milliGRAM(s) Oral every 12 hours  dextrose 5%. 1000 milliLiter(s) (50 mL/Hr) IV Continuous <Continuous>  dextrose 50% Injectable 12.5 Gram(s) IV Push once  dextrose 50% Injectable 25 Gram(s) IV Push once  dextrose 50% Injectable 25 Gram(s) IV Push once  fluticasone propionate 50 MICROgram(s)/spray Nasal Spray 1 Spray(s) Both Nostrils two times a day  furosemide    Tablet 80 milliGRAM(s) Oral every 12 hours  insulin glargine Injectable (LANTUS) 24 Unit(s) SubCutaneous at bedtime  insulin lispro (HumaLOG) corrective regimen sliding scale   SubCutaneous three times a day before meals  insulin lispro (HumaLOG) corrective regimen sliding scale   SubCutaneous at bedtime  insulin lispro Injectable (HumaLOG) 8 Unit(s) SubCutaneous three times a day before meals  levothyroxine 25 MICROGram(s) Oral daily  metolazone 5 milliGRAM(s) Oral <User Schedule>  mometasone 220 MICROgram(s) Inhaler 1 Puff(s) Inhalation two times a day  nystatin Powder 1 Application(s) Topical two times a day  predniSONE   Tablet 20 milliGRAM(s) Oral two times a day      Physical Exam  General: Patient comfortable in bed  Vital Signs Last 12 Hrs  T(F): 97.9 (04-27-19 @ 04:34), Max: 97.9 (04-27-19 @ 04:34)  HR: 102 (04-27-19 @ 04:34) (102 - 102)  BP: 130/83 (04-27-19 @ 04:34) (130/83 - 130/83)  BP(mean): --  RR: 18 (04-27-19 @ 04:34) (18 - 18)  SpO2: 96% (04-27-19 @ 04:34) (96% - 96%)  Neck: No palpable thyroid nodules.  CVS: S1S2, No murmurs  Respiratory: No wheezing, no crepitations  GI: Abdomen soft, bowel sounds positive  Musculoskeletal:  edema lower extremities.   Skin: No skin rashes, no ecchymosis    Diagnostics

## 2019-04-27 NOTE — PROGRESS NOTE ADULT - SUBJECTIVE AND OBJECTIVE BOX
Subjective: Patient seen and examined. No new events except as noted.     REVIEW OF SYSTEMS:    CONSTITUTIONAL: No weakness, fevers or chills  EYES/ENT: No visual changes;  No vertigo or throat pain   NECK: No pain or stiffness  RESPIRATORY: No cough, wheezing, hemoptysis; No shortness of breath  CARDIOVASCULAR: No chest pain or palpitations  GASTROINTESTINAL: No abdominal or epigastric pain. No nausea, vomiting, or hematemesis; No diarrhea or constipation. No melena or hematochezia.  GENITOURINARY: No dysuria, frequency or hematuria  NEUROLOGICAL: No numbness or weakness  SKIN: No itching, burning, rashes, or lesions   All other review of systems is negative unless indicated above.    MEDICATIONS:  MEDICATIONS  (STANDING):  atorvastatin 40 milliGRAM(s) Oral at bedtime  carvedilol 6.25 milliGRAM(s) Oral every 12 hours  dextrose 5%. 1000 milliLiter(s) (50 mL/Hr) IV Continuous <Continuous>  dextrose 50% Injectable 12.5 Gram(s) IV Push once  dextrose 50% Injectable 25 Gram(s) IV Push once  dextrose 50% Injectable 25 Gram(s) IV Push once  fluticasone propionate 50 MICROgram(s)/spray Nasal Spray 1 Spray(s) Both Nostrils two times a day  furosemide    Tablet 80 milliGRAM(s) Oral <User Schedule>  insulin glargine Injectable (LANTUS) 24 Unit(s) SubCutaneous at bedtime  insulin lispro (HumaLOG) corrective regimen sliding scale   SubCutaneous three times a day before meals  insulin lispro (HumaLOG) corrective regimen sliding scale   SubCutaneous at bedtime  insulin lispro Injectable (HumaLOG) 8 Unit(s) SubCutaneous three times a day before meals  levothyroxine 25 MICROGram(s) Oral daily  metolazone 5 milliGRAM(s) Oral <User Schedule>  mometasone 220 MICROgram(s) Inhaler 1 Puff(s) Inhalation two times a day  nystatin Powder 1 Application(s) Topical two times a day  predniSONE   Tablet 20 milliGRAM(s) Oral two times a day      PHYSICAL EXAM:  T(C): 36.3 (04-27-19 @ 14:05), Max: 36.6 (04-27-19 @ 04:34)  HR: 97 (04-27-19 @ 14:05) (94 - 102)  BP: 105/73 (04-27-19 @ 14:05) (102/70 - 130/83)  RR: 18 (04-27-19 @ 14:05) (18 - 18)  SpO2: 96% (04-27-19 @ 14:05) (95% - 96%)  Wt(kg): --  I&O's Summary    26 Apr 2019 07:01  -  27 Apr 2019 07:00  --------------------------------------------------------  IN: 360 mL / OUT: 0 mL / NET: 360 mL          Appearance: Normal	  HEENT:   Normal oral mucosa, PERRL, EOMI	  Lymphatic: No lymphadenopathy , no edema  Cardiovascular: Normal S1 S2, No JVD, No murmurs , Peripheral pulses palpable 2+ bilaterally  Respiratory: Lungs clear to auscultation, normal effort 	  Gastrointestinal:  Soft, Non-tender, + BS	  Skin: No rashes, No ecchymoses, No cyanosis, warm to touch  Musculoskeletal: Normal range of motion, normal strength  Psychiatry:  Mood & affect appropriate  Ext: No edema      All labs, Imaging and EKGs personally reviewed                           13.4   8.08  )-----------( 252      ( 27 Apr 2019 12:19 )             44.8               04-27    141  |  97  |  117<H>  ----------------------------<  168<H>  5.1   |  29  |  2.47<H>    Ca    8.7      27 Apr 2019 12:21  Phos  3.7     04-26  Mg     2.0     04-26    < from: Colonoscopy (04.26.19 @ 17:16) >  Impression:          - Preparation of the colon was inadequate.                       - One 5 mm polyp in the ascending colon, removed with a cold biopsy forceps.                        Resected and retrieved.         - The entire examined colon is normal. Biopsied.

## 2019-04-27 NOTE — PROGRESS NOTE ADULT - ASSESSMENT
Pt is a 52 yo female with ICM s/p ICD, sarcoid admitted with moderate to lare sized circumferential pericardial effusion. Also has been having persistent diarrhea.     Problem/Recommendation:  Problem: Pericardial effusion. Recommendation: seen on Echo at outpatient office  card eval appreciated  IR pericardiac effusion drainage, S/P pericardiac drain, + drainage, drain removed by IR   patient is hemodynamically stable   CT chest noted.  BP monitoring     Problem/Recommendation:  YANA,   monitor BUN/Cr  avoid nephrotoxic medications   Renal eval appreciated   diuresis as per nephrology   I and Os     Problem/Recommendation :  ·  Problem: Diarrhea.  Recommendation: chronic for months   GI eval appreciated  send stool studies   C diff negative   in view of Hx of sarcoidosis, GI autoimmune diseases need to be ruled out   diet control  monitor electrolytes.   S/P colonoscopy EGD< no acute findings, awaiting for biopsy results     Problem/Recommendation -:  ·  Problem: Sarcoidosis.  Recommendation: on prednisone.   monitor BUN/Cr  avoid nephrotoxic medications     Problem/Recommendation :  ·  Problem: History of atrial fibrillation.  Recommendation: rate control  restart AC tomorrow   card F/U.     Problem/Recommendation :  ·  Problem: CAD (coronary artery disease).  Recommendation: S/P PCI  Statin and AC.     Problem/Recommendation :  Problem: Diabetes mellitus. Recommendation: sliding scale   endo eval appreciated   increase lantus and premeal humalog for better BS control   elevate A1c   DM diet  Endo eval for BS management.    Problem/Recommendation:  Problem: Prophylactic measure. Recommendation: DVT and Gi PPX.

## 2019-04-27 NOTE — CONSULT NOTE ADULT - SUBJECTIVE AND OBJECTIVE BOX
HPI:  Ms. Nava is a 52 year-old woman with history of multiple medical issues including hypertension, type 2 diabetes mellitus, coronary artery disease, systolic congestive heart failure, and sarcoidosis, on steroids. She presented on 4/16/19 to the [facility] ER from her cardiologist's office after being noted on echo to have a large pericardial effusion with early tamponade physiology. On 4/18/19, she underwent pericardial drain placement by IR; the drain was removed a couple days later. Diarrhea has been a chronic issue for her; she underwent colonoscopy this week, which turned out unremarkable. Today she was noted to have acute kidney injury; her BUN/creatinine has trended up to 112/2.28. Therefore, a renal consultation was requested.      PAST MEDICAL & SURGICAL HISTORY:  DM (diabetes mellitus)  CAD (coronary artery disease)  Sarcoidosis  Asthma with COPD  CHF, chronic  Mural thrombus of cardiac apex  History of atrial fibrillation  Thrombus: ? Thrombus in heart - on Xarelto  Hyperlipidemia  Anemia  Hypertension  Sarcoidosis  CHF (congestive heart failure): Stage III, on home O2 @ 2lpm  Diabetes  AICD (automatic cardioverter/defibrillator) present  History of repair of hip fracture  Ankle fracture  Ankle fracture: Left Ankle - with plate and screws placed  Hip deformity: left hip had a pin placed to hold bone in place after a fall    Allergies  avocado (Unknown)    SOCIAL HISTORY:  Denies ETOh,Smoking,     FAMILY HISTORY:  FH: breast cancer: mother  Family hx of colon cancer: Mother  Family history of hypertension (Sibling): Siblings  Family history of diabetes mellitus (Father, Sibling): Father, Siblings  Family history of breast cancer in mother (Mother): Mother  Family history of colon cancer in mother (Mother): Mother      REVIEW OF SYSTEMS:  CONSTITUTIONAL: No weakness, fevers or chills  EYES/ENT: No visual changes;  No vertigo or throat pain   NECK: No pain or stiffness  RESPIRATORY: No cough, wheezing, hemoptysis; No shortness of breath  CARDIOVASCULAR: No chest pain or palpitations  GASTROINTESTINAL: No abdominal or epigastric pain. No nausea, vomiting, or hematemesis; No diarrhea or constipation. No melena or hematochezia.  GENITOURINARY: No dysuria, frequency or hematuria  NEUROLOGICAL: No numbness or weakness  SKIN: No itching, burning, rashes, or lesions   All other review of systems is negative unless indicated above.    VITAL:  T(C): , Max: 36.6 (04-27-19 @ 04:34)  T(F): , Max: 97.9 (04-27-19 @ 04:34)  HR: 102 (04-27-19 @ 04:34)  BP: 130/83 (04-27-19 @ 04:34)  RR: 18 (04-27-19 @ 04:34)  SpO2: 96% (04-27-19 @ 04:34)    PHYSICAL EXAM:  Constitutional: NAD, Alert  HEENT: NCAT, MMM  Neck: Supple, No JVD  Respiratory: CTA-b/l  Cardiovascular: RRR s1s2, no m/r/g  Gastrointestinal: BS+, soft, NT/ND  Extremities: No peripheral edema b/l  Neurological: no focal deficits; strength grossly intact  Back: no CVAT b/l  Skin: No rashes, no nevi    LABS:                        13.4   7.18  )-----------( 255      ( 26 Apr 2019 16:33 )             43.9     Na(134)/K(5.6)/Cl(93)/HCO3(27)/BUN(112)/Cr(2.28)Glu(497)/Ca(8.5)/Mg(--)/PO4(--)    04-27 @ 09:30  Na(138)/K(4.4)/Cl(98)/HCO3(29)/BUN(104)/Cr(1.69)Glu(141)/Ca(8.9)/Mg(2.0)/PO4(3.7)    04-26 @ 11:36  Na(136)/K(4.9)/Cl(98)/HCO3(25)/BUN(114)/Cr(1.85)Glu(178)/Ca(9.0)/Mg(--)/PO4(--)    04-25 @ 09:41    (4/18/19) - BUN/creatinine: 50/1.33  (5/18/18) - BUN/creatinine: 142/1.46    IMAGING:  < from: CT Chest No Cont (04.17.19 @ 01:07) >  Large pericardial effusion measuring simple fluid  Slightly increased pulmonary sarcoidosis with extensive fibrotic changes   in the bilateral lower lobe and right middle lobe, as well as nonfibrotic   changes in the bilateral upper lobes.      ASSESSMENT:  (1)Renal - CKD3; superimposed YANA, which I presume is prerenally mediated; particularly high BUN/creatinine ratio - the high ratio is primarily due to steroids as well as intravascular depletion.    (2)Hyperkalemia - in part from YANA; also in part from cellular shifts induced by hyperglycemia.     (3)CV - on a very high dose of diuretics - her CT of the chest from earlier this admission shows extensive pulmonary disease from sarcoidosis. Stands to reason that the diuretics are contributing to her YANA, without notably improving her breathing.       RECOMMEND:  (1)Low-K diet  (2)Kayexalate 30gm po prn K 5.8 or higher  (3)Glycemic control per primary team/Endocrine  (4)Favor reducing her diuretics - d/c Metolazone and switch Lasix to daily?  (5)Dose new meds for GFR 15-20ml/min  (6)Renal ultrasound  (7)Urine: urinalysis, lytes, creatinine  (8)BMP daily    Thank you for involving [facility] in this patient's care.    With warm regards,    Jair Lion MD   [facility]  [phone] HPI:  Ms. Nava is a 52 year-old woman with history of multiple medical issues including hypertension, type 2 diabetes mellitus, coronary artery disease, systolic congestive heart failure, and sarcoidosis, on steroids. She presented on 4/16/19 to the [facility] ER from her cardiologist's office after being noted on echo to have a large pericardial effusion with early tamponade physiology. On 4/18/19, she underwent pericardial drain placement by IR; the drain was removed a couple days later. Diarrhea has been a chronic issue for her; she underwent colonoscopy this week, which turned out unremarkable. Today she was noted to have acute kidney injury; her BUN/creatinine has trended up to 112/2.28. Therefore, a renal consultation was requested.    Ms. Nava denies known history of CKD. She denies NSAID use. She states that she has been on high-dose diuretics for the past several months; prior to placement on the diuretics, her legs were quite swollen. She brings up that her legs were swollen upon admission, but that they have improved in association with drainage of her pericardial effusion.      PAST MEDICAL & SURGICAL HISTORY:  DM (diabetes mellitus)  CAD (coronary artery disease)  Sarcoidosis  Asthma with COPD  CHF, chronic  Mural thrombus of cardiac apex  History of atrial fibrillation  Thrombus: ? Thrombus in heart - on Xarelto  Hyperlipidemia  Anemia  Hypertension  Sarcoidosis  CHF (congestive heart failure): Stage III, on home O2 @ 2lpm  Diabetes  AICD (automatic cardioverter/defibrillator) present  History of repair of hip fracture  Ankle fracture  Ankle fracture: Left Ankle - with plate and screws placed  Hip deformity: left hip had a pin placed to hold bone in place after a fall    Allergies  avocado (Unknown)    SOCIAL HISTORY:  Denies ETOh,Smoking,     FAMILY HISTORY:  FH: breast cancer: mother  Family hx of colon cancer: Mother  Family history of hypertension (Sibling): Siblings  Family history of diabetes mellitus (Father, Sibling): Father, Siblings  Family history of breast cancer in mother (Mother): Mother  Family history of colon cancer in mother (Mother): Mother      REVIEW OF SYSTEMS:  CONSTITUTIONAL: No weakness, fevers or chills  EYES/ENT: No visual changes;  No vertigo or throat pain   NECK: No pain or stiffness  RESPIRATORY: No cough, wheezing, hemoptysis; No shortness of breath  CARDIOVASCULAR: No chest pain or palpitations  GASTROINTESTINAL: No abdominal or epigastric pain; (+)diarrhea  GENITOURINARY: No dysuria, frequency or hematuria  NEUROLOGICAL: No numbness or weakness  SKIN: No itching, burning, rashes, or lesions   All other review of systems is negative unless indicated above.    VITAL:  T(C): , Max: 36.6 (04-27-19 @ 04:34)  T(F): , Max: 97.9 (04-27-19 @ 04:34)  HR: 102 (04-27-19 @ 04:34)  BP: 130/83 (04-27-19 @ 04:34)  RR: 18 (04-27-19 @ 04:34)  SpO2: 96% (04-27-19 @ 04:34)    PHYSICAL EXAM:  Constitutional: NAD, frail; thin   HEENT: NCAT, DMM  Neck: Supple, (+)diffuse b/l rhonchi  Cardiovascular: RRR s1s2, no m/r/g  Gastrointestinal: BS+, soft, NT/ND  Extremities: No peripheral edema b/l  Neurological: no focal deficits; strength grossly intact  Back: no CVAT b/l  Skin: No rashes, no nevi    LABS:                        13.4   7.18  )-----------( 255      ( 26 Apr 2019 16:33 )             43.9     Na(134)/K(5.6)/Cl(93)/HCO3(27)/BUN(112)/Cr(2.28)Glu(497)/Ca(8.5)/Mg(--)/PO4(--)    04-27 @ 09:30  Na(138)/K(4.4)/Cl(98)/HCO3(29)/BUN(104)/Cr(1.69)Glu(141)/Ca(8.9)/Mg(2.0)/PO4(3.7)    04-26 @ 11:36  Na(136)/K(4.9)/Cl(98)/HCO3(25)/BUN(114)/Cr(1.85)Glu(178)/Ca(9.0)/Mg(--)/PO4(--)    04-25 @ 09:41    (4/18/19) - BUN/creatinine: 50/1.33  (5/18/18) - BUN/creatinine: 142/1.46    IMAGING:  < from: CT Chest No Cont (04.17.19 @ 01:07) >  Large pericardial effusion measuring simple fluid  Slightly increased pulmonary sarcoidosis with extensive fibrotic changes   in the bilateral lower lobe and right middle lobe, as well as nonfibrotic   changes in the bilateral upper lobes.      ASSESSMENT:  (1)Renal - CKD3; superimposed YANA, which I presume is prerenally mediated; particularly high BUN/creatinine ratio - the high ratio is primarily due to steroids as well as intravascular depletion.    (2)Hyperkalemia - in part from YANA; also in part from cellular shifts induced by hyperglycemia.     (3)CV - on a very high dose of diuretics - her CT of the chest from earlier this admission shows extensive pulmonary disease from sarcoidosis. Stands to reason that the diuretics are contributing to her YANA, without notably improving her breathing.       RECOMMEND:  (1)Low-K diet  (2)Kayexalate 30gm po prn K 5.8 or higher  (3)Glycemic control per primary team/Endocrine  (4)Favor reducing her diuretics - d/c Metolazone and switch Lasix to daily?  (5)Dose new meds for GFR 15-20ml/min  (6)Renal ultrasound  (7)Urine: urinalysis, lytes, creatinine  (8)BMP daily    Thank you for involving [facility] in this patient's care.    With warm regards,    Jair Lion MD   [facility]  [phone]

## 2019-04-27 NOTE — PROGRESS NOTE ADULT - ASSESSMENT
Assessment  DMT2: 53y Female with DM T2 with hyperglycemia, still on high dose steroids, on basal bolus insulin, started eating meals, blood sugars improving, no hypoglycemic episode, NPO.  CAD/Pericardial effusion: on medications, no chest pain, stable, monitored.  HTN: Controlled,  on antihypertensive medications.  CKD: Monitor labs/BMP,   Sarcoidosis: On treatment, stable.          Marlena Brown MD  Cell: [phone]  Office: [phone]

## 2019-04-27 NOTE — PROGRESS NOTE ADULT - SUBJECTIVE AND OBJECTIVE BOX
PEYMAN WATTS:[id number],   53yFemale followed for:  avocado (Unknown)  No Known Drug Allergies    PAST MEDICAL & SURGICAL HISTORY:  DM (diabetes mellitus)  CAD (coronary artery disease)  Sarcoidosis  Asthma with COPD  CHF, chronic  Mural thrombus of cardiac apex  History of atrial fibrillation  Thrombus: ? Thrombus in heart - on Xarelto  Hyperlipidemia  Anemia  Hypertension  Sarcoidosis  CHF (congestive heart failure): Stage III, on home O2 @ 2lpm  Diabetes  AICD (automatic cardioverter/defibrillator) present  History of repair of hip fracture  Ankle fracture  Ankle fracture: Left Ankle - with plate and screws placed  Hip deformity: left hip had a pin placed to hold bone in place after a fall    FAMILY HISTORY:  FH: breast cancer: mother  Family hx of colon cancer: Mother  Family history of hypertension (Sibling): Siblings  Family history of diabetes mellitus (Father, Sibling): Father, Siblings  Family history of breast cancer in mother (Mother): Mother  Family history of colon cancer in mother (Mother): Mother    MEDICATIONS  (STANDING):  atorvastatin 40 milliGRAM(s) Oral at bedtime  carvedilol 6.25 milliGRAM(s) Oral every 12 hours  dextrose 5%. 1000 milliLiter(s) (50 mL/Hr) IV Continuous <Continuous>  dextrose 50% Injectable 12.5 Gram(s) IV Push once  dextrose 50% Injectable 25 Gram(s) IV Push once  dextrose 50% Injectable 25 Gram(s) IV Push once  fluticasone propionate 50 MICROgram(s)/spray Nasal Spray 1 Spray(s) Both Nostrils two times a day  furosemide    Tablet 80 milliGRAM(s) Oral every 12 hours  insulin glargine Injectable (LANTUS) 24 Unit(s) SubCutaneous at bedtime  insulin lispro (HumaLOG) corrective regimen sliding scale   SubCutaneous three times a day before meals  insulin lispro (HumaLOG) corrective regimen sliding scale   SubCutaneous at bedtime  insulin lispro Injectable (HumaLOG) 8 Unit(s) SubCutaneous three times a day before meals  levothyroxine 25 MICROGram(s) Oral daily  metolazone 5 milliGRAM(s) Oral <User Schedule>  mometasone 220 MICROgram(s) Inhaler 1 Puff(s) Inhalation two times a day  nystatin Powder 1 Application(s) Topical two times a day  predniSONE   Tablet 20 milliGRAM(s) Oral two times a day    MEDICATIONS  (PRN):  ALBUTerol    90 MICROgram(s) HFA Inhaler 2 Puff(s) Inhalation every 6 hours PRN Shortness of Breath and/or Wheezing  dextrose 40% Gel 15 Gram(s) Oral once PRN Blood Glucose LESS THAN 70 milliGRAM(s)/deciliter  glucagon  Injectable 1 milliGRAM(s) IntraMuscular once PRN Glucose LESS THAN 70 milligrams/deciliter  hemorrhoidal Ointment 1 Application(s) Rectal two times a day PRN Hemorrhoids  lidocaine 2% Gel 1 Application(s) Topical two times a day PRN perianally for pain  simethicone 80 milliGRAM(s) Chew two times a day PRN Gas      Vital Signs Last 24 Hrs  T(C): 36.6 (27 Apr 2019 04:34), Max: 36.6 (27 Apr 2019 04:34)  T(F): 97.9 (27 Apr 2019 04:34), Max: 97.9 (27 Apr 2019 04:34)  HR: 102 (27 Apr 2019 04:34) (91 - 102)  BP: 130/83 (27 Apr 2019 04:34) (102/70 - 130/83)  BP(mean): --  RR: 18 (27 Apr 2019 04:34) (17 - 18)  SpO2: 96% (27 Apr 2019 04:34) (95% - 98%)  nc/at  s1s2  cta  soft, nt, nd no guarding or rebound  no c/c/e    CBC Full  -  ( 26 Apr 2019 16:33 )  WBC Count : 7.18 K/uL  RBC Count : 6.40 M/uL  Hemoglobin : 13.4 g/dL  Hematocrit : 43.9 %  Platelet Count - Automated : 255 K/uL  Mean Cell Volume : 68.6 fl  Mean Cell Hemoglobin : 20.9 pg  Mean Cell Hemoglobin Concentration : 30.5 gm/dL  Auto Neutrophil # : x  Auto Lymphocyte # : x  Auto Monocyte # : x  Auto Eosinophil # : x  Auto Basophil # : x  Auto Neutrophil % : x  Auto Lymphocyte % : x  Auto Monocyte % : x  Auto Eosinophil % : x  Auto Basophil % : x    04-27    134<L>  |  93<L>  |  112<H>  ----------------------------<  x   5.6<H>   |  27  |  2.28<H>    Ca    8.5      27 Apr 2019 09:30  Phos  3.7     04-26  Mg     2.0     04-26

## 2019-04-27 NOTE — CONSULT NOTE ADULT - REASON FOR ADMISSION
sent here for pericardial effusion

## 2019-04-27 NOTE — CONSULT NOTE ADULT - CONSULT REQUESTED DATE/TIME
17-Apr-2019 12:25
17-Apr-2019 16:12
17-Apr-2019 18:00
17-Apr-2019 19:38
23-Apr-2019 15:46
27-Apr-2019 12:08

## 2019-04-28 LAB
ANION GAP SERPL CALC-SCNC: 13 MMOL/L — SIGNIFICANT CHANGE UP (ref 5–17)
ANION GAP SERPL CALC-SCNC: 13 MMOL/L — SIGNIFICANT CHANGE UP (ref 5–17)
BUN SERPL-MCNC: 107 MG/DL — HIGH (ref 7–23)
BUN SERPL-MCNC: 117 MG/DL — HIGH (ref 7–23)
CALCIUM SERPL-MCNC: 8.6 MG/DL — SIGNIFICANT CHANGE UP (ref 8.4–10.5)
CALCIUM SERPL-MCNC: 8.7 MG/DL — SIGNIFICANT CHANGE UP (ref 8.4–10.5)
CHLORIDE SERPL-SCNC: 91 MMOL/L — LOW (ref 96–108)
CHLORIDE SERPL-SCNC: 94 MMOL/L — LOW (ref 96–108)
CO2 SERPL-SCNC: 24 MMOL/L — SIGNIFICANT CHANGE UP (ref 22–31)
CO2 SERPL-SCNC: 27 MMOL/L — SIGNIFICANT CHANGE UP (ref 22–31)
CREAT SERPL-MCNC: 1.97 MG/DL — HIGH (ref 0.5–1.3)
CREAT SERPL-MCNC: 2.05 MG/DL — HIGH (ref 0.5–1.3)
CULTURE RESULTS: SIGNIFICANT CHANGE UP
GLUCOSE SERPL-MCNC: 221 MG/DL — HIGH (ref 70–99)
GLUCOSE SERPL-MCNC: 501 MG/DL — CRITICAL HIGH (ref 70–99)
POTASSIUM SERPL-MCNC: 4.8 MMOL/L — SIGNIFICANT CHANGE UP (ref 3.5–5.3)
POTASSIUM SERPL-MCNC: 5 MMOL/L — SIGNIFICANT CHANGE UP (ref 3.5–5.3)
POTASSIUM SERPL-SCNC: 4.8 MMOL/L — SIGNIFICANT CHANGE UP (ref 3.5–5.3)
POTASSIUM SERPL-SCNC: 5 MMOL/L — SIGNIFICANT CHANGE UP (ref 3.5–5.3)
SODIUM SERPL-SCNC: 128 MMOL/L — LOW (ref 135–145)
SODIUM SERPL-SCNC: 134 MMOL/L — LOW (ref 135–145)
SPECIMEN SOURCE: SIGNIFICANT CHANGE UP

## 2019-04-28 PROCEDURE — 76770 US EXAM ABDO BACK WALL COMP: CPT | Mod: 26

## 2019-04-28 RX ORDER — INSULIN GLARGINE 100 [IU]/ML
28 INJECTION, SOLUTION SUBCUTANEOUS AT BEDTIME
Qty: 0 | Refills: 0 | Status: DISCONTINUED | OUTPATIENT
Start: 2019-04-28 | End: 2019-04-29

## 2019-04-28 RX ORDER — RIVAROXABAN 15 MG-20MG
15 KIT ORAL EVERY 24 HOURS
Qty: 0 | Refills: 0 | Status: DISCONTINUED | OUTPATIENT
Start: 2019-04-28 | End: 2019-05-02

## 2019-04-28 RX ORDER — INSULIN LISPRO 100/ML
10 VIAL (ML) SUBCUTANEOUS
Qty: 0 | Refills: 0 | Status: DISCONTINUED | OUTPATIENT
Start: 2019-04-28 | End: 2019-04-29

## 2019-04-28 RX ORDER — RIVAROXABAN 15 MG-20MG
20 KIT ORAL EVERY 24 HOURS
Qty: 0 | Refills: 0 | Status: DISCONTINUED | OUTPATIENT
Start: 2019-04-28 | End: 2019-04-28

## 2019-04-28 RX ADMIN — Medication 10 UNIT(S): at 09:00

## 2019-04-28 RX ADMIN — Medication 10 UNIT(S): at 12:20

## 2019-04-28 RX ADMIN — Medication 20 MILLIGRAM(S): at 08:33

## 2019-04-28 RX ADMIN — ATORVASTATIN CALCIUM 40 MILLIGRAM(S): 80 TABLET, FILM COATED ORAL at 21:47

## 2019-04-28 RX ADMIN — Medication 1 APPLICATION(S): at 17:33

## 2019-04-28 RX ADMIN — SIMETHICONE 80 MILLIGRAM(S): 80 TABLET, CHEWABLE ORAL at 17:33

## 2019-04-28 RX ADMIN — INSULIN GLARGINE 28 UNIT(S): 100 INJECTION, SOLUTION SUBCUTANEOUS at 21:52

## 2019-04-28 RX ADMIN — Medication 20 MILLIGRAM(S): at 21:47

## 2019-04-28 RX ADMIN — Medication 80 MILLIGRAM(S): at 21:47

## 2019-04-28 RX ADMIN — RIVAROXABAN 15 MILLIGRAM(S): KIT at 17:33

## 2019-04-28 RX ADMIN — Medication 12: at 08:33

## 2019-04-28 RX ADMIN — Medication 10 UNIT(S): at 17:33

## 2019-04-28 RX ADMIN — Medication 4: at 17:33

## 2019-04-28 RX ADMIN — CARVEDILOL PHOSPHATE 6.25 MILLIGRAM(S): 80 CAPSULE, EXTENDED RELEASE ORAL at 17:33

## 2019-04-28 RX ADMIN — Medication 25 MICROGRAM(S): at 05:32

## 2019-04-28 RX ADMIN — CARVEDILOL PHOSPHATE 6.25 MILLIGRAM(S): 80 CAPSULE, EXTENDED RELEASE ORAL at 05:32

## 2019-04-28 NOTE — PROGRESS NOTE ADULT - SUBJECTIVE AND OBJECTIVE BOX
Subjective: Patient seen and examined. No new events except as noted.   cont to have diarrhea     REVIEW OF SYSTEMS:    CONSTITUTIONAL: No weakness, fevers or chills  EYES/ENT: No visual changes;  No vertigo or throat pain   NECK: No pain or stiffness  RESPIRATORY: No cough, wheezing, hemoptysis; No shortness of breath  CARDIOVASCULAR: No chest pain or palpitations  GASTROINTESTINAL: + diarrhea   GENITOURINARY: No dysuria, frequency or hematuria  NEUROLOGICAL: No numbness or weakness  SKIN: No itching, burning, rashes, or lesions   All other review of systems is negative unless indicated above.    MEDICATIONS:  MEDICATIONS  (STANDING):  atorvastatin 40 milliGRAM(s) Oral at bedtime  carvedilol 6.25 milliGRAM(s) Oral every 12 hours  dextrose 5%. 1000 milliLiter(s) (50 mL/Hr) IV Continuous <Continuous>  dextrose 50% Injectable 12.5 Gram(s) IV Push once  dextrose 50% Injectable 25 Gram(s) IV Push once  dextrose 50% Injectable 25 Gram(s) IV Push once  fluticasone propionate 50 MICROgram(s)/spray Nasal Spray 1 Spray(s) Both Nostrils two times a day  furosemide    Tablet 80 milliGRAM(s) Oral <User Schedule>  insulin glargine Injectable (LANTUS) 28 Unit(s) SubCutaneous at bedtime  insulin lispro (HumaLOG) corrective regimen sliding scale   SubCutaneous three times a day before meals  insulin lispro (HumaLOG) corrective regimen sliding scale   SubCutaneous at bedtime  insulin lispro Injectable (HumaLOG) 10 Unit(s) SubCutaneous three times a day before meals  levothyroxine 25 MICROGram(s) Oral daily  metolazone 5 milliGRAM(s) Oral <User Schedule>  mometasone 220 MICROgram(s) Inhaler 1 Puff(s) Inhalation two times a day  nystatin Powder 1 Application(s) Topical two times a day  predniSONE   Tablet 20 milliGRAM(s) Oral two times a day      PHYSICAL EXAM:  T(C): 36.5 (19 @ 04:50), Max: 36.6 (19 @ 20:51)  HR: 85 (19 @ 04:50) (85 - 97)  BP: 144/92 (19 @ 04:50) (105/73 - 144/92)  RR: 18 (19 @ 04:50) (18 - 18)  SpO2: 99% (19 @ 04:50) (96% - 99%)  Wt(kg): --  I&O's Summary    2019 07:01  -  2019 07:00  --------------------------------------------------------  IN: 540 mL / OUT: 0 mL / NET: 540 mL          Appearance: Normal	  HEENT:   Normal oral mucosa, PERRL, EOMI	  Lymphatic: No lymphadenopathy , no edema  Cardiovascular: Normal S1 S2, No JVD, No murmurs , Peripheral pulses palpable 2+ bilaterally  Respiratory: Lungs clear to auscultation, normal effort 	  Gastrointestinal:  Soft, Non-tender, + BS	  Skin: No rashes, No ecchymoses, No cyanosis, warm to touch  Musculoskeletal: Normal range of motion, normal strength  Psychiatry:  Mood & affect appropriate  Ext: No edema      All labs, Imaging and EKGs personally reviewed                           13.4   8.08  )-----------( 252      ( 2019 12:19 )             44.8               04-28    128<L>  |  91<L>  |  117<H>  ----------------------------<  501<HH>  5.0   |  24  |  2.05<H>    Ca    8.6      2019 06:22  Phos  3.7     -  Mg     2.0     -                         Urinalysis Basic - ( 2019 21:56 )    Color: Colorless / Appearance: Clear / S.017 / pH: x  Gluc: x / Ketone: Negative  / Bili: Negative / Urobili: Negative   Blood: x / Protein: 100 / Nitrite: Negative   Leuk Esterase: Negative / RBC: 2 /hpf / WBC 1 /HPF   Sq Epi: x / Non Sq Epi: 0 /hpf / Bacteria: Negative

## 2019-04-28 NOTE — PROGRESS NOTE ADULT - ASSESSMENT
a 53 yo female with PMH of hypertension, type 2 diabetes mellitus, coronary artery disease, systolic congestive heart failure, and sarcoidosis, on steroids, presented on 4/16/19 to the [facility] ER from her cardiologist's office after being noted on echo to have a large pericardial effusion with early tamponade physiology. Nephrology consulted for BUN/creatinine has trended up to 112/2.28.    - Renal - CKD3; superimposed YANA, which I presume is prerenally mediated; particularly high BUN/creatinine ratio - the high ratio is primarily due to steroids as well as intravascular depletion.--- today 117/2.0    - Hyperkalemia: in part from YANA; also in part from cellular shifts induced by hyperglycemia ( this AM).     - CV - on a very high dose of diuretics - her CT of the chest from earlier this admission shows extensive pulmonary disease from sarcoidosis. Stands to reason that the diuretics are contributing to her YANA, without notably improving her breathing.-------s/p drain 4/26/2019    - US Renal 4/28/2019:  Bilateral collecting system dilatation.  Echogenic kidney scan suggest medical renal disease.  - UA: Protein urine 100  - Urine Na 67, K 30, Cr 58    RECOMMEND:  - Low-K diet  - Protein/Creatinine ratio  - Glycemic control per primary team/Endocrine  - c/w Lasix 80mg daily po  - Dose new meds for GFR 30 ml/min  - repeat BMP today and BMP daily

## 2019-04-28 NOTE — PROGRESS NOTE ADULT - SUBJECTIVE AND OBJECTIVE BOX
Subjective: Patient seen and examined. No new events except as noted.   no cp or sob   feels weak     REVIEW OF SYSTEMS:    CONSTITUTIONAL: + weakness, fevers or chills  EYES/ENT: No visual changes;  No vertigo or throat pain   NECK: No pain or stiffness  RESPIRATORY: No cough, wheezing, hemoptysis; No shortness of breath  CARDIOVASCULAR: No chest pain or palpitations  GASTROINTESTINAL: No abdominal or epigastric pain. No nausea, vomiting, or hematemesis; No diarrhea or constipation. No melena or hematochezia.  GENITOURINARY: No dysuria, frequency or hematuria  NEUROLOGICAL: No numbness or weakness  SKIN: No itching, burning, rashes, or lesions   All other review of systems is negative unless indicated above.    MEDICATIONS:  MEDICATIONS  (STANDING):  atorvastatin 40 milliGRAM(s) Oral at bedtime  carvedilol 6.25 milliGRAM(s) Oral every 12 hours  dextrose 5%. 1000 milliLiter(s) (50 mL/Hr) IV Continuous <Continuous>  dextrose 50% Injectable 12.5 Gram(s) IV Push once  dextrose 50% Injectable 25 Gram(s) IV Push once  dextrose 50% Injectable 25 Gram(s) IV Push once  fluticasone propionate 50 MICROgram(s)/spray Nasal Spray 1 Spray(s) Both Nostrils two times a day  furosemide    Tablet 80 milliGRAM(s) Oral <User Schedule>  insulin glargine Injectable (LANTUS) 28 Unit(s) SubCutaneous at bedtime  insulin lispro (HumaLOG) corrective regimen sliding scale   SubCutaneous three times a day before meals  insulin lispro (HumaLOG) corrective regimen sliding scale   SubCutaneous at bedtime  insulin lispro Injectable (HumaLOG) 10 Unit(s) SubCutaneous three times a day before meals  levothyroxine 25 MICROGram(s) Oral daily  metolazone 5 milliGRAM(s) Oral <User Schedule>  mometasone 220 MICROgram(s) Inhaler 1 Puff(s) Inhalation two times a day  nystatin Powder 1 Application(s) Topical two times a day  predniSONE   Tablet 20 milliGRAM(s) Oral two times a day  rivaroxaban 20 milliGRAM(s) Oral every 24 hours      PHYSICAL EXAM:  T(C): 36.5 (04-28-19 @ 04:50), Max: 36.6 (04-27-19 @ 20:51)  HR: 85 (04-28-19 @ 04:50) (85 - 97)  BP: 144/92 (04-28-19 @ 04:50) (105/73 - 144/92)  RR: 18 (04-28-19 @ 04:50) (18 - 18)  SpO2: 99% (04-28-19 @ 04:50) (96% - 99%)  Wt(kg): --  I&O's Summary    27 Apr 2019 07:01  -  28 Apr 2019 07:00  --------------------------------------------------------  IN: 540 mL / OUT: 0 mL / NET: 540 mL          Appearance: NAD  HEENT:   Normal oral mucosa, PERRL, EOMI	  Lymphatic: No lymphadenopathy , no edema  Cardiovascular: Normal S1 S2, No JVD, No murmurs , Peripheral pulses palpable 2+ bilaterally  Respiratory: Lungs clear to auscultation, normal effort 	  Gastrointestinal:  Soft, Non-tender, + BS	  Skin: No rashes, No ecchymoses, No cyanosis, warm to touch  Musculoskeletal: Normal range of motion, normal strength  Psychiatry:  Mood & affect appropriate  Ext: No edema      LABS:    CARDIAC MARKERS:                                13.4   8.08  )-----------( 252      ( 27 Apr 2019 12:19 )             44.8     04-28    128<L>  |  91<L>  |  117<H>  ----------------------------<  501<HH>  5.0   |  24  |  2.05<H>    Ca    8.6      28 Apr 2019 06:22      proBNP:   Lipid Profile:   HgA1c:   TSH:     0          TELEMETRY: 	  SR  ECG:  	  RADIOLOGY:   DIAGNOSTIC TESTING:  [ ] Echocardiogram:  [ ]  Catheterization:  [ ] Stress Test:    OTHER: 	      < from: Colonoscopy (04.26.19 @ 17:16) >    [facility]  ____________________________________________________________________________________________________  Patient Name: Charlene Nava                      MRN: [id number]  Account Number: [id number]                     YOB: 1965  Room: Endoscopy Room 4                           Gender: Female  Attending MD: Félix Christine MD                 Procedure Date No Time: 4/26/2019  ____________________________________________________________________________________________________     Procedure:           Colonoscopy  Indications:         Chronic diarrhea  Providers:           Félix Christine MD  Medicines:           Monitored Anesthesia Care  Complications:       No immediate complications.  ____________________________________________________________________________________________________  Procedure:           Pre-Anesthesia Assessment:                       - Prior to the procedure, a History and Physical was performed, and patient          medications, allergies and sensitivities were reviewed. The patient's                        tolerance of previous anesthesia was reviewed.                       After I obtained informed consent, the scope was passed under direct vision.                     Throughout the procedure, the patient's blood pressure, pulse, and oxygen                        saturations were monitored continuously. The Colonoscope was introduced                        through the anus and advanced to the cecum, identified by appendiceal orifice                        and ileocecal valve. The colonoscopy was performed without difficulty. The                        patient tolerated the procedure well. The quality of the bowel preparation        was inadequate.                                                                                                        Findings:       A 5 mm polyp was found in the ascending colon. The polyp was sessile. The polyp was removed        with a coldbiopsy forceps. Resection and retrieval were complete.       The colon (entire examined portion) appeared normal. This was biopsied with a cold forceps        for histology.                            Impression:          - Preparation of the colon was inadequate.                       - One 5 mm polyp in the ascending colon, removed with a cold biopsy forceps.                        Resected and retrieved.         - The entire examined colon is normal. Biopsied.  Recommendation:      - Await pathology results.                                                                                                        Attending Participation:       I personally performed the entire procedure.                                                                                                          _________________  Félix Christine MD  4/26/2019 5:54:59 PM  This report has been signed electronically.  Number of Addenda: 0    Note Initiated On: 4/26/2019 5:16 PM    < end of copied text >

## 2019-04-28 NOTE — PROGRESS NOTE ADULT - ASSESSMENT
Pt is a 54 yo female with ICM s/p ICD, sarcoid admitted with moderate to lare sized circumferential pericardial effusion. Also has been having persistent diarrhea.     Problem/Recommendation:  Problem: Pericardial effusion. Recommendation: seen on Echo at outpatient office  card eval appreciated  IR pericardiac effusion drainage, S/P pericardiac drain, + drainage, drain removed by IR   patient is hemodynamically stable   CT chest noted.  BP monitoring     Problem/Recommendation:  YANA,   monitor BUN/Cr  improving   avoid nephrotoxic medications   Renal eval appreciated   diuresis as per nephrology   I and Os     Problem/Recommendation :  ·  Problem: Diarrhea.  Recommendation: chronic for months   GI eval appreciated  send stool studies   C diff negative   in view of Hx of sarcoidosis, GI autoimmune diseases need to be ruled out   diet control  monitor electrolytes.   S/P colonoscopy EGD< no acute findings, awaiting for biopsy results     Problem/Recommendation -:  ·  Problem: Sarcoidosis.  Recommendation: on prednisone.   monitor BUN/Cr  avoid nephrotoxic medications     Problem/Recommendation :  ·  Problem: History of atrial fibrillation.  Recommendation: rate control  restart AC today  card F/U.     Problem/Recommendation :  ·  Problem: CAD (coronary artery disease).  Recommendation: S/P PCI  Statin and AC.     Problem/Recommendation :  Problem: Diabetes mellitus. Recommendation: sliding scale   endo eval appreciated    lantus and premeal humalog for better BS control   elevate A1c   DM diet  Endo eval for BS management.    Problem/Recommendation:  Problem: Prophylactic measure. Recommendation: DVT and Gi PPX.

## 2019-04-28 NOTE — PROGRESS NOTE ADULT - SUBJECTIVE AND OBJECTIVE BOX
Patient seen and examined. Resting in bed. No complaints.    REVIEW OF SYSTEMS:  As per HPI, otherwise 8 full 10 ROS were unremarkable    MEDICATIONS  (STANDING):  atorvastatin 40 milliGRAM(s) Oral at bedtime  carvedilol 6.25 milliGRAM(s) Oral every 12 hours  dextrose 5%. 1000 milliLiter(s) (50 mL/Hr) IV Continuous <Continuous>  dextrose 50% Injectable 12.5 Gram(s) IV Push once  dextrose 50% Injectable 25 Gram(s) IV Push once  dextrose 50% Injectable 25 Gram(s) IV Push once  fluticasone propionate 50 MICROgram(s)/spray Nasal Spray 1 Spray(s) Both Nostrils two times a day  furosemide    Tablet 80 milliGRAM(s) Oral <User Schedule>  insulin glargine Injectable (LANTUS) 28 Unit(s) SubCutaneous at bedtime  insulin lispro (HumaLOG) corrective regimen sliding scale   SubCutaneous three times a day before meals  insulin lispro (HumaLOG) corrective regimen sliding scale   SubCutaneous at bedtime  insulin lispro Injectable (HumaLOG) 10 Unit(s) SubCutaneous three times a day before meals  levothyroxine 25 MICROGram(s) Oral daily  metolazone 5 milliGRAM(s) Oral <User Schedule>  mometasone 220 MICROgram(s) Inhaler 1 Puff(s) Inhalation two times a day  nystatin Powder 1 Application(s) Topical two times a day  predniSONE   Tablet 20 milliGRAM(s) Oral two times a day  rivaroxaban 15 milliGRAM(s) Oral every 24 hours      VITAL:  T(C): , Max: 36.6 (19 @ 20:51)  T(F): , Max: 97.9 (19 @ 20:51)  HR: 90 (19 @ 13:15)  BP: 130/87 (19 @ 13:15)  BP(mean): --  RR: 17 (19 @ 13:15)  SpO2: 96% (19 @ 13:15)  Wt(kg): --    I and O's:     @ 07:01  -   @ 07:00  --------------------------------------------------------  IN: 540 mL / OUT: 0 mL / NET: 540 mL     @ 07:01  -   @ 15:17  --------------------------------------------------------  IN: 480 mL / OUT: 0 mL / NET: 480 mL          PHYSICAL EXAM:  Constitutional: NAD, frail; thin   lungs:, CTA b/l  Cardiovascular: RRR s1s2, no m/r/g  Gastrointestinal: BS+, soft, NT/ND  Extremities: No peripheral edema b/l  Neurological: no focal deficits; strength grossly intact  : voids      LABS:                        13.4   8.08  )-----------( 252      ( 2019 12:19 )             44.8         128<L>  |  91<L>  |  117<H>  ----------------------------<  501<HH>  5.0   |  24  |  2.05<H>    Ca    8.6      2019 06:22        Urine Studies:  Urinalysis Basic - ( 2019 21:56 )    Color: Colorless / Appearance: Clear / S.017 / pH: x  Gluc: x / Ketone: Negative  / Bili: Negative / Urobili: Negative   Blood: x / Protein: 100 / Nitrite: Negative   Leuk Esterase: Negative / RBC: 2 /hpf / WBC 1 /HPF   Sq Epi: x / Non Sq Epi: 0 /hpf / Bacteria: Negative      Sodium, Random Urine: 67 mmol/L ( @ 21:53)  Chloride, Random Urine: 37 mmol/L ( @ 21:53)  Potassium, Random Urine: 30 mmol/L ( @ 21:53)  Creatinine, Random Urine: 58 mg/dL ( @ 21:53)

## 2019-04-28 NOTE — PROGRESS NOTE ADULT - SUBJECTIVE AND OBJECTIVE BOX
PEYMAN WATTS:[id number],   53yFemale followed for:  avocado (Unknown)  No Known Drug Allergies    PAST MEDICAL & SURGICAL HISTORY:  DM (diabetes mellitus)  CAD (coronary artery disease)  Sarcoidosis  Asthma with COPD  CHF, chronic  Mural thrombus of cardiac apex  History of atrial fibrillation  Thrombus: ? Thrombus in heart - on Xarelto  Hyperlipidemia  Anemia  Hypertension  Sarcoidosis  CHF (congestive heart failure): Stage III, on home O2 @ 2lpm  Diabetes  AICD (automatic cardioverter/defibrillator) present  History of repair of hip fracture  Ankle fracture  Ankle fracture: Left Ankle - with plate and screws placed  Hip deformity: left hip had a pin placed to hold bone in place after a fall    FAMILY HISTORY:  FH: breast cancer: mother  Family hx of colon cancer: Mother  Family history of hypertension (Sibling): Siblings  Family history of diabetes mellitus (Father, Sibling): Father, Siblings  Family history of breast cancer in mother (Mother): Mother  Family history of colon cancer in mother (Mother): Mother    MEDICATIONS  (STANDING):  atorvastatin 40 milliGRAM(s) Oral at bedtime  carvedilol 6.25 milliGRAM(s) Oral every 12 hours  dextrose 5%. 1000 milliLiter(s) (50 mL/Hr) IV Continuous <Continuous>  dextrose 50% Injectable 12.5 Gram(s) IV Push once  dextrose 50% Injectable 25 Gram(s) IV Push once  dextrose 50% Injectable 25 Gram(s) IV Push once  fluticasone propionate 50 MICROgram(s)/spray Nasal Spray 1 Spray(s) Both Nostrils two times a day  furosemide    Tablet 80 milliGRAM(s) Oral <User Schedule>  insulin glargine Injectable (LANTUS) 28 Unit(s) SubCutaneous at bedtime  insulin lispro (HumaLOG) corrective regimen sliding scale   SubCutaneous three times a day before meals  insulin lispro (HumaLOG) corrective regimen sliding scale   SubCutaneous at bedtime  insulin lispro Injectable (HumaLOG) 10 Unit(s) SubCutaneous three times a day before meals  levothyroxine 25 MICROGram(s) Oral daily  metolazone 5 milliGRAM(s) Oral <User Schedule>  mometasone 220 MICROgram(s) Inhaler 1 Puff(s) Inhalation two times a day  nystatin Powder 1 Application(s) Topical two times a day  predniSONE   Tablet 20 milliGRAM(s) Oral two times a day    MEDICATIONS  (PRN):  ALBUTerol    90 MICROgram(s) HFA Inhaler 2 Puff(s) Inhalation every 6 hours PRN Shortness of Breath and/or Wheezing  dextrose 40% Gel 15 Gram(s) Oral once PRN Blood Glucose LESS THAN 70 milliGRAM(s)/deciliter  glucagon  Injectable 1 milliGRAM(s) IntraMuscular once PRN Glucose LESS THAN 70 milligrams/deciliter  hemorrhoidal Ointment 1 Application(s) Rectal two times a day PRN Hemorrhoids  lidocaine 2% Gel 1 Application(s) Topical two times a day PRN perianally for pain  simethicone 80 milliGRAM(s) Chew two times a day PRN Gas  vitamin A &amp; D Ointment 1 Application(s) Topical two times a day PRN rash      Vital Signs Last 24 Hrs  T(C): 36.5 (28 Apr 2019 04:50), Max: 36.6 (27 Apr 2019 20:51)  T(F): 97.7 (28 Apr 2019 04:50), Max: 97.9 (27 Apr 2019 20:51)  HR: 85 (28 Apr 2019 04:50) (85 - 97)  BP: 144/92 (28 Apr 2019 04:50) (105/73 - 144/92)  BP(mean): --  RR: 18 (28 Apr 2019 04:50) (18 - 18)  SpO2: 99% (28 Apr 2019 04:50) (96% - 99%)  nc/at  s1s2  cta  soft, nt, nd no guarding or rebound  no c/c/e    CBC Full  -  ( 27 Apr 2019 12:19 )  WBC Count : 8.08 K/uL  RBC Count : 6.41 M/uL  Hemoglobin : 13.4 g/dL  Hematocrit : 44.8 %  Platelet Count - Automated : 252 K/uL  Mean Cell Volume : 69.9 fl  Mean Cell Hemoglobin : 20.9 pg  Mean Cell Hemoglobin Concentration : 29.9 gm/dL  Auto Neutrophil # : x  Auto Lymphocyte # : x  Auto Monocyte # : x  Auto Eosinophil # : x  Auto Basophil # : x  Auto Neutrophil % : x  Auto Lymphocyte % : x  Auto Monocyte % : x  Auto Eosinophil % : x  Auto Basophil % : x    04-28    128<L>  |  91<L>  |  117<H>  ----------------------------<  501<HH>  5.0   |  24  |  2.05<H>    Ca    8.6      28 Apr 2019 06:22  Phos  3.7     04-26  Mg     2.0     04-26

## 2019-04-29 LAB
ANION GAP SERPL CALC-SCNC: 12 MMOL/L — SIGNIFICANT CHANGE UP (ref 5–17)
BUN SERPL-MCNC: 101 MG/DL — HIGH (ref 7–23)
CALCIUM SERPL-MCNC: 9 MG/DL — SIGNIFICANT CHANGE UP (ref 8.4–10.5)
CHLORIDE SERPL-SCNC: 91 MMOL/L — LOW (ref 96–108)
CO2 SERPL-SCNC: 26 MMOL/L — SIGNIFICANT CHANGE UP (ref 22–31)
CREAT ?TM UR-MCNC: 29 MG/DL — SIGNIFICANT CHANGE UP
CREAT SERPL-MCNC: 1.67 MG/DL — HIGH (ref 0.5–1.3)
GLUCOSE SERPL-MCNC: 463 MG/DL — CRITICAL HIGH (ref 70–99)
POTASSIUM SERPL-MCNC: 5 MMOL/L — SIGNIFICANT CHANGE UP (ref 3.5–5.3)
POTASSIUM SERPL-SCNC: 5 MMOL/L — SIGNIFICANT CHANGE UP (ref 3.5–5.3)
PROT ?TM UR-MCNC: 68 MG/DL — HIGH (ref 0–12)
PROT/CREAT UR-RTO: 2.3 RATIO — HIGH (ref 0–0.2)
SODIUM SERPL-SCNC: 129 MMOL/L — LOW (ref 135–145)

## 2019-04-29 RX ORDER — INSULIN GLARGINE 100 [IU]/ML
36 INJECTION, SOLUTION SUBCUTANEOUS AT BEDTIME
Qty: 0 | Refills: 0 | Status: DISCONTINUED | OUTPATIENT
Start: 2019-04-29 | End: 2019-05-02

## 2019-04-29 RX ORDER — INSULIN LISPRO 100/ML
12 VIAL (ML) SUBCUTANEOUS
Qty: 0 | Refills: 0 | Status: DISCONTINUED | OUTPATIENT
Start: 2019-04-29 | End: 2019-05-02

## 2019-04-29 RX ORDER — NYSTATIN CREAM 100000 [USP'U]/G
1 CREAM TOPICAL
Qty: 0 | Refills: 0 | Status: DISCONTINUED | OUTPATIENT
Start: 2019-04-29 | End: 2019-05-02

## 2019-04-29 RX ORDER — INSULIN LISPRO 100/ML
8 VIAL (ML) SUBCUTANEOUS
Qty: 0 | Refills: 0 | Status: DISCONTINUED | OUTPATIENT
Start: 2019-04-29 | End: 2019-05-02

## 2019-04-29 RX ADMIN — Medication 1 SPRAY(S): at 05:18

## 2019-04-29 RX ADMIN — MOMETASONE FUROATE 1 PUFF(S): 220 INHALANT RESPIRATORY (INHALATION) at 05:19

## 2019-04-29 RX ADMIN — Medication 25 MICROGRAM(S): at 05:18

## 2019-04-29 RX ADMIN — NYSTATIN CREAM 1 APPLICATION(S): 100000 CREAM TOPICAL at 17:28

## 2019-04-29 RX ADMIN — ATORVASTATIN CALCIUM 40 MILLIGRAM(S): 80 TABLET, FILM COATED ORAL at 22:03

## 2019-04-29 RX ADMIN — Medication 80 MILLIGRAM(S): at 17:27

## 2019-04-29 RX ADMIN — Medication 1 SPRAY(S): at 17:27

## 2019-04-29 RX ADMIN — MOMETASONE FUROATE 1 PUFF(S): 220 INHALANT RESPIRATORY (INHALATION) at 17:27

## 2019-04-29 RX ADMIN — Medication 1 APPLICATION(S): at 17:27

## 2019-04-29 RX ADMIN — SIMETHICONE 80 MILLIGRAM(S): 80 TABLET, CHEWABLE ORAL at 22:03

## 2019-04-29 RX ADMIN — Medication 10: at 08:09

## 2019-04-29 RX ADMIN — Medication 4: at 12:23

## 2019-04-29 RX ADMIN — Medication 10 UNIT(S): at 08:09

## 2019-04-29 RX ADMIN — INSULIN GLARGINE 36 UNIT(S): 100 INJECTION, SOLUTION SUBCUTANEOUS at 22:21

## 2019-04-29 RX ADMIN — CARVEDILOL PHOSPHATE 6.25 MILLIGRAM(S): 80 CAPSULE, EXTENDED RELEASE ORAL at 17:27

## 2019-04-29 RX ADMIN — Medication 2: at 17:41

## 2019-04-29 RX ADMIN — Medication 2: at 22:26

## 2019-04-29 RX ADMIN — CARVEDILOL PHOSPHATE 6.25 MILLIGRAM(S): 80 CAPSULE, EXTENDED RELEASE ORAL at 05:18

## 2019-04-29 RX ADMIN — Medication 12 UNIT(S): at 12:23

## 2019-04-29 RX ADMIN — Medication 12 UNIT(S): at 17:41

## 2019-04-29 RX ADMIN — Medication 20 MILLIGRAM(S): at 22:03

## 2019-04-29 RX ADMIN — Medication 20 MILLIGRAM(S): at 11:01

## 2019-04-29 RX ADMIN — RIVAROXABAN 15 MILLIGRAM(S): KIT at 17:27

## 2019-04-29 NOTE — PROGRESS NOTE ADULT - SUBJECTIVE AND OBJECTIVE BOX
No pain, no shortness of breath      VITAL:  T(C): , Max: 36.4 (19 @ 13:15)  T(F): , Max: 97.6 (19 @ 21:18)  HR: 78 (19 @ 04:48)  BP: 144/97 (19 @ 04:48)  BP(mean): --  RR: 18 (19 @ 04:48)  SpO2: 99% (19 @ 04:48)      PHYSICAL EXAM:  Constitutional: NAD, frail; thin   HEENT: NCAT, DMM  Neck: Supple, (+)diffuse b/l rhonchi  Cardiovascular: RRR s1s2, no m/r/g  Gastrointestinal: BS+, soft, NT/ND  Extremities: No peripheral edema b/l  Neurological: no focal deficits; strength grossly intact  Back: no CVAT b/l  Skin: No rashes, no nevi      LABS:                        13.4   8.08  )-----------( 252      ( 2019 12:19 )             44.8     Na(129)/K(5.0)/Cl(91)/HCO3(26)/BUN(101)/Cr(1.67)Glu(463)/Ca(9.0)/Mg(--)/PO4(--)     @ 06:02  Na(134)/K(4.8)/Cl(94)/HCO3(27)/BUN(107)/Cr(1.97)Glu(221)/Ca(8.7)/Mg(--)/PO4(--)     @ 16:30  Na(128)/K(5.0)/Cl(91)/HCO3(24)/BUN(117)/Cr(2.05)Glu(501)/Ca(8.6)/Mg(--)/PO4(--)     @ 06:22  Na(141)/K(5.1)/Cl(97)/HCO3(29)/BUN(117)/Cr(2.47)Glu(168)/Ca(8.7)/Mg(--)/PO4(--)     @ 12:21  Na(134)/K(5.6)/Cl(93)/HCO3(27)/BUN(112)/Cr(2.28)Glu(497)/Ca(8.5)/Mg(--)/PO4(--)     @ 09:30  Na(138)/K(4.4)/Cl(98)/HCO3(29)/BUN(104)/Cr(1.69)Glu(141)/Ca(8.9)/Mg(2.0)/PO4(3.7)     @ 11:36    Urinalysis Basic - ( 2019 21:56 )    Color: Colorless / Appearance: Clear / S.017 / pH: x  Gluc: x / Ketone: Negative  / Bili: Negative / Urobili: Negative   Blood: x / Protein: 100 / Nitrite: Negative   Leuk Esterase: Negative / RBC: 2 /hpf / WBC 1 /HPF   Sq Epi: x / Non Sq Epi: 0 /hpf / Bacteria: Negative      Creatinine, Random Urine: 29 mg/dL ( @ 02:18)  Protein/Creatinine Ratio Calculation: 2.3 Ratio ( @ 02:18)  Sodium, Random Urine: 67 mmol/L ( @ 21:53)  Chloride, Random Urine: 37 mmol/L ( @ 21:53)  Potassium, Random Urine: 30 mmol/L ( @ 21:53)  Creatinine, Random Urine: 58 mg/dL ( @ 21:53)    IMAGING:  < from: US Kidney and Bladder (19 @ 11:08) >  1.  Bilateral collecting system dilatation.  2.  Echogenic kidneyscan suggest medical renal disease.      IMPRESSION: 52F w/ HTN , DM2, CAD, HFrEF, CKD3, and Sarcoidosis, 19 a/w large pericardial effusion; s/p drainage; admission c/b YANA     (1)Renal - proteinuric CKD3; likely diabetic nephropathy. Resolving superimposed prerenally mediated YANA. BUN remains high, due to steroids. I am not worried about the mild dilatation of the collecting systems on US, as the renal function is improving without intervention on the collecting system    (2)Hyperkalemia - in part from YANA; also in part from cellular shifts induced by hyperglycemia. Improved relative to 2 days ago    (3)Hyponatremia - dilutional from hyperglycemia - corrected sodium is 135meq/L    (4)CV - overdiuresed as of a few days ago; improving, s/p reduction in diuretics      RECOMMEND:  (1)Continue low-K diet  (2)Glycemic control per primary team/Endocrine  (3)Diuretics as ordered  (4)Dose new meds for GFR 25-35ml/min  (5)F/U at my office 2-4 weeks after discharge      Jair Lion MD  [facility]  [phone] No pain, no shortness of breath      VITAL:  T(C): , Max: 36.4 (19 @ 13:15)  T(F): , Max: 97.6 (19 @ 21:18)  HR: 78 (19 @ 04:48)  BP: 144/97 (19 @ 04:48)  RR: 18 (19 @ 04:48)  SpO2: 99% (19 @ 04:48)      PHYSICAL EXAM:  Constitutional: NAD, frail; thin   HEENT: NCAT, DMM  Neck: Supple, (+)diffuse b/l rhonchi  Cardiovascular: RRR s1s2, no m/r/g  Gastrointestinal: BS+, soft, NT/ND  Extremities: No peripheral edema b/l  Neurological: no focal deficits; strength grossly intact  Back: no CVAT b/l  Skin: No rashes, no nevi      LABS:                        13.4   8.08  )-----------( 252      ( 2019 12:19 )             44.8     Na(129)/K(5.0)/Cl(91)/HCO3(26)/BUN(101)/Cr(1.67)Glu(463)/Ca(9.0)/Mg(--)/PO4(--)     @ 06:02  Na(134)/K(4.8)/Cl(94)/HCO3(27)/BUN(107)/Cr(1.97)Glu(221)/Ca(8.7)/Mg(--)/PO4(--)     @ 16:30  Na(128)/K(5.0)/Cl(91)/HCO3(24)/BUN(117)/Cr(2.05)Glu(501)/Ca(8.6)/Mg(--)/PO4(--)     @ 06:22  Na(141)/K(5.1)/Cl(97)/HCO3(29)/BUN(117)/Cr(2.47)Glu(168)/Ca(8.7)/Mg(--)/PO4(--)     @ 12:21  Na(134)/K(5.6)/Cl(93)/HCO3(27)/BUN(112)/Cr(2.28)Glu(497)/Ca(8.5)/Mg(--)/PO4(--)     @ 09:30  Na(138)/K(4.4)/Cl(98)/HCO3(29)/BUN(104)/Cr(1.69)Glu(141)/Ca(8.9)/Mg(2.0)/PO4(3.7)     @ 11:36    Urinalysis Basic - ( 2019 21:56 )    Color: Colorless / Appearance: Clear / S.017 / pH: x  Gluc: x / Ketone: Negative  / Bili: Negative / Urobili: Negative   Blood: x / Protein: 100 / Nitrite: Negative   Leuk Esterase: Negative / RBC: 2 /hpf / WBC 1 /HPF   Sq Epi: x / Non Sq Epi: 0 /hpf / Bacteria: Negative      Creatinine, Random Urine: 29 mg/dL ( @ 02:18)  Protein/Creatinine Ratio Calculation: 2.3 Ratio ( @ 02:18)  Sodium, Random Urine: 67 mmol/L ( @ 21:53)  Chloride, Random Urine: 37 mmol/L ( @ 21:53)  Potassium, Random Urine: 30 mmol/L ( @ 21:53)  Creatinine, Random Urine: 58 mg/dL ( @ 21:53)    IMAGING:  < from: US Kidney and Bladder (19 @ 11:08) >  1.  Bilateral collecting system dilatation.  2.  Echogenic kidneyscan suggest medical renal disease.      IMPRESSION: 52F w/ HTN , DM2, CAD, HFrEF, CKD3, and Sarcoidosis, 19 a/w large pericardial effusion; s/p drainage; admission c/b YANA     (1)Renal - proteinuric CKD3; likely diabetic nephropathy. Resolving superimposed prerenally mediated YANA. BUN remains high, due to steroids. I am not worried about the mild dilatation of the collecting systems on US, as the renal function is improving without intervention on the collecting system    (2)Hyperkalemia - in part from YANA; also in part from cellular shifts induced by hyperglycemia. Improved relative to 2 days ago    (3)Hyponatremia - dilutional from hyperglycemia - corrected sodium is 135meq/L    (4)CV - overdiuresed as of a few days ago; improving, s/p reduction in diuretics      RECOMMEND:  (1)Continue low-K diet  (2)Glycemic control per primary team/Endocrine  (3)Diuretics as ordered  (4)Dose new meds for GFR 25-35ml/min  (5)F/U at my office 2-4 weeks after discharge      Jair Lion MD  [facility]  [phone]

## 2019-04-29 NOTE — PROGRESS NOTE ADULT - SUBJECTIVE AND OBJECTIVE BOX
Endocrinology Attending Covering for Dr. Brown      Chief complaint  Patient is a 53y old  Female who presents with a chief complaint of sent here for pericardial effusion (29 Apr 2019 09:09)   Review of systems  Patient in bed, looks comfortable, no fever,  had no hypoglycemia.  as per nurse got 28 units Lantus last night,  this AM  Labs and Fingersticks  CAPILLARY BLOOD GLUCOSE      POCT Blood Glucose.: 400 mg/dL (29 Apr 2019 07:42)  POCT Blood Glucose.: 223 mg/dL (28 Apr 2019 21:40)  POCT Blood Glucose.: 214 mg/dL (28 Apr 2019 16:50)  POCT Blood Glucose.: 163 mg/dL (28 Apr 2019 11:43)      Anion Gap, Serum: 12 (04-29 @ 06:02)  Anion Gap, Serum: 13 (04-28 @ 16:30)  Anion Gap, Serum: 13 (04-28 @ 06:22)  Anion Gap, Serum: 15 (04-27 @ 12:21)      Calcium, Total Serum: 9.0 (04-29 @ 06:02)  Calcium, Total Serum: 8.7 (04-28 @ 16:30)  Calcium, Total Serum: 8.6 (04-28 @ 06:22)  Calcium, Total Serum: 8.7 (04-27 @ 12:21)          04-29    129<L>  |  91<L>  |  101<H>  ----------------------------<  463<HH>  5.0   |  26  |  1.67<H>    Ca    9.0      29 Apr 2019 06:02                          13.4   8.08  )-----------( 252      ( 27 Apr 2019 12:19 )             44.8     Medications  MEDICATIONS  (STANDING):  atorvastatin 40 milliGRAM(s) Oral at bedtime  carvedilol 6.25 milliGRAM(s) Oral every 12 hours  dextrose 5%. 1000 milliLiter(s) (50 mL/Hr) IV Continuous <Continuous>  dextrose 50% Injectable 12.5 Gram(s) IV Push once  dextrose 50% Injectable 25 Gram(s) IV Push once  dextrose 50% Injectable 25 Gram(s) IV Push once  fluticasone propionate 50 MICROgram(s)/spray Nasal Spray 1 Spray(s) Both Nostrils two times a day  furosemide    Tablet 80 milliGRAM(s) Oral <User Schedule>  insulin glargine Injectable (LANTUS) 36 Unit(s) SubCutaneous at bedtime  insulin lispro (HumaLOG) corrective regimen sliding scale   SubCutaneous three times a day before meals  insulin lispro (HumaLOG) corrective regimen sliding scale   SubCutaneous at bedtime  insulin lispro Injectable (HumaLOG) 8 Unit(s) SubCutaneous before breakfast  insulin lispro Injectable (HumaLOG) 12 Unit(s) SubCutaneous before lunch  insulin lispro Injectable (HumaLOG) 12 Unit(s) SubCutaneous before dinner  levothyroxine 25 MICROGram(s) Oral daily  metolazone 5 milliGRAM(s) Oral <User Schedule>  mometasone 220 MICROgram(s) Inhaler 1 Puff(s) Inhalation two times a day  nystatin Cream 1 Application(s) Topical two times a day  predniSONE   Tablet 20 milliGRAM(s) Oral two times a day  rivaroxaban 15 milliGRAM(s) Oral every 24 hours      Physical Exam  General: Patient comfortable in bed  Vital Signs Last 12 Hrs  T(F): 97.6 (04-29-19 @ 04:48), Max: 97.6 (04-29-19 @ 04:48)  HR: 78 (04-29-19 @ 04:48) (78 - 93)  BP: 144/97 (04-29-19 @ 04:48) (138/90 - 144/97)  BP(mean): --  RR: 18 (04-29-19 @ 04:48) (18 - 18)  SpO2: 99% (04-29-19 @ 04:48) (97% - 99%)  Neck: No palpable thyroid nodules.  CVS: S1S2, No murmurs  Respiratory: No wheezing, no crepitations  GI: Abdomen soft, bowel sounds positive  Musculoskeletal:  edema lower extremities.   Skin: No skin rashes, no ecchymosis    Diagnostics

## 2019-04-29 NOTE — PROGRESS NOTE ADULT - SUBJECTIVE AND OBJECTIVE BOX
Subjective: Patient seen and examined. No new events except as noted.   feels ok     REVIEW OF SYSTEMS:    CONSTITUTIONAL: +weakness, fevers or chills  EYES/ENT: No visual changes;  No vertigo or throat pain   NECK: No pain or stiffness  RESPIRATORY: No cough, wheezing, hemoptysis; No shortness of breath  CARDIOVASCULAR: No chest pain or palpitations  GASTROINTESTINAL: No abdominal or epigastric pain. No nausea, vomiting, or hematemesis; No diarrhea or constipation. No melena or hematochezia.  GENITOURINARY: No dysuria, frequency or hematuria  NEUROLOGICAL: No numbness or weakness  SKIN: No itching, burning, rashes, or lesions   All other review of systems is negative unless indicated above.    MEDICATIONS:  MEDICATIONS  (STANDING):  atorvastatin 40 milliGRAM(s) Oral at bedtime  carvedilol 6.25 milliGRAM(s) Oral every 12 hours  dextrose 5%. 1000 milliLiter(s) (50 mL/Hr) IV Continuous <Continuous>  dextrose 50% Injectable 12.5 Gram(s) IV Push once  dextrose 50% Injectable 25 Gram(s) IV Push once  dextrose 50% Injectable 25 Gram(s) IV Push once  fluticasone propionate 50 MICROgram(s)/spray Nasal Spray 1 Spray(s) Both Nostrils two times a day  furosemide    Tablet 80 milliGRAM(s) Oral <User Schedule>  insulin glargine Injectable (LANTUS) 36 Unit(s) SubCutaneous at bedtime  insulin lispro (HumaLOG) corrective regimen sliding scale   SubCutaneous three times a day before meals  insulin lispro (HumaLOG) corrective regimen sliding scale   SubCutaneous at bedtime  insulin lispro Injectable (HumaLOG) 8 Unit(s) SubCutaneous before breakfast  insulin lispro Injectable (HumaLOG) 12 Unit(s) SubCutaneous before lunch  insulin lispro Injectable (HumaLOG) 12 Unit(s) SubCutaneous before dinner  levothyroxine 25 MICROGram(s) Oral daily  metolazone 5 milliGRAM(s) Oral <User Schedule>  mometasone 220 MICROgram(s) Inhaler 1 Puff(s) Inhalation two times a day  nystatin Cream 1 Application(s) Topical two times a day  predniSONE   Tablet 20 milliGRAM(s) Oral two times a day  rivaroxaban 15 milliGRAM(s) Oral every 24 hours      PHYSICAL EXAM:  T(C): 36.4 (04-29-19 @ 04:48), Max: 36.4 (04-28-19 @ 13:15)  HR: 78 (04-29-19 @ 04:48) (78 - 96)  BP: 144/97 (04-29-19 @ 04:48) (130/87 - 149/98)  RR: 18 (04-29-19 @ 04:48) (17 - 18)  SpO2: 99% (04-29-19 @ 04:48) (96% - 99%)  Wt(kg): --  I&O's Summary    28 Apr 2019 07:01  -  29 Apr 2019 07:00  --------------------------------------------------------  IN: 1500 mL / OUT: 0 mL / NET: 1500 mL          Appearance: NAD	  HEENT:   Normal oral mucosa, PERRL, EOMI	  Lymphatic: No lymphadenopathy , no edema  Cardiovascular: Normal S1 S2, No JVD, No murmurs , Peripheral pulses palpable 2+ bilaterally  Respiratory: Lungs clear to auscultation, normal effort 	  Gastrointestinal:  Soft, Non-tender, + BS	  Skin: No rashes, No ecchymoses, No cyanosis, warm to touch  Musculoskeletal: Normal range of motion, normal strength  Psychiatry:  Mood & affect appropriate  Ext: No edema      LABS:    CARDIAC MARKERS:                                13.4   8.08  )-----------( 252      ( 27 Apr 2019 12:19 )             44.8     04-29    129<L>  |  91<L>  |  101<H>  ----------------------------<  463<HH>  5.0   |  26  |  1.67<H>    Ca    9.0      29 Apr 2019 06:02      proBNP:   Lipid Profile:   HgA1c:   TSH:               TELEMETRY: 	SR    ECG:  	  RADIOLOGY:   DIAGNOSTIC TESTING:  [ ] Echocardiogram:  [ ]  Catheterization:  [ ] Stress Test:    OTHER:

## 2019-04-29 NOTE — PROGRESS NOTE ADULT - PROBLEM SELECTOR PLAN 1
s/p drain, s/p removal yesterday   pericardial effusion resolved. TTE reviewed   Xarelto  Dc planning

## 2019-04-29 NOTE — PROGRESS NOTE ADULT - SUBJECTIVE AND OBJECTIVE BOX
PEYMAN WATTS:[id number],   53yFemale followed for:  avocado (Unknown)  No Known Drug Allergies    PAST MEDICAL & SURGICAL HISTORY:  DM (diabetes mellitus)  CAD (coronary artery disease)  Sarcoidosis  Asthma with COPD  CHF, chronic  Mural thrombus of cardiac apex  History of atrial fibrillation  Thrombus: ? Thrombus in heart - on Xarelto  Hyperlipidemia  Anemia  Hypertension  Sarcoidosis  CHF (congestive heart failure): Stage III, on home O2 @ 2lpm  Diabetes  AICD (automatic cardioverter/defibrillator) present  History of repair of hip fracture  Ankle fracture  Ankle fracture: Left Ankle - with plate and screws placed  Hip deformity: left hip had a pin placed to hold bone in place after a fall    FAMILY HISTORY:  FH: breast cancer: mother  Family hx of colon cancer: Mother  Family history of hypertension (Sibling): Siblings  Family history of diabetes mellitus (Father, Sibling): Father, Siblings  Family history of breast cancer in mother (Mother): Mother  Family history of colon cancer in mother (Mother): Mother    MEDICATIONS  (STANDING):  atorvastatin 40 milliGRAM(s) Oral at bedtime  carvedilol 6.25 milliGRAM(s) Oral every 12 hours  dextrose 5%. 1000 milliLiter(s) (50 mL/Hr) IV Continuous <Continuous>  dextrose 50% Injectable 12.5 Gram(s) IV Push once  dextrose 50% Injectable 25 Gram(s) IV Push once  dextrose 50% Injectable 25 Gram(s) IV Push once  fluticasone propionate 50 MICROgram(s)/spray Nasal Spray 1 Spray(s) Both Nostrils two times a day  furosemide    Tablet 80 milliGRAM(s) Oral <User Schedule>  insulin glargine Injectable (LANTUS) 36 Unit(s) SubCutaneous at bedtime  insulin lispro (HumaLOG) corrective regimen sliding scale   SubCutaneous three times a day before meals  insulin lispro (HumaLOG) corrective regimen sliding scale   SubCutaneous at bedtime  insulin lispro Injectable (HumaLOG) 8 Unit(s) SubCutaneous before breakfast  insulin lispro Injectable (HumaLOG) 12 Unit(s) SubCutaneous before lunch  insulin lispro Injectable (HumaLOG) 12 Unit(s) SubCutaneous before dinner  levothyroxine 25 MICROGram(s) Oral daily  metolazone 5 milliGRAM(s) Oral <User Schedule>  mometasone 220 MICROgram(s) Inhaler 1 Puff(s) Inhalation two times a day  nystatin Cream 1 Application(s) Topical two times a day  predniSONE   Tablet 20 milliGRAM(s) Oral two times a day  rivaroxaban 15 milliGRAM(s) Oral every 24 hours    MEDICATIONS  (PRN):  ALBUTerol    90 MICROgram(s) HFA Inhaler 2 Puff(s) Inhalation every 6 hours PRN Shortness of Breath and/or Wheezing  dextrose 40% Gel 15 Gram(s) Oral once PRN Blood Glucose LESS THAN 70 milliGRAM(s)/deciliter  glucagon  Injectable 1 milliGRAM(s) IntraMuscular once PRN Glucose LESS THAN 70 milligrams/deciliter  hemorrhoidal Ointment 1 Application(s) Rectal two times a day PRN Hemorrhoids  lidocaine 2% Gel 1 Application(s) Topical two times a day PRN perianally for pain  simethicone 80 milliGRAM(s) Chew two times a day PRN Gas  vitamin A &amp; D Ointment 1 Application(s) Topical two times a day PRN rash      Vital Signs Last 24 Hrs  T(C): 36.4 (29 Apr 2019 04:48), Max: 36.4 (28 Apr 2019 13:15)  T(F): 97.6 (29 Apr 2019 04:48), Max: 97.6 (28 Apr 2019 21:18)  HR: 78 (29 Apr 2019 04:48) (78 - 96)  BP: 144/97 (29 Apr 2019 04:48) (130/87 - 149/98)  BP(mean): --  RR: 18 (29 Apr 2019 04:48) (17 - 18)  SpO2: 99% (29 Apr 2019 04:48) (96% - 99%)  nc/at  s1s2  cta  soft, nt, nd no guarding or rebound  no c/c/e    CBC Full  -  ( 27 Apr 2019 12:19 )  WBC Count : 8.08 K/uL  RBC Count : 6.41 M/uL  Hemoglobin : 13.4 g/dL  Hematocrit : 44.8 %  Platelet Count - Automated : 252 K/uL  Mean Cell Volume : 69.9 fl  Mean Cell Hemoglobin : 20.9 pg  Mean Cell Hemoglobin Concentration : 29.9 gm/dL  Auto Neutrophil # : x  Auto Lymphocyte # : x  Auto Monocyte # : x  Auto Eosinophil # : x  Auto Basophil # : x  Auto Neutrophil % : x  Auto Lymphocyte % : x  Auto Monocyte % : x  Auto Eosinophil % : x  Auto Basophil % : x    04-29    129<L>  |  91<L>  |  101<H>  ----------------------------<  463<HH>  5.0   |  26  |  1.67<H>    Ca    9.0      29 Apr 2019 06:02

## 2019-04-29 NOTE — PROVIDER CONTACT NOTE (OTHER) - RECOMMENDATIONS
on PO prednisone, sliding scale was increased yesterday
NP aware
will take care of it , no orders  yet

## 2019-04-29 NOTE — PROVIDER CONTACT NOTE (OTHER) - ASSESSMENT
on repeat. is on 20mg prednisone BID since 4/16. never spiked.
VSS
Pt is A/Ox4 & alert, pt is supposed to be NPO after midnight for a colonoscopy tomorrow, pt's BMs are not clear yet
Pt is asymptomatic, VSS
Pt. asymptomatic
pt asymptomatic

## 2019-04-29 NOTE — PROGRESS NOTE ADULT - PROBLEM SELECTOR PLAN 1
Increase Lantus to 36 units  increase Humalog to 8 preBreakfast-12 Pre-Lunch-12 Pre-dinner  Will continue monitoring FS, log, will Follow up.

## 2019-04-29 NOTE — PROGRESS NOTE ADULT - SUBJECTIVE AND OBJECTIVE BOX
Subjective: Patient seen and examined. No new events except as noted.   doing well     REVIEW OF SYSTEMS:    CONSTITUTIONAL: No weakness, fevers or chills  EYES/ENT: No visual changes;  No vertigo or throat pain   NECK: No pain or stiffness  RESPIRATORY: No cough, wheezing, hemoptysis; No shortness of breath  CARDIOVASCULAR: No chest pain or palpitations  GASTROINTESTINAL: + diarrhea   GENITOURINARY: No dysuria, frequency or hematuria  NEUROLOGICAL: No numbness or weakness  SKIN: No itching, burning, rashes, or lesions   All other review of systems is negative unless indicated above.    MEDICATIONS:  MEDICATIONS  (STANDING):  atorvastatin 40 milliGRAM(s) Oral at bedtime  carvedilol 6.25 milliGRAM(s) Oral every 12 hours  dextrose 5%. 1000 milliLiter(s) (50 mL/Hr) IV Continuous <Continuous>  dextrose 50% Injectable 12.5 Gram(s) IV Push once  dextrose 50% Injectable 25 Gram(s) IV Push once  dextrose 50% Injectable 25 Gram(s) IV Push once  fluticasone propionate 50 MICROgram(s)/spray Nasal Spray 1 Spray(s) Both Nostrils two times a day  furosemide    Tablet 80 milliGRAM(s) Oral <User Schedule>  insulin glargine Injectable (LANTUS) 36 Unit(s) SubCutaneous at bedtime  insulin lispro (HumaLOG) corrective regimen sliding scale   SubCutaneous three times a day before meals  insulin lispro (HumaLOG) corrective regimen sliding scale   SubCutaneous at bedtime  insulin lispro Injectable (HumaLOG) 8 Unit(s) SubCutaneous before breakfast  insulin lispro Injectable (HumaLOG) 12 Unit(s) SubCutaneous before lunch  insulin lispro Injectable (HumaLOG) 12 Unit(s) SubCutaneous before dinner  levothyroxine 25 MICROGram(s) Oral daily  metolazone 5 milliGRAM(s) Oral <User Schedule>  mometasone 220 MICROgram(s) Inhaler 1 Puff(s) Inhalation two times a day  nystatin Cream 1 Application(s) Topical two times a day  predniSONE   Tablet 20 milliGRAM(s) Oral two times a day  rivaroxaban 15 milliGRAM(s) Oral every 24 hours      PHYSICAL EXAM:  T(C): 36.4 (19 @ 12:38), Max: 36.4 (19 @ 21:18)  HR: 92 (04-29-19 @ 12:38) (78 - 96)  BP: 141/93 (19 @ 12:38) (138/90 - 149/98)  RR: 18 (19 @ 12:38) (18 - 18)  SpO2: 97% (19 @ 12:38) (96% - 99%)  Wt(kg): --  I&O's Summary    2019 07:  -  2019 07:00  --------------------------------------------------------  IN: 1500 mL / OUT: 0 mL / NET: 1500 mL    2019 07:  -  2019 18:15  --------------------------------------------------------  IN: 420 mL / OUT: 350 mL / NET: 70 mL          Appearance: Normal	  HEENT:   Normal oral mucosa, PERRL, EOMI	  Lymphatic: No lymphadenopathy , no edema  Cardiovascular: Normal S1 S2, No JVD, No murmurs , Peripheral pulses palpable 2+ bilaterally  Respiratory: Lungs clear to auscultation, normal effort 	  Gastrointestinal:  Soft, Non-tender, + BS	  Skin: No rashes, No ecchymoses, No cyanosis, warm to touch  Musculoskeletal: Normal range of motion, normal strength  Psychiatry:  Mood & affect appropriate  Ext: No edema      All labs, Imaging and EKGs personally reviewed                     129<L>  |  91<L>  |  101<H>  ----------------------------<  463<HH>  5.0   |  26  |  1.67<H>    Ca    9.0      2019 06:02                         Urinalysis Basic - ( 2019 21:56 )    Color: Colorless / Appearance: Clear / S.017 / pH: x  Gluc: x / Ketone: Negative  / Bili: Negative / Urobili: Negative   Blood: x / Protein: 100 / Nitrite: Negative   Leuk Esterase: Negative / RBC: 2 /hpf / WBC 1 /HPF   Sq Epi: x / Non Sq Epi: 0 /hpf / Bacteria: Negative

## 2019-04-29 NOTE — PROGRESS NOTE ADULT - ASSESSMENT
Assessment  DMT2: 53y Female with DM T2 with hyperglycemia, still on high dose steroids, on basal bolus insulin,  eating meals, blood sugars are high no hypoglycemic episode,.  CAD/Pericardial effusion: on medications, no chest pain, stable, monitored.  HTN: Controlled,  on antihypertensive medications.  CKD: Monitor labs/BMP,   Sarcoidosis: On treatment, stable.  Case discussed with NP  Henry Logan MD  Cell: [phone]

## 2019-04-30 LAB
ANION GAP SERPL CALC-SCNC: 15 MMOL/L — SIGNIFICANT CHANGE UP (ref 5–17)
BUN SERPL-MCNC: 117 MG/DL — HIGH (ref 7–23)
CALCIUM SERPL-MCNC: 8.7 MG/DL — SIGNIFICANT CHANGE UP (ref 8.4–10.5)
CHLORIDE SERPL-SCNC: 90 MMOL/L — LOW (ref 96–108)
CO2 SERPL-SCNC: 25 MMOL/L — SIGNIFICANT CHANGE UP (ref 22–31)
CREAT SERPL-MCNC: 1.85 MG/DL — HIGH (ref 0.5–1.3)
FAT STL QN: NORMAL — SIGNIFICANT CHANGE UP
FAT STL QN: NORMAL — SIGNIFICANT CHANGE UP
GLUCOSE SERPL-MCNC: 591 MG/DL — CRITICAL HIGH (ref 70–99)
POTASSIUM SERPL-MCNC: 4.9 MMOL/L — SIGNIFICANT CHANGE UP (ref 3.5–5.3)
POTASSIUM SERPL-SCNC: 4.9 MMOL/L — SIGNIFICANT CHANGE UP (ref 3.5–5.3)
SODIUM SERPL-SCNC: 130 MMOL/L — LOW (ref 135–145)

## 2019-04-30 RX ADMIN — Medication 1 SPRAY(S): at 18:11

## 2019-04-30 RX ADMIN — Medication 12: at 07:54

## 2019-04-30 RX ADMIN — Medication 12 UNIT(S): at 18:11

## 2019-04-30 RX ADMIN — SIMETHICONE 80 MILLIGRAM(S): 80 TABLET, CHEWABLE ORAL at 06:26

## 2019-04-30 RX ADMIN — Medication 8 UNIT(S): at 07:53

## 2019-04-30 RX ADMIN — NYSTATIN CREAM 1 APPLICATION(S): 100000 CREAM TOPICAL at 18:12

## 2019-04-30 RX ADMIN — Medication 1 SPRAY(S): at 06:23

## 2019-04-30 RX ADMIN — NYSTATIN CREAM 1 APPLICATION(S): 100000 CREAM TOPICAL at 06:23

## 2019-04-30 RX ADMIN — INSULIN GLARGINE 36 UNIT(S): 100 INJECTION, SOLUTION SUBCUTANEOUS at 21:50

## 2019-04-30 RX ADMIN — RIVAROXABAN 15 MILLIGRAM(S): KIT at 18:10

## 2019-04-30 RX ADMIN — Medication 80 MILLIGRAM(S): at 18:10

## 2019-04-30 RX ADMIN — Medication 12 UNIT(S): at 12:29

## 2019-04-30 RX ADMIN — Medication 20 MILLIGRAM(S): at 21:05

## 2019-04-30 RX ADMIN — CARVEDILOL PHOSPHATE 6.25 MILLIGRAM(S): 80 CAPSULE, EXTENDED RELEASE ORAL at 18:10

## 2019-04-30 RX ADMIN — CARVEDILOL PHOSPHATE 6.25 MILLIGRAM(S): 80 CAPSULE, EXTENDED RELEASE ORAL at 06:22

## 2019-04-30 RX ADMIN — Medication 4: at 12:29

## 2019-04-30 RX ADMIN — MOMETASONE FUROATE 1 PUFF(S): 220 INHALANT RESPIRATORY (INHALATION) at 06:23

## 2019-04-30 RX ADMIN — Medication 20 MILLIGRAM(S): at 10:40

## 2019-04-30 RX ADMIN — Medication 25 MICROGRAM(S): at 06:22

## 2019-04-30 RX ADMIN — ATORVASTATIN CALCIUM 40 MILLIGRAM(S): 80 TABLET, FILM COATED ORAL at 21:05

## 2019-04-30 RX ADMIN — MOMETASONE FUROATE 1 PUFF(S): 220 INHALANT RESPIRATORY (INHALATION) at 18:11

## 2019-04-30 NOTE — PROGRESS NOTE ADULT - SUBJECTIVE AND OBJECTIVE BOX
Subjective: Patient seen and examined. No new events except as noted.   FS have remained high   DM med adjusted yesterday   has not been compliant with diet   no cp or sob     REVIEW OF SYSTEMS:    CONSTITUTIONAL: + weakness, fevers or chills  EYES/ENT: No visual changes;  No vertigo or throat pain   NECK: No pain or stiffness  RESPIRATORY: No cough, wheezing, hemoptysis; No shortness of breath  CARDIOVASCULAR: No chest pain or palpitations  GASTROINTESTINAL: No abdominal or epigastric pain. No nausea, vomiting, or hematemesis; No diarrhea or constipation. No melena or hematochezia.  GENITOURINARY: No dysuria, frequency or hematuria  NEUROLOGICAL: No numbness or weakness  SKIN: No itching, burning, rashes, or lesions   All other review of systems is negative unless indicated above.    MEDICATIONS:  MEDICATIONS  (STANDING):  atorvastatin 40 milliGRAM(s) Oral at bedtime  carvedilol 6.25 milliGRAM(s) Oral every 12 hours  dextrose 5%. 1000 milliLiter(s) (50 mL/Hr) IV Continuous <Continuous>  dextrose 50% Injectable 12.5 Gram(s) IV Push once  dextrose 50% Injectable 25 Gram(s) IV Push once  dextrose 50% Injectable 25 Gram(s) IV Push once  fluticasone propionate 50 MICROgram(s)/spray Nasal Spray 1 Spray(s) Both Nostrils two times a day  furosemide    Tablet 80 milliGRAM(s) Oral <User Schedule>  insulin glargine Injectable (LANTUS) 36 Unit(s) SubCutaneous at bedtime  insulin lispro (HumaLOG) corrective regimen sliding scale   SubCutaneous three times a day before meals  insulin lispro (HumaLOG) corrective regimen sliding scale   SubCutaneous at bedtime  insulin lispro Injectable (HumaLOG) 8 Unit(s) SubCutaneous before breakfast  insulin lispro Injectable (HumaLOG) 12 Unit(s) SubCutaneous before lunch  insulin lispro Injectable (HumaLOG) 12 Unit(s) SubCutaneous before dinner  levothyroxine 25 MICROGram(s) Oral daily  metolazone 5 milliGRAM(s) Oral <User Schedule>  mometasone 220 MICROgram(s) Inhaler 1 Puff(s) Inhalation two times a day  nystatin Cream 1 Application(s) Topical two times a day  predniSONE   Tablet 20 milliGRAM(s) Oral two times a day  rivaroxaban 15 milliGRAM(s) Oral every 24 hours      PHYSICAL EXAM:  T(C): 36.4 (04-30-19 @ 06:05), Max: 36.8 (04-29-19 @ 20:29)  HR: 94 (04-30-19 @ 06:05) (92 - 98)  BP: 128/88 (04-30-19 @ 06:05) (126/82 - 141/93)  RR: 18 (04-30-19 @ 06:05) (18 - 18)  SpO2: 99% (04-30-19 @ 06:05) (96% - 99%)  Wt(kg): --  I&O's Summary    29 Apr 2019 07:01  -  30 Apr 2019 07:00  --------------------------------------------------------  IN: 780 mL / OUT: 800 mL / NET: -20 mL          Appearance: Normal	  HEENT:   Normal oral mucosa, PERRL, EOMI	  Lymphatic: No lymphadenopathy , no edema  Cardiovascular: Normal S1 S2, No JVD, No murmurs , Peripheral pulses palpable 2+ bilaterally  Respiratory: Lungs clear to auscultation, normal effort 	  Gastrointestinal:  Soft, Non-tender, + BS	  Skin: No rashes, No ecchymoses, No cyanosis, warm to touch  Musculoskeletal: Normal range of motion, normal strength  Psychiatry:  Mood & affect appropriate  Ext: No edema      LABS:    CARDIAC MARKERS:            04-30    130<L>  |  90<L>  |  117<H>  ----------------------------<  591<HH>  4.9   |  25  |  1.85<H>    Ca    8.7      30 Apr 2019 06:38      proBNP:   Lipid Profile:   HgA1c:   TSH:     0          TELEMETRY: SR	    ECG:  	  RADIOLOGY:   DIAGNOSTIC TESTING:  [ ] Echocardiogram:  [ ]  Catheterization:  [ ] Stress Test:    OTHER:

## 2019-04-30 NOTE — PROVIDER CONTACT NOTE (CRITICAL VALUE NOTIFICATION) - SITUATION
Visit Summary for Ezra Schwab - Gender: Male - Date of Birth: [id number]  Date: [id number] - Duration: 5 minutes  Patient: Ezra Schwab  Provider: David Peters PA-C    Patient Contact Information  Address  [address] [address]  [phone]    Visit Topics  I need a prescription for an eye infection: other [Added By: Self - 2019-02-07]    Conversation Transcripts     [Notification] Lizbeth Zelaya, [facility], will help you prepare for your visit  She is assisting David Peters PA-C, Physician Assistant  [Lizbeth Zelaya] Fortino Webb Repress will be with you in just a couple moments  [Notification] Allie Beckhamjack has left the room  [Notification] You are connected with David Peters PA-C, Physician Assistant  [Notification] Ezra Schwab is located in [place]  [Notification] Ezra Schwab has shared health history         Diagnosis    Procedures  Value: [id number] Code: CPT-4 UNLISTED E&M SERVICE    Medications Prescribed    No prescriptions ordered    Electronically signed by: Dora Alba(NPI [id number])
Blood glucose 463
Pt serum glucose 591 as per laboratory
glucose 497
Glucose 510

## 2019-04-30 NOTE — PROGRESS NOTE ADULT - ASSESSMENT
still c/o diarrhea if she doesnt get enough food or doesnt eat on time  awaiting path results  would also suggest CT scan abd/pelivs with contrast if ok with renal function to r/o pancreatic abnormalities given poorly controlled glucose

## 2019-04-30 NOTE — PROGRESS NOTE ADULT - ASSESSMENT
Pt is a 52 yo female with ICM s/p ICD, sarcoid admitted with moderate to lare sized circumferential pericardial effusion. Also has been having persistent diarrhea.     Problem/Recommendation:  Problem: Pericardial effusion. Recommendation: seen on Echo at outpatient office  card eval appreciated  IR pericardiac effusion drainage, S/P pericardiac drain, + drainage, drain removed by IR   patient is hemodynamically stable   CT chest noted.  BP monitoring     Problem/Recommendation:  YANA,   monitor BUN/Cr  improving   avoid nephrotoxic medications   Renal eval appreciated   diuresis as per nephrology   I and Os   would  hold of further CT with IV contrast     Problem/Recommendation :  ·  Problem: Diarrhea.  Recommendation: chronic for months   GI eval appreciated  send stool studies   C diff negative   in view of Hx of sarcoidosis, GI autoimmune diseases need to be ruled out   diet control  monitor electrolytes.   S/P colonoscopy EGD< no acute findings, awaiting for biopsy results     Problem/Recommendation -:  ·  Problem: Sarcoidosis.  Recommendation: on prednisone.   monitor BUN/Cr  avoid nephrotoxic medications     Problem/Recommendation :  ·  Problem: History of atrial fibrillation.  Recommendation: rate control  Cont AC   card F/U.     Problem/Recommendation :  ·  Problem: CAD (coronary artery disease).  Recommendation: S/P PCI  Statin and AC.     Problem/Recommendation :  Problem: Diabetes mellitus. Recommendation: sliding scale   NOT CONTROLLED, diet issue   Nutritionist eval   endo eval appreciated    lantus and premeal humalog for better BS control   elevate A1c   DM diet  Endo eval for BS management.    Problem/Recommendation:  Problem: Prophylactic measure. Recommendation: DVT and Gi PPX.

## 2019-04-30 NOTE — PROGRESS NOTE ADULT - SUBJECTIVE AND OBJECTIVE BOX
NEPHROLOGY    Patient seen and examined.  NAD, + diarrhea    MEDICATIONS  (STANDING):  atorvastatin 40 milliGRAM(s) Oral at bedtime  carvedilol 6.25 milliGRAM(s) Oral every 12 hours  fluticasone propionate 50 MICROgram(s)/spray Nasal Spray 1 Spray(s) Both Nostrils two times a day  furosemide    Tablet 80 milliGRAM(s) Oral <User Schedule>  levothyroxine 25 MICROGram(s) Oral daily  metolazone 5 milliGRAM(s) Oral <User Schedule>  mometasone 220 MICROgram(s) Inhaler 1 Puff(s) Inhalation two times a day  nystatin Cream 1 Application(s) Topical two times a day  predniSONE   Tablet 20 milliGRAM(s) Oral two times a day  rivaroxaban 15 milliGRAM(s) Oral every 24 hours    VITALS:  T(C): , Max: 36.8 (04-29-19 @ 20:29)  T(F): , Max: 98.3 (04-29-19 @ 20:29)  HR: 89 (04-30-19 @ 12:44)  BP: 149/95 (04-30-19 @ 12:44)  RR: 18 (04-30-19 @ 12:44)  SpO2: 99% (04-30-19 @ 12:44)    04-29 @ 07:01  -  04-30 @ 07:00  --------------------------------------------------------  IN: 780 mL / OUT: 800 mL / NET: -20 mL    04-30 @ 07:01  -  04-30 @ 13:35  --------------------------------------------------------  IN: 600 mL / OUT: 0 mL / NET: 600 mL    REVIEW OF SYSTEMS:  Full ROS done and were negative unless otherwise indicated in HPI/assessment.     PHYSICAL EXAM:  Constitutional: NAD  Respiratory: diminished B/L  Cardiovascular: S1 and S2, RRR  Gastrointestinal: + BS, soft, NT, ND  Extremities: no peripheral edema  Neurological: AAO x 3  : no barfield    LABS:    04-30    130<L>  |  90<L>  |  117<H>  ----------------------------<  591<HH>  4.9   |  25  |  1.85<H>    Ca    8.7      30 Apr 2019 06:38    Urine Studies:  Creatinine, Random Urine: 29 mg/dL (04-29 @ 02:18)  Protein/Creatinine Ratio Calculation: 2.3 Ratio (04-29 @ 02:18)    ASSESSMENT / RECOMMEND  52F w/ HTN , DM2, CAD, HFrEF, CKD3, and Sarcoidosis, 4/16/19 a/w large pericardial effusion; s/p drainage; admission c/b YANA   (1)Renal - proteinuric CKD3; likely diabetic nephropathy. BUN remains high, due to steroids. Azotemia rising  (2)Hyperkalemia - in part from YANA; also in part from cellular shifts induced by hyperglycemia - stable  (3)Hyponatremia - dilutional from hyperglycemia - stable  (4)CV - volume status acceptable    RECOMMEND:  - continue furosemide 80 po daily  - continue metolazone 5 qod  - f/u endocrine evaluation  - defer CT with IV contrast for now  - avoid NSAIDs/nephrotoxins  - dose meds for a GFR ~ 25    Laurel Valles, MARGO  [place]  [phone] NEPHROLOGY    Patient seen and examined.  NAD, + diarrhea    MEDICATIONS  (STANDING):  atorvastatin 40 milliGRAM(s) Oral at bedtime  carvedilol 6.25 milliGRAM(s) Oral every 12 hours  fluticasone propionate 50 MICROgram(s)/spray Nasal Spray 1 Spray(s) Both Nostrils two times a day  furosemide    Tablet 80 milliGRAM(s) Oral <User Schedule>  levothyroxine 25 MICROGram(s) Oral daily  metolazone 5 milliGRAM(s) Oral <User Schedule>  mometasone 220 MICROgram(s) Inhaler 1 Puff(s) Inhalation two times a day  nystatin Cream 1 Application(s) Topical two times a day  predniSONE   Tablet 20 milliGRAM(s) Oral two times a day  rivaroxaban 15 milliGRAM(s) Oral every 24 hours    VITALS:  T(C): , Max: 36.8 (04-29-19 @ 20:29)  T(F): , Max: 98.3 (04-29-19 @ 20:29)  HR: 89 (04-30-19 @ 12:44)  BP: 149/95 (04-30-19 @ 12:44)  RR: 18 (04-30-19 @ 12:44)  SpO2: 99% (04-30-19 @ 12:44)    04-29 @ 07:01  -  04-30 @ 07:00  --------------------------------------------------------  IN: 780 mL / OUT: 800 mL / NET: -20 mL    04-30 @ 07:01  -  04-30 @ 13:35  --------------------------------------------------------  IN: 600 mL / OUT: 0 mL / NET: 600 mL    REVIEW OF SYSTEMS:  Full ROS done and were negative unless otherwise indicated in HPI/assessment.     PHYSICAL EXAM:  Constitutional: NAD  Respiratory: diminished B/L  Cardiovascular: S1 and S2, RRR  Gastrointestinal: + BS, soft, NT, ND  Extremities: no peripheral edema  Neurological: AAO x 3  : no barfield    LABS:    04-30    130<L>  |  90<L>  |  117<H>  ----------------------------<  591<HH>  4.9   |  25  |  1.85<H>    Ca    8.7      30 Apr 2019 06:38    Urine Studies:  Creatinine, Random Urine: 29 mg/dL (04-29 @ 02:18)  Protein/Creatinine Ratio Calculation: 2.3 Ratio (04-29 @ 02:18)    ASSESSMENT / RECOMMEND  52F w/ HTN , DM2, CAD, HFrEF, CKD3, and Sarcoidosis, 4/16/19 a/w large pericardial effusion; s/p drainage; admission c/b YANA   (1)Renal - proteinuric CKD3; likely diabetic nephropathy. BUN remains high, due to steroids. Azotemia rising - possibly pre-renal  (2)Hyperkalemia - in part from YANA; also in part from cellular shifts induced by hyperglycemia - stable  (3)Hyponatremia - dilutional from hyperglycemia - stable  (4)CV - volume status acceptable    RECOMMEND:  - continue furosemide 80 po daily  - continue metolazone 5 qod  - f/u endocrine evaluation  - defer CT with IV contrast for now  - no IVF for now   - daily BMP  - avoid NSAIDs/nephrotoxins  - dose meds for a GFR ~ 25    Laurel Valles, MARGO  [employer]  [phone]

## 2019-04-30 NOTE — PROGRESS NOTE ADULT - SUBJECTIVE AND OBJECTIVE BOX
INTERVAL HPI/OVERNIGHT EVENTS:    MEDICATIONS  (STANDING):  atorvastatin 40 milliGRAM(s) Oral at bedtime  carvedilol 6.25 milliGRAM(s) Oral every 12 hours  dextrose 5%. 1000 milliLiter(s) (50 mL/Hr) IV Continuous <Continuous>  dextrose 50% Injectable 12.5 Gram(s) IV Push once  dextrose 50% Injectable 25 Gram(s) IV Push once  dextrose 50% Injectable 25 Gram(s) IV Push once  fluticasone propionate 50 MICROgram(s)/spray Nasal Spray 1 Spray(s) Both Nostrils two times a day  furosemide    Tablet 80 milliGRAM(s) Oral <User Schedule>  insulin glargine Injectable (LANTUS) 36 Unit(s) SubCutaneous at bedtime  insulin lispro (HumaLOG) corrective regimen sliding scale   SubCutaneous three times a day before meals  insulin lispro (HumaLOG) corrective regimen sliding scale   SubCutaneous at bedtime  insulin lispro Injectable (HumaLOG) 8 Unit(s) SubCutaneous before breakfast  insulin lispro Injectable (HumaLOG) 12 Unit(s) SubCutaneous before lunch  insulin lispro Injectable (HumaLOG) 12 Unit(s) SubCutaneous before dinner  levothyroxine 25 MICROGram(s) Oral daily  metolazone 5 milliGRAM(s) Oral <User Schedule>  mometasone 220 MICROgram(s) Inhaler 1 Puff(s) Inhalation two times a day  nystatin Cream 1 Application(s) Topical two times a day  predniSONE   Tablet 20 milliGRAM(s) Oral two times a day  rivaroxaban 15 milliGRAM(s) Oral every 24 hours    MEDICATIONS  (PRN):  ALBUTerol    90 MICROgram(s) HFA Inhaler 2 Puff(s) Inhalation every 6 hours PRN Shortness of Breath and/or Wheezing  dextrose 40% Gel 15 Gram(s) Oral once PRN Blood Glucose LESS THAN 70 milliGRAM(s)/deciliter  glucagon  Injectable 1 milliGRAM(s) IntraMuscular once PRN Glucose LESS THAN 70 milligrams/deciliter  hemorrhoidal Ointment 1 Application(s) Rectal two times a day PRN Hemorrhoids  lidocaine 2% Gel 1 Application(s) Topical two times a day PRN perianally for pain  simethicone 80 milliGRAM(s) Chew two times a day PRN Gas  vitamin A &amp; D Ointment 1 Application(s) Topical two times a day PRN rash      Allergies    avocado (Unknown)  No Known Drug Allergies    Intolerances            PHYSICAL EXAM:   Vital Signs:  Vital Signs Last 24 Hrs  T(C): 36.4 (30 Apr 2019 06:05), Max: 36.8 (29 Apr 2019 20:29)  T(F): 97.6 (30 Apr 2019 06:05), Max: 98.3 (29 Apr 2019 20:29)  HR: 94 (30 Apr 2019 06:05) (92 - 98)  BP: 128/88 (30 Apr 2019 06:05) (126/82 - 141/93)  BP(mean): --  RR: 18 (30 Apr 2019 06:05) (18 - 18)  SpO2: 99% (30 Apr 2019 06:05) (96% - 99%)  Daily     Daily     GENERAL:  no distress  HEENT:  NC/AT,  anicteric  CHEST:   no increased effort, breath sounds clear  HEART:  Regular rhythm  ABDOMEN:  Soft, non-tender, non-distended, normoactive bowel sounds,  no masses ,no hepato-splenomegaly, no signs of chronic liver disease  EXTEREMITIES:  no cyanosis      LABS:    04-30    130<L>  |  90<L>  |  117<H>  ----------------------------<  591<HH>  4.9   |  25  |  1.85<H>    Ca    8.7      30 Apr 2019 06:38            RADIOLOGY & ADDITIONAL TESTS:

## 2019-04-30 NOTE — CHART NOTE - NSCHARTNOTEFT_GEN_A_CORE
Nutrition Follow Up Note  Patient seen for nutrition follow up.     Source: comprehensive chart review, RN, pt     Chart reviewed, events noted. Pt is a 52 year old female sent to ER yesterday for moderate to large sized pericardial effusion seen on TTE at her cardiologist office. She has PMHx of HTN, DM, HLD, sarcoidosis on steroids, HFrEF, CAD s/p mLAD s/p stent placement 2017, cardiac thrombus on Xarelto, asthma/COPD on home O2, CHF s/p AICD. S/p drainage of pericardial effusion on . IR removed pericardial drainage tube on . Pt noted with persistent diarrhea. CDiff negative. On , s/p EGD/colonoscopy; awaiting biopsy results. Nephrology following as pt with CKD3, superimposed YANA with BUN/Cr trending up; pt being diuresed. Pt has been noted with events of hyperglycemia and, per chart, has not been compliant with diet. Fasting blood glucose this morning >600 mg/dL, repeat showed 526 mg/dL.     Diet: Consistent Carbohydrate with evening snack, No Concentrated Potassium    Patient reports:     PO intake : %     Source for PO intake: patient, RN     Daily Weight in k.9 (), Weight in k.1 (), Weight in k.5 (), Weight in k.5 (-), Weight in k.5 (), Weight in k.1 (-), Weight in k.3 ()    Daily weights in pounds: (all standing weights)   - 118.8 pounds   - 117 pounds   - 120.1 pounds   - 117.9 pounds   - 117.9 pounds    - 119.2 pounds   - 121.9 pounds   Weight changes likely related to fluid shifts as pt on Lasix       Pertinent Medications: MEDICATIONS  (STANDING):  atorvastatin 40 milliGRAM(s) Oral at bedtime  carvedilol 6.25 milliGRAM(s) Oral every 12 hours  dextrose 5%. 1000 milliLiter(s) (50 mL/Hr) IV Continuous <Continuous>  dextrose 50% Injectable 12.5 Gram(s) IV Push once  dextrose 50% Injectable 25 Gram(s) IV Push once  dextrose 50% Injectable 25 Gram(s) IV Push once  fluticasone propionate 50 MICROgram(s)/spray Nasal Spray 1 Spray(s) Both Nostrils two times a day  furosemide    Tablet 80 milliGRAM(s) Oral <User Schedule>  insulin glargine Injectable (LANTUS) 36 Unit(s) SubCutaneous at bedtime  insulin lispro (HumaLOG) corrective regimen sliding scale   SubCutaneous three times a day before meals  insulin lispro (HumaLOG) corrective regimen sliding scale   SubCutaneous at bedtime  insulin lispro Injectable (HumaLOG) 8 Unit(s) SubCutaneous before breakfast  insulin lispro Injectable (HumaLOG) 12 Unit(s) SubCutaneous before lunch  insulin lispro Injectable (HumaLOG) 12 Unit(s) SubCutaneous before dinner  levothyroxine 25 MICROGram(s) Oral daily  metolazone 5 milliGRAM(s) Oral <User Schedule>  mometasone 220 MICROgram(s) Inhaler 1 Puff(s) Inhalation two times a day  nystatin Cream 1 Application(s) Topical two times a day  predniSONE   Tablet 20 milliGRAM(s) Oral two times a day  rivaroxaban 15 milliGRAM(s) Oral every 24 hours    MEDICATIONS  (PRN):  ALBUTerol    90 MICROgram(s) HFA Inhaler 2 Puff(s) Inhalation every 6 hours PRN Shortness of Breath and/or Wheezing  dextrose 40% Gel 15 Gram(s) Oral once PRN Blood Glucose LESS THAN 70 milliGRAM(s)/deciliter  glucagon  Injectable 1 milliGRAM(s) IntraMuscular once PRN Glucose LESS THAN 70 milligrams/deciliter  hemorrhoidal Ointment 1 Application(s) Rectal two times a day PRN Hemorrhoids  lidocaine 2% Gel 1 Application(s) Topical two times a day PRN perianally for pain  simethicone 80 milliGRAM(s) Chew two times a day PRN Gas  vitamin A &amp; D Ointment 1 Application(s) Topical two times a day PRN rash    Pertinent Labs:  @ 06:38: Na 130<L>, <H>, Cr 1.85<H>, <HH>, K+ 4.9, Phos --, Mg --, Alk Phos --, ALT/SGPT --, AST/SGOT --, HbA1c --    Finger Sticks:  POCT Blood Glucose.: 526 mg/dL ( @ 07:41)  POCT Blood Glucose.: >600 mg/dL ( @ 07:40)  POCT Blood Glucose.: 272 mg/dL ( @ 22:09)  POCT Blood Glucose.: 188 mg/dL ( @ 17:16)  POCT Blood Glucose.: 239 mg/dL ( @ 12:02)    Skin per nursing documentation: no pressure injuries noted, surgical incision  Edema per nursing documentation: none noted    Estimated Needs:   [x ] no change since previous assessment       Estimated energy needs: 2349-4394 kcal (30-35 kcal/kg using 4/21 wt 122.1 pounds)       Estimated protein needs: 66.48-77.46 grams (1.2-1.4 g/kg using 4/21 wt 122.1 pounds)       Fluid needs deferred as pt with CHF  [ ] recalculated:     Previous Nutrition Diagnoses: Increased nutrient needs, Food- and nutrition- related knowledge deficit  Nutrition Diagnosis is: ongoing, addressed with PO intake and reinforcement of nutrition education    New Nutrition Diagnosis: n/a     Interventions:     Recommend  1) Add DASH/TLC to diet order as pt with CHF - Recommend Change diet to Consistent Carbohydrate with evening snack, DASH/TLC, No Concentrated Potassium  2) Obtain/honor food preferences as able within diet restrictions   3) Reinforce diet education as needed     Monitoring and Evaluation:   -Continue to monitor nutritional intake, tolerance to diet prescription, bowel movements, weights, labs, skin integrity  -RD remains available upon request and will follow up per protocol    Miya Palm, Dietetic Intern  Pager # [phone] Nutrition Follow Up Note  Patient seen for nutrition follow up.     Source: comprehensive chart review, RN, pt     Chart reviewed, events noted. Pt is a 52 year old female sent to ER yesterday for moderate to large sized pericardial effusion seen on TTE at her cardiologist office. She has PMHx of HTN, DM, HLD, sarcoidosis on steroids, HFrEF, CAD s/p mLAD s/p stent placement 2017, cardiac thrombus on Xarelto, asthma/COPD on home O2, CHF s/p AICD. S/p drainage of pericardial effusion on . IR removed pericardial drainage tube on . Pt noted with persistent diarrhea. CDiff negative. On , s/p EGD/colonoscopy; awaiting biopsy results. Nephrology following as pt with CKD3, superimposed YANA with BUN/Cr trending up; pt being diuresed. Pt has been noted with events of hyperglycemia and, per chart, has not been compliant with diet. Fasting blood glucose this morning >600 mg/dL, repeat showed 526 mg/dL.     Diet: Consistent Carbohydrate with evening snack, No Concentrated Potassium    Patient reports:     PO intake : %     Source for PO intake: patient, RN     Daily Weight in k.9 (), Weight in k.1 (), Weight in k.5 (), Weight in k.5 (-), Weight in k.5 (), Weight in k.1 (-), Weight in k.3 ()    Daily weights in pounds: (all standing weights)   - 118.8 pounds   - 117 pounds   - 120.1 pounds   - 117.9 pounds   - 117.9 pounds    - 119.2 pounds   - 121.9 pounds   Weight changes likely related to fluid shifts as pt on Lasix       Pertinent Medications: MEDICATIONS  (STANDING):  atorvastatin 40 milliGRAM(s) Oral at bedtime  carvedilol 6.25 milliGRAM(s) Oral every 12 hours  dextrose 5%. 1000 milliLiter(s) (50 mL/Hr) IV Continuous <Continuous>  dextrose 50% Injectable 12.5 Gram(s) IV Push once  dextrose 50% Injectable 25 Gram(s) IV Push once  dextrose 50% Injectable 25 Gram(s) IV Push once  fluticasone propionate 50 MICROgram(s)/spray Nasal Spray 1 Spray(s) Both Nostrils two times a day  furosemide    Tablet 80 milliGRAM(s) Oral <User Schedule>  insulin glargine Injectable (LANTUS) 36 Unit(s) SubCutaneous at bedtime  insulin lispro (HumaLOG) corrective regimen sliding scale   SubCutaneous three times a day before meals  insulin lispro (HumaLOG) corrective regimen sliding scale   SubCutaneous at bedtime  insulin lispro Injectable (HumaLOG) 8 Unit(s) SubCutaneous before breakfast  insulin lispro Injectable (HumaLOG) 12 Unit(s) SubCutaneous before lunch  insulin lispro Injectable (HumaLOG) 12 Unit(s) SubCutaneous before dinner  levothyroxine 25 MICROGram(s) Oral daily  metolazone 5 milliGRAM(s) Oral <User Schedule>  mometasone 220 MICROgram(s) Inhaler 1 Puff(s) Inhalation two times a day  nystatin Cream 1 Application(s) Topical two times a day  predniSONE   Tablet 20 milliGRAM(s) Oral two times a day  rivaroxaban 15 milliGRAM(s) Oral every 24 hours    MEDICATIONS  (PRN):  ALBUTerol    90 MICROgram(s) HFA Inhaler 2 Puff(s) Inhalation every 6 hours PRN Shortness of Breath and/or Wheezing  dextrose 40% Gel 15 Gram(s) Oral once PRN Blood Glucose LESS THAN 70 milliGRAM(s)/deciliter  glucagon  Injectable 1 milliGRAM(s) IntraMuscular once PRN Glucose LESS THAN 70 milligrams/deciliter  hemorrhoidal Ointment 1 Application(s) Rectal two times a day PRN Hemorrhoids  lidocaine 2% Gel 1 Application(s) Topical two times a day PRN perianally for pain  simethicone 80 milliGRAM(s) Chew two times a day PRN Gas  vitamin A &amp; D Ointment 1 Application(s) Topical two times a day PRN rash    Pertinent Labs:  @ 06:38: Na 130<L>, <H>, Cr 1.85<H>, <HH>, K+ 4.9, Phos --, Mg --, Alk Phos --, ALT/SGPT --, AST/SGOT --, HbA1c --    Finger Sticks:  POCT Blood Glucose.: 526 mg/dL ( @ 07:41)  POCT Blood Glucose.: >600 mg/dL ( @ 07:40)  POCT Blood Glucose.: 272 mg/dL ( @ 22:09)  POCT Blood Glucose.: 188 mg/dL ( @ 17:16)  POCT Blood Glucose.: 239 mg/dL ( @ 12:02)    Skin per nursing documentation: no pressure injuries noted, surgical incision  Edema per nursing documentation: none noted    Estimated Needs:   [x ] no change since previous assessment       Estimated energy needs: 6294-6234 kcal (30-35 kcal/kg using 4/21 wt 122.1 pounds)       Estimated protein needs: 66.48-77.46 grams (1.2-1.4 g/kg using 4/21 wt 122.1 pounds)       Fluid needs deferred as pt with CHF  [ ] recalculated:     Previous Nutrition Diagnoses: Increased nutrient needs, Food- and nutrition- related knowledge deficit  Nutrition Diagnosis is: ongoing, addressed with PO intake and reinforcement of nutrition education    New Nutrition Diagnosis: n/a     Interventions:     Recommend  1) Continue current diet order of Consistent Carbohydrate with evening snack, No Concentrated Potassium. Monitor need for potassium restriction.   2) Obtain/honor food preferences as able within diet restrictions   3) Reinforce diet education as needed     Monitoring and Evaluation:   -Continue to monitor nutritional intake, tolerance to diet prescription, bowel movements, weights, labs, skin integrity  -RD remains available upon request and will follow up per protocol    Miya Palm, Dietetic Intern  Pager # [phone] Nutrition Follow Up Note  Patient seen for nutrition follow up.     Source: comprehensive chart review, RN, pt     Chart reviewed, events noted. Pt is a 52 year old female sent to ER yesterday for moderate to large sized pericardial effusion seen on TTE at her cardiologist office. She has PMHx of HTN, DM, HLD, sarcoidosis on steroids, HFrEF, CAD s/p mLAD s/p stent placement 2017, cardiac thrombus on Xarelto, asthma/COPD on home O2, CHF s/p AICD. S/p drainage of pericardial effusion on . IR removed pericardial drainage tube on . Pt noted with persistent diarrhea. CDiff negative. On , s/p EGD/colonoscopy; awaiting biopsy results. Nephrology following as pt with CKD3, superimposed YANA with BUN/Cr trending up; pt being diuresed. Pt has been noted with events of hyperglycemia and, per chart, has not been compliant with diet. Fasting blood glucose this morning >600 mg/dL, repeat showed 526 mg/dL.     Diet: Consistent Carbohydrate with evening snack, No Concentrated Potassium    Patient reports good appetite and intake. Last BM today; pt expressing frustration and pain with persistent diarrhea. Regarding this morning's hyperglycemia, pt's reported nighttime intake as follows: 2 slices rye toast with butter + 2 cups of coffee with 3 creams, 2 Equal sweeteners each (12:30 am), 3 bites lamb chop, carrots, and macaroni and cheese (3 am). Pt was able to teach back two points from previous nutrition education: sources of carbohydrates and importance of low sodium intake. Discussed and reinforced nutrition education at length with pt.      PO intake : %     Source for PO intake: patient, RN     Daily Weight in k.9 (), Weight in k.1 (-), Weight in k.5 (), Weight in k.5 (-), Weight in k.5 (-), Weight in k.1 (-), Weight in k.3 ()    Daily weights in pounds: (all standing weights)   - 118.8 pounds   - 117 pounds   - 120.1 pounds   - 117.9 pounds   - 117.9 pounds    - 119.2 pounds   - 121.9 pounds   Weight changes likely related to fluid shifts as pt on Lasix       Pertinent Medications: MEDICATIONS  (STANDING):  atorvastatin 40 milliGRAM(s) Oral at bedtime  carvedilol 6.25 milliGRAM(s) Oral every 12 hours  dextrose 5%. 1000 milliLiter(s) (50 mL/Hr) IV Continuous <Continuous>  dextrose 50% Injectable 12.5 Gram(s) IV Push once  dextrose 50% Injectable 25 Gram(s) IV Push once  dextrose 50% Injectable 25 Gram(s) IV Push once  fluticasone propionate 50 MICROgram(s)/spray Nasal Spray 1 Spray(s) Both Nostrils two times a day  furosemide    Tablet 80 milliGRAM(s) Oral <User Schedule>  insulin glargine Injectable (LANTUS) 36 Unit(s) SubCutaneous at bedtime  insulin lispro (HumaLOG) corrective regimen sliding scale   SubCutaneous three times a day before meals  insulin lispro (HumaLOG) corrective regimen sliding scale   SubCutaneous at bedtime  insulin lispro Injectable (HumaLOG) 8 Unit(s) SubCutaneous before breakfast  insulin lispro Injectable (HumaLOG) 12 Unit(s) SubCutaneous before lunch  insulin lispro Injectable (HumaLOG) 12 Unit(s) SubCutaneous before dinner  levothyroxine 25 MICROGram(s) Oral daily  metolazone 5 milliGRAM(s) Oral <User Schedule>  mometasone 220 MICROgram(s) Inhaler 1 Puff(s) Inhalation two times a day  nystatin Cream 1 Application(s) Topical two times a day  predniSONE   Tablet 20 milliGRAM(s) Oral two times a day  rivaroxaban 15 milliGRAM(s) Oral every 24 hours    MEDICATIONS  (PRN):  ALBUTerol    90 MICROgram(s) HFA Inhaler 2 Puff(s) Inhalation every 6 hours PRN Shortness of Breath and/or Wheezing  dextrose 40% Gel 15 Gram(s) Oral once PRN Blood Glucose LESS THAN 70 milliGRAM(s)/deciliter  glucagon  Injectable 1 milliGRAM(s) IntraMuscular once PRN Glucose LESS THAN 70 milligrams/deciliter  hemorrhoidal Ointment 1 Application(s) Rectal two times a day PRN Hemorrhoids  lidocaine 2% Gel 1 Application(s) Topical two times a day PRN perianally for pain  simethicone 80 milliGRAM(s) Chew two times a day PRN Gas  vitamin A &amp; D Ointment 1 Application(s) Topical two times a day PRN rash    Pertinent Labs:  @ 06:38: Na 130<L>, <H>, Cr 1.85<H>, <HH>, K+ 4.9    Finger Sticks:  POCT Blood Glucose.: 526 mg/dL ( @ 07:41)  POCT Blood Glucose.: >600 mg/dL ( @ 07:40)  POCT Blood Glucose.: 272 mg/dL ( @ 22:09)  POCT Blood Glucose.: 188 mg/dL ( @ 17:16)  POCT Blood Glucose.: 239 mg/dL ( @ 12:02)    Skin per nursing documentation: no pressure injuries noted, surgical incision  Edema per nursing documentation: none noted    Estimated Needs:   [x ] no change since previous assessment       Estimated energy needs: 4334-5924 kcal (30-35 kcal/kg using 4/21 wt 122.1 pounds)       Estimated protein needs: 66.48-77.46 grams (1.2-1.4 g/kg using 4/21 wt 122.1 pounds)       Fluid needs deferred as pt with CHF  [ ] recalculated:     Previous Nutrition Diagnoses: Increased nutrient needs, Food- and nutrition- related knowledge deficit  Nutrition Diagnosis is: ongoing, addressed with PO intake and reinforcement of nutrition education    New Nutrition Diagnosis: n/a     Interventions: Discussed and reinforced previous nutrition education on T2DM and CHF nutrition therapy at length with patient. Reviewed sources of carbohydrates and portion sizes, sources of protein, importance of pairing carbohydrates with protein, snack options (crackers with peanut butter, half tuna sandwich, cheese stick plus one slice of rye toast with butter). Also reviewed high sodium foods and reading nutrition labels.     Recommend  1) Continue current diet order of Consistent Carbohydrate with evening snack, No Concentrated Potassium. Monitor need for potassium restriction.   2) Obtain/honor food preferences as able within diet restrictions   3) Reinforce diet education as needed   4) Monitor BMs as pt with complaints of persistent diarrhea     Monitoring and Evaluation:   -Continue to monitor nutritional intake, tolerance to diet prescription, bowel movements, weights, labs, skin integrity  -RD remains available upon request and will follow up per protocol    Miya Palm, Dietetic Intern  Pager # [phone] Nutrition Follow Up Note  Patient seen for nutrition follow up.     Source: comprehensive chart review, RN, pt     Chart reviewed, events noted. Pt is a 53 year old female sent to ER yesterday for moderate to large sized pericardial effusion seen on TTE at her cardiologist office. She has PMHx of HTN, DM, HLD, sarcoidosis on steroids, HFrEF, CAD s/p mLAD s/p stent placement 2017, cardiac thrombus on Xarelto, asthma/COPD on home O2, CHF s/p AICD. S/p drainage of pericardial effusion on . IR removed pericardial drainage tube on . Pt noted with persistent diarrhea. CDiff negative. On , s/p EGD/colonoscopy; awaiting biopsy results. Nephrology following as pt with CKD3, superimposed YANA with BUN/Cr trending up; pt being diuresed. Pt has been noted with events of hyperglycemia and, per chart, has not been compliant with diet. Fasting blood glucose this morning >600 mg/dL, repeat showed 526 mg/dL.     Diet: Consistent Carbohydrate with evening snack, No Concentrated Potassium    Patient reports good appetite and intake; nursing flowsheets confirm 100% intake. Last BM today; pt expressing frustration and pain with persistent diarrhea. No reports of nausea/vomiting or acute GI distress other than diarrhea. Regarding this morning's hyperglycemia, pt's reported nighttime intake as follows: 2 slices rye toast with butter + 2 cups of coffee with 3 creams, 2 Equal sweeteners each (12:30 am), 3 bites lamb chop, carrots, and macaroni and cheese (3 am). Pt was able to teach back two points from previous nutrition education: sources of carbohydrates and importance of low sodium intake. Discussed and reinforced nutrition education at length with pt. Of note, pt with serum potassium WNL, however question whether WNL due to resolved issue or due to diet restriction as pt reports she was told she will need to restrict potassium once discharged.     PO intake : %     Source for PO intake: patient, RN     Daily Weight in k.9 (-30), Weight in k.1 (-), Weight in k.5 (), Weight in k.5 (-), Weight in k.5 (-), Weight in k.1 (), Weight in k.3 (-)    Daily weights in pounds: (all standing weights)   - 118.8 pounds   - 117 pounds   - 120.1 pounds   - 117.9 pounds   - 117.9 pounds    - 119.2 pounds   - 121.9 pounds   Weight changes likely related to fluid shifts as pt on Lasix       Pertinent Medications: MEDICATIONS  (STANDING):  atorvastatin 40 milliGRAM(s) Oral at bedtime  carvedilol 6.25 milliGRAM(s) Oral every 12 hours  dextrose 5%. 1000 milliLiter(s) (50 mL/Hr) IV Continuous <Continuous>  dextrose 50% Injectable 12.5 Gram(s) IV Push once  dextrose 50% Injectable 25 Gram(s) IV Push once  dextrose 50% Injectable 25 Gram(s) IV Push once  fluticasone propionate 50 MICROgram(s)/spray Nasal Spray 1 Spray(s) Both Nostrils two times a day  furosemide    Tablet 80 milliGRAM(s) Oral <User Schedule>  insulin glargine Injectable (LANTUS) 36 Unit(s) SubCutaneous at bedtime  insulin lispro (HumaLOG) corrective regimen sliding scale   SubCutaneous three times a day before meals  insulin lispro (HumaLOG) corrective regimen sliding scale   SubCutaneous at bedtime  insulin lispro Injectable (HumaLOG) 8 Unit(s) SubCutaneous before breakfast  insulin lispro Injectable (HumaLOG) 12 Unit(s) SubCutaneous before lunch  insulin lispro Injectable (HumaLOG) 12 Unit(s) SubCutaneous before dinner  levothyroxine 25 MICROGram(s) Oral daily  metolazone 5 milliGRAM(s) Oral <User Schedule>  mometasone 220 MICROgram(s) Inhaler 1 Puff(s) Inhalation two times a day  nystatin Cream 1 Application(s) Topical two times a day  predniSONE   Tablet 20 milliGRAM(s) Oral two times a day  rivaroxaban 15 milliGRAM(s) Oral every 24 hours    MEDICATIONS  (PRN):  ALBUTerol    90 MICROgram(s) HFA Inhaler 2 Puff(s) Inhalation every 6 hours PRN Shortness of Breath and/or Wheezing  dextrose 40% Gel 15 Gram(s) Oral once PRN Blood Glucose LESS THAN 70 milliGRAM(s)/deciliter  glucagon  Injectable 1 milliGRAM(s) IntraMuscular once PRN Glucose LESS THAN 70 milligrams/deciliter  hemorrhoidal Ointment 1 Application(s) Rectal two times a day PRN Hemorrhoids  lidocaine 2% Gel 1 Application(s) Topical two times a day PRN perianally for pain  simethicone 80 milliGRAM(s) Chew two times a day PRN Gas  vitamin A &amp; D Ointment 1 Application(s) Topical two times a day PRN rash    Pertinent Labs:    @ 06:38: Na 130<L>, <H>, Cr 1.85<H>, <HH>, K+ 4.9  () HgbA1c 12.2%    Finger Sticks:  POCT Blood Glucose.: 526 mg/dL ( @ 07:41)  POCT Blood Glucose.: >600 mg/dL ( @ 07:40)  POCT Blood Glucose.: 272 mg/dL ( @ 22:09)  POCT Blood Glucose.: 188 mg/dL ( @ 17:16)  POCT Blood Glucose.: 239 mg/dL ( @ 12:02)    Skin per nursing documentation: no pressure injuries noted, surgical incision  Edema per nursing documentation: none noted    Estimated Needs:   [x ] no change since previous assessment       Estimated energy needs: 5685-0372 kcal (30-35 kcal/kg using 4/21 wt 122.1 pounds)       Estimated protein needs: 66.48-77.46 grams (1.2-1.4 g/kg using 4/21 wt 122.1 pounds)       Fluid needs deferred as pt with CHF  [ ] recalculated:     Previous Nutrition Diagnoses: Increased nutrient needs, Food- and nutrition- related knowledge deficit  Nutrition Diagnoses are: ongoing, addressed with PO intake and reinforcement of nutrition education    New Nutrition Diagnosis: n/a     Interventions: Discussed and reinforced previous nutrition education on T2DM and CHF nutrition therapy at length with patient. Reviewed sources of carbohydrates and portion sizes, sources of protein, importance of pairing carbohydrates with protein, snack options (crackers with peanut butter, half tuna sandwich, cheese stick plus one slice of rye toast with butter). Also reviewed high sodium foods and reading nutrition labels.     Recommend  1) Continue current diet order of Consistent Carbohydrate with evening snack, No Concentrated Potassium. Monitor need for potassium restriction.   2) Obtain/honor food preferences as able within diet restrictions   3) Reinforce diet education as needed   4) Monitor BMs as pt with complaints of persistent diarrhea     Monitoring and Evaluation:   -Continue to monitor nutritional intake, tolerance to diet prescription, bowel movements, weights, labs, skin integrity  -RD remains available upon request and will follow up per protocol    Miya Palm, Dietetic Intern  Pager # [phone]

## 2019-04-30 NOTE — PROGRESS NOTE ADULT - SUBJECTIVE AND OBJECTIVE BOX
Subjective: Patient seen and examined. No new events except as noted.   cont to have abnormal BS level  not compliant with diet   diarrhea restarted     REVIEW OF SYSTEMS:    CONSTITUTIONAL: No weakness, fevers or chills  EYES/ENT: No visual changes;  No vertigo or throat pain   NECK: No pain or stiffness  RESPIRATORY: No cough, wheezing, hemoptysis; No shortness of breath  CARDIOVASCULAR: No chest pain or palpitations  GASTROINTESTINAL: + diarrhea   GENITOURINARY: No dysuria, frequency or hematuria  NEUROLOGICAL: No numbness or weakness  SKIN: No itching, burning, rashes, or lesions   All other review of systems is negative unless indicated above.    MEDICATIONS:  MEDICATIONS  (STANDING):  atorvastatin 40 milliGRAM(s) Oral at bedtime  carvedilol 6.25 milliGRAM(s) Oral every 12 hours  dextrose 5%. 1000 milliLiter(s) (50 mL/Hr) IV Continuous <Continuous>  dextrose 50% Injectable 12.5 Gram(s) IV Push once  dextrose 50% Injectable 25 Gram(s) IV Push once  dextrose 50% Injectable 25 Gram(s) IV Push once  fluticasone propionate 50 MICROgram(s)/spray Nasal Spray 1 Spray(s) Both Nostrils two times a day  furosemide    Tablet 80 milliGRAM(s) Oral <User Schedule>  insulin glargine Injectable (LANTUS) 36 Unit(s) SubCutaneous at bedtime  insulin lispro (HumaLOG) corrective regimen sliding scale   SubCutaneous three times a day before meals  insulin lispro (HumaLOG) corrective regimen sliding scale   SubCutaneous at bedtime  insulin lispro Injectable (HumaLOG) 8 Unit(s) SubCutaneous before breakfast  insulin lispro Injectable (HumaLOG) 12 Unit(s) SubCutaneous before lunch  insulin lispro Injectable (HumaLOG) 12 Unit(s) SubCutaneous before dinner  levothyroxine 25 MICROGram(s) Oral daily  metolazone 5 milliGRAM(s) Oral <User Schedule>  mometasone 220 MICROgram(s) Inhaler 1 Puff(s) Inhalation two times a day  nystatin Cream 1 Application(s) Topical two times a day  predniSONE   Tablet 20 milliGRAM(s) Oral two times a day  rivaroxaban 15 milliGRAM(s) Oral every 24 hours      PHYSICAL EXAM:  T(C): 36.4 (04-30-19 @ 12:44), Max: 36.8 (04-29-19 @ 20:29)  HR: 89 (04-30-19 @ 12:44) (89 - 98)  BP: 149/95 (04-30-19 @ 12:44) (126/82 - 149/95)  RR: 18 (04-30-19 @ 12:44) (18 - 18)  SpO2: 99% (04-30-19 @ 12:44) (96% - 99%)  Wt(kg): --  I&O's Summary    29 Apr 2019 07:01  -  30 Apr 2019 07:00  --------------------------------------------------------  IN: 780 mL / OUT: 800 mL / NET: -20 mL    30 Apr 2019 07:01  -  30 Apr 2019 12:49  --------------------------------------------------------  IN: 240 mL / OUT: 0 mL / NET: 240 mL          Appearance: Normal	  HEENT:   Normal oral mucosa	  Lymphatic: No lymphadenopathy , no edema  Cardiovascular: Normal S1 S2  Respiratory: Lungs clear to auscultation, normal effort 	  Gastrointestinal:  Soft, Non-tender, + BS	  Skin: No rashes, No ecchymoses, No cyanosis, warm to touch  Musculoskeletal: Normal range of motion, normal strength  Psychiatry:  Mood & affect appropriate  Ext: No edema      All labs, Imaging and EKGs personally reviewed                 04-30    130<L>  |  90<L>  |  117<H>  ----------------------------<  591<HH>  4.9   |  25  |  1.85<H>    Ca    8.7      30 Apr 2019 06:38

## 2019-04-30 NOTE — CHART NOTE - NSCHARTNOTEFT_GEN_A_CORE
Called by Rn to report patient with hyperglycemia this am, >600  - pt seen and examined  - Pt admits to dietary discretions and eating late last night   - Patient educated on eating meals provided at the times provided  - Drew Sam  - Repeat fingersticks for the day improved

## 2019-04-30 NOTE — PROVIDER CONTACT NOTE (CRITICAL VALUE NOTIFICATION) - ASSESSMENT
VSS, on PO steroids. AM FS was 310
Pt finger stick taken, resulted at 526. Pt states she ate a buttered roll and other food late last night. No complaints otherwise.
pt asymptomatic
Pt. asymptomatic

## 2019-05-01 LAB
ALBUMIN SERPL ELPH-MCNC: 2.7 G/DL — LOW (ref 3.3–5)
ALP SERPL-CCNC: 211 U/L — HIGH (ref 40–120)
ALT FLD-CCNC: 91 U/L — HIGH (ref 10–45)
ANION GAP SERPL CALC-SCNC: 14 MMOL/L — SIGNIFICANT CHANGE UP (ref 5–17)
AST SERPL-CCNC: 45 U/L — HIGH (ref 10–40)
BILIRUB SERPL-MCNC: 0.3 MG/DL — SIGNIFICANT CHANGE UP (ref 0.2–1.2)
BUN SERPL-MCNC: 133 MG/DL — HIGH (ref 7–23)
CALCIUM SERPL-MCNC: 8.3 MG/DL — LOW (ref 8.4–10.5)
CHLORIDE SERPL-SCNC: 94 MMOL/L — LOW (ref 96–108)
CO2 SERPL-SCNC: 22 MMOL/L — SIGNIFICANT CHANGE UP (ref 22–31)
CREAT SERPL-MCNC: 1.9 MG/DL — HIGH (ref 0.5–1.3)
GLUCOSE SERPL-MCNC: 426 MG/DL — HIGH (ref 70–99)
MAGNESIUM SERPL-MCNC: 2 MG/DL — SIGNIFICANT CHANGE UP (ref 1.6–2.6)
PHOSPHATE SERPL-MCNC: 3.9 MG/DL — SIGNIFICANT CHANGE UP (ref 2.5–4.5)
POTASSIUM SERPL-MCNC: 5 MMOL/L — SIGNIFICANT CHANGE UP (ref 3.5–5.3)
POTASSIUM SERPL-SCNC: 5 MMOL/L — SIGNIFICANT CHANGE UP (ref 3.5–5.3)
PROT SERPL-MCNC: 6 G/DL — SIGNIFICANT CHANGE UP (ref 6–8.3)
SODIUM SERPL-SCNC: 130 MMOL/L — LOW (ref 135–145)

## 2019-05-01 RX ORDER — LOPERAMIDE HCL 2 MG
2 TABLET ORAL
Qty: 0 | Refills: 0 | Status: DISCONTINUED | OUTPATIENT
Start: 2019-05-01 | End: 2019-05-02

## 2019-05-01 RX ORDER — SALICYLIC ACID 0.5 %
1 CLEANSER (GRAM) TOPICAL THREE TIMES A DAY
Qty: 0 | Refills: 0 | Status: DISCONTINUED | OUTPATIENT
Start: 2019-05-01 | End: 2019-05-02

## 2019-05-01 RX ORDER — SIMETHICONE 80 MG/1
80 TABLET, CHEWABLE ORAL
Qty: 0 | Refills: 0 | Status: DISCONTINUED | OUTPATIENT
Start: 2019-05-01 | End: 2019-05-02

## 2019-05-01 RX ADMIN — NYSTATIN CREAM 1 APPLICATION(S): 100000 CREAM TOPICAL at 17:16

## 2019-05-01 RX ADMIN — Medication 2: at 17:15

## 2019-05-01 RX ADMIN — SIMETHICONE 80 MILLIGRAM(S): 80 TABLET, CHEWABLE ORAL at 21:37

## 2019-05-01 RX ADMIN — Medication 8 UNIT(S): at 07:53

## 2019-05-01 RX ADMIN — Medication 20 MILLIGRAM(S): at 21:37

## 2019-05-01 RX ADMIN — CARVEDILOL PHOSPHATE 6.25 MILLIGRAM(S): 80 CAPSULE, EXTENDED RELEASE ORAL at 17:16

## 2019-05-01 RX ADMIN — Medication 1 APPLICATION(S): at 21:37

## 2019-05-01 RX ADMIN — Medication 12 UNIT(S): at 11:54

## 2019-05-01 RX ADMIN — Medication 20 MILLIGRAM(S): at 11:54

## 2019-05-01 RX ADMIN — INSULIN GLARGINE 36 UNIT(S): 100 INJECTION, SOLUTION SUBCUTANEOUS at 21:37

## 2019-05-01 RX ADMIN — CARVEDILOL PHOSPHATE 6.25 MILLIGRAM(S): 80 CAPSULE, EXTENDED RELEASE ORAL at 05:38

## 2019-05-01 RX ADMIN — Medication 25 MICROGRAM(S): at 05:38

## 2019-05-01 RX ADMIN — Medication 1 SPRAY(S): at 05:38

## 2019-05-01 RX ADMIN — RIVAROXABAN 15 MILLIGRAM(S): KIT at 17:16

## 2019-05-01 RX ADMIN — Medication 8: at 07:54

## 2019-05-01 RX ADMIN — Medication 12 UNIT(S): at 17:15

## 2019-05-01 RX ADMIN — Medication 4: at 11:55

## 2019-05-01 RX ADMIN — ATORVASTATIN CALCIUM 40 MILLIGRAM(S): 80 TABLET, FILM COATED ORAL at 21:37

## 2019-05-01 NOTE — CHART NOTE - NSCHARTNOTEFT_GEN_A_CORE
Pt seen for nutrition consult for education. Pt was educated yesterday and on (4/23) on T2DM nutrition education. Pt unable to teach back points, kept repeating she will "check nutrition facts" when asked pt to teach back sources of CHO. Extensive T2DM provided including: sources of CHO, portion sizes of CHO, CHO counting, pairing CHO with proteins, reading food labels, consistent eating patterns, and importance of self monitoring blood glucose levels. Provided T2DM nutrition handout. Educated pt on sources of potassium and provided potassium content of foods list. Pt able to teach back a few points after education however was easily distracted and kept repeating she will "check nutrition facts label". Question comprehension. Pt made aware RD remains available for review/reinforcement as needed.     RD to follow up for further nutritional interventions as indicated/requested by medical team/pt.  Abigail Iyer RD pager #[phone]

## 2019-05-01 NOTE — PROGRESS NOTE ADULT - SUBJECTIVE AND OBJECTIVE BOX
Subjective: Patient seen and examined. No new events except as noted.     REVIEW OF SYSTEMS:    CONSTITUTIONAL: No weakness, fevers or chills  EYES/ENT: No visual changes;  No vertigo or throat pain   NECK: No pain or stiffness  RESPIRATORY: No cough, wheezing, hemoptysis; No shortness of breath  CARDIOVASCULAR: No chest pain or palpitations  GASTROINTESTINAL: No abdominal or epigastric pain. No nausea, vomiting, or hematemesis; No diarrhea or constipation. No melena or hematochezia.  GENITOURINARY: No dysuria, frequency or hematuria  NEUROLOGICAL: No numbness or weakness  SKIN: No itching, burning, rashes, or lesions   All other review of systems is negative unless indicated above.    MEDICATIONS:  MEDICATIONS  (STANDING):  atorvastatin 40 milliGRAM(s) Oral at bedtime  carvedilol 6.25 milliGRAM(s) Oral every 12 hours  dextrose 5%. 1000 milliLiter(s) (50 mL/Hr) IV Continuous <Continuous>  dextrose 50% Injectable 12.5 Gram(s) IV Push once  dextrose 50% Injectable 25 Gram(s) IV Push once  dextrose 50% Injectable 25 Gram(s) IV Push once  fluticasone propionate 50 MICROgram(s)/spray Nasal Spray 1 Spray(s) Both Nostrils two times a day  furosemide    Tablet 80 milliGRAM(s) Oral <User Schedule>  insulin glargine Injectable (LANTUS) 36 Unit(s) SubCutaneous at bedtime  insulin lispro (HumaLOG) corrective regimen sliding scale   SubCutaneous three times a day before meals  insulin lispro (HumaLOG) corrective regimen sliding scale   SubCutaneous at bedtime  insulin lispro Injectable (HumaLOG) 8 Unit(s) SubCutaneous before breakfast  insulin lispro Injectable (HumaLOG) 12 Unit(s) SubCutaneous before lunch  insulin lispro Injectable (HumaLOG) 12 Unit(s) SubCutaneous before dinner  levothyroxine 25 MICROGram(s) Oral daily  metolazone 5 milliGRAM(s) Oral <User Schedule>  mometasone 220 MICROgram(s) Inhaler 1 Puff(s) Inhalation two times a day  nystatin Cream 1 Application(s) Topical two times a day  predniSONE   Tablet 20 milliGRAM(s) Oral two times a day  rivaroxaban 15 milliGRAM(s) Oral every 24 hours      PHYSICAL EXAM:  T(C): 36.4 (05-01-19 @ 08:35), Max: 36.8 (04-30-19 @ 21:08)  HR: 88 (05-01-19 @ 08:35) (88 - 95)  BP: 121/83 (05-01-19 @ 08:35) (119/77 - 149/95)  RR: 18 (05-01-19 @ 08:35) (18 - 18)  SpO2: 98% (05-01-19 @ 08:35) (98% - 100%)  Wt(kg): --  I&O's Summary    30 Apr 2019 07:01  -  01 May 2019 07:00  --------------------------------------------------------  IN: 940 mL / OUT: 200 mL / NET: 740 mL          Appearance: Normal	  HEENT:   Normal oral mucosa, PERRL, EOMI	  Lymphatic: No lymphadenopathy , no edema  Cardiovascular: Normal S1 S2, No JVD, No murmurs , Peripheral pulses palpable 2+ bilaterally  Respiratory: Lungs clear to auscultation, normal effort 	  Gastrointestinal:  Soft, Non-tender, + BS	  Skin: No rashes, No ecchymoses, No cyanosis, warm to touch  Musculoskeletal: Normal range of motion, normal strength  Psychiatry:  Mood & affect appropriate  Ext: No edema      LABS:    CARDIAC MARKERS:            05-01    130<L>  |  94<L>  |  133<H>  ----------------------------<  426<H>  5.0   |  22  |  1.90<H>    Ca    8.3<L>      01 May 2019 05:25  Phos  3.9     05-01  Mg     2.0     05-01    TPro  6.0  /  Alb  2.7<L>  /  TBili  0.3  /  DBili  x   /  AST  45<H>  /  ALT  91<H>  /  AlkPhos  211<H>  05-01    proBNP:   Lipid Profile:   HgA1c:   TSH:             TELEMETRY: 	SR    ECG:  	  RADIOLOGY:   DIAGNOSTIC TESTING:  [ ] Echocardiogram:  [ ]  Catheterization:  [ ] Stress Test:    OTHER:

## 2019-05-01 NOTE — PROGRESS NOTE ADULT - SUBJECTIVE AND OBJECTIVE BOX
PEYMAN WATTS:[id number],   53yFemale followed for:  avocado (Unknown)  No Known Drug Allergies    PAST MEDICAL & SURGICAL HISTORY:  DM (diabetes mellitus)  CAD (coronary artery disease)  Sarcoidosis  Asthma with COPD  CHF, chronic  Mural thrombus of cardiac apex  History of atrial fibrillation  Thrombus: ? Thrombus in heart - on Xarelto  Hyperlipidemia  Anemia  Hypertension  Sarcoidosis  CHF (congestive heart failure): Stage III, on home O2 @ 2lpm  Diabetes  AICD (automatic cardioverter/defibrillator) present  History of repair of hip fracture  Ankle fracture  Ankle fracture: Left Ankle - with plate and screws placed  Hip deformity: left hip had a pin placed to hold bone in place after a fall    FAMILY HISTORY:  FH: breast cancer: mother  Family hx of colon cancer: Mother  Family history of hypertension (Sibling): Siblings  Family history of diabetes mellitus (Father, Sibling): Father, Siblings  Family history of breast cancer in mother (Mother): Mother  Family history of colon cancer in mother (Mother): Mother    MEDICATIONS  (STANDING):  atorvastatin 40 milliGRAM(s) Oral at bedtime  carvedilol 6.25 milliGRAM(s) Oral every 12 hours  dextrose 5%. 1000 milliLiter(s) (50 mL/Hr) IV Continuous <Continuous>  dextrose 50% Injectable 12.5 Gram(s) IV Push once  dextrose 50% Injectable 25 Gram(s) IV Push once  dextrose 50% Injectable 25 Gram(s) IV Push once  fluticasone propionate 50 MICROgram(s)/spray Nasal Spray 1 Spray(s) Both Nostrils two times a day  furosemide    Tablet 80 milliGRAM(s) Oral <User Schedule>  insulin glargine Injectable (LANTUS) 36 Unit(s) SubCutaneous at bedtime  insulin lispro (HumaLOG) corrective regimen sliding scale   SubCutaneous three times a day before meals  insulin lispro (HumaLOG) corrective regimen sliding scale   SubCutaneous at bedtime  insulin lispro Injectable (HumaLOG) 8 Unit(s) SubCutaneous before breakfast  insulin lispro Injectable (HumaLOG) 12 Unit(s) SubCutaneous before lunch  insulin lispro Injectable (HumaLOG) 12 Unit(s) SubCutaneous before dinner  levothyroxine 25 MICROGram(s) Oral daily  metolazone 5 milliGRAM(s) Oral <User Schedule>  mometasone 220 MICROgram(s) Inhaler 1 Puff(s) Inhalation two times a day  nystatin Cream 1 Application(s) Topical two times a day  predniSONE   Tablet 20 milliGRAM(s) Oral two times a day  rivaroxaban 15 milliGRAM(s) Oral every 24 hours    MEDICATIONS  (PRN):  ALBUTerol    90 MICROgram(s) HFA Inhaler 2 Puff(s) Inhalation every 6 hours PRN Shortness of Breath and/or Wheezing  dextrose 40% Gel 15 Gram(s) Oral once PRN Blood Glucose LESS THAN 70 milliGRAM(s)/deciliter  glucagon  Injectable 1 milliGRAM(s) IntraMuscular once PRN Glucose LESS THAN 70 milligrams/deciliter  hemorrhoidal Ointment 1 Application(s) Rectal two times a day PRN Hemorrhoids  lidocaine 2% Gel 1 Application(s) Topical two times a day PRN perianally for pain  simethicone 80 milliGRAM(s) Chew two times a day PRN Gas  vitamin A &amp; D Ointment 1 Application(s) Topical two times a day PRN rash      Vital Signs Last 24 Hrs  T(C): 36.4 (01 May 2019 08:35), Max: 36.8 (30 Apr 2019 21:08)  T(F): 97.6 (01 May 2019 08:35), Max: 98.2 (30 Apr 2019 21:08)  HR: 88 (01 May 2019 08:35) (88 - 95)  BP: 121/83 (01 May 2019 08:35) (119/77 - 149/95)  BP(mean): --  RR: 18 (01 May 2019 08:35) (18 - 18)  SpO2: 98% (01 May 2019 08:35) (98% - 100%)  nc/at  s1s2  cta  soft, nt, nd no guarding or rebound  no c/c/e      05-01    130<L>  |  94<L>  |  133<H>  ----------------------------<  426<H>  5.0   |  22  |  1.90<H>    Ca    8.3<L>      01 May 2019 05:25  Phos  3.9     05-01  Mg     2.0     05-01    TPro  6.0  /  Alb  2.7<L>  /  TBili  0.3  /  DBili  x   /  AST  45<H>  /  ALT  91<H>  /  AlkPhos  211<H>  05-01

## 2019-05-01 NOTE — PROGRESS NOTE ADULT - ASSESSMENT
Pt is a 54 yo female with ICM s/p ICD, sarcoid admitted with moderate to lare sized circumferential pericardial effusion. Also has been having persistent diarrhea.     1.	Problem/Recommendation:  Problem: Pericardial effusion. Recommendation: seen on Echo at outpatient office  card eval appreciated  IR pericardiac effusion drainage, S/P pericardiac drain, + drainage, drain removed by IR   patient is hemodynamically stable   CT chest noted.  BP monitoring     Problem/Recommendation:  YANA,   monitor BUN/Cr  improving   avoid nephrotoxic medications   Renal eval appreciated   diuresis as per nephrology   I and Os   would  hold of further CT with IV contrast     Problem/Recommendation :  ·  Problem: Diarrhea.  Recommendation: chronic for months   GI eval appreciated  send stool studies   C diff negative   in view of Hx of sarcoidosis, GI autoimmune diseases need to be ruled out   diet control  monitor electrolytes.   S/P colonoscopy EGD< no acute findings, awaiting for biopsy results   G/I F/U    Problem/Recommendation -:  ·  Problem: Sarcoidosis.  Recommendation: on prednisone.   monitor BUN/Cr  avoid nephrotoxic medications     Problem/Recommendation :  ·  Problem: History of atrial fibrillation.  Recommendation: rate control  Cont AC   card F/U.     Problem/Recommendation :  ·  Problem: CAD (coronary artery disease).  Recommendation: S/P PCI  Statin and AC.     Problem/Recommendation :  Problem: Diabetes mellitus. Recommendation: sliding scale   NOT CONTROLLED, diet issue   Nutritionist eval   endo eval appreciated    lantus and premeal humalog for better BS control   elevate A1c   DM diet  Endo eval for BS management.    Problem/Recommendation:  Problem: Prophylactic measure. Recommendation: DVT and Gi PPX.

## 2019-05-01 NOTE — PROGRESS NOTE ADULT - ASSESSMENT
Assessment  DMT2: 53y Female with DM T2 with hyperglycemia, still on high dose steroids, on basal bolus insulin,  eating inconsistent meals, blood sugars trending down,  no hypoglycemic episode,.  CAD/Pericardial effusion: on medications, no chest pain, stable, monitored.  HTN: Controlled,  on antihypertensive medications.  CKD: Monitor labs/BMP,   Sarcoidosis: On treatment, stable.

## 2019-05-01 NOTE — PROGRESS NOTE ADULT - SUBJECTIVE AND OBJECTIVE BOX
Patient seen and examined in bed. No pain, no SOB.      MEDICATIONS  (STANDING):  atorvastatin 40 milliGRAM(s) Oral at bedtime  carvedilol 6.25 milliGRAM(s) Oral every 12 hours  dextrose 5%. 1000 milliLiter(s) (50 mL/Hr) IV Continuous <Continuous>  dextrose 50% Injectable 12.5 Gram(s) IV Push once  dextrose 50% Injectable 25 Gram(s) IV Push once  dextrose 50% Injectable 25 Gram(s) IV Push once  fluticasone propionate 50 MICROgram(s)/spray Nasal Spray 1 Spray(s) Both Nostrils two times a day  furosemide    Tablet 80 milliGRAM(s) Oral <User Schedule>  insulin glargine Injectable (LANTUS) 36 Unit(s) SubCutaneous at bedtime  insulin lispro (HumaLOG) corrective regimen sliding scale   SubCutaneous three times a day before meals  insulin lispro (HumaLOG) corrective regimen sliding scale   SubCutaneous at bedtime  insulin lispro Injectable (HumaLOG) 8 Unit(s) SubCutaneous before breakfast  insulin lispro Injectable (HumaLOG) 12 Unit(s) SubCutaneous before lunch  insulin lispro Injectable (HumaLOG) 12 Unit(s) SubCutaneous before dinner  levothyroxine 25 MICROGram(s) Oral daily  metolazone 5 milliGRAM(s) Oral <User Schedule>  mometasone 220 MICROgram(s) Inhaler 1 Puff(s) Inhalation two times a day  nystatin Cream 1 Application(s) Topical two times a day  predniSONE   Tablet 20 milliGRAM(s) Oral two times a day  rivaroxaban 15 milliGRAM(s) Oral every 24 hours      VITAL:  T(C): , Max: 36.8 (04-30-19 @ 21:08)  T(F): , Max: 98.2 (04-30-19 @ 21:08)  HR: 88 (05-01-19 @ 08:35)  BP: 121/83 (05-01-19 @ 08:35)  RR: 18 (05-01-19 @ 08:35)  SpO2: 98% (05-01-19 @ 08:35)    I and O's:    04-30 @ 07:01  -  05-01 @ 07:00  --------------------------------------------------------  IN: 940 mL / OUT: 200 mL / NET: 740 mL          PHYSICAL EXAM:    Constitutional: NAD  Neck:  No JVD  Respiratory: CTAB/L  Cardiovascular: S1 and S2  Gastrointestinal: BS+, soft, NT/ND  Extremities: No peripheral edema  : No Oliva  Skin: No rashes    LABS:    05-01    130<L>  |  94<L>  |  133<H>  ----------------------------<  426<H>  5.0   |  22  |  1.90<H>    Ca    8.3<L>      01 May 2019 05:25  Phos  3.9     05-01  Mg     2.0     05-01    TPro  6.0  /  Alb  2.7<L>  /  TBili  0.3  /  DBili  x   /  AST  45<H>  /  ALT  91<H>  /  AlkPhos  211<H>  05-01        ASSESSMENT:  52F w/ HTN , DM2, CAD, HFrEF, CKD3, and Sarcoidosis, 4/16/19 a/w large pericardial effusion; s/p drainage; admission c/b YANA   (1)Renal - proteinuric CKD3; likely diabetic nephropathy. BUN remains high, due to steroids. Azotemia about the same as yesterday - possibly pre-renal due to diarrhea?  (2)Hyperkalemia - in part from YANA; also in part from cellular shifts induced by hyperglycemia - stable  (3)Hyponatremia - dilutional from hyperglycemia - stable  (4)CV - volume status acceptable    RECOMMEND:  - continue furosemide 80 po daily  - continue metolazone 5 qod  - no IVF for now   - daily BMP while admitted  - dose meds for a GFR ~25-35 ml/min        Judy Garcia, NP-C  [facility]  [phone] Patient seen and examined in bed. No pain, no SOB. She states she does have chronic diarrhea, however diarrhea has been worse of late. Beginning to improve today.      MEDICATIONS  (STANDING):  atorvastatin 40 milliGRAM(s) Oral at bedtime  carvedilol 6.25 milliGRAM(s) Oral every 12 hours  dextrose 5%. 1000 milliLiter(s) (50 mL/Hr) IV Continuous <Continuous>  dextrose 50% Injectable 12.5 Gram(s) IV Push once  dextrose 50% Injectable 25 Gram(s) IV Push once  dextrose 50% Injectable 25 Gram(s) IV Push once  fluticasone propionate 50 MICROgram(s)/spray Nasal Spray 1 Spray(s) Both Nostrils two times a day  furosemide    Tablet 80 milliGRAM(s) Oral <User Schedule>  insulin glargine Injectable (LANTUS) 36 Unit(s) SubCutaneous at bedtime  insulin lispro (HumaLOG) corrective regimen sliding scale   SubCutaneous three times a day before meals  insulin lispro (HumaLOG) corrective regimen sliding scale   SubCutaneous at bedtime  insulin lispro Injectable (HumaLOG) 8 Unit(s) SubCutaneous before breakfast  insulin lispro Injectable (HumaLOG) 12 Unit(s) SubCutaneous before lunch  insulin lispro Injectable (HumaLOG) 12 Unit(s) SubCutaneous before dinner  levothyroxine 25 MICROGram(s) Oral daily  metolazone 5 milliGRAM(s) Oral <User Schedule>  mometasone 220 MICROgram(s) Inhaler 1 Puff(s) Inhalation two times a day  nystatin Cream 1 Application(s) Topical two times a day  predniSONE   Tablet 20 milliGRAM(s) Oral two times a day  rivaroxaban 15 milliGRAM(s) Oral every 24 hours      VITAL:  T(C): , Max: 36.8 (04-30-19 @ 21:08)  T(F): , Max: 98.2 (04-30-19 @ 21:08)  HR: 88 (05-01-19 @ 08:35)  BP: 121/83 (05-01-19 @ 08:35)  RR: 18 (05-01-19 @ 08:35)  SpO2: 98% (05-01-19 @ 08:35)    I and O's:    04-30 @ 07:01  -  05-01 @ 07:00  --------------------------------------------------------  IN: 940 mL / OUT: 200 mL / NET: 740 mL          PHYSICAL EXAM:    Constitutional: NAD  Neck:  No JVD  Respiratory: CTAB/L  Cardiovascular: S1 and S2  Gastrointestinal: BS+, soft, NT/ND  Extremities: No peripheral edema  : No Oliva  Skin: No rashes    LABS:    05-01    130<L>  |  94<L>  |  133<H>  ----------------------------<  426<H>  5.0   |  22  |  1.90<H>    Ca    8.3<L>      01 May 2019 05:25  Phos  3.9     05-01  Mg     2.0     05-01    TPro  6.0  /  Alb  2.7<L>  /  TBili  0.3  /  DBili  x   /  AST  45<H>  /  ALT  91<H>  /  AlkPhos  211<H>  05-01        ASSESSMENT:  52F w/ HTN , DM2, CAD, HFrEF, CKD3, and Sarcoidosis, 4/16/19 a/w large pericardial effusion; s/p drainage; admission c/b YANA   (1)Renal - proteinuric CKD3; likely diabetic nephropathy. BUN remains high, due to steroids. Azotemia about the same as yesterday - possibly pre-renal due to diarrhea?  (2)Hyperkalemia - in part from YANA; also in part from cellular shifts induced by hyperglycemia - stable  (3)Hyponatremia - dilutional from hyperglycemia - stable  (4)CV - volume status acceptable    RECOMMEND:  - consider holding diuretics tonight - will discuss with MD Lion  - continue furosemide 80 mg po daily  - continue metolazone 5 mg qod  - daily BMP while admitted  - dose meds for a GFR ~25-35 ml/min        ROSENDA AlvaradoC  [facility]  [phone] Patient seen and examined in bed. No pain, no SOB. She states she does have chronic diarrhea, however diarrhea has been worse of late. Beginning to improve today.      MEDICATIONS  (STANDING):  atorvastatin 40 milliGRAM(s) Oral at bedtime  carvedilol 6.25 milliGRAM(s) Oral every 12 hours  dextrose 5%. 1000 milliLiter(s) (50 mL/Hr) IV Continuous <Continuous>  dextrose 50% Injectable 12.5 Gram(s) IV Push once  dextrose 50% Injectable 25 Gram(s) IV Push once  dextrose 50% Injectable 25 Gram(s) IV Push once  fluticasone propionate 50 MICROgram(s)/spray Nasal Spray 1 Spray(s) Both Nostrils two times a day  furosemide    Tablet 80 milliGRAM(s) Oral <User Schedule>  insulin glargine Injectable (LANTUS) 36 Unit(s) SubCutaneous at bedtime  insulin lispro (HumaLOG) corrective regimen sliding scale   SubCutaneous three times a day before meals  insulin lispro (HumaLOG) corrective regimen sliding scale   SubCutaneous at bedtime  insulin lispro Injectable (HumaLOG) 8 Unit(s) SubCutaneous before breakfast  insulin lispro Injectable (HumaLOG) 12 Unit(s) SubCutaneous before lunch  insulin lispro Injectable (HumaLOG) 12 Unit(s) SubCutaneous before dinner  levothyroxine 25 MICROGram(s) Oral daily  metolazone 5 milliGRAM(s) Oral <User Schedule>  mometasone 220 MICROgram(s) Inhaler 1 Puff(s) Inhalation two times a day  nystatin Cream 1 Application(s) Topical two times a day  predniSONE   Tablet 20 milliGRAM(s) Oral two times a day  rivaroxaban 15 milliGRAM(s) Oral every 24 hours      VITAL:  T(C): , Max: 36.8 (04-30-19 @ 21:08)  T(F): , Max: 98.2 (04-30-19 @ 21:08)  HR: 88 (05-01-19 @ 08:35)  BP: 121/83 (05-01-19 @ 08:35)  RR: 18 (05-01-19 @ 08:35)  SpO2: 98% (05-01-19 @ 08:35)    I and O's:    04-30 @ 07:01  -  05-01 @ 07:00  --------------------------------------------------------  IN: 940 mL / OUT: 200 mL / NET: 740 mL          PHYSICAL EXAM:    Constitutional: NAD  Neck:  No JVD  Respiratory: CTAB/L  Cardiovascular: S1 and S2  Gastrointestinal: BS+, soft, NT/ND  Extremities: No peripheral edema  : No Oliva  Skin: No rashes    LABS:    05-01    130<L>  |  94<L>  |  133<H>  ----------------------------<  426<H>  5.0   |  22  |  1.90<H>    Ca    8.3<L>      01 May 2019 05:25  Phos  3.9     05-01  Mg     2.0     05-01    TPro  6.0  /  Alb  2.7<L>  /  TBili  0.3  /  DBili  x   /  AST  45<H>  /  ALT  91<H>  /  AlkPhos  211<H>  05-01    ASSESSMENT:  52F w/ HTN , DM2, CAD, HFrEF, CKD3, and Sarcoidosis, 4/16/19 a/w large pericardial effusion; s/p drainage; admission c/b YANA   (1)Renal - proteinuric CKD3; likely diabetic nephropathy. BUN remains high, due to steroids. Azotemia about the same as yesterday - possibly pre-renal due to diarrhea?  (2)Hyperkalemia - in part from YANA; also in part from cellular shifts induced by hyperglycemia - stable  (3)Hyponatremia - dilutional from hyperglycemia - stable  (4)CV - volume status acceptable    RECOMMEND:  - consider holding diuretics tonight - will discuss with MD Lion  - continue furosemide 80 mg po daily  - continue metolazone 5 mg qod  - daily BMP while admitted  - dose meds for a GFR ~25-35 ml/min      NANDINI Alvarado  [facility]  [phone]      *******************RENAL ATTENDING**********************  Seen/examined with NP. I agree with NP assessment as above.    VS as above; NAD; CTA-B/L; RRR; no edema b/l      ASSESSMENT:  52F w/ HTN , DM2, CAD, HFrEF, CKD3, and Sarcoidosis, 4/16/19 a/w large pericardial effusion; s/p drainage; admission c/b YANA   (1)Renal - proteinuric CKD3; likely diabetic nephropathy. YANA superimposed/worsening - prerenal from diuretics/diarrhea  (2)Hyperkalemia - in part from YANA; also in part from cellular shifts induced by hyperglycemia - stable  (3)Hyponatremia - dilutional from hyperglycemia - stable  (4)CV - hypovolemic - due to combination of diuretics and diarrhea    RECOMMEND:  (1)Hold diuretics for now  (2)BMP daily      Jair Lion MD  [facility]  [phone]

## 2019-05-01 NOTE — PROGRESS NOTE ADULT - SUBJECTIVE AND OBJECTIVE BOX
Chief complaint  Patient is a 53y old  Female who presents with a chief complaint of sent here for pericardial effusion (01 May 2019 12:33)   Review of systems  Patient in bed, looks comfortable, no fever, no hypoglycemia.    Labs and Fingersticks  CAPILLARY BLOOD GLUCOSE      POCT Blood Glucose.: 157 mg/dL (01 May 2019 17:07)  POCT Blood Glucose.: 222 mg/dL (01 May 2019 11:43)  POCT Blood Glucose.: 347 mg/dL (01 May 2019 07:35)  POCT Blood Glucose.: 177 mg/dL (30 Apr 2019 21:17)      Anion Gap, Serum: 14 (05-01 @ 05:25)  Anion Gap, Serum: 15 (04-30 @ 06:38)      Calcium, Total Serum: 8.3 <L> (05-01 @ 05:25)  Calcium, Total Serum: 8.7 (04-30 @ 06:38)  Albumin, Serum: 2.7 <L> (05-01 @ 05:25)    Alanine Aminotransferase (ALT/SGPT): 91 <H> (05-01 @ 05:25)  Alkaline Phosphatase, Serum: 211 <H> (05-01 @ 05:25)  Aspartate Aminotransferase (AST/SGOT): 45 <H> (05-01 @ 05:25)        05-01    130<L>  |  94<L>  |  133<H>  ----------------------------<  426<H>  5.0   |  22  |  1.90<H>    Ca    8.3<L>      01 May 2019 05:25  Phos  3.9     05-01  Mg     2.0     05-01    TPro  6.0  /  Alb  2.7<L>  /  TBili  0.3  /  DBili  x   /  AST  45<H>  /  ALT  91<H>  /  AlkPhos  211<H>  05-01    Medications  MEDICATIONS  (STANDING):  atorvastatin 40 milliGRAM(s) Oral at bedtime  carvedilol 6.25 milliGRAM(s) Oral every 12 hours  dextrose 5%. 1000 milliLiter(s) (50 mL/Hr) IV Continuous <Continuous>  dextrose 50% Injectable 12.5 Gram(s) IV Push once  dextrose 50% Injectable 25 Gram(s) IV Push once  dextrose 50% Injectable 25 Gram(s) IV Push once  fluticasone propionate 50 MICROgram(s)/spray Nasal Spray 1 Spray(s) Both Nostrils two times a day  insulin glargine Injectable (LANTUS) 36 Unit(s) SubCutaneous at bedtime  insulin lispro (HumaLOG) corrective regimen sliding scale   SubCutaneous three times a day before meals  insulin lispro (HumaLOG) corrective regimen sliding scale   SubCutaneous at bedtime  insulin lispro Injectable (HumaLOG) 8 Unit(s) SubCutaneous before breakfast  insulin lispro Injectable (HumaLOG) 12 Unit(s) SubCutaneous before lunch  insulin lispro Injectable (HumaLOG) 12 Unit(s) SubCutaneous before dinner  levothyroxine 25 MICROGram(s) Oral daily  mometasone 220 MICROgram(s) Inhaler 1 Puff(s) Inhalation two times a day  nystatin Cream 1 Application(s) Topical two times a day  predniSONE   Tablet 20 milliGRAM(s) Oral two times a day  rivaroxaban 15 milliGRAM(s) Oral every 24 hours      Physical Exam  General: Patient comfortable in bed  Vital Signs Last 12 Hrs  T(F): 97.6 (05-01-19 @ 13:42), Max: 97.6 (05-01-19 @ 08:35)  HR: 94 (05-01-19 @ 17:14) (88 - 96)  BP: 118/81 (05-01-19 @ 17:14) (118/81 - 125/77)  BP(mean): --  RR: 18 (05-01-19 @ 13:42) (18 - 18)  SpO2: 100% (05-01-19 @ 13:42) (98% - 100%)  Neck: No palpable thyroid nodules.  CVS: S1S2, No murmurs  Respiratory: No wheezing, no crepitations  GI: Abdomen soft, bowel sounds positive  Musculoskeletal:  edema lower extremities.   Skin: No skin rashes, no ecchymosis    Diagnostics

## 2019-05-01 NOTE — PROGRESS NOTE ADULT - SUBJECTIVE AND OBJECTIVE BOX
Subjective: Patient seen and examined. No new events except as noted.   doing better     REVIEW OF SYSTEMS:    CONSTITUTIONAL: No weakness, fevers or chills  EYES/ENT: No visual changes;  No vertigo or throat pain   NECK: No pain or stiffness  RESPIRATORY: No cough, wheezing, hemoptysis; No shortness of breath  CARDIOVASCULAR: No chest pain or palpitations  GASTROINTESTINAL: On and off diarrhea   GENITOURINARY: No dysuria, frequency or hematuria  NEUROLOGICAL: No numbness or weakness  SKIN: No itching, burning, rashes, or lesions   All other review of systems is negative unless indicated above.    MEDICATIONS:  MEDICATIONS  (STANDING):  atorvastatin 40 milliGRAM(s) Oral at bedtime  carvedilol 6.25 milliGRAM(s) Oral every 12 hours  dextrose 5%. 1000 milliLiter(s) (50 mL/Hr) IV Continuous <Continuous>  dextrose 50% Injectable 12.5 Gram(s) IV Push once  dextrose 50% Injectable 25 Gram(s) IV Push once  dextrose 50% Injectable 25 Gram(s) IV Push once  fluticasone propionate 50 MICROgram(s)/spray Nasal Spray 1 Spray(s) Both Nostrils two times a day  furosemide    Tablet 80 milliGRAM(s) Oral <User Schedule>  insulin glargine Injectable (LANTUS) 36 Unit(s) SubCutaneous at bedtime  insulin lispro (HumaLOG) corrective regimen sliding scale   SubCutaneous three times a day before meals  insulin lispro (HumaLOG) corrective regimen sliding scale   SubCutaneous at bedtime  insulin lispro Injectable (HumaLOG) 8 Unit(s) SubCutaneous before breakfast  insulin lispro Injectable (HumaLOG) 12 Unit(s) SubCutaneous before lunch  insulin lispro Injectable (HumaLOG) 12 Unit(s) SubCutaneous before dinner  levothyroxine 25 MICROGram(s) Oral daily  metolazone 5 milliGRAM(s) Oral <User Schedule>  mometasone 220 MICROgram(s) Inhaler 1 Puff(s) Inhalation two times a day  nystatin Cream 1 Application(s) Topical two times a day  predniSONE   Tablet 20 milliGRAM(s) Oral two times a day  rivaroxaban 15 milliGRAM(s) Oral every 24 hours      PHYSICAL EXAM:  T(C): 36.4 (05-01-19 @ 08:35), Max: 36.8 (04-30-19 @ 21:08)  HR: 88 (05-01-19 @ 08:35) (88 - 95)  BP: 121/83 (05-01-19 @ 08:35) (119/77 - 149/95)  RR: 18 (05-01-19 @ 08:35) (18 - 18)  SpO2: 98% (05-01-19 @ 08:35) (98% - 100%)  Wt(kg): --  I&O's Summary    30 Apr 2019 07:01  -  01 May 2019 07:00  --------------------------------------------------------  IN: 940 mL / OUT: 200 mL / NET: 740 mL          Appearance: Normal	  HEENT:   Normal oral mucosa, PERRL, EOMI	  Lymphatic: No lymphadenopathy , no edema  Cardiovascular: Normal S1 S2, No JVD, No murmurs , Peripheral pulses palpable 2+ bilaterally  Respiratory: Lungs clear to auscultation, normal effort 	  Gastrointestinal:  Soft, Non-tender, + BS	  Skin: No rashes, No ecchymoses, No cyanosis, warm to touch  Musculoskeletal: Normal range of motion, normal strength  Psychiatry:  Mood & affect appropriate  Ext: No edema      All labs, Imaging and EKGs personally reviewed                 05-01    130<L>  |  94<L>  |  133<H>  ----------------------------<  426<H>  5.0   |  22  |  1.90<H>    Ca    8.3<L>      01 May 2019 05:25  Phos  3.9     05-01  Mg     2.0     05-01    TPro  6.0  /  Alb  2.7<L>  /  TBili  0.3  /  DBili  x   /  AST  45<H>  /  ALT  91<H>  /  AlkPhos  211<H>  05-01

## 2019-05-02 ENCOUNTER — TRANSCRIPTION ENCOUNTER (OUTPATIENT)
Age: 54
End: 2019-05-02

## 2019-05-02 VITALS
RESPIRATION RATE: 16 BRPM | TEMPERATURE: 98 F | SYSTOLIC BLOOD PRESSURE: 132 MMHG | DIASTOLIC BLOOD PRESSURE: 88 MMHG | OXYGEN SATURATION: 99 % | HEART RATE: 106 BPM

## 2019-05-02 LAB
ALBUMIN SERPL ELPH-MCNC: 2.7 G/DL — LOW (ref 3.3–5)
ALP SERPL-CCNC: 220 U/L — HIGH (ref 40–120)
ALT FLD-CCNC: 103 U/L — HIGH (ref 10–45)
ANION GAP SERPL CALC-SCNC: 13 MMOL/L — SIGNIFICANT CHANGE UP (ref 5–17)
AST SERPL-CCNC: 68 U/L — HIGH (ref 10–40)
BILIRUB SERPL-MCNC: 0.3 MG/DL — SIGNIFICANT CHANGE UP (ref 0.2–1.2)
BUN SERPL-MCNC: 140 MG/DL — HIGH (ref 7–23)
CALCIUM SERPL-MCNC: 8.6 MG/DL — SIGNIFICANT CHANGE UP (ref 8.4–10.5)
CALPROTECTIN STL-MCNT: 27 UG/G — SIGNIFICANT CHANGE UP (ref 0–120)
CHLORIDE SERPL-SCNC: 95 MMOL/L — LOW (ref 96–108)
CO2 SERPL-SCNC: 22 MMOL/L — SIGNIFICANT CHANGE UP (ref 22–31)
CREAT SERPL-MCNC: 1.69 MG/DL — HIGH (ref 0.5–1.3)
GLUCOSE SERPL-MCNC: 423 MG/DL — HIGH (ref 70–99)
HCT VFR BLD CALC: 41.6 % — SIGNIFICANT CHANGE UP (ref 34.5–45)
HGB BLD-MCNC: 12.6 G/DL — SIGNIFICANT CHANGE UP (ref 11.5–15.5)
MAGNESIUM SERPL-MCNC: 2.1 MG/DL — SIGNIFICANT CHANGE UP (ref 1.6–2.6)
MCHC RBC-ENTMCNC: 20.9 PG — LOW (ref 27–34)
MCHC RBC-ENTMCNC: 30.3 GM/DL — LOW (ref 32–36)
MCV RBC AUTO: 69 FL — LOW (ref 80–100)
PHOSPHATE SERPL-MCNC: 3.6 MG/DL — SIGNIFICANT CHANGE UP (ref 2.5–4.5)
PLATELET # BLD AUTO: 198 K/UL — SIGNIFICANT CHANGE UP (ref 150–400)
POTASSIUM SERPL-MCNC: 4.9 MMOL/L — SIGNIFICANT CHANGE UP (ref 3.5–5.3)
POTASSIUM SERPL-SCNC: 4.9 MMOL/L — SIGNIFICANT CHANGE UP (ref 3.5–5.3)
PROT SERPL-MCNC: 6.1 G/DL — SIGNIFICANT CHANGE UP (ref 6–8.3)
RBC # BLD: 6.03 M/UL — HIGH (ref 3.8–5.2)
RBC # FLD: 16.6 % — HIGH (ref 10.3–14.5)
SODIUM SERPL-SCNC: 130 MMOL/L — LOW (ref 135–145)
WBC # BLD: 11.82 K/UL — HIGH (ref 3.8–10.5)
WBC # FLD AUTO: 11.82 K/UL — HIGH (ref 3.8–10.5)

## 2019-05-02 PROCEDURE — 80048 BASIC METABOLIC PNL TOTAL CA: CPT

## 2019-05-02 PROCEDURE — 87507 IADNA-DNA/RNA PROBE TQ 12-25: CPT

## 2019-05-02 PROCEDURE — 93321 DOPPLER ECHO F-UP/LMTD STD: CPT

## 2019-05-02 PROCEDURE — 82436 ASSAY OF URINE CHLORIDE: CPT

## 2019-05-02 PROCEDURE — 83036 HEMOGLOBIN GLYCOSYLATED A1C: CPT

## 2019-05-02 PROCEDURE — 87324 CLOSTRIDIUM AG IA: CPT

## 2019-05-02 PROCEDURE — 87329 GIARDIA AG IA: CPT

## 2019-05-02 PROCEDURE — 88305 TISSUE EXAM BY PATHOLOGIST: CPT

## 2019-05-02 PROCEDURE — 84999 UNLISTED CHEMISTRY PROCEDURE: CPT

## 2019-05-02 PROCEDURE — 82705 FATS/LIPIDS FECES QUAL: CPT

## 2019-05-02 PROCEDURE — 87045 FECES CULTURE AEROBIC BACT: CPT

## 2019-05-02 PROCEDURE — 83550 IRON BINDING TEST: CPT

## 2019-05-02 PROCEDURE — C1889: CPT

## 2019-05-02 PROCEDURE — 81376 HLA II TYPING 1 LOCUS LR: CPT

## 2019-05-02 PROCEDURE — 82945 GLUCOSE OTHER FLUID: CPT

## 2019-05-02 PROCEDURE — 84484 ASSAY OF TROPONIN QUANT: CPT

## 2019-05-02 PROCEDURE — 83993 ASSAY FOR CALPROTECTIN FECAL: CPT

## 2019-05-02 PROCEDURE — 75989 ABSCESS DRAINAGE UNDER X-RAY: CPT

## 2019-05-02 PROCEDURE — 84100 ASSAY OF PHOSPHORUS: CPT

## 2019-05-02 PROCEDURE — 84133 ASSAY OF URINE POTASSIUM: CPT

## 2019-05-02 PROCEDURE — 83690 ASSAY OF LIPASE: CPT

## 2019-05-02 PROCEDURE — 84156 ASSAY OF PROTEIN URINE: CPT

## 2019-05-02 PROCEDURE — 87449 NOS EACH ORGANISM AG IA: CPT

## 2019-05-02 PROCEDURE — 82728 ASSAY OF FERRITIN: CPT

## 2019-05-02 PROCEDURE — 82607 VITAMIN B-12: CPT

## 2019-05-02 PROCEDURE — 84302 ASSAY OF SWEAT SODIUM: CPT

## 2019-05-02 PROCEDURE — 71250 CT THORAX DX C-: CPT

## 2019-05-02 PROCEDURE — 82042 OTHER SOURCE ALBUMIN QUAN EA: CPT

## 2019-05-02 PROCEDURE — 86803 HEPATITIS C AB TEST: CPT

## 2019-05-02 PROCEDURE — 87075 CULTR BACTERIA EXCEPT BLOOD: CPT

## 2019-05-02 PROCEDURE — 82553 CREATINE MB FRACTION: CPT

## 2019-05-02 PROCEDURE — 33015: CPT

## 2019-05-02 PROCEDURE — 82784 ASSAY IGA/IGD/IGG/IGM EACH: CPT

## 2019-05-02 PROCEDURE — 87046 STOOL CULTR AEROBIC BACT EA: CPT

## 2019-05-02 PROCEDURE — 99285 EMERGENCY DEPT VISIT HI MDM: CPT

## 2019-05-02 PROCEDURE — 82274 ASSAY TEST FOR BLOOD FECAL: CPT

## 2019-05-02 PROCEDURE — 93308 TTE F-UP OR LMTD: CPT

## 2019-05-02 PROCEDURE — 84300 ASSAY OF URINE SODIUM: CPT

## 2019-05-02 PROCEDURE — 71045 X-RAY EXAM CHEST 1 VIEW: CPT

## 2019-05-02 PROCEDURE — 84157 ASSAY OF PROTEIN OTHER: CPT

## 2019-05-02 PROCEDURE — 82962 GLUCOSE BLOOD TEST: CPT

## 2019-05-02 PROCEDURE — 93005 ELECTROCARDIOGRAM TRACING: CPT

## 2019-05-02 PROCEDURE — 82570 ASSAY OF URINE CREATININE: CPT

## 2019-05-02 PROCEDURE — 82150 ASSAY OF AMYLASE: CPT

## 2019-05-02 PROCEDURE — 76770 US EXAM ABDO BACK WALL COMP: CPT

## 2019-05-02 PROCEDURE — 88112 CYTOPATH CELL ENHANCE TECH: CPT

## 2019-05-02 PROCEDURE — 87177 OVA AND PARASITES SMEARS: CPT

## 2019-05-02 PROCEDURE — 89051 BODY FLUID CELL COUNT: CPT

## 2019-05-02 PROCEDURE — 80053 COMPREHEN METABOLIC PANEL: CPT

## 2019-05-02 PROCEDURE — 83615 LACTATE (LD) (LDH) ENZYME: CPT

## 2019-05-02 PROCEDURE — 82746 ASSAY OF FOLIC ACID SERUM: CPT

## 2019-05-02 PROCEDURE — 82550 ASSAY OF CK (CPK): CPT

## 2019-05-02 PROCEDURE — 97530 THERAPEUTIC ACTIVITIES: CPT

## 2019-05-02 PROCEDURE — 86364 TISS TRNSGLTMNASE EA IG CLAS: CPT

## 2019-05-02 PROCEDURE — 85730 THROMBOPLASTIN TIME PARTIAL: CPT

## 2019-05-02 PROCEDURE — 97116 GAIT TRAINING THERAPY: CPT

## 2019-05-02 PROCEDURE — 97161 PT EVAL LOW COMPLEX 20 MIN: CPT

## 2019-05-02 PROCEDURE — 83735 ASSAY OF MAGNESIUM: CPT

## 2019-05-02 PROCEDURE — 85610 PROTHROMBIN TIME: CPT

## 2019-05-02 PROCEDURE — C1729: CPT

## 2019-05-02 PROCEDURE — 85027 COMPLETE CBC AUTOMATED: CPT

## 2019-05-02 PROCEDURE — 94640 AIRWAY INHALATION TREATMENT: CPT

## 2019-05-02 PROCEDURE — 87070 CULTURE OTHR SPECIMN AEROBIC: CPT

## 2019-05-02 PROCEDURE — 87205 SMEAR GRAM STAIN: CPT

## 2019-05-02 PROCEDURE — 83540 ASSAY OF IRON: CPT

## 2019-05-02 RX ORDER — AZELASTINE 137 UG/1
2 SPRAY, METERED NASAL
Qty: 0 | Refills: 0 | COMMUNITY

## 2019-05-02 RX ORDER — RIVAROXABAN 15 MG-20MG
1 KIT ORAL
Qty: 0 | Refills: 0 | COMMUNITY

## 2019-05-02 RX ORDER — INSULIN GLARGINE 100 [IU]/ML
38 INJECTION, SOLUTION SUBCUTANEOUS AT BEDTIME
Qty: 0 | Refills: 0 | Status: DISCONTINUED | OUTPATIENT
Start: 2019-05-02 | End: 2019-05-02

## 2019-05-02 RX ORDER — ROSUVASTATIN CALCIUM 5 MG/1
1 TABLET ORAL
Qty: 0 | Refills: 0 | COMMUNITY

## 2019-05-02 RX ORDER — INSULIN ASPART 100 [IU]/ML
12 INJECTION, SOLUTION SUBCUTANEOUS
Qty: 2 | Refills: 0 | OUTPATIENT
Start: 2019-05-02 | End: 2019-05-31

## 2019-05-02 RX ORDER — INSULIN DETEMIR 100/ML (3)
20 INSULIN PEN (ML) SUBCUTANEOUS
Qty: 0 | Refills: 0 | COMMUNITY

## 2019-05-02 RX ORDER — FLUTICASONE PROPIONATE 220 MCG
1 AEROSOL WITH ADAPTER (GRAM) INHALATION
Qty: 0 | Refills: 0 | COMMUNITY

## 2019-05-02 RX ORDER — INSULIN ASPART 100 [IU]/ML
4 INJECTION, SOLUTION SUBCUTANEOUS
Qty: 0 | Refills: 0 | COMMUNITY

## 2019-05-02 RX ORDER — FUROSEMIDE 40 MG
1 TABLET ORAL
Qty: 0 | Refills: 0 | COMMUNITY

## 2019-05-02 RX ORDER — SIMETHICONE 80 MG/1
1 TABLET, CHEWABLE ORAL
Qty: 120 | Refills: 0 | OUTPATIENT
Start: 2019-05-02 | End: 2019-05-31

## 2019-05-02 RX ORDER — ATORVASTATIN CALCIUM 80 MG/1
1 TABLET, FILM COATED ORAL
Qty: 30 | Refills: 0 | OUTPATIENT
Start: 2019-05-02 | End: 2019-05-31

## 2019-05-02 RX ORDER — FLUTICASONE PROPIONATE 220 MCG
1 AEROSOL WITH ADAPTER (GRAM) INHALATION
Qty: 1 | Refills: 0 | OUTPATIENT
Start: 2019-05-02 | End: 2019-05-31

## 2019-05-02 RX ORDER — AZELASTINE 137 UG/1
2 SPRAY, METERED NASAL
Qty: 1 | Refills: 0 | OUTPATIENT
Start: 2019-05-02 | End: 2019-05-31

## 2019-05-02 RX ORDER — NYSTATIN CREAM 100000 [USP'U]/G
1 CREAM TOPICAL
Qty: 1 | Refills: 0 | OUTPATIENT
Start: 2019-05-02 | End: 2019-05-31

## 2019-05-02 RX ORDER — PHENYLEPHRINE-SHARK LIVER OIL-MINERAL OIL-PETROLATUM RECTAL OINTMENT
1 OINTMENT (GRAM) RECTAL
Qty: 1 | Refills: 0 | OUTPATIENT
Start: 2019-05-02 | End: 2019-05-31

## 2019-05-02 RX ORDER — RIVAROXABAN 15 MG-20MG
1 KIT ORAL
Qty: 30 | Refills: 0 | OUTPATIENT
Start: 2019-05-02 | End: 2019-05-31

## 2019-05-02 RX ORDER — ALBUTEROL 90 UG/1
0 AEROSOL, METERED ORAL
Qty: 0 | Refills: 0 | COMMUNITY

## 2019-05-02 RX ORDER — CARVEDILOL PHOSPHATE 80 MG/1
1 CAPSULE, EXTENDED RELEASE ORAL
Qty: 60 | Refills: 0 | OUTPATIENT
Start: 2019-05-02 | End: 2019-05-31

## 2019-05-02 RX ORDER — ASPIRIN/CALCIUM CARB/MAGNESIUM 324 MG
1 TABLET ORAL
Qty: 0 | Refills: 0 | COMMUNITY

## 2019-05-02 RX ORDER — LOPERAMIDE HCL 2 MG
1 TABLET ORAL
Qty: 60 | Refills: 0 | OUTPATIENT
Start: 2019-05-02 | End: 2019-05-16

## 2019-05-02 RX ORDER — FUROSEMIDE 40 MG
1 TABLET ORAL
Qty: 30 | Refills: 0 | OUTPATIENT
Start: 2019-05-02 | End: 2019-05-31

## 2019-05-02 RX ORDER — LIDOCAINE 4 G/100G
1 CREAM TOPICAL
Qty: 1 | Refills: 0 | OUTPATIENT
Start: 2019-05-02 | End: 2019-05-31

## 2019-05-02 RX ORDER — LEVOTHYROXINE SODIUM 125 MCG
1 TABLET ORAL
Qty: 30 | Refills: 0 | OUTPATIENT
Start: 2019-05-02 | End: 2019-05-31

## 2019-05-02 RX ORDER — ENOXAPARIN SODIUM 100 MG/ML
36 INJECTION SUBCUTANEOUS
Qty: 1 | Refills: 0 | OUTPATIENT
Start: 2019-05-02 | End: 2019-05-31

## 2019-05-02 RX ORDER — INSULIN LISPRO 100/ML
12 VIAL (ML) SUBCUTANEOUS
Qty: 0 | Refills: 0 | Status: DISCONTINUED | OUTPATIENT
Start: 2019-05-02 | End: 2019-05-02

## 2019-05-02 RX ORDER — ASPIRIN/CALCIUM CARB/MAGNESIUM 324 MG
1 TABLET ORAL
Qty: 30 | Refills: 0 | OUTPATIENT
Start: 2019-05-02 | End: 2019-05-31

## 2019-05-02 RX ADMIN — SIMETHICONE 80 MILLIGRAM(S): 80 TABLET, CHEWABLE ORAL at 12:25

## 2019-05-02 RX ADMIN — NYSTATIN CREAM 1 APPLICATION(S): 100000 CREAM TOPICAL at 05:28

## 2019-05-02 RX ADMIN — Medication 20 MILLIGRAM(S): at 12:25

## 2019-05-02 RX ADMIN — Medication 25 MICROGRAM(S): at 05:27

## 2019-05-02 RX ADMIN — Medication 1 APPLICATION(S): at 05:27

## 2019-05-02 RX ADMIN — Medication 2: at 12:24

## 2019-05-02 RX ADMIN — Medication 10: at 08:02

## 2019-05-02 RX ADMIN — Medication 12 UNIT(S): at 12:25

## 2019-05-02 RX ADMIN — SIMETHICONE 80 MILLIGRAM(S): 80 TABLET, CHEWABLE ORAL at 05:27

## 2019-05-02 RX ADMIN — CARVEDILOL PHOSPHATE 6.25 MILLIGRAM(S): 80 CAPSULE, EXTENDED RELEASE ORAL at 05:27

## 2019-05-02 RX ADMIN — Medication 1 APPLICATION(S): at 12:30

## 2019-05-02 RX ADMIN — Medication 12 UNIT(S): at 08:02

## 2019-05-02 NOTE — PROGRESS NOTE ADULT - ASSESSMENT
Pt is a 52 yo female with ICM s/p ICD, sarcoid admitted with moderate to lare sized circumferential pericardial effusion. Also has been having persistent diarrhea.     1.	Problem/Recommendation:  Problem: Pericardial effusion. Recommendation: seen on Echo at outpatient office  card eval appreciated  IR pericardiac effusion drainage, S/P pericardiac drain, + drainage, drain removed by IR   patient is hemodynamically stable   CT chest noted.  BP monitoring     Problem/Recommendation:  YANA,   monitor BUN/Cr  improving   avoid nephrotoxic medications   Renal eval appreciated   diuresis as per nephrology   I and Os   would  hold of further CT with IV contrast     Problem/Recommendation :  ·  Problem: Diarrhea.  Recommendation: chronic for months   GI eval appreciated  send stool studies   C diff negative   in view of Hx of sarcoidosis, GI autoimmune diseases need to be ruled out   diet control  monitor electrolytes.   S/P colonoscopy EGD< no acute findings, awaiting for biopsy results   G/I F/U    Problem/Recommendation -:  ·  Problem: Sarcoidosis.  Recommendation: on prednisone.   monitor BUN/Cr  avoid nephrotoxic medications     Problem/Recommendation :  ·  Problem: History of atrial fibrillation.  Recommendation: rate control  Cont AC   card F/U.     Problem/Recommendation :  ·  Problem: CAD (coronary artery disease).  Recommendation: S/P PCI  Statin and AC.     Problem/Recommendation :  Problem: Diabetes mellitus. Recommendation: sliding scale   NOT CONTROLLED, diet issue   Nutritionist eval   endo eval appreciated    lantus and premeal humalog for better BS control   elevate A1c   DM diet  Endo eval for BS management.    Problem/Recommendation:  Problem: Prophylactic measure. Recommendation: DVT and Gi PPX.    D/C planing with F/U with card, GI and nephrology

## 2019-05-02 NOTE — DISCHARGE NOTE PROVIDER - CARE PROVIDER_API CALL
David Sam (DO)  Cardiology; Internal Medicine  [address] [facility], [address]  [place] [address]  Phone: [phone]  Fax: [phone]  Follow Up Time:     Marlena Brown)  EndocrinologyMetabDiabetes; Internal Medicine  [address] [place] [address]  Phone: [phone]  Fax: [phone]  Follow Up Time:     Jair Lion)  Internal Medicine; Nephrology  [address] [facility] [address]  [place] [address]  Phone: [phone]  Fax: [phone]  Follow Up Time:     Emmanuel Vazquez (DO)  Medicine  [address] [facility] [facility] [address]  [place] [address]  Phone: [phone]  Fax: [phone]  Follow Up Time:     Félix Christine (DO)  Gastroenterology; Internal Medicine  [address] [address]  Phone: [phone]  Fax: [phone]  Follow Up Time:

## 2019-05-02 NOTE — PROGRESS NOTE ADULT - ASSESSMENT
Assessment  DMT2: 53y Female with DM T2 with hyperglycemia, still on high dose steroids, on basal bolus insulin,  eating inconsistent meals, non compliant with low carb diet, planing DC home today, blood sugars trending down,  no hypoglycemic episode,.  CAD/Pericardial effusion: on medications, no chest pain, stable, monitored.  HTN: Controlled,  on antihypertensive medications.  CKD: Monitor labs/BMP,   Sarcoidosis: On treatment, stable.

## 2019-05-02 NOTE — DISCHARGE NOTE PROVIDER - NSDCFUADDAPPT_GEN_ALL_CORE_FT
follow up with primary care physician/cardiology within one week after discharge  follow up with nephrology Dr. Valente Lion within one week after discharge to monitor kidney funchon  follow up with endocrine Dr. Brown for diabetic management & A1C @ 12.2 %  follow up with gastroenterology Dr. Christine for colonoscopy results and chronic diarrhea  you have prescription for rollator walker

## 2019-05-02 NOTE — PROGRESS NOTE ADULT - REASON FOR ADMISSION
sent here for pericardial effusion
pericardial effusion
sent here for pericardial effusion

## 2019-05-02 NOTE — PROGRESS NOTE ADULT - PROBLEM SELECTOR PLAN 1
Will continue current insulin regimen for now. Will continue monitoring FS, log, will Follow up.  DC home on current insulin regimen, FU  Patient counseled for compliance with consistent low carb diet.

## 2019-05-02 NOTE — PROGRESS NOTE ADULT - ASSESSMENT
await path, contninue conservative mangemnt.  lfts to follow up. no reason from gi perspective cna not d/c

## 2019-05-02 NOTE — PROGRESS NOTE ADULT - SUBJECTIVE AND OBJECTIVE BOX
Patient seen and examined in bed. No pain, no SOB.      MEDICATIONS  (STANDING):  atorvastatin 40 milliGRAM(s) Oral at bedtime  carvedilol 6.25 milliGRAM(s) Oral every 12 hours  dextrose 5%. 1000 milliLiter(s) (50 mL/Hr) IV Continuous <Continuous>  dextrose 50% Injectable 12.5 Gram(s) IV Push once  dextrose 50% Injectable 25 Gram(s) IV Push once  dextrose 50% Injectable 25 Gram(s) IV Push once  fluticasone propionate 50 MICROgram(s)/spray Nasal Spray 1 Spray(s) Both Nostrils two times a day  insulin glargine Injectable (LANTUS) 38 Unit(s) SubCutaneous at bedtime  insulin lispro (HumaLOG) corrective regimen sliding scale   SubCutaneous three times a day before meals  insulin lispro (HumaLOG) corrective regimen sliding scale   SubCutaneous at bedtime  insulin lispro Injectable (HumaLOG) 12 Unit(s) SubCutaneous before breakfast  insulin lispro Injectable (HumaLOG) 12 Unit(s) SubCutaneous before lunch  insulin lispro Injectable (HumaLOG) 12 Unit(s) SubCutaneous before dinner  levothyroxine 25 MICROGram(s) Oral daily  mometasone 220 MICROgram(s) Inhaler 1 Puff(s) Inhalation two times a day  nystatin Cream 1 Application(s) Topical two times a day  predniSONE   Tablet 20 milliGRAM(s) Oral two times a day  rivaroxaban 15 milliGRAM(s) Oral every 24 hours  simethicone 80 milliGRAM(s) Chew four times a day  vitamin A &amp; D Ointment 1 Application(s) Topical three times a day      VITAL:  T(C): , Max: 37.1 (05-01-19 @ 20:40)  T(F): , Max: 98.7 (05-01-19 @ 20:40)  HR: 94 (05-02-19 @ 05:45)  BP: 137/94 (05-02-19 @ 05:45)  RR: 16 (05-02-19 @ 05:45)  SpO2: 94% (05-02-19 @ 05:45)    I and O's:    05-01 @ 07:01  -  05-02 @ 07:00  --------------------------------------------------------  IN: 1260 mL / OUT: 2600 mL / NET: -1340 mL    05-02 @ 07:01  -  05-02 @ 14:06  --------------------------------------------------------  IN: 360 mL / OUT: 0 mL / NET: 360 mL          PHYSICAL EXAM:    Constitutional: NAD  Neck:  No JVD  Respiratory: CTAB/L  Cardiovascular: S1 and S2  Gastrointestinal: BS+, soft, NT/ND  Extremities: No peripheral edema  : No Oliva  Skin: No rashes    LABS:                        12.6   11.82 )-----------( 198      ( 02 May 2019 08:38 )             41.6     05-02    130<L>  |  95<L>  |  140<H>  ----------------------------<  423<H>  4.9   |  22  |  1.69<H>    Ca    8.6      02 May 2019 05:47  Phos  3.6     05-02  Mg     2.1     05-02    TPro  6.1  /  Alb  2.7<L>  /  TBili  0.3  /  DBili  x   /  AST  68<H>  /  ALT  103<H>  /  AlkPhos  220<H>  05-02      ASSESSMENT:  52F w/ HTN , DM2, CAD, HFrEF, CKD3, and Sarcoidosis, 4/16/19 a/w large pericardial effusion; s/p drainage; admission c/b YANA   (1)Renal - proteinuric CKD3; likely diabetic nephropathy. YANA, prerenal from diuretics/diarrhea - now improving  (2)Hyperkalemia - in part from YANA; also in part from cellular shifts induced by hyperglycemia - stable  (3)Hyponatremia - dilutional from hyperglycemia - stable  (4)CV - hypovolemic - due to combination of diuretics and diarrhea, improving     RECOMMEND:  (1)Hold diuretics for now  (2)BMP daily        Judy Garcia, NP-C  [facility],   [phone] Patient seen and examined in bed. No pain, no SOB.      MEDICATIONS  (STANDING):  atorvastatin 40 milliGRAM(s) Oral at bedtime  carvedilol 6.25 milliGRAM(s) Oral every 12 hours  dextrose 5%. 1000 milliLiter(s) (50 mL/Hr) IV Continuous <Continuous>  dextrose 50% Injectable 12.5 Gram(s) IV Push once  dextrose 50% Injectable 25 Gram(s) IV Push once  dextrose 50% Injectable 25 Gram(s) IV Push once  fluticasone propionate 50 MICROgram(s)/spray Nasal Spray 1 Spray(s) Both Nostrils two times a day  insulin glargine Injectable (LANTUS) 38 Unit(s) SubCutaneous at bedtime  insulin lispro (HumaLOG) corrective regimen sliding scale   SubCutaneous three times a day before meals  insulin lispro (HumaLOG) corrective regimen sliding scale   SubCutaneous at bedtime  insulin lispro Injectable (HumaLOG) 12 Unit(s) SubCutaneous before breakfast  insulin lispro Injectable (HumaLOG) 12 Unit(s) SubCutaneous before lunch  insulin lispro Injectable (HumaLOG) 12 Unit(s) SubCutaneous before dinner  levothyroxine 25 MICROGram(s) Oral daily  mometasone 220 MICROgram(s) Inhaler 1 Puff(s) Inhalation two times a day  nystatin Cream 1 Application(s) Topical two times a day  predniSONE   Tablet 20 milliGRAM(s) Oral two times a day  rivaroxaban 15 milliGRAM(s) Oral every 24 hours  simethicone 80 milliGRAM(s) Chew four times a day  vitamin A &amp; D Ointment 1 Application(s) Topical three times a day      VITAL:  T(C): , Max: 37.1 (05-01-19 @ 20:40)  T(F): , Max: 98.7 (05-01-19 @ 20:40)  HR: 94 (05-02-19 @ 05:45)  BP: 137/94 (05-02-19 @ 05:45)  RR: 16 (05-02-19 @ 05:45)  SpO2: 94% (05-02-19 @ 05:45)    I and O's:    05-01 @ 07:01  -  05-02 @ 07:00  --------------------------------------------------------  IN: 1260 mL / OUT: 2600 mL / NET: -1340 mL    05-02 @ 07:01  -  05-02 @ 14:06  --------------------------------------------------------  IN: 360 mL / OUT: 0 mL / NET: 360 mL          PHYSICAL EXAM:    Constitutional: NAD  Neck:  No JVD  Respiratory: CTAB/L  Cardiovascular: S1 and S2  Gastrointestinal: BS+, soft, NT/ND  Extremities: No peripheral edema  : No Oliva  Skin: No rashes    LABS:                        12.6   11.82 )-----------( 198      ( 02 May 2019 08:38 )             41.6     05-02    130<L>  |  95<L>  |  140<H>  ----------------------------<  423<H>  4.9   |  22  |  1.69<H>    Ca    8.6      02 May 2019 05:47  Phos  3.6     05-02  Mg     2.1     05-02    TPro  6.1  /  Alb  2.7<L>  /  TBili  0.3  /  DBili  x   /  AST  68<H>  /  ALT  103<H>  /  AlkPhos  220<H>  05-02        ASSESSMENT:  52F w/ HTN , DM2, CAD, HFrEF, CKD3, and Sarcoidosis, 4/16/19 a/w large pericardial effusion; s/p drainage; admission c/b YANA   (1)Renal - proteinuric CKD3; likely diabetic nephropathy. YANA, prerenal from diuretics/diarrhea - now improving  (2)Hyperkalemia - in part from YANA; also in part from cellular shifts induced by hyperglycemia - stable  (3)Hyponatremia - dilutional from hyperglycemia - stable  (4)CV - hypovolemic - due to combination of diuretics and diarrhea, improving     RECOMMEND:  (1)No renal objection to discharge  (2)Lasix 80 mg po daily, metolazone 5mg po every other day      Judy D'Aconti, NP-C  [facility]  [phone] Patient seen and examined in bed. No pain, no SOB.      MEDICATIONS  (STANDING):  atorvastatin 40 milliGRAM(s) Oral at bedtime  carvedilol 6.25 milliGRAM(s) Oral every 12 hours  dextrose 5%. 1000 milliLiter(s) (50 mL/Hr) IV Continuous <Continuous>  dextrose 50% Injectable 12.5 Gram(s) IV Push once  dextrose 50% Injectable 25 Gram(s) IV Push once  dextrose 50% Injectable 25 Gram(s) IV Push once  fluticasone propionate 50 MICROgram(s)/spray Nasal Spray 1 Spray(s) Both Nostrils two times a day  insulin glargine Injectable (LANTUS) 38 Unit(s) SubCutaneous at bedtime  insulin lispro (HumaLOG) corrective regimen sliding scale   SubCutaneous three times a day before meals  insulin lispro (HumaLOG) corrective regimen sliding scale   SubCutaneous at bedtime  insulin lispro Injectable (HumaLOG) 12 Unit(s) SubCutaneous before breakfast  insulin lispro Injectable (HumaLOG) 12 Unit(s) SubCutaneous before lunch  insulin lispro Injectable (HumaLOG) 12 Unit(s) SubCutaneous before dinner  levothyroxine 25 MICROGram(s) Oral daily  mometasone 220 MICROgram(s) Inhaler 1 Puff(s) Inhalation two times a day  nystatin Cream 1 Application(s) Topical two times a day  predniSONE   Tablet 20 milliGRAM(s) Oral two times a day  rivaroxaban 15 milliGRAM(s) Oral every 24 hours  simethicone 80 milliGRAM(s) Chew four times a day  vitamin A &amp; D Ointment 1 Application(s) Topical three times a day      VITAL:  T(C): , Max: 37.1 (05-01-19 @ 20:40)  T(F): , Max: 98.7 (05-01-19 @ 20:40)  HR: 94 (05-02-19 @ 05:45)  BP: 137/94 (05-02-19 @ 05:45)  RR: 16 (05-02-19 @ 05:45)  SpO2: 94% (05-02-19 @ 05:45)    I and O's:    05-01 @ 07:01  -  05-02 @ 07:00  --------------------------------------------------------  IN: 1260 mL / OUT: 2600 mL / NET: -1340 mL    05-02 @ 07:01  -  05-02 @ 14:06  --------------------------------------------------------  IN: 360 mL / OUT: 0 mL / NET: 360 mL          PHYSICAL EXAM:    Constitutional: NAD  Neck:  No JVD  Respiratory: CTAB/L  Cardiovascular: S1 and S2  Gastrointestinal: BS+, soft, NT/ND  Extremities: No peripheral edema  : No Oliva  Skin: No rashes    LABS:                        12.6   11.82 )-----------( 198      ( 02 May 2019 08:38 )             41.6     05-02    130<L>  |  95<L>  |  140<H>  ----------------------------<  423<H>  4.9   |  22  |  1.69<H>    Ca    8.6      02 May 2019 05:47  Phos  3.6     05-02  Mg     2.1     05-02    TPro  6.1  /  Alb  2.7<L>  /  TBili  0.3  /  DBili  x   /  AST  68<H>  /  ALT  103<H>  /  AlkPhos  220<H>  05-02        ASSESSMENT:  52F w/ HTN , DM2, CAD, HFrEF, CKD3, and Sarcoidosis, 4/16/19 a/w large pericardial effusion; s/p drainage; admission c/b YANA   (1)Renal - proteinuric CKD3; likely diabetic nephropathy. YANA, prerenal from diuretics/diarrhea - now improving  (2)Hyperkalemia - in part from YANA; also in part from cellular shifts induced by hyperglycemia - stable  (3)Hyponatremia - dilutional from hyperglycemia - stable  (4)CV - hypovolemic - due to combination of diuretics and diarrhea, improving     RECOMMEND:  (1)No renal objection to discharge  	- follow up with MD Lion in the office in one month  	- BMP in 1-2 weeks  (2)Lasix 80 mg po daily, metolazone 5mg po every other day      Judy Garcia, NP-C  [facility]  [phone]

## 2019-05-02 NOTE — CHART NOTE - NSCHARTNOTEFT_GEN_A_CORE
Patient has not required oxygen for last few days - uses NC @ 2L/min prn at home - may be discharged via ambulette with no oxygen safely

## 2019-05-02 NOTE — PROGRESS NOTE ADULT - SUBJECTIVE AND OBJECTIVE BOX
Subjective: Patient seen and examined. No new events except as noted.     REVIEW OF SYSTEMS:    CONSTITUTIONAL: No weakness, fevers or chills  EYES/ENT: No visual changes;  No vertigo or throat pain   NECK: No pain or stiffness  RESPIRATORY: No cough, wheezing, hemoptysis; No shortness of breath  CARDIOVASCULAR: No chest pain or palpitations  GASTROINTESTINAL: No abdominal or epigastric pain. No nausea, vomiting, or hematemesis; No diarrhea or constipation. No melena or hematochezia.  GENITOURINARY: No dysuria, frequency or hematuria  NEUROLOGICAL: No numbness or weakness  SKIN: No itching, burning, rashes, or lesions   All other review of systems is negative unless indicated above.    MEDICATIONS:  MEDICATIONS  (STANDING):  atorvastatin 40 milliGRAM(s) Oral at bedtime  carvedilol 6.25 milliGRAM(s) Oral every 12 hours  dextrose 5%. 1000 milliLiter(s) (50 mL/Hr) IV Continuous <Continuous>  dextrose 50% Injectable 12.5 Gram(s) IV Push once  dextrose 50% Injectable 25 Gram(s) IV Push once  dextrose 50% Injectable 25 Gram(s) IV Push once  fluticasone propionate 50 MICROgram(s)/spray Nasal Spray 1 Spray(s) Both Nostrils two times a day  insulin glargine Injectable (LANTUS) 38 Unit(s) SubCutaneous at bedtime  insulin lispro (HumaLOG) corrective regimen sliding scale   SubCutaneous three times a day before meals  insulin lispro (HumaLOG) corrective regimen sliding scale   SubCutaneous at bedtime  insulin lispro Injectable (HumaLOG) 12 Unit(s) SubCutaneous before breakfast  insulin lispro Injectable (HumaLOG) 12 Unit(s) SubCutaneous before lunch  insulin lispro Injectable (HumaLOG) 12 Unit(s) SubCutaneous before dinner  levothyroxine 25 MICROGram(s) Oral daily  mometasone 220 MICROgram(s) Inhaler 1 Puff(s) Inhalation two times a day  nystatin Cream 1 Application(s) Topical two times a day  predniSONE   Tablet 20 milliGRAM(s) Oral two times a day  rivaroxaban 15 milliGRAM(s) Oral every 24 hours  simethicone 80 milliGRAM(s) Chew four times a day  vitamin A &amp; D Ointment 1 Application(s) Topical three times a day      PHYSICAL EXAM:  T(C): 36.3 (05-02-19 @ 14:26), Max: 37.1 (05-01-19 @ 20:40)  HR: 93 (05-02-19 @ 14:26) (89 - 94)  BP: 127/78 (05-02-19 @ 14:26) (127/78 - 137/94)  RR: 16 (05-02-19 @ 14:26) (16 - 18)  SpO2: 99% (05-02-19 @ 14:26) (94% - 99%)  Wt(kg): --  I&O's Summary    01 May 2019 07:01  -  02 May 2019 07:00  --------------------------------------------------------  IN: 1260 mL / OUT: 2600 mL / NET: -1340 mL    02 May 2019 07:01  -  02 May 2019 18:02  --------------------------------------------------------  IN: 780 mL / OUT: 0 mL / NET: 780 mL          Appearance: Normal	  HEENT:   Normal oral mucosa, PERRL, EOMI	  Lymphatic: No lymphadenopathy , no edema  Cardiovascular: Normal S1 S2, No JVD, No murmurs , Peripheral pulses palpable 2+ bilaterally  Respiratory: Lungs clear to auscultation, normal effort 	  Gastrointestinal:  Soft, Non-tender, + BS	  Skin: No rashes, No ecchymoses, No cyanosis, warm to touch  Musculoskeletal: Normal range of motion, normal strength  Psychiatry:  Mood & affect appropriate  Ext: No edema      All labs, Imaging and EKGs personally reviewed                             12.6   11.82 )-----------( 198      ( 02 May 2019 08:38 )             41.6               05-02    130<L>  |  95<L>  |  140<H>  ----------------------------<  423<H>  4.9   |  22  |  1.69<H>    Ca    8.6      02 May 2019 05:47  Phos  3.6     05-02  Mg     2.1     05-02    TPro  6.1  /  Alb  2.7<L>  /  TBili  0.3  /  DBili  x   /  AST  68<H>  /  ALT  103<H>  /  AlkPhos  220<H>  05-02

## 2019-05-02 NOTE — DISCHARGE NOTE PROVIDER - NSDCCPCAREPLAN_GEN_ALL_CORE_FT
PRINCIPAL DISCHARGE DIAGNOSIS  Diagnosis: Pericardial effusion  Assessment and Plan of Treatment: had pericardial drain during this hospitalization w/ problem resolved  follow up with cardiology w/ any shortness of breath and regular visits      SECONDARY DISCHARGE DIAGNOSES  Diagnosis: Acute-on-chronic kidney injury  Assessment and Plan of Treatment: Avoid taking (NSAIDs) - (ex: Ibuprofen, Advil, Celebrex, Naprosyn)  Avoid taking any nephrotoxic agents (can harm kidneys) - Intravenous contrast for diagnostic testing, combination cold medications.  Have all medications adjusted for your renal function by your Health Care Provider.  Blood pressure control is important.  Take all medication as prescribed.  take Lasix & Metalazone as directed and follow up visit w/l nephrology Dr. Valente Lion within one week after discharge      Diagnosis: Thrombus in heart chamber  Assessment and Plan of Treatment: Thrombus in heart chamber  on anticoagulation w/ Xarelto    Diagnosis: Diabetes mellitus  Assessment and Plan of Treatment: Diabetes mellitus  HgA1C this admission was 12.2% which is very high  Make sure you get your HgA1c checked every three months.  If you take insulin, check your blood glucose before meals and at bedtime.  It's important not to skip any meals.  Keep a log of your blood glucose results and always take it with you to your doctor appointments.  Keep a list of your current medications including injectables and over the counter medications and bring this medication list with you to all your doctor appointments.  If you have not seen your opthalmologist this year call for appointment.  Check your feet daily for redness, sores, or openings. Do not self treat. If no improvement in two days call your primary care physician for an appointment.  Low blood sugar (hypoglycemia) is a blood sugar below 70mg/dl. Check your blood sugar if you feel signs/symptoms of hypoglycemia. If your blood sugar is below 70 take 15 grams of carbohydrates (ex 4 oz of apple juice, 3-4 glucosr tablets, or 4-6 oz of regular soda) wait 15 minutes and repeat blood sugar to make sure it comes up above 70.  If your blood sugar is above 70 and you are due for a meal, have a meal.  If you are not due for a meal have a snack.  This snack helps keeps your blood sugar at a safe range.  take Lantus and humalog as directed - follow up with PCP &/or endocrine  you received new prescription for glucometer and supplies since equipment in home old    Diagnosis: CAD (coronary artery disease)  Assessment and Plan of Treatment: CAD (coronary artery disease)  Coronary artery disease is a condition where the arteries the supply the heart muscle get clogges with fatty deposits & puts you at risk for a heart attack  Call your doctor if you have any new pain, pressure, or discomfort in the center of your chest, pain, tingling or discomfort in arms, back, neck, jaw, or stomach, shortness of breath, nausea, vomiting, burping or heartburn, sweating, cold and clammy skin, racing or abnormal heartbeat for more than 10 minutes or if they keep coming & going.  Call 911 and do not tr to get to hospital by care  You can help yourself with lefestyle changes (quitting smoking if you smoke), eat lots of fruits & vegetables & low fat dairy products, not a lot of meat & fatty foods, walk or some form of physical activity most days of the week, lose weight if you are overweight  Take your cardiac medication as prescribed to lower cholesterol, to lower blood pressure, aspirin to prevent blood clots, and diabetes control  Make sure to keep appointments with doctor for cardiac follow up care      Diagnosis: History of atrial fibrillation  Assessment and Plan of Treatment: History of atrial fibrillation  Atrial fibrillation is the most common heart rhythm problem & has the risk of stroke & heart attack  It helps if you control your blood pressure, not drink more than 1-2 alcohol drinks per day, cut down on caffeine, getting treatment for over active thyroid gland, & getting exercise  Call your doctor if you feel your heart racing or beating unusually, chest tightness or pain, lightheaded, faint, shortness of breath especially with exercise  It is important to take your heart medication as prescribed  You are on Xarelto for anticoagulation for stroke prevention  Xarelto/Rivaroxaban is used to thin the blood so clots will not form and to keep existing ones from getting bigger.  Take this medication daily as prescribed by your health care provider.  Take this medication with food to prevent upset stomach.  If you miss a dose call your health care provider or pharmacist right away.  Tell your doctor you use this drug before you have a spinal or epidural procedure  Tell dentists, surgeon, and other doctors that you use this drug.  You may bleed more easily.  Be careful and avoid injury.  Use a soft toothbrush and an electric razor.      Diagnosis: Sarcoidosis  Assessment and Plan of Treatment: Sarcoidosis  take Prednisone 20 mg twice daily w/ food  nasal oxygen @ 2L/min as needed    Diagnosis: Systolic CHF, chronic  Assessment and Plan of Treatment: Weigh yourself daily.  If you gain 3lbs in 3 days, or 5lbs in a week call your Health Care Provider.  Do not eat or drink foods containing more than 2000mg of salt (sodium) in your diet every day.  Call your Health Care Provider if you have any swelling or increased swelling in your feet, ankles, and/or stomach.  Take all of your medication as directed.  If you become dizzy call your Health Care Provider.      Diagnosis: Diarrhea  Assessment and Plan of Treatment: Diarrhea  take Imodium as needed and Simethicone around the clock as directed PRINCIPAL DISCHARGE DIAGNOSIS  Diagnosis: Pericardial effusion  Assessment and Plan of Treatment: had pericardial drain during this hospitalization w/ problem resolved  follow up with cardiology w/ any shortness of breath and regular visits      SECONDARY DISCHARGE DIAGNOSES  Diagnosis: Hypothyroid  Assessment and Plan of Treatment: you do not make enough thyroid hormone  signs & symptoms of low levels of thyroid hormone - tired, getting cold easily, coarse or thin hair, constipation, shortness of breath, swelling, irregular periods  your doctor will do thyroid hormone blood tests at least once a year to monitor if medication dose is adequate  take your thyroid medicine as directed by your doctor & on empty stomach      Diagnosis: Acute-on-chronic kidney injury  Assessment and Plan of Treatment: Avoid taking (NSAIDs) - (ex: Ibuprofen, Advil, Celebrex, Naprosyn)  Avoid taking any nephrotoxic agents (can harm kidneys) - Intravenous contrast for diagnostic testing, combination cold medications.  Have all medications adjusted for your renal function by your Health Care Provider.  Blood pressure control is important.  Take all medication as prescribed.  take Lasix & Metalazone as directed and follow up visit w/l nephrology Dr. Valente Lion within one week after discharge      Diagnosis: Thrombus in heart chamber  Assessment and Plan of Treatment: Thrombus in heart chamber  on anticoagulation w/ Xarelto    Diagnosis: Diabetes mellitus  Assessment and Plan of Treatment: Diabetes mellitus  HgA1C this admission was 12.2% which is very high  Make sure you get your HgA1c checked every three months.  If you take insulin, check your blood glucose before meals and at bedtime.  It's important not to skip any meals.  Keep a log of your blood glucose results and always take it with you to your doctor appointments.  Keep a list of your current medications including injectables and over the counter medications and bring this medication list with you to all your doctor appointments.  If you have not seen your opthalmologist this year call for appointment.  Check your feet daily for redness, sores, or openings. Do not self treat. If no improvement in two days call your primary care physician for an appointment.  Low blood sugar (hypoglycemia) is a blood sugar below 70mg/dl. Check your blood sugar if you feel signs/symptoms of hypoglycemia. If your blood sugar is below 70 take 15 grams of carbohydrates (ex 4 oz of apple juice, 3-4 glucosr tablets, or 4-6 oz of regular soda) wait 15 minutes and repeat blood sugar to make sure it comes up above 70.  If your blood sugar is above 70 and you are due for a meal, have a meal.  If you are not due for a meal have a snack.  This snack helps keeps your blood sugar at a safe range.  take Lantus and humalog as directed - follow up with PCP &/or endocrine  you received new prescription for glucometer and supplies since equipment in home old    Diagnosis: CAD (coronary artery disease)  Assessment and Plan of Treatment: CAD (coronary artery disease)  Coronary artery disease is a condition where the arteries the supply the heart muscle get clogges with fatty deposits & puts you at risk for a heart attack  Call your doctor if you have any new pain, pressure, or discomfort in the center of your chest, pain, tingling or discomfort in arms, back, neck, jaw, or stomach, shortness of breath, nausea, vomiting, burping or heartburn, sweating, cold and clammy skin, racing or abnormal heartbeat for more than 10 minutes or if they keep coming & going.  Call 911 and do not tr to get to hospital by care  You can help yourself with lefestyle changes (quitting smoking if you smoke), eat lots of fruits & vegetables & low fat dairy products, not a lot of meat & fatty foods, walk or some form of physical activity most days of the week, lose weight if you are overweight  Take your cardiac medication as prescribed to lower cholesterol, to lower blood pressure, aspirin to prevent blood clots, and diabetes control  Make sure to keep appointments with doctor for cardiac follow up care      Diagnosis: History of atrial fibrillation  Assessment and Plan of Treatment: History of atrial fibrillation  Atrial fibrillation is the most common heart rhythm problem & has the risk of stroke & heart attack  It helps if you control your blood pressure, not drink more than 1-2 alcohol drinks per day, cut down on caffeine, getting treatment for over active thyroid gland, & getting exercise  Call your doctor if you feel your heart racing or beating unusually, chest tightness or pain, lightheaded, faint, shortness of breath especially with exercise  It is important to take your heart medication as prescribed  You are on Xarelto for anticoagulation for stroke prevention  Xarelto/Rivaroxaban is used to thin the blood so clots will not form and to keep existing ones from getting bigger.  Take this medication daily as prescribed by your health care provider.  Take this medication with food to prevent upset stomach.  If you miss a dose call your health care provider or pharmacist right away.  Tell your doctor you use this drug before you have a spinal or epidural procedure  Tell dentists, surgeon, and other doctors that you use this drug.  You may bleed more easily.  Be careful and avoid injury.  Use a soft toothbrush and an electric razor.      Diagnosis: Sarcoidosis  Assessment and Plan of Treatment: Sarcoidosis  take Prednisone 20 mg twice daily w/ food  nasal oxygen @ 2L/min as needed    Diagnosis: Systolic CHF, chronic  Assessment and Plan of Treatment: Weigh yourself daily.  If you gain 3lbs in 3 days, or 5lbs in a week call your Health Care Provider.  Do not eat or drink foods containing more than 2000mg of salt (sodium) in your diet every day.  Call your Health Care Provider if you have any swelling or increased swelling in your feet, ankles, and/or stomach.  Take all of your medication as directed.  If you become dizzy call your Health Care Provider.      Diagnosis: Diarrhea  Assessment and Plan of Treatment: Diarrhea  take Imodium as needed and Simethicone around the clock as directed

## 2019-05-02 NOTE — DISCHARGE NOTE PROVIDER - REASON FOR ADMISSION
sent here for pericardial effusion requiring pericardial tube for drainage, hx sarcoidosis w/ chronic Prednisone, chronic intermittent diarrhea controlled w/ Imodium as needed, chronic systolic congestive heart failure w/ ejection fraction 10-15% - has AICD, had endoscopy/colonscopy this admission w/ biopsies, has home oxygen @ 2L/min as needed,

## 2019-05-02 NOTE — DISCHARGE NOTE NURSING/CASE MANAGEMENT/SOCIAL WORK - NSDCDPATPORTLINK_GEN_ALL_CORE
You can access the [facility] Portal, offered by [facility], by registering with the following website: [email or web address]

## 2019-05-02 NOTE — PROGRESS NOTE ADULT - SUBJECTIVE AND OBJECTIVE BOX
PEYMAN WATTS:[id number],   53yFemale followed for:  avocado (Unknown)  No Known Drug Allergies    PAST MEDICAL & SURGICAL HISTORY:  DM (diabetes mellitus)  CAD (coronary artery disease)  Sarcoidosis  Asthma with COPD  CHF, chronic  Mural thrombus of cardiac apex  History of atrial fibrillation  Thrombus: ? Thrombus in heart - on Xarelto  Hyperlipidemia  Anemia  Hypertension  Sarcoidosis  CHF (congestive heart failure): Stage III, on home O2 @ 2lpm  Diabetes  AICD (automatic cardioverter/defibrillator) present  History of repair of hip fracture  Ankle fracture  Ankle fracture: Left Ankle - with plate and screws placed  Hip deformity: left hip had a pin placed to hold bone in place after a fall    FAMILY HISTORY:  FH: breast cancer: mother  Family hx of colon cancer: Mother  Family history of hypertension (Sibling): Siblings  Family history of diabetes mellitus (Father, Sibling): Father, Siblings  Family history of breast cancer in mother (Mother): Mother  Family history of colon cancer in mother (Mother): Mother    MEDICATIONS  (STANDING):  atorvastatin 40 milliGRAM(s) Oral at bedtime  carvedilol 6.25 milliGRAM(s) Oral every 12 hours  dextrose 5%. 1000 milliLiter(s) (50 mL/Hr) IV Continuous <Continuous>  dextrose 50% Injectable 12.5 Gram(s) IV Push once  dextrose 50% Injectable 25 Gram(s) IV Push once  dextrose 50% Injectable 25 Gram(s) IV Push once  fluticasone propionate 50 MICROgram(s)/spray Nasal Spray 1 Spray(s) Both Nostrils two times a day  insulin glargine Injectable (LANTUS) 38 Unit(s) SubCutaneous at bedtime  insulin lispro (HumaLOG) corrective regimen sliding scale   SubCutaneous three times a day before meals  insulin lispro (HumaLOG) corrective regimen sliding scale   SubCutaneous at bedtime  insulin lispro Injectable (HumaLOG) 12 Unit(s) SubCutaneous before breakfast  insulin lispro Injectable (HumaLOG) 12 Unit(s) SubCutaneous before lunch  insulin lispro Injectable (HumaLOG) 12 Unit(s) SubCutaneous before dinner  levothyroxine 25 MICROGram(s) Oral daily  mometasone 220 MICROgram(s) Inhaler 1 Puff(s) Inhalation two times a day  nystatin Cream 1 Application(s) Topical two times a day  predniSONE   Tablet 20 milliGRAM(s) Oral two times a day  rivaroxaban 15 milliGRAM(s) Oral every 24 hours  simethicone 80 milliGRAM(s) Chew four times a day  vitamin A &amp; D Ointment 1 Application(s) Topical three times a day    MEDICATIONS  (PRN):  ALBUTerol    90 MICROgram(s) HFA Inhaler 2 Puff(s) Inhalation every 6 hours PRN Shortness of Breath and/or Wheezing  dextrose 40% Gel 15 Gram(s) Oral once PRN Blood Glucose LESS THAN 70 milliGRAM(s)/deciliter  glucagon  Injectable 1 milliGRAM(s) IntraMuscular once PRN Glucose LESS THAN 70 milligrams/deciliter  hemorrhoidal Ointment 1 Application(s) Rectal two times a day PRN Hemorrhoids  lidocaine 2% Gel 1 Application(s) Topical two times a day PRN perianally for pain  loperamide 2 milliGRAM(s) Oral four times a day PRN Diarrhea      Vital Signs Last 24 Hrs  T(C): 36.6 (02 May 2019 05:45), Max: 37.1 (01 May 2019 20:40)  T(F): 97.9 (02 May 2019 05:45), Max: 98.7 (01 May 2019 20:40)  HR: 94 (02 May 2019 05:45) (89 - 96)  BP: 137/94 (02 May 2019 05:45) (118/81 - 137/94)  BP(mean): --  RR: 16 (02 May 2019 05:45) (16 - 18)  SpO2: 94% (02 May 2019 05:45) (94% - 100%)  nc/at  s1s2  cta  soft, nt, nd no guarding or rebound  no c/c/e    CBC Full  -  ( 02 May 2019 08:38 )  WBC Count : 11.82 K/uL  RBC Count : 6.03 M/uL  Hemoglobin : 12.6 g/dL  Hematocrit : 41.6 %  Platelet Count - Automated : 198 K/uL  Mean Cell Volume : 69.0 fl  Mean Cell Hemoglobin : 20.9 pg  Mean Cell Hemoglobin Concentration : 30.3 gm/dL  Auto Neutrophil # : x  Auto Lymphocyte # : x  Auto Monocyte # : x  Auto Eosinophil # : x  Auto Basophil # : x  Auto Neutrophil % : x  Auto Lymphocyte % : x  Auto Monocyte % : x  Auto Eosinophil % : x  Auto Basophil % : x    05-02    130<L>  |  95<L>  |  140<H>  ----------------------------<  423<H>  4.9   |  22  |  1.69<H>    Ca    8.6      02 May 2019 05:47  Phos  3.6     05-02  Mg     2.1     05-02    TPro  6.1  /  Alb  2.7<L>  /  TBili  0.3  /  DBili  x   /  AST  68<H>  /  ALT  103<H>  /  AlkPhos  220<H>  05-02

## 2019-05-02 NOTE — PROGRESS NOTE ADULT - SUBJECTIVE AND OBJECTIVE BOX
Chief complaint  Patient is a 53y old  Female who presents with a chief complaint of sent here for pericardial effusion (01 May 2019 17:56)   Review of systems  Patient in bed, looks comfortable, no fever, no hypoglycemia.    Labs and Fingersticks  CAPILLARY BLOOD GLUCOSE      POCT Blood Glucose.: 389 mg/dL (02 May 2019 07:25)  POCT Blood Glucose.: 163 mg/dL (01 May 2019 21:23)  POCT Blood Glucose.: 157 mg/dL (01 May 2019 17:07)  POCT Blood Glucose.: 222 mg/dL (01 May 2019 11:43)      Anion Gap, Serum: 13 (05-02 @ 05:47)  Anion Gap, Serum: 14 (05-01 @ 05:25)      Calcium, Total Serum: 8.6 (05-02 @ 05:47)  Calcium, Total Serum: 8.3 <L> (05-01 @ 05:25)  Albumin, Serum: 2.7 <L> (05-02 @ 05:47)  Albumin, Serum: 2.7 <L> (05-01 @ 05:25)    Alanine Aminotransferase (ALT/SGPT): 103 <H> (05-02 @ 05:47)  Alanine Aminotransferase (ALT/SGPT): 91 <H> (05-01 @ 05:25)  Alkaline Phosphatase, Serum: 220 <H> (05-02 @ 05:47)  Alkaline Phosphatase, Serum: 211 <H> (05-01 @ 05:25)  Aspartate Aminotransferase (AST/SGOT): 68 <H> (05-02 @ 05:47)  Aspartate Aminotransferase (AST/SGOT): 45 <H> (05-01 @ 05:25)        05-02    130<L>  |  95<L>  |  140<H>  ----------------------------<  423<H>  4.9   |  22  |  1.69<H>    Ca    8.6      02 May 2019 05:47  Phos  3.6     05-02  Mg     2.1     05-02    TPro  6.1  /  Alb  2.7<L>  /  TBili  0.3  /  DBili  x   /  AST  68<H>  /  ALT  103<H>  /  AlkPhos  220<H>  05-02    Medications  MEDICATIONS  (STANDING):  atorvastatin 40 milliGRAM(s) Oral at bedtime  carvedilol 6.25 milliGRAM(s) Oral every 12 hours  dextrose 5%. 1000 milliLiter(s) (50 mL/Hr) IV Continuous <Continuous>  dextrose 50% Injectable 12.5 Gram(s) IV Push once  dextrose 50% Injectable 25 Gram(s) IV Push once  dextrose 50% Injectable 25 Gram(s) IV Push once  fluticasone propionate 50 MICROgram(s)/spray Nasal Spray 1 Spray(s) Both Nostrils two times a day  insulin glargine Injectable (LANTUS) 38 Unit(s) SubCutaneous at bedtime  insulin lispro (HumaLOG) corrective regimen sliding scale   SubCutaneous three times a day before meals  insulin lispro (HumaLOG) corrective regimen sliding scale   SubCutaneous at bedtime  insulin lispro Injectable (HumaLOG) 12 Unit(s) SubCutaneous before breakfast  insulin lispro Injectable (HumaLOG) 12 Unit(s) SubCutaneous before lunch  insulin lispro Injectable (HumaLOG) 12 Unit(s) SubCutaneous before dinner  levothyroxine 25 MICROGram(s) Oral daily  mometasone 220 MICROgram(s) Inhaler 1 Puff(s) Inhalation two times a day  nystatin Cream 1 Application(s) Topical two times a day  predniSONE   Tablet 20 milliGRAM(s) Oral two times a day  rivaroxaban 15 milliGRAM(s) Oral every 24 hours  simethicone 80 milliGRAM(s) Chew four times a day  vitamin A &amp; D Ointment 1 Application(s) Topical three times a day      Physical Exam  General: Patient comfortable in bed  Vital Signs Last 12 Hrs  T(F): 97.9 (05-02-19 @ 05:45), Max: 98.7 (05-01-19 @ 20:40)  HR: 94 (05-02-19 @ 05:45) (89 - 94)  BP: 137/94 (05-02-19 @ 05:45) (131/91 - 137/94)  BP(mean): --  RR: 16 (05-02-19 @ 05:45) (16 - 18)  SpO2: 94% (05-02-19 @ 05:45) (94% - 98%)  Neck: No palpable thyroid nodules.  CVS: S1S2, No murmurs  Respiratory: No wheezing, no crepitations  GI: Abdomen soft, bowel sounds positive  Musculoskeletal:  edema lower extremities.   Skin: No skin rashes, no ecchymosis    Diagnostics

## 2019-05-02 NOTE — DISCHARGE NOTE PROVIDER - HOSPITAL COURSE
Pt is a 54 yo female with ICM s/p ICD, sarcoid admitted with moderate to lare sized circumferential pericardial effusion. Also has been having persistent diarrhea.          Problem/Recommendation:    Problem: Pericardial effusion. Recommendation: seen on Echo at outpatient office    card eval appreciated    IR pericardiac effusion drainage, S/P pericardiac drain, + drainage, drain removed by IR     patient is hemodynamically stable     CT chest noted.    BP monitoring          Problem/Recommendation:    YANA,     monitor BUN/Cr    improving     avoid nephrotoxic medications     Renal eval appreciated     diuresis as per nephrology     I and Os     would  hold of further CT with IV contrast          Problem/Recommendation :    ·  Problem: Diarrhea.  Recommendation: chronic for months - Imodium prn and Simethicone ATC    GI eval appreciated    C diff negative     in view of Hx of sarcoidosis, GI autoimmune diseases need to be ruled out     diet control    monitor electrolytes.     S/P colonoscopy EGD< no acute findings, awaiting for biopsy results as outpatient     G/I F/U         Problem/Recommendation -:    ·  Problem: Sarcoidosis.  Recommendation: on prednisone chronic 20 mg twice daily    monitor BUN/Cr    avoid nephrotoxic medications          Problem/Recommendation :    ·  Problem: History of atrial fibrillation.  Recommendation: rate control    Cont AC     card F/U.          Problem/Recommendation :    ·  Problem: CAD (coronary artery disease).  Recommendation: S/P PCI    Statin and AC.          Problem/Recommendation :    Problem: Diabetes mellitus. Recommendation: sliding scale     NOT CONTROLLED, diet issue     Nutritionist eval     endo eval appreciated      lantus and premeal humalog for better BS control for steroid induced hyperglycemia    elevate A1c @ 12.2%    DM diet    Endo eval for BS management.

## 2019-05-02 NOTE — PROGRESS NOTE ADULT - PROVIDER SPECIALTY LIST ADULT
CT Surgery
Cardiology
Endocrinology
Gastroenterology
Internal Medicine
Intervent Radiology
Nephrology
Thoracic Surgery
Gastroenterology

## 2019-05-02 NOTE — DISCHARGE NOTE NURSING/CASE MANAGEMENT/SOCIAL WORK - NSDCPEPT PROEDHF_GEN_ALL_CORE
Monitor weight daily/Activities as tolerated/Call primary care provider for follow up after discharge/Report signs and symptoms to primary care provider/Low salt diet

## 2019-05-02 NOTE — DISCHARGE NOTE PROVIDER - CARE PROVIDERS DIRECT ADDRESSES
,DirectAddress_Unknown,DirectAddress_Unknown,DirectAddress_Unknown,DirectAddress_Unknown,[email or web address]

## 2019-07-06 NOTE — PROGRESS NOTE ADULT - PROBLEM SELECTOR PLAN 3
Problem: Potential for Falls  Goal: Patient will remain free of falls  Description  INTERVENTIONS:  - Assess patient frequently for physical needs  -  Identify cognitive and physical deficits and behaviors that affect risk of falls    -  [place] fall precautions as indicated by assessment   - Educate patient/family on patient safety including physical limitations  - Instruct patient to call for assistance with activity based on assessment  - Modify environment to reduce risk of injury  - Consider OT/PT consult to assist with strengthening/mobility  Outcome: Progressing     Problem: PAIN - ADULT  Goal: Verbalizes/displays adequate comfort level or baseline comfort level  Description  Interventions:  - Encourage patient to monitor pain and request assistance  - Assess pain using appropriate pain scale  - Administer analgesics based on type and severity of pain and evaluate response  - Implement non-pharmacological measures as appropriate and evaluate response  - Consider cultural and social influences on pain and pain management  - Notify physician/advanced practitioner if interventions unsuccessful or patient reports new pain  Outcome: Progressing     Problem: SAFETY ADULT  Goal: Maintain or return to baseline ADL function  Description  INTERVENTIONS:  -  Assess patient's ability to carry out ADLs; assess patient's baseline for ADL function and identify physical deficits which impact ability to perform ADLs (bathing, care of mouth/teeth, toileting, grooming, dressing, etc )  - Assess/evaluate cause of self-care deficits   - Assess range of motion  - Assess patient's mobility; develop plan if impaired  - Assess patient's need for assistive devices and provide as appropriate  - Encourage maximum independence but intervene and supervise when necessary  ¯ Involve family in performance of ADLs  ¯ Assess for home care needs following discharge   ¯ Request OT consult to assist with ADL evaluation and planning for discharge  ¯ Provide patient education as appropriate  Outcome: Progressing  Goal: Maintain or return mobility status to optimal level  Description  INTERVENTIONS:  - Assess patient's baseline mobility status (ambulation, transfers, stairs, etc )    - Identify cognitive and physical deficits and behaviors that affect mobility  - Identify mobility aids required to assist with transfers and/or ambulation (gait belt, sit-to-stand, lift, walker, cane, etc )  - [place] fall precautions as indicated by assessment  - Record patient progress and toleration of activity level on Mobility SBAR; progress patient to next Phase/Stage  - Instruct patient to call for assistance with activity based on assessment  - Request Rehabilitation consult to assist with strengthening/weightbearing, etc   Outcome: Progressing     Problem: DISCHARGE PLANNING  Goal: Discharge to home or other facility with appropriate resources  Description  INTERVENTIONS:  - Identify barriers to discharge w/patient and caregiver  - Arrange for needed discharge resources and transportation as appropriate  - Identify discharge learning needs (meds, wound care, etc )  - Arrange for interpretive services to assist at discharge as needed  - Refer to Case Management Department for coordinating discharge planning if the patient needs post-hospital services based on physician/advanced practitioner order or complex needs related to functional status, cognitive ability, or social support system  Outcome: Progressing     Problem: Knowledge Deficit  Goal: Patient/family/caregiver demonstrates understanding of disease process, treatment plan, medications, and discharge instructions  Description  Complete learning assessment and assess knowledge base    Interventions:  - Provide teaching at level of understanding  - Provide teaching via preferred learning methods  Outcome: Progressing     Problem: SAFETY ADULT  Goal: Maintain or return to baseline ADL function  Description  INTERVENTIONS:  -  Assess patient's ability to carry out ADLs; assess patient's baseline for ADL function and identify physical deficits which impact ability to perform ADLs (bathing, care of mouth/teeth, toileting, grooming, dressing, etc )  - Assess/evaluate cause of self-care deficits   - Assess range of motion  - Assess patient's mobility; develop plan if impaired  - Assess patient's need for assistive devices and provide as appropriate  - Encourage maximum independence but intervene and supervise when necessary  ¯ Involve family in performance of ADLs  ¯ Assess for home care needs following discharge   ¯ Request OT consult to assist with ADL evaluation and planning for discharge  ¯ Provide patient education as appropriate  Outcome: Progressing  Goal: Maintain or return mobility status to optimal level  Description  INTERVENTIONS:  - Assess patient's baseline mobility status (ambulation, transfers, stairs, etc )    - Identify cognitive and physical deficits and behaviors that affect mobility  - Identify mobility aids required to assist with transfers and/or ambulation (gait belt, sit-to-stand, lift, walker, cane, etc )  - [place] fall precautions as indicated by assessment  - Record patient progress and toleration of activity level on Mobility SBAR; progress patient to next Phase/Stage  - Instruct patient to call for assistance with activity based on assessment  - Request Rehabilitation consult to assist with strengthening/weightbearing, etc   Outcome: Progressing     Problem: CARDIOVASCULAR - ADULT  Goal: Maintains optimal cardiac output and hemodynamic stability  Description  INTERVENTIONS:  - Monitor I/O, vital signs and rhythm  - Monitor for S/S and trends of decreased cardiac output i e  bleeding, hypotension  - Administer and titrate ordered vasoactive medications to optimize hemodynamic stability  - Assess quality of pulses, skin color and temperature  - Assess for signs of decreased coronary artery perfusion - ex   Angina  - Instruct patient to report change in severity of symptoms  Outcome: Progressing  Goal: Absence of cardiac dysrhythmias or at baseline rhythm  Description  INTERVENTIONS:  - Continuous cardiac monitoring, monitor vital signs, obtain 12 lead EKG if indicated  - Administer antiarrhythmic and heart rate control medications as ordered  - Monitor electrolytes and administer replacement therapy as ordered  Outcome: Progressing     Problem: MUSCULOSKELETAL - ADULT  Goal: Maintain or return mobility to safest level of function  Description  INTERVENTIONS:  - Assess patient's ability to carry out ADLs; assess patient's baseline for ADL function and identify physical deficits which impact ability to perform ADLs (bathing, care of mouth/teeth, toileting, grooming, dressing, etc )  - Assess/evaluate cause of self-care deficits   - Assess range of motion  - Assess patient's mobility; develop plan if impaired  - Assess patient's need for assistive devices and provide as appropriate  - Encourage maximum independence but intervene and supervise when necessary  - Involve family in performance of ADLs  - Assess for home care needs following discharge   - Request OT consult to assist with ADL evaluation and planning for discharge  - Provide patient education as appropriate  Outcome: Progressing  Goal: Maintain proper alignment of affected body part  Description  INTERVENTIONS:  - Support, maintain and protect limb and body alignment  - Provide pt/fam with appropriate education  Outcome: Progressing     Problem: Prexisting or High Potential for Compromised Skin Integrity  Goal: Skin integrity is maintained or improved  Description  INTERVENTIONS:  - Identify patients at risk for skin breakdown  - Assess and monitor skin integrity  - Assess and monitor nutrition and hydration status  - Monitor labs (i e  albumin)  - Assess for incontinence   - Turn and reposition patient  - Assist with mobility/ambulation  - Relieve pressure over bony prominences  - Avoid friction and shearing  - Provide appropriate hygiene as needed including keeping skin clean and dry  - Evaluate need for skin moisturizer/barrier cream  - Collaborate with interdisciplinary team (i e  Nutrition, Rehabilitation, etc )   - Patient/family teaching  Outcome: Progressing - History of sarcoidosis on Prednisone  - May be possible etiology of effusion  - C/w Prednisone 20 mg QD

## 2020-08-02 NOTE — H&P ADULT - NSHPSOCIALHISTORY_GEN_ALL_CORE
RANDY Rivera Marital Status: Single, has 2 children    Occupation: Used to be an Assistant Manger for a store, now on disability    Tobacco Use: neg     ETOH Use: Socially    Flu Vaccine:           neg                       Pneumonia Vaccine:  neg

## 2021-07-29 NOTE — H&P ADULT - VASCULAR
Would like to know if gabapentin is good for pain? Equal and normal pulses (carotid, femoral, dorsalis pedis)

## 2021-08-19 NOTE — PROGRESS NOTE ADULT - PROBLEM/PLAN-1
DISPLAY PLAN FREE TEXT Minoxidil Counseling: Minoxidil is a topical medication which can increase blood flow where it is applied. It is uncertain how this medication increases hair growth. Side effects are uncommon and include stinging and allergic reactions.

## 2022-04-28 NOTE — H&P ADULT - NSICDXPASTSURGICALHX_GEN_ALL_CORE_FT
PAST SURGICAL HISTORY:  AICD (automatic cardioverter/defibrillator) present     Ankle fracture     History of repair of hip fracture
numerical 0-10

## 2022-06-21 NOTE — PROVIDER CONTACT NOTE (OTHER) - SITUATION
/432 before breakfast
Blood sugar 463
FS >400
Notified provider-UM=[id number], pt is due for sliding scale 4 u & lantus 24 u
Notified provider-pt has only drank about half of Golytely prep
Detail Level: Simple
Price (Do Not Change): 0.00
Instructions: This plan will send the code FBSD to the PM system.  DO NOT or CHANGE the price.

## 2022-09-26 NOTE — PHYSICAL THERAPY INITIAL EVALUATION ADULT - CRITERIA FOR SKILLED THERAPEUTIC INTERVENTIONS
Principal Discharge DX:	Abdominal pain   1 impairments found/functional limitations in following categories/predicted duration of therapy intervention/anticipated equipment needs at discharge/anticipated discharge recommendation/rehab potential/therapy frequency

## 2022-12-12 NOTE — DIETITIAN INITIAL EVALUATION ADULT. - PROBLEM SELECTOR PLAN 2
currently patient does not have diarrhea, if recurs, will send stool work up  consider GI consult if diarrhea continues
Yes

## 2023-03-08 NOTE — CONSULT NOTE ADULT - PROBLEM SELECTOR RECOMMENDATION 7
X Size Of Lesion In Cm (Optional): 0 Procedure To Be Performed At Next Visit: Skin Tag removal (Cosmetic) Introduction Text (Please End With A Colon): The following procedure was deferred: Detail Level: Detailed DVT and Gi PPX

## 2023-04-12 NOTE — DISCHARGE NOTE PROVIDER - PROVIDER TOKENS
PROVIDER:[TOKEN:[[id number]]],PROVIDER:[TOKEN:[[id number]]],PROVIDER:[TOKEN:[[id number]]],PROVIDER:[TOKEN:[[id number]]],PROVIDER:[TOKEN:[[id number]]] Azelaic Acid Pregnancy And Lactation Text: This medication is considered safe during pregnancy and breast feeding.

## 2023-07-26 NOTE — PATIENT PROFILE ADULT. - FALL HARM RISK CONCLUSION
[NL] : no abnormal lymph nodes palpated [Moves All Extremities x 4] : moves all extremities x4 [FreeTextEntry7] : no increased work of breathing [de-identified] : multiple, raised red papule to face, scalp and nape of neck Fall Risk

## 2023-08-18 NOTE — PATIENT PROFILE ADULT - INFORMATION PROVIDED TO:
[FreeTextEntry1] : 60 year old female with chronic pain. We will change her Oxycodone ER to IR formulation today to be taken no more then two times a day. Patient will return for re-evaluation in 4-6 weeks. If there is any issue she will contact the office.  Check of the  registry reveals compliance in regards to narcotic medication management use.  UDS from 04/14/2023 is consistent.  I advised the patient they must keep their medication under a lock and key, or in a safe place away from children or other individuals. This medication given is solely for the patient and under no circumstances to be shared. Patient verbalized this and is in agreement with the aforementioned. I explained the risk of addiction, tolerance, and withdrawals. UDS will be done randomly and a drug agreement was signed.  I explained the risk of addiction, tolerance and withdrawals. UDS will be done randomly and a drug agreement was signed.   LUI De Guzman M.D., FAAPMR    
patient

## 2023-08-30 NOTE — PROGRESS NOTE ADULT - SUBJECTIVE AND OBJECTIVE BOX
Follow Up:  pericarditis    Inverval History/ROS: feels better.  pericardial drain in place.    Allergies  No Known Allergies        ANTIMICROBIALS:  cefTRIAXone   IVPB    cefTRIAXone   IVPB 2 every 24 hours  vancomycin  IVPB 750 every 12 hours      OTHER MEDS:  acetaminophen   Tablet. 650 milliGRAM(s) Oral every 6 hours PRN  acetaminophen   Tablet. 650 milliGRAM(s) Oral every 6 hours PRN  ALBUTerol    90 MICROgram(s) HFA Inhaler 2 Puff(s) Inhalation every 6 hours PRN  aspirin enteric coated 81 milliGRAM(s) Oral daily  atorvastatin 40 milliGRAM(s) Oral at bedtime  chlorhexidine 4% Liquid 1 Application(s) Topical <User Schedule>  dextrose 5%. 1000 milliLiter(s) IV Continuous <Continuous>  dextrose 50% Injectable 12.5 Gram(s) IV Push once  dextrose 50% Injectable 25 Gram(s) IV Push once  dextrose 50% Injectable 25 Gram(s) IV Push once  dextrose Gel 1 Dose(s) Oral once PRN  ferrous    sulfate 325 milliGRAM(s) Oral two times a day  fluticasone propionate   220 MICROgram(s) HFA Inhaler 1 Puff(s) Inhalation two times a day  furosemide   Injectable 40 milliGRAM(s) IV Push every 12 hours  glucagon  Injectable 1 milliGRAM(s) IntraMuscular once PRN  heparin  Injectable 5000 Unit(s) SubCutaneous every 8 hours  insulin glargine Injectable (LANTUS) 24 Unit(s) SubCutaneous at bedtime  insulin lispro (HumaLOG) corrective regimen sliding scale   SubCutaneous three times a day before meals  insulin lispro (HumaLOG) corrective regimen sliding scale   SubCutaneous at bedtime  insulin lispro Injectable (HumaLOG) 15 Unit(s) SubCutaneous three times a day before meals  predniSONE   Tablet 20 milliGRAM(s) Oral two times a day      Vital Signs Last 24 Hrs  T(C): 36.3 (09 May 2018 16:00), Max: 36.7 (09 May 2018 04:00)  T(F): 97.4 (09 May 2018 16:00), Max: 98.1 (09 May 2018 08:00)  HR: 110 (09 May 2018 15:00) (95 - 133)  BP: 132/85 (09 May 2018 15:00) (112/66 - 147/96)  BP(mean): 98 (09 May 2018 15:00) (76 - 119)  RR: 18 (09 May 2018 15:00) (9 - 29)  SpO2: 97% (09 May 2018 09:00) (94% - 97%)    PHYSICAL EXAM:  General: [x ] non-toxic, in bedside chair  HEAD/EYES: [ ] PERRL [x ] white sclera [ ] icterus  ENT:  [ ] normal [x ] supple [ ] thrush [ ] pharyngeal exudate  Cardiovascular:   [ ] murmur [x ] normal  drain+  Respiratory:  [x ] clear to ausculation bilaterally  GI:  [x ] soft, non-tender, normal bowel sounds  :  [ ] barfield [ ] no CVA tenderness   Musculoskeletal:  [ ] no synovitis  Neurologic:  [ ] non-focal exam   Skin:  [x ] no rash  Lymph: [ ] no lymphadenopathy  Psychiatric:  [x ] appropriate affect [x ] alert & oriented  Lines:  [ ] no phlebitis [ ] central line                                13.2   10.51 )-----------( 348      ( 09 May 2018 06:40 )             45.3       05-09    133<L>  |  98  |  65<H>  ----------------------------<  435<H>  5.8<H>   |  26  |  1.35<H>    Ca    8.5      09 May 2018 08:45  Phos  3.5     05-09  Mg     2.2     05-09            MICROBIOLOGY:    DRAINAGE  05-07-18 --  --  --  gram stain WBC+  Gram positive cocci in pairs      URINE MIDSTREAM  05-04-18 --  --  --      BLOOD PERIPHERAL  05-03-18 --  --  --        RADIOLOGY:    < from: Xray Chest 1 View- PORTABLE-Routine (05.09.18 @ 07:50) >    Lines/Tubes: None    Heart and mediastinum:  Unchanged.    Lungs, pleura, and airways: Patchy opacities bilaterally suggestive of   pulmonary edema. Small left effusion, stable    Bones and soft tissues: The bones and soft tissues are unchanged.    Impression:    Interstitial edema and left effusion, stable.    < end of copied text > 27.3

## 2025-02-28 NOTE — DIETITIAN INITIAL EVALUATION ADULT. - OTHER INFO
Pt seen for nutrition consult for assessment.  Pt denies nausea/vomiting, notes she does not have back teeth, but declined softer diet (re: chewing/swallowing), Pt reports chronic diarrhea since Oct 2018; unclear BMs since admission - Pt not reporting diarrhea to nursing staff, noted solid stool over weekend per MD note, Pt reporting "I am sitting in diarrhea now" at time of RD visit, however, declined offer for RD to return at later time.  RD provided T2DM diet education - reviewed sources of carbohydrate, label reading, protein vs. carbohydrate, consistent carbohydrate intake; also reviewed HF Nutrition Therapy - sodium, relationship between Na and fluid, fluid intake.  Handouts provided.  Pt able to teach back examples of carbohydrate and protein. RD also reviewed label reading.  HgbA1c likely elevated in context of prednisone and poor dietary adherence.  Pt made aware RD remains available PRN for review of diet education.  Of note, Pt upset about lactose restricted diet - endorses using dairy products PTA and diarrhea persists with or without.  RD defers to medical team.  Per MD note, C diff negative.  Given POCT BG remains elevated, ?consider consult to endocrinology.